# Patient Record
Sex: FEMALE | Race: BLACK OR AFRICAN AMERICAN | NOT HISPANIC OR LATINO | Employment: PART TIME | URBAN - METROPOLITAN AREA
[De-identification: names, ages, dates, MRNs, and addresses within clinical notes are randomized per-mention and may not be internally consistent; named-entity substitution may affect disease eponyms.]

---

## 2020-06-15 ENCOUNTER — OFFICE VISIT - HEALTHEAST (OUTPATIENT)
Dept: FAMILY MEDICINE | Facility: CLINIC | Age: 25
End: 2020-06-15

## 2020-06-15 DIAGNOSIS — D57.00 HB-SS DISEASE WITH CRISIS (H): ICD-10-CM

## 2020-06-15 RX ORDER — CYCLOBENZAPRINE HCL 10 MG
10 TABLET ORAL 2 TIMES DAILY PRN
Status: ON HOLD | COMMUNITY
Start: 2020-02-18 | End: 2023-01-19

## 2020-06-15 RX ORDER — ALBUTEROL SULFATE 0.83 MG/ML
SOLUTION RESPIRATORY (INHALATION)
Status: SHIPPED | COMMUNITY
Start: 2020-02-18 | End: 2022-08-04

## 2020-06-15 RX ORDER — ACETAMINOPHEN 325 MG/1
TABLET ORAL
Status: SHIPPED | COMMUNITY
Start: 2020-03-01 | End: 2023-01-14

## 2021-03-09 ENCOUNTER — COMMUNICATION - HEALTHEAST (OUTPATIENT)
Dept: SCHEDULING | Facility: CLINIC | Age: 26
End: 2021-03-09

## 2021-05-12 ENCOUNTER — COMMUNICATION - HEALTHEAST (OUTPATIENT)
Dept: SCHEDULING | Facility: CLINIC | Age: 26
End: 2021-05-12

## 2021-06-04 VITALS
SYSTOLIC BLOOD PRESSURE: 130 MMHG | WEIGHT: 176.3 LBS | HEART RATE: 84 BPM | TEMPERATURE: 98.7 F | RESPIRATION RATE: 14 BRPM | DIASTOLIC BLOOD PRESSURE: 77 MMHG | OXYGEN SATURATION: 100 %

## 2021-06-08 NOTE — PROGRESS NOTES
"Chief Complaint   Patient presents with     Arm Pain     Left arm pain, shoulder to elbow, sharp pain, constant sensation, gets more intense, started today       ASSESSMENT & PLAN:   Diagnoses and all orders for this visit:    Hb-SS disease with crisis (H)        MDM:  Patient with severe right upper arm pain and tenderness consistent with previous sickle cell flareups. Patient informed we do not have adequate capability to fully assess sickle cell flareups here.  Very slight scleral icterus noted as well.      Patient normally goes to [facility], but states she does not want to go downtown and is having difficulty obtaining a ride today.  Patient reminded we are across the street from [facility] emergency room that they can see her previous information.  Patient says she will go to [facility] for further care.  Did discuss with Dr. Jamison in the emergency room.    Supportive care discussed.  See discharge instructions below for specific recommendations given.    At the end of the encounter, I discussed results, diagnosis, medications. Discussed red flags for immediate return to clinic/ER, as well as indications for follow up if no improvement. Patient and/or caregiver understood and agreed to plan. Patient was stable for discharge.    SUBJECTIVE    HPI:  HPI  Eveline Gage presents to the walk-in clinic with   Chief Complaint   Patient presents with     Arm Pain     Left arm pain, shoulder to elbow, sharp pain, constant sensation, gets more intense, started today     Severe right (not left as stated by MA) upper arm pain.  Pain 8/10.  Got much worse throughout the day last 6 hours ago.  \"Pressure\" like if someone is squeezing arm and is starting to feel a little numb as if a \"rubber band is around the arm cutting off the circulation a little bit.\"      Normally gets flare ups with seasons changing.  Says flareups will oftentimes involve 1 or more upper or lower extremities.     Tried tylenol PTA; " doesn't have opiates at home.      Difficulty moving her arm due to pain.    Denies: fevers, cough, dysuria, other urinary/infectious sx.      Known exposures: none     See ROS for additional symptoms and/or pertinent negatives.       History obtained from the patient.    No past medical history on file.    Active Ambulatory (Non-Hospital) Problems    Diagnosis     Sickle cell disease (H)     Elevated liver enzymes     Adjustment disorder with mixed anxiety and depressed mood     Postpartum mood disturbance     Asthma       No family history on file.    Social History     Tobacco Use     Smoking status: Current Every Day Smoker     Smokeless tobacco: Never Used   Substance Use Topics     Alcohol use: Not on file       Review of Systems   Constitutional: Negative for chills and fever.   HENT: Negative for congestion and rhinorrhea.    Respiratory: Negative for cough and shortness of breath.    Cardiovascular: Negative for chest pain.   Gastrointestinal: Negative for abdominal pain and vomiting.   Musculoskeletal: Positive for neck stiffness (mild lateral neck ).   Skin: Negative for rash.   Neurological: Negative for dizziness and headaches.       OBJECTIVE    Vitals:    06/15/20 1833   BP: 130/77   Pulse: 84   Resp: 14   Temp: 98.7  F (37.1  C)   TempSrc: Oral   SpO2: 100%   Weight: 176 lb 4.8 oz (80 kg)       Physical Exam  Constitutional:       General: She is not in acute distress.     Appearance: She is well-developed.   HENT:      Right Ear: External ear normal.      Left Ear: External ear normal.      Nose: No congestion or rhinorrhea.   Eyes:      General:         Right eye: No discharge.         Left eye: No discharge.      Conjunctiva/sclera: Conjunctivae normal.   Neck:      Musculoskeletal: Muscular tenderness (rt lateral ) present.   Cardiovascular:      Rate and Rhythm: Normal rate.      Pulses: Normal pulses.   Pulmonary:      Effort: Pulmonary effort is normal.   Musculoskeletal: Normal range of  motion.         General: Tenderness (rt upper extremity ) present.   Skin:     General: Skin is warm and dry.      Capillary Refill: Capillary refill takes less than 2 seconds.   Neurological:      Mental Status: She is alert and oriented to person, place, and time.   Psychiatric:         Mood and Affect: Mood normal.         Behavior: Behavior normal.         Thought Content: Thought content normal.         Judgment: Judgment normal.         Labs:  No results found for this or any previous visit (from the past 240 hour(s)).      Radiology:    No results found.    PATIENT INSTRUCTIONS:   Patient Instructions   Recommend ER for further care.

## 2021-06-16 PROBLEM — J45.909 ASTHMA: Status: ACTIVE | Noted: 2017-03-09

## 2021-06-20 NOTE — LETTER
Letter by Carrie Soriano CNP at      Author: Carrie Soriano CNP Service: -- Author Type: --    Filed:  Encounter Date: 6/15/2020 Status: (Other)         Melissa 15, 2020     Patient: Eveline Gage   YOB: 1995   Date of Visit: 6/15/2020       To Whom It May Concern:    It is my medical opinion that Eveline Gage was seen today in my clinic for an urgent clinic.    If you have any questions or concerns, please don't hesitate to call.    Sincerely,        Electronically signed by Carrie Soriano CNP

## 2021-08-22 ENCOUNTER — HEALTH MAINTENANCE LETTER (OUTPATIENT)
Age: 26
End: 2021-08-22

## 2021-08-25 ENCOUNTER — HOSPITAL ENCOUNTER (EMERGENCY)
Facility: CLINIC | Age: 26
Discharge: HOME | End: 2021-08-25
Attending: EMERGENCY MEDICINE | Admitting: EMERGENCY MEDICINE
Payer: COMMERCIAL

## 2021-08-25 ENCOUNTER — APPOINTMENT (OUTPATIENT)
Dept: RADIOLOGY | Facility: CLINIC | Age: 26
End: 2021-08-25
Attending: EMERGENCY MEDICINE
Payer: COMMERCIAL

## 2021-08-25 VITALS
HEART RATE: 88 BPM | HEIGHT: 63 IN | SYSTOLIC BLOOD PRESSURE: 129 MMHG | TEMPERATURE: 98.7 F | OXYGEN SATURATION: 96 % | DIASTOLIC BLOOD PRESSURE: 70 MMHG | WEIGHT: 186 LBS | RESPIRATION RATE: 16 BRPM | BODY MASS INDEX: 32.96 KG/M2

## 2021-08-25 DIAGNOSIS — U07.1 2019 NOVEL CORONAVIRUS DISEASE (COVID-19): ICD-10-CM

## 2021-08-25 DIAGNOSIS — D57.00 SICKLE CELL PAIN CRISIS (H): ICD-10-CM

## 2021-08-25 LAB
ALBUMIN SERPL-MCNC: 4 G/DL (ref 3.5–5)
ALP SERPL-CCNC: 118 U/L (ref 45–120)
ALT SERPL W P-5'-P-CCNC: 76 U/L (ref 0–45)
ANION GAP SERPL CALCULATED.3IONS-SCNC: 9 MMOL/L (ref 5–18)
AST SERPL W P-5'-P-CCNC: 63 U/L (ref 0–40)
BASOPHILS # BLD MANUAL: 0 10E3/UL (ref 0–0.2)
BASOPHILS NFR BLD MANUAL: 0 %
BILIRUB SERPL-MCNC: 2.5 MG/DL (ref 0–1)
BUN SERPL-MCNC: 7 MG/DL (ref 8–22)
CALCIUM SERPL-MCNC: 9 MG/DL (ref 8.5–10.5)
CHLORIDE BLD-SCNC: 108 MMOL/L (ref 98–107)
CO2 SERPL-SCNC: 20 MMOL/L (ref 22–31)
CREAT SERPL-MCNC: 0.71 MG/DL (ref 0.6–1.1)
EOSINOPHIL # BLD MANUAL: 0.2 10E3/UL (ref 0–0.7)
EOSINOPHIL NFR BLD MANUAL: 2 %
ERYTHROCYTE [DISTWIDTH] IN BLOOD BY AUTOMATED COUNT: 16 % (ref 10–15)
GFR SERPL CREATININE-BSD FRML MDRD: >90 ML/MIN/1.73M2
GLUCOSE BLD-MCNC: 112 MG/DL (ref 70–125)
HCT VFR BLD AUTO: 27 % (ref 35–47)
HGB BLD-MCNC: 9 G/DL (ref 11.7–15.7)
LYMPHOCYTES # BLD MANUAL: 4.1 10E3/UL (ref 0.8–5.3)
LYMPHOCYTES NFR BLD MANUAL: 33 %
MCH RBC QN AUTO: 28 PG (ref 26.5–33)
MCHC RBC AUTO-ENTMCNC: 33.3 G/DL (ref 31.5–36.5)
MCV RBC AUTO: 84 FL (ref 78–100)
MONOCYTES # BLD MANUAL: 0.7 10E3/UL (ref 0–1.3)
MONOCYTES NFR BLD MANUAL: 6 %
NEUTROPHILS # BLD MANUAL: 7.3 10E3/UL (ref 1.6–8.3)
NEUTROPHILS NFR BLD MANUAL: 59 %
PLAT MORPH BLD: ABNORMAL
PLATELET # BLD AUTO: 334 10E3/UL (ref 150–450)
POTASSIUM BLD-SCNC: 3.6 MMOL/L (ref 3.5–5)
PROT SERPL-MCNC: 7.9 G/DL (ref 6–8)
RBC # BLD AUTO: 3.22 10E6/UL (ref 3.8–5.2)
RBC MORPH BLD: ABNORMAL
RETICS # AUTO: 0.21 10E6/UL (ref 0.01–0.11)
RETICS/RBC NFR AUTO: 6.4 % (ref 0.8–2.7)
SARS-COV-2 RNA RESP QL NAA+PROBE: POSITIVE
SICKLE CELLS BLD QL SMEAR: SLIGHT
SODIUM SERPL-SCNC: 137 MMOL/L (ref 136–145)
TARGETS BLD QL SMEAR: SLIGHT
TROPONIN I SERPL-MCNC: 0.03 NG/ML (ref 0–0.29)
WBC # BLD AUTO: 12.3 10E3/UL (ref 4–11)

## 2021-08-25 PROCEDURE — 258N000003 HC RX IP 258 OP 636: Performed by: EMERGENCY MEDICINE

## 2021-08-25 PROCEDURE — 87635 SARS-COV-2 COVID-19 AMP PRB: CPT | Performed by: EMERGENCY MEDICINE

## 2021-08-25 PROCEDURE — C9803 HOPD COVID-19 SPEC COLLECT: HCPCS

## 2021-08-25 PROCEDURE — 71046 X-RAY EXAM CHEST 2 VIEWS: CPT

## 2021-08-25 PROCEDURE — 250N000013 HC RX MED GY IP 250 OP 250 PS 637: Performed by: EMERGENCY MEDICINE

## 2021-08-25 PROCEDURE — 96361 HYDRATE IV INFUSION ADD-ON: CPT

## 2021-08-25 PROCEDURE — 85027 COMPLETE CBC AUTOMATED: CPT | Performed by: EMERGENCY MEDICINE

## 2021-08-25 PROCEDURE — 82040 ASSAY OF SERUM ALBUMIN: CPT | Performed by: EMERGENCY MEDICINE

## 2021-08-25 PROCEDURE — 93005 ELECTROCARDIOGRAM TRACING: CPT | Performed by: EMERGENCY MEDICINE

## 2021-08-25 PROCEDURE — 36415 COLL VENOUS BLD VENIPUNCTURE: CPT | Performed by: EMERGENCY MEDICINE

## 2021-08-25 PROCEDURE — 84484 ASSAY OF TROPONIN QUANT: CPT | Performed by: EMERGENCY MEDICINE

## 2021-08-25 PROCEDURE — 96374 THER/PROPH/DIAG INJ IV PUSH: CPT

## 2021-08-25 PROCEDURE — 99285 EMERGENCY DEPT VISIT HI MDM: CPT | Mod: 25

## 2021-08-25 PROCEDURE — 85045 AUTOMATED RETICULOCYTE COUNT: CPT | Performed by: EMERGENCY MEDICINE

## 2021-08-25 PROCEDURE — 250N000011 HC RX IP 250 OP 636: Performed by: EMERGENCY MEDICINE

## 2021-08-25 PROCEDURE — 82565 ASSAY OF CREATININE: CPT | Performed by: EMERGENCY MEDICINE

## 2021-08-25 PROCEDURE — 96375 TX/PRO/DX INJ NEW DRUG ADDON: CPT

## 2021-08-25 RX ORDER — HYDROMORPHONE HYDROCHLORIDE 1 MG/ML
0.5 INJECTION, SOLUTION INTRAMUSCULAR; INTRAVENOUS; SUBCUTANEOUS ONCE
Status: COMPLETED | OUTPATIENT
Start: 2021-08-25 | End: 2021-08-25

## 2021-08-25 RX ORDER — OXYCODONE HYDROCHLORIDE 5 MG/1
5 TABLET ORAL ONCE
Status: COMPLETED | OUTPATIENT
Start: 2021-08-25 | End: 2021-08-25

## 2021-08-25 RX ORDER — OXYCODONE HYDROCHLORIDE 5 MG/1
TABLET ORAL
Qty: 12 TABLET | Refills: 0 | Status: SHIPPED | OUTPATIENT
Start: 2021-08-25 | End: 2023-01-14

## 2021-08-25 RX ORDER — ACETAMINOPHEN 325 MG/1
975 TABLET ORAL ONCE
Status: COMPLETED | OUTPATIENT
Start: 2021-08-25 | End: 2021-08-25

## 2021-08-25 RX ORDER — SODIUM CHLORIDE 9 MG/ML
INJECTION, SOLUTION INTRAVENOUS CONTINUOUS
Status: DISCONTINUED | OUTPATIENT
Start: 2021-08-25 | End: 2021-08-25 | Stop reason: HOSPADM

## 2021-08-25 RX ORDER — KETOROLAC TROMETHAMINE 15 MG/ML
15 INJECTION, SOLUTION INTRAMUSCULAR; INTRAVENOUS ONCE
Status: COMPLETED | OUTPATIENT
Start: 2021-08-25 | End: 2021-08-25

## 2021-08-25 RX ADMIN — SODIUM CHLORIDE 1000 ML: 9 INJECTION, SOLUTION INTRAVENOUS at 16:39

## 2021-08-25 RX ADMIN — HYDROMORPHONE HYDROCHLORIDE 0.5 MG: 1 INJECTION, SOLUTION INTRAMUSCULAR; INTRAVENOUS; SUBCUTANEOUS at 17:33

## 2021-08-25 RX ADMIN — ACETAMINOPHEN 975 MG: 325 TABLET ORAL at 16:17

## 2021-08-25 RX ADMIN — OXYCODONE HYDROCHLORIDE 5 MG: 5 TABLET ORAL at 16:17

## 2021-08-25 RX ADMIN — KETOROLAC TROMETHAMINE 15 MG: 15 INJECTION, SOLUTION INTRAMUSCULAR; INTRAVENOUS at 16:16

## 2021-08-25 ASSESSMENT — MIFFLIN-ST. JEOR: SCORE: 1547.82

## 2021-08-25 NOTE — Clinical Note
Eveline Gage was seen and treated in our emergency department on 8/25/2021.  She may return to work on 09/01/2021.       If you have any questions or concerns, please don't hesitate to call.      Giovanna Arango MD

## 2021-08-25 NOTE — ED PROVIDER NOTES
EMERGENCY DEPARTMENT ENCOUNTER       ED Course & Medical Decision Making       Final Impression  25-year-old female with history of sickle cell disease and asthma presents for evaluation of multiple symptoms suggestive of viral syndrome, particularly concerning for COVID-19 given ongoing surge in pandemic, partial vaccination status, and specific symptom of loss of taste and smell.  Additionally with some myalgias worse in lower extremities that feels similar to prior sickle cell pain crises.  Taking ibuprofen alone at home with limited improvement.  Will add Tylenol, dose of toradol and oxycodone here in the ED. Suspect elevated heart rate is due to a combination of pain and possible dehydration with some loose stools as opposed to sepsis.  She does endorse a productive cough and some mild dyspnea though these could well be attributed to COVID-19.  Some chest pain that is largely posttussive though does have a pleuritic component.  Chest x-ray appears normal with no signs of viral pneumonia nor focal infiltrates to suggest a superimposed bacterial pneumonia or acute chest syndrome.  Some subjective fevers though no documented fevers including on checks at home.  No hemoptysis with overall low concern for PE at this time.  Oxygen saturation 100% and remains in the high 90s including with ambulation.  Will gently hydrate and treat pain.  Low concern for SBI. Discussed supportive care and need for self isolation.  Anticipate likely discharge home with strict return precautions.     4:16 PM I met with the patient for the initial interview and physical examination. Discussed plan for treatment and workup in the ED.    5:27 PM I rechecked and updated the patient. Pain improved from 8/10 to 6/10 will order more pain meds.     PPE: Provider wore gloves, N95 mask, eye protection, surgical cap, and paper mask.   Prior to making a final disposition on this patient the results of patient's tests and other diagnostic studies  were discussed with the patient. All questions were answered. Patient expressed understanding of the plan and was amenable to it.      ED Course as of Aug 25 2046   Wed Aug 25, 2021   1644 Labs w/ +Covid swab. Chronic stable anemia, mildly elevated tbili from prior, mild transaminitis which could be 2/2 covid. Retic count appropriately elevated. Mild leukocytosis. CXR unremarkable.      1714 EKG shows normal sinus rhythm with sinus arrhythmia rate of 70.  Normal EKG with no acute ST-T wave abnormalities no STEMI.  No right heart strain.  No old EKG available for comparison.      1727 Pain 6/10 from 8/10 on arrival. Goal is 4-5/10. Lungs CTAB. HR down to 65 s/p IVF and pain meds. Reviewed return precautions.       1752 Documented respiratory rates noted to be elevated - these appear falsely elevated in comparison to patient exam, with pt having normal WOB.            Medications   0.9% sodium chloride BOLUS (0 mLs Intravenous Stopped 8/25/21 1827)     Followed by   sodium chloride 0.9% infusion (has no administration in time range)   ketorolac (TORADOL) injection 15 mg (15 mg Intravenous Given 8/25/21 1616)   oxyCODONE (ROXICODONE) tablet 5 mg (5 mg Oral Given 8/25/21 1617)   acetaminophen (TYLENOL) tablet 975 mg (975 mg Oral Given 8/25/21 1617)   HYDROmorphone (PF) (DILAUDID) injection 0.5 mg (0.5 mg Intravenous Given 8/25/21 1733)       Final Impression     1. 2019 novel coronavirus disease (COVID-19)    2. Sickle cell pain crisis (H)          Chief Complaint     Chief Complaint   Patient presents with     Covid 19 Testing     Sickle Cell Pain Crisis       Covid 19 Testing and Sickle Cell Pain Crisis      HPI     Eveline Gage is a 25 year old female with a pertinent medical history of sickle cell disease and asthma who presents for evaluation of fever, cough, rhinorrhea, myalgias, and dyspnea.     Patient reports a 3 day history of subjective fever (Tmax 99.2F), cough productive of clear/green mucous,  rhinorrhea, myalgias, loss of taste and smell, mild HA, and diarrhea. She also notes some right-sided chest pain that worsens with coughing and deep breathing. Patient also endorses some bilateral leg pain (improved some with ibuprofen) that feels similar to her usual pain caused by her sickle cell anemia. No focal joint pain/swelling/redness. Patient denies nausea, emesis, hemoptysis, and any other symptoms or complaints at this time.    Patient is partially vaccinated with Pfizer 2-dose regimen. Second dose just given Friday (8/20) and first dose given 1 month prior. Patient's fiancee was recently ill with similar symptoms. Patient has 4 year old daughter who is in . No recent travel/immob/inj/surg. No hx VTE or cancer. Has nexplanon for contraception.     I, Maria Fernanda Koch am serving as a scribe to document services personally performed by Giovanna Arango MD, based on my observation and the provider's statements to me. I, Giovanna Arango MD attest that Maria Fernanda Koch is acting in a scribe capacity, has observed my performance of the services and has documented them in accordance with my direction.    Past Medical History     Patient Active Problem List    Diagnosis Date Noted     Sickle cell disease (H) 06/15/2020     Priority: Medium     Elevated liver enzymes 03/12/2018     Priority: Medium     Adjustment disorder with mixed anxiety and depressed mood 10/05/2017     Priority: Medium     Postpartum mood disturbance 10/05/2017     Priority: Medium     Asthma 03/09/2017     Priority: Medium       No past medical history on file.  No past surgical history on file.  No family history on file.   Social History     Tobacco Use     Smoking status: Current Every Day Smoker     Smokeless tobacco: Never Used   Substance Use Topics     Alcohol use: Not on file     Drug use: Not on file       Relevant past medical, surgical, family and social history as documented above, has been reviewed and discussed with patient. No changes  or additions, unless otherwise noted in the HPI.    Current Medications     Discharge Medication List as of 8/25/2021  6:31 PM      CONTINUE these medications which have CHANGED    Details   oxyCODONE (ROXICODONE) 5 MG tablet 1-2 tabs q6hr PRN breakthrough pain, Disp-12 tablet, R-0, Local Print         CONTINUE these medications which have NOT CHANGED    Details   acetaminophen (TYLENOL) 325 MG tablet [ACETAMINOPHEN (TYLENOL) 325 MG TABLET] , Historical      albuterol (PROVENTIL) 2.5 mg /3 mL (0.083 %) nebulizer solution [ALBUTEROL (PROVENTIL) 2.5 MG /3 ML (0.083 %) NEBULIZER SOLUTION] , Historical      cyclobenzaprine (FLEXERIL) 10 MG tablet [CYCLOBENZAPRINE (FLEXERIL) 10 MG TABLET] , Historical      famotidine (PEPCID) 20 MG tablet [FAMOTIDINE (PEPCID) 20 MG TABLET] Take 1 tablet (20 mg total) by mouth 2 (two) times a day., Disp-30 tablet, R-0, Normal      lidocaine (LIDODERM) 5 % [LIDOCAINE (LIDODERM) 5 %] Remove & Discard patch within 12 hours or as directed by MDDisp-15 patch, R-0Print      omeprazole (PRILOSEC) 20 MG capsule [OMEPRAZOLE (PRILOSEC) 20 MG CAPSULE] Take 1 capsule (20 mg total) by mouth daily before breakfast., Disp-30 capsule, R-0, Normal             Allergies     Allergies   Allergen Reactions     Blood-Group Specific Substance Other (See Comments)     Patient has anti-C, anti-S, anti-Jkb, anti-M, and a nonspecific antibody. Blood product orders may be delayed. Draw THREE purple top tubes for all Type and Screen/Type and crossmatch orders.     Penicillins Rash       Review of Systems     Constitutional: Positive for fever (subjective), loss of taste, loss of smell  Eyes: Denies discharge  HENT: Denies sore throat  Positive for rhinorrhea  Respiratory: Positive for cough, hemoptysis  Cardiovascular: Positive for chest pain (right sided)  GI: Denies vomiting, nausea  Positive for diarrhea  : Denies dysuria  Musculoskeletal: Positive for myalgias, bilateral leg pain  Skin: Denies  "rashes  Neurologic: Positive for headache (mild)    Remainder of systems reviewed, unless noted in HPI all others negative.    Physical Exam     /70   Pulse 88   Temp 98.7  F (37.1  C) (Temporal)   Resp 16   Ht 1.6 m (5' 3\")   Wt 84.4 kg (186 lb)   SpO2 96%   BMI 32.95 kg/m    Constitutional: Awake, alert, in no acute distress  Head: Normocephalic, atraumatic.  ENT: Mucous membranes moist.   Eyes: Conjunctiva normal  Respiratory: Respirations even, unlabored. Lungs clear to ascultation bilaterally.  Cardiovascular: Regular rate and rhythm. No pitting edema.   GI: Abdomen soft, non-tender. No guarding or rebound.   Musculoskeletal: No deformity. No calf asymmetry or tenderness. No palpable cords. No focal joint tenderness or swelling.   Integument: Warm, dry. No rash.   Neurologic: Alert & oriented x 3. Normal speech. Grossly normal motor and sensory function. No focal deficits noted.  Psychiatric: Normal mood and affect. Normal judgment.       Labs     Labs Ordered and Resulted from Time of ED Arrival Up to the Time of Departure from the ED   COMPREHENSIVE METABOLIC PANEL - Abnormal; Notable for the following components:       Result Value    Chloride 108 (*)     Carbon Dioxide (CO2) 20 (*)     Urea Nitrogen 7 (*)     AST 63 (*)     ALT 76 (*)     Bilirubin Total 2.5 (*)     All other components within normal limits   RETICULOCYTE COUNT - Abnormal; Notable for the following components:    % Reticulocyte 6.4 (*)     Absolute Reticulocyte 0.206 (*)     All other components within normal limits   COVID-19 VIRUS (CORONAVIRUS) BY PCR - Abnormal; Notable for the following components:    SARS CoV2 PCR Positive (*)     All other components within normal limits    Narrative:     Testing was performed using the khoi  SARS-CoV-2 & Influenza A/B Assay on the khoi  [employer]gn[employer]  System.  This test should be ordered for the detection of SARS-COV-2 in individuals who meet SARS-CoV-2 clinical and/or epidemiological criteria. " Test performance is unknown in asymptomatic patients.  This test is for in vitro diagnostic use under the FDA EUA for laboratories certified under CLIA to perform moderate and/or high complexity testing. This test has not been FDA cleared or approved.  A negative test does not rule out the presence of PCR inhibitors in the specimen or target RNA in concentration below the limit of detection for the assay. The possibility of a false negative should be considered if the patient's recent exposure or clinical presentation suggests COVID-19.  [facility] are certified under the Clinical Laboratory Improvement Amendments of 1988 (CLIA-88) as qualified to perform moderate and/or high complexity laboratory testing.   CBC WITH PLATELETS AND DIFFERENTIAL - Abnormal; Notable for the following components:    WBC Count 12.3 (*)     RBC Count 3.22 (*)     Hemoglobin 9.0 (*)     Hematocrit 27.0 (*)     RDW 16.0 (*)     All other components within normal limits   DIFFERENTIAL - Abnormal; Notable for the following components:    Sickle Cells Slight (*)     Target Cells Slight (*)     All other components within normal limits   TROPONIN I - Normal   CBC WITH PLATELETS & DIFFERENTIAL    Narrative:     The following orders were created for panel order CBC with platelets differential.  Procedure                               Abnormality         Status                     ---------                               -----------         ------                     CBC with platelets and d...[[id number]]  Abnormal            Final result               Manual Differential[[id number]]          Abnormal            Final result                 Please view results for these tests on the individual orders.   PERIPHERAL IV CATHETER   PATIENT CARE ORDER   PERIPHERAL IV CATHETER   HCG QUALITATIVE URINE POCT         EKG          EKG Interpretation:      Impression: Normal sinus rhythm with sinus arrhythmia rate of 70.  Normal EKG with no  acute ST-T wave abnormalities, no STEMI. No right heart strain. No old EKG available for comparison.    Rate: 70 bpm  Rhythm: Sinus rhythm with sinus arrhythmia  Axis: 50  NV: 150 ms   QRS: 72 ms  QTc: 399 ms  ST changes: as above  Comparison to prior: as above      Radiology     I have ordered and reviewed the following imaging exams. Upon independent review of the images and radiology reads, I agree with the reads documented below.    XR Chest 2 Views   Final Result   IMPRESSION: Negative chest.  The lungs are clear and there are no pleural effusions. Normal heart size.              Giovanan Arango MD  08/25/21 2040

## 2021-08-25 NOTE — DISCHARGE INSTRUCTIONS
For pain take acetaminophen (Tylenol) 1000 mg every 4-6 hours (maximum 4000 mg per day). If needed you can also take ibuprofen 600 mg every 6-8 hours. Take the ibuprofen with food to prevent stomach irritation.  We have prescribed a stronger opiate pain medication called oxycodone. This medication can cause drowsiness, so do not operate heavy machinery or perform certain tasks while taking it.  The medication also commonly causes constipation, so we recommend that you obtain a stool softener over-the-counter to take with it.  This medication can also be habit-forming (addicting), so please take the minimum amount possible for minimum possible duration.    Come back to the ER if you have worsening symptoms including worsening shortness of breath, coughing up blood, severe dizziness, persistent fevers, or other symptoms that worry you.  Thanks and we hope you feel better soon.

## 2021-08-26 LAB
ATRIAL RATE - MUSE: 70 BPM
DIASTOLIC BLOOD PRESSURE - MUSE: NORMAL MMHG
INTERPRETATION ECG - MUSE: NORMAL
P AXIS - MUSE: 7 DEGREES
PR INTERVAL - MUSE: 150 MS
QRS DURATION - MUSE: 72 MS
QT - MUSE: 370 MS
QTC - MUSE: 399 MS
R AXIS - MUSE: 50 DEGREES
SYSTOLIC BLOOD PRESSURE - MUSE: NORMAL MMHG
T AXIS - MUSE: 34 DEGREES
VENTRICULAR RATE- MUSE: 70 BPM

## 2021-10-17 ENCOUNTER — HEALTH MAINTENANCE LETTER (OUTPATIENT)
Age: 26
End: 2021-10-17

## 2021-12-20 ENCOUNTER — HOSPITAL ENCOUNTER (EMERGENCY)
Facility: CLINIC | Age: 26
Discharge: HOME | End: 2021-12-20
Attending: EMERGENCY MEDICINE | Admitting: EMERGENCY MEDICINE
Payer: COMMERCIAL

## 2021-12-20 VITALS
RESPIRATION RATE: 18 BRPM | WEIGHT: 182 LBS | DIASTOLIC BLOOD PRESSURE: 71 MMHG | BODY MASS INDEX: 32.24 KG/M2 | TEMPERATURE: 99.1 F | HEART RATE: 87 BPM | OXYGEN SATURATION: 99 % | SYSTOLIC BLOOD PRESSURE: 153 MMHG

## 2021-12-20 DIAGNOSIS — Z20.822 SUSPECTED 2019 NOVEL CORONAVIRUS INFECTION: ICD-10-CM

## 2021-12-20 LAB
FLUAV RNA SPEC QL NAA+PROBE: NEGATIVE
FLUBV RNA RESP QL NAA+PROBE: NEGATIVE
SARS-COV-2 RNA RESP QL NAA+PROBE: NEGATIVE

## 2021-12-20 PROCEDURE — 87636 SARSCOV2 & INF A&B AMP PRB: CPT | Performed by: EMERGENCY MEDICINE

## 2021-12-20 PROCEDURE — C9803 HOPD COVID-19 SPEC COLLECT: HCPCS

## 2021-12-20 PROCEDURE — 99283 EMERGENCY DEPT VISIT LOW MDM: CPT

## 2021-12-20 ASSESSMENT — ENCOUNTER SYMPTOMS
DIAPHORESIS: 1
HEADACHES: 0
TROUBLE SWALLOWING: 0
ADENOPATHY: 0
COUGH: 1
SHORTNESS OF BREATH: 1
MYALGIAS: 0
CONFUSION: 0

## 2021-12-20 NOTE — Clinical Note
Eveline Gage was seen and treated in our emergency department on 12/20/2021.  She may return to work on 12/30/2021.  Covid exposure and has symptoms     If you have any questions or concerns, please don't hesitate to call.      Sg Erickson MD

## 2021-12-20 NOTE — ED TRIAGE NOTES
Patient is here with a cough and shortness of breath.She does have asthma as well. Her  is covid positive.

## 2021-12-21 NOTE — ED PROVIDER NOTES
EMERGENCY DEPARTMENT ENCOUNTER      NAME: Eveline Gage  AGE: 26 year old female  YOB: 1995  MRN: [id number]  EVALUATION DATE & TIME: 12/20/2021  5:47 PM    [facility]        Chief Complaint   Patient presents with     Cough     Shortness of Breath         FINAL IMPRESSION:  1. Suspected 2019 novel coronavirus infection          ED COURSE & MEDICAL DECISION MAKING:    Pertinent Labs & Imaging studies reviewed. (See chart for details)  26 year old female presents to the Emergency Department for evaluation of cough and SOB    ED Course as of 12/20/21 2014   Mon Dec 20, 2021   1803 Patient declined x-ray   1925 Cough, myalgias after being exposed to Covid.  Is vaccinated.  Has history of asthma.  Is declining x-ray.  On exam no wheezing or crackles or increased work of breathing.   1925 Pulmonary bullae, ACS, Bactrim pneumonia unlikely   1925 COVID-19 test ordered   2011 SARS CoV2 PCR: Negative   2013 Covid test is negative however just her developing symptoms yesterday.  Lives with somebody was COVID-19 therefore needs to isolate since she has symptoms despite the negative Covid test.  Recommend repeat Covid test in 72 hours if positive recommend monoclonal antibody treatment.     Exam and history is not suggestive of an asthma exacerbation    6:47 PM I met with the patient to gather history and to perform my initial exam. I discussed the plan for care while in the Emergency Department. PPE (gown, gloves, face shield, N95 mask) was worn by me during patient encounters while patient wore mask.       At the conclusion of the encounter I discussed the results of all of the tests and the disposition. The questions were answered. The patient or family acknowledged understanding and was agreeable with the care plan.           MEDICATIONS GIVEN IN THE EMERGENCY:  Medications - No data to display    NEW PRESCRIPTIONS STARTED AT TODAY'S ER VISIT  New Prescriptions    No  medications on file            =================================================================    HPI    Patient information was obtained from: Patient    Use of Intrepreter: N/A         Eveline Gage is a 26 year old female with a pertinent history of asthma and sickle cell disease who presents to this ED by walk in accompanied by daughter and  for evaluation of cough and shortness of breath.    Patient reports feeling unwell since yesterday with diaphoresis, cough, and mild shortness of breath. Denies chest pain. Patient does have a history of asthma and has albuterol at home but has not had to use it yet. She does smoke. Patient has been vaccinated x2 for COVID but has not received the booster. Patient denies additional medical concerns or complaints at this time.      REVIEW OF SYSTEMS   Review of Systems   Constitutional: Positive for diaphoresis.   HENT: Negative for trouble swallowing.    Respiratory: Positive for cough and shortness of breath (mild).    Cardiovascular: Negative for chest pain.   Musculoskeletal: Negative for myalgias.   Skin: Negative for rash.   Allergic/Immunologic: Negative for immunocompromised state.   Neurological: Negative for headaches.   Hematological: Negative for adenopathy.   Psychiatric/Behavioral: Negative for confusion.     PAST MEDICAL HISTORY:  History reviewed. No pertinent past medical history.    PAST SURGICAL HISTORY:  History reviewed. No pertinent surgical history.        CURRENT MEDICATIONS:    Patient's Medications   New Prescriptions    No medications on file   Previous Medications    ACETAMINOPHEN (TYLENOL) 325 MG TABLET    [ACETAMINOPHEN (TYLENOL) 325 MG TABLET]     ALBUTEROL (PROVENTIL) 2.5 MG /3 ML (0.083 %) NEBULIZER SOLUTION    [ALBUTEROL (PROVENTIL) 2.5 MG /3 ML (0.083 %) NEBULIZER SOLUTION]     CYCLOBENZAPRINE (FLEXERIL) 10 MG TABLET    [CYCLOBENZAPRINE (FLEXERIL) 10 MG TABLET]     FAMOTIDINE (PEPCID) 20 MG TABLET    [FAMOTIDINE (PEPCID) 20 MG  TABLET] Take 1 tablet (20 mg total) by mouth 2 (two) times a day.    LIDOCAINE (LIDODERM) 5 %    [LIDOCAINE (LIDODERM) 5 %] Remove & Discard patch within 12 hours or as directed by MD    OMEPRAZOLE (PRILOSEC) 20 MG CAPSULE    [OMEPRAZOLE (PRILOSEC) 20 MG CAPSULE] Take 1 capsule (20 mg total) by mouth daily before breakfast.    OXYCODONE (ROXICODONE) 5 MG TABLET    1-2 tabs q6hr PRN breakthrough pain   Modified Medications    No medications on file   Discontinued Medications    No medications on file       ALLERGIES:  Allergies   Allergen Reactions     Blood-Group Specific Substance Other (See Comments)     Patient has anti-C, anti-S, anti-Jkb, anti-M, and a nonspecific antibody. Blood product orders may be delayed. Draw THREE purple top tubes for all Type and Screen/Type and crossmatch orders.  Patient has anti-C, anti-S, anti-Jkb, anti-M, and a nonspecific antibody. Blood product orders may be delayed. Draw THREE purple top tubes for all Type and Screen/Type and crossmatch orders.  Patient has anti-C, anti-S, anti-Jkb, anti-M, and a nonspecific antibody. Blood product orders may be delayed. Draw THREE purple top tubes for all Type and Screen/Type and crossmatch orders.  Patient has anti-C, anti-S, anti-Jkb, anti-M, and a nonspecific antibody. Blood product orders may be delayed. Draw THREE purple top tubes for all Type and Screen/Type and crossmatch orders.       Penicillins Rash       FAMILY HISTORY:  History reviewed. No pertinent family history.    SOCIAL HISTORY:   Social History     Socioeconomic History     Marital status: Single     Spouse name: Not on file     Number of children: Not on file     Years of education: Not on file     Highest education level: Not on file   Occupational History     Not on file   Tobacco Use     Smoking status: Current Every Day Smoker     Smokeless tobacco: Never Used   Substance and Sexual Activity     Alcohol use: Not on file     Drug use: Not on file     Sexual activity: Yes      Birth control/protection: Implant   Other Topics Concern     Not on file   Social History Narrative     Not on file     Social Determinants of Health     Financial Resource Strain: Not on file   Food Insecurity: Not on file   Transportation Needs: Not on file   Physical Activity: Not on file   Stress: Not on file   Social Connections: Not on file   Intimate Partner Violence: Not on file   Housing Stability: Not on file       VITALS:  Patient Vitals for the past 24 hrs:   BP Temp Temp src Pulse Resp SpO2 Weight   12/20/21 1829 (!) 153/71 -- -- 87 18 99 % --   12/20/21 1501 112/60 99.1  F (37.3  C) Oral 99 16 99 % 82.6 kg (182 lb)       PHYSICAL EXAM      Vitals: BP (!) 153/71   Pulse 87   Temp 99.1  F (37.3  C) (Oral)   Resp 18   Wt 82.6 kg (182 lb)   SpO2 99%   BMI 32.24 kg/m    General: Appears in no acute distress, awake, alert, interactive.  Eyes: Conjunctivae non-injected. Sclera anicteric.  HENT: Atraumatic.  Neck: Supple.  Respiratory/Chest: Respiration unlabored. No wheezing or increased work of breathing.  Heart: RRR  Abdomen: non distended  Musculoskeletal: Normal extremities. No edema or erythema.  Skin: Normal color. No rash or diaphoresis.  Neurologic: Face symmetric, moves all extremities spontaneously. Speech clear.  Psychiatric: Oriented to person, place, and time. Affect appropriate.    LAB:  All pertinent labs reviewed and interpreted.  Results for orders placed or performed during the hospital encounter of 12/20/21   Symptomatic; Unknown Influenza A/B & SARS-CoV2 (COVID-19) Virus PCR Multiplex Nasopharyngeal    Specimen: Nasopharyngeal; Swab   Result Value Ref Range    Influenza A PCR Negative Negative    Influenza B PCR Negative Negative    SARS CoV2 PCR Negative Negative       RADIOLOGY:  Reviewed all pertinent imaging. Please see official radiology report.  No orders to display         Sandy LUNA, cristal serving as a scribe to document services personally performed by Dr. Georgina thompson  on my observation and the provider's statements to me. I, José Miguel Erickson MD attest that Sandy Cortesorn is acting in a scribe capacity, has observed my performance of the services and has documented them in accordance with my direction.    José Miguel Erickson M.D.  Emergency Medicine  [facility] EMERGENCY ROOM  [address] [id number]  [phone]  Dept: [phone]     Sg Erickson MD  12/20/21 2014

## 2021-12-21 NOTE — DISCHARGE INSTRUCTIONS
Your Covid test was negative however this could be a false negative test.  Since you were exposed to COVID-19 and have symptoms you need to isolate for a minimum of 10 days from when your symptoms started.  Recommend scheduling another Covid test and 72 hours at a Covid testing site.  If the repeat Covid test comes back positive recommend a monoclonal antibody treatment that is available for Department of Health

## 2022-01-18 ENCOUNTER — TELEPHONE (OUTPATIENT)
Dept: EMERGENCY MEDICINE | Facility: CLINIC | Age: 27
End: 2022-01-18
Payer: COMMERCIAL

## 2022-01-19 NOTE — TELEPHONE ENCOUNTER
"Coronavirus (COVID-19) Notification    Caller Name (Patient, parent, daughter/son, grandparent, etc)  The patient states she never received the covid results via my chart.Writer informed the patient the results were released to my chart. The patient is requesting the results in an email. Advised to contact the clinic to obtain the results.    Reason for call  Notify of Positive Coronavirus (COVID-19) lab results, assess symptoms,  review  [employer] recommendations    Lab Result    Lab test:  2019-nCoV rRt-PCR or SARS-CoV-2 PCR    Oropharyngeal AND/OR nasopharyngeal swabs is POSITIVE for 2019-nCoV RNA/SARS-COV-2 PCR (COVID-19 virus)    RN Recommendations/Instructions per [facility] Coronavirus COVID-19 recommendations    Brief introduction script  Introduce self then review script:  \"I am calling on behalf of [employer].  We were notified that your Coronavirus test (COVID-19) for was POSITIVE for the virus.  I have some information to relay to you but first I wanted to mention that the [place] Dept of Health will be contacting you shortly [it's possible MD already called Patient] to talk to you more about how you are feeling and other people you have had contact with who might now also have the virus.  Also,  [employer] is Partnering with the [facility] for Covid-19 research, you may be contacted directly by research staff.\"    Assessment (Inquire about Patient's current symptoms)   Assessment   Current Symptoms at time of phone call: (if no symptoms, document No symptoms] None   Symptoms onset (if applicable) NA     If at time of call, Patients symptoms hare worsened, the Patient should contact 911 or have someone drive them to Emergency Dept promptly:      If Patient calling 911, inform 911 personal that you have tested positive for the Coronavirus (COVID-19).  Place mask on and await 911 to arrive.    If Emergency Dept, If possible, please have another adult drive you to the Emergency " Dept but you need to wear mask when in contact with other people.      Monoclonal Antibody Administration    Is the patient symptomatic at the time of result notification? No.  Does the patient fit the criteria: No    Review information with Patient    Your result was positive. This means you have COVID-19 (coronavirus).  We have sent you a letter that reviews the information that I'll be reviewing with you now.    How can I protect others?    If you have symptoms: stay home and away from others (self-isolate) until:    You've had no fever--and no medicine that reduces fever--for 1 full day (24 hours). And       Your other symptoms have gotten better. For example, your cough or breathing has improved. And     At least 10 days have passed since your symptoms started. (If you've been told by a doctor that you have a weak immune system, wait 20 days.)     If you don't have symptoms: Stay home and away from others (self-isolate) until at least 10 days have passed since your first positive COVID-19 test. (Date test collected)    During this time:    Stay in your own room, including for meals. Use your own bathroom if you can.    Stay away from others in your home. No hugging, kissing or shaking hands. No visitors.     Don't go to work, school or anywhere else.     Clean  high touch  surfaces often (doorknobs, counters, handles, etc.). Use a household cleaning spray or wipes. You'll find a full list on the EPA website at www.epa.gov/pesticide-registration/list-n-disinfectants-use-against-sars-cov-2.     Cover your mouth and nose with a mask, tissue or other face covering to avoid spreading germs.    Wash your hands and face often with soap and water.    Make a list of people you have been in close contact with recently, even if either of you wore a face covering.   - Start your list from 2 days before you became ill or had a positive test.  - Include anyone that was within 6 feet of you for a cumulative total of 15  minutes or more in 24 hours. (Example: if you sat next to Vikram for 5 minutes in the morning and 10 minutes in the afternoon, then you were in close contact for 15 minutes total that day. Vikram would be added to your list.)    A public health worker will call or text you. It is important that you answer. They will ask you questions about possible exposures to COVID-19, such as people you have been in direct contact with and places you have visited.    Tell the people on your list that you have COVID-19; they should stay away from others for 14 days starting from the last time they were in contact with you (unless you are told something different from a public health worker).     Caregivers in these groups are at risk for severe illness due to COVID-19:  o People 65 years and older  o People who live in a nursing home or long-term care facility  o People with chronic disease (lung, heart, cancer, diabetes, kidney, liver, immunologic)  o People who have a weakened immune system, including those who:  - Are in cancer treatment  - Take medicine that weakens the immune system, such as corticosteroids  - Had a bone marrow or organ transplant  - Have an immune deficiency  - Have poorly controlled HIV or AIDS  - Are obese (body mass index of 40 or higher)  - Smoke regularly    Caregivers should wear gloves while washing dishes, handling laundry and cleaning bedrooms and bathrooms.    Wash and dry laundry with special caution. Don't shake dirty laundry, and use the warmest water setting you can.    If you have a weakened immune system, ask your doctor about other actions you should take.    For more tips, go to www.cdc.gov/coronavirus/2019-ncov/downloads/10Things.pdf.    You should not go back to work until you meet the guidelines above for ending your home isolation. You don't need to be retested for COVID-19 before going back to work--studies show that you won't spread the virus if it's been at least 10 days since your  symptoms started (or 20 days, if you have a weak immune system).    Employers: This document serves as formal notice of your employee's medical guidelines for going back to work. They must meet the above guidelines before going back to work in person.    How can I take care of myself?    1. Get lots of rest. Drink extra fluids (unless a doctor has told you not to).    2. Take Tylenol (acetaminophen) for fever or pain. If you have liver or kidney problems, ask your family doctor if it's okay to take Tylenol.     Take either:     650 mg (two 325 mg pills) every 4 to 6 hours, or     1,000 mg (two 500 mg pills) every 8 hours as needed.     Note: Don't take more than 3,000 mg in one day. Acetaminophen is found in many medicines (both prescribed and over-the-counter medicines). Read all labels to be sure you don't take too much.    For children, check the Tylenol bottle for the right dose (based on their age or weight).    3. If you have other health problems (like cancer, heart failure, an organ transplant or severe kidney disease): Call your specialty clinic if you don't feel better in the next 2 days.    4. Know when to call 911: Emergency warning signs include:    Trouble breathing or shortness of breath    Pain or pressure in the chest that doesn't go away    Feeling confused like you haven't felt before, or not being able to wake up    Bluish-colored lips or face    5. Sign up for [employer]. We know it's scary to hear that you have COVID-19. We want to track your symptoms to make sure you're okay over the next 2 weeks. Please look for an email from [employer]--this is a free, online program that we'll use to keep in touch. To sign up, follow the link in the email. Learn more at [email or web address].    Where can I get more information?    [facility] [employer]: [email or web address]    Coronavirus Basics: www.health.[facility].[place]/diseases/coronavirus/basics.html    What to Do If You're Sick:  www.cdc.gov/coronavirus/2019-ncov/about/steps-when-sick.html    Ending Home Isolation: www.cdc.gov/coronavirus/2019-ncov/hcp/disposition-in-home-patients.html     Caring for Someone with COVID-19: www.cdc.gov/coronavirus/2019-ncov/if-you-are-sick/care-for-someone.html     [facility] clinical trials (COVID-19 research studies): clinicalaffairs.[facility]-clinical-trials     A Positive COVID-19 letter will be sent via [employer] or the mail. (Exception, no letters sent to Presurgerical/Preprocedure Patients)    Lillie Torres LPN

## 2022-08-04 ENCOUNTER — HOSPITAL ENCOUNTER (EMERGENCY)
Facility: CLINIC | Age: 27
Discharge: HOME | End: 2022-08-04
Admitting: PHYSICIAN ASSISTANT
Payer: COMMERCIAL

## 2022-08-04 VITALS
HEIGHT: 64 IN | WEIGHT: 182 LBS | HEART RATE: 93 BPM | TEMPERATURE: 97.7 F | RESPIRATION RATE: 18 BRPM | SYSTOLIC BLOOD PRESSURE: 128 MMHG | OXYGEN SATURATION: 97 % | BODY MASS INDEX: 31.07 KG/M2 | DIASTOLIC BLOOD PRESSURE: 66 MMHG

## 2022-08-04 DIAGNOSIS — J06.9 UPPER RESPIRATORY TRACT INFECTION, UNSPECIFIED TYPE: ICD-10-CM

## 2022-08-04 LAB
FLUAV RNA SPEC QL NAA+PROBE: NEGATIVE
FLUBV RNA RESP QL NAA+PROBE: NEGATIVE
RSV RNA SPEC NAA+PROBE: NEGATIVE
SARS-COV-2 RNA RESP QL NAA+PROBE: NEGATIVE

## 2022-08-04 PROCEDURE — C9803 HOPD COVID-19 SPEC COLLECT: HCPCS

## 2022-08-04 PROCEDURE — 87637 SARSCOV2&INF A&B&RSV AMP PRB: CPT | Performed by: EMERGENCY MEDICINE

## 2022-08-04 PROCEDURE — 99284 EMERGENCY DEPT VISIT MOD MDM: CPT

## 2022-08-04 RX ORDER — ALBUTEROL SULFATE 90 UG/1
2 AEROSOL, METERED RESPIRATORY (INHALATION) EVERY 6 HOURS PRN
Qty: 18 G | Refills: 1 | Status: SHIPPED | OUTPATIENT
Start: 2022-08-04 | End: 2023-05-22

## 2022-08-04 RX ORDER — IPRATROPIUM BROMIDE AND ALBUTEROL SULFATE 2.5; .5 MG/3ML; MG/3ML
1 SOLUTION RESPIRATORY (INHALATION) EVERY 6 HOURS PRN
Qty: 90 ML | Refills: 1 | Status: SHIPPED | OUTPATIENT
Start: 2022-08-04 | End: 2024-06-28

## 2022-08-04 ASSESSMENT — ENCOUNTER SYMPTOMS
FATIGUE: 1
SHORTNESS OF BREATH: 1
SORE THROAT: 1
CHILLS: 0
COUGH: 1
FEVER: 0
RHINORRHEA: 1
CARDIOVASCULAR NEGATIVE: 1
NEUROLOGICAL NEGATIVE: 1
MUSCULOSKELETAL NEGATIVE: 1
GASTROINTESTINAL NEGATIVE: 1

## 2022-08-04 NOTE — ED TRIAGE NOTES
Pt reports she started having nasal congestion and increased cough since last night.      Triage Assessment     Row Name 08/04/22 4950       Triage Assessment (Adult)    Airway WDL WDL       Respiratory WDL    Respiratory WDL X;cough    Cough Frequency infrequent    Cough Type congested;nonproductive       Skin Circulation/Temperature WDL    Skin Circulation/Temperature WDL WDL       Cardiac WDL    Cardiac WDL WDL       Peripheral/Neurovascular WDL    Peripheral Neurovascular WDL WDL       Cognitive/Neuro/Behavioral WDL    Cognitive/Neuro/Behavioral WDL WDL

## 2022-08-04 NOTE — ED PROVIDER NOTES
EMERGENCY DEPARTMENT ENCOUNTER      NAME: Eveline Gage  AGE: 26 year old female  YOB: 1995  MRN: [id number]  EVALUATION DATE & TIME: 8/4/2022  5:14 PM    PCP: [facility]    ED PROVIDER: Feliberto Guaman PA-C      Chief Complaint   Patient presents with     Nasal Congestion     Cough         FINAL IMPRESSION:  1. Upper respiratory tract infection, unspecified type          MEDICAL DECISION MAKING:    Pertinent Labs & Imaging studies reviewed. (See chart for details)  26 year old female presents to the Emergency Department for evaluation of 2 to 3 days of upper respiratory infection type symptoms.    After obtaining history present illness, reviewing vitals and exam the patient I did not feel that there is indication for further emergent work-up at this time.  Vital signs are benign, exam is benign.  I suspect viral cause of symptoms.  Because the patient does have intermittent asthma and she is out of her inhalers I did fill prescriptions of albuterol and also DuoNeb therapy.  I discussed over-the-counter medications to treat symptoms and encouraged rest.  Patient can follow-up with her primary care if there is persistent symptoms.  No indication for further emergent work-up or antibiotic therapy at this time.        ED COURSE    I met with the patient, obtained history, performed an initial exam, and discussed options and plan for diagnostics and treatment here in the ED.    At the conclusion of the encounter I discussed the results of all of the tests and the disposition. The questions were answered. The patient or family acknowledged understanding and was agreeable with the care plan.     MEDICATIONS GIVEN IN THE EMERGENCY:  Medications - No data to display    NEW PRESCRIPTIONS STARTED AT TODAY'S ER VISIT  New Prescriptions    ALBUTEROL (PROAIR HFA/PROVENTIL HFA/VENTOLIN HFA) 108 (90 BASE) MCG/ACT INHALER    Inhale 2 puffs into the lungs every 6 hours as needed for  shortness of breath / dyspnea or wheezing    IPRATROPIUM - ALBUTEROL 0.5 MG/2.5 MG/3 ML (DUONEB) 0.5-2.5 (3) MG/3ML NEB SOLUTION    Take 1 vial (3 mLs) by nebulization every 6 hours as needed for shortness of breath / dyspnea or wheezing            =================================================================    HPI    Patient information was obtained from: Patient  Eveline Gage is a 26 year old female with a pertinent history of asthma who presents to this ED for evaluation of 2 to 3 days of nasal congestion, sinus pressure, cough, sore throat, fatigue, mild shortness of breath.  Patient does admit that she has asthma.  She denies smoking.  No reports of nausea, vomiting, diarrhea.  She denies abdominal pain.  She is here with daughter who is sick with similar symptoms.  Patient reports that they have had COVID in the past.  They have been trying some antihistamines for symptoms but no Tylenol or Motrin at this time.      REVIEW OF SYSTEMS   Review of Systems   Constitutional: Positive for fatigue. Negative for chills and fever.   HENT: Positive for congestion, rhinorrhea and sore throat.    Respiratory: Positive for cough and shortness of breath.    Cardiovascular: Negative.    Gastrointestinal: Negative.    Genitourinary: Negative.    Musculoskeletal: Negative.    Skin: Negative.    Neurological: Negative.    All other systems reviewed and are negative.         PAST MEDICAL HISTORY:  No past medical history on file.    PAST SURGICAL HISTORY:  No past surgical history on file.      CURRENT MEDICATIONS:    No current facility-administered medications for this encounter.    Current Outpatient Medications:      albuterol (PROAIR HFA/PROVENTIL HFA/VENTOLIN HFA) 108 (90 Base) MCG/ACT inhaler, Inhale 2 puffs into the lungs every 6 hours as needed for shortness of breath / dyspnea or wheezing, Disp: 18 g, Rfl: 1     ipratropium - albuterol 0.5 mg/2.5 mg/3 mL (DUONEB) 0.5-2.5 (3) MG/3ML neb solution, Take 1 vial  (3 mLs) by nebulization every 6 hours as needed for shortness of breath / dyspnea or wheezing, Disp: 90 mL, Rfl: 1     acetaminophen (TYLENOL) 325 MG tablet, [ACETAMINOPHEN (TYLENOL) 325 MG TABLET] , Disp: , Rfl:      cyclobenzaprine (FLEXERIL) 10 MG tablet, [CYCLOBENZAPRINE (FLEXERIL) 10 MG TABLET] , Disp: , Rfl:      famotidine (PEPCID) 20 MG tablet, [FAMOTIDINE (PEPCID) 20 MG TABLET] Take 1 tablet (20 mg total) by mouth 2 (two) times a day., Disp: 30 tablet, Rfl: 0     lidocaine (LIDODERM) 5 %, [LIDOCAINE (LIDODERM) 5 %] Remove & Discard patch within 12 hours or as directed by MD, Disp: 15 patch, Rfl: 0     omeprazole (PRILOSEC) 20 MG capsule, [OMEPRAZOLE (PRILOSEC) 20 MG CAPSULE] Take 1 capsule (20 mg total) by mouth daily before breakfast., Disp: 30 capsule, Rfl: 0     oxyCODONE (ROXICODONE) 5 MG tablet, 1-2 tabs q6hr PRN breakthrough pain, Disp: 12 tablet, Rfl: 0      ALLERGIES:  Allergies   Allergen Reactions     Blood-Group Specific Substance Other (See Comments)     Patient has anti-C, anti-S, anti-Jkb, anti-M, and a nonspecific antibody. Blood product orders may be delayed. Draw THREE purple top tubes for all Type and Screen/Type and crossmatch orders.  Patient has anti-C, anti-S, anti-Jkb, anti-M, and a nonspecific antibody. Blood product orders may be delayed. Draw THREE purple top tubes for all Type and Screen/Type and crossmatch orders.  Patient has anti-C, anti-S, anti-Jkb, anti-M, and a nonspecific antibody. Blood product orders may be delayed. Draw THREE purple top tubes for all Type and Screen/Type and crossmatch orders.  Patient has anti-C, anti-S, anti-Jkb, anti-M, and a nonspecific antibody. Blood product orders may be delayed. Draw THREE purple top tubes for all Type and Screen/Type and crossmatch orders.       Penicillins Rash       FAMILY HISTORY:  No family history on file.    SOCIAL HISTORY:   Social History     Socioeconomic History     Marital status: Single   Tobacco Use     Smoking  "status: Current Every Day Smoker     Smokeless tobacco: Never Used   Substance and Sexual Activity     Sexual activity: Yes     Birth control/protection: Implant       VITALS:  Patient Vitals for the past 24 hrs:   BP Temp Temp src Pulse Resp SpO2 Height Weight   08/04/22 1528 128/66 97.7  F (36.5  C) Oral 93 18 97 % 1.626 m (5' 4\") 82.6 kg (182 lb)       PHYSICAL EXAM    Physical Exam  Vitals and nursing note reviewed.   Constitutional:       General: She is not in acute distress.     Appearance: She is normal weight. She is not ill-appearing, toxic-appearing or diaphoretic.   HENT:      Head: Normocephalic.      Right Ear: External ear normal.      Left Ear: External ear normal.      Nose: Nose normal.      Mouth/Throat:      Mouth: Mucous membranes are moist.      Pharynx: No oropharyngeal exudate or posterior oropharyngeal erythema.   Eyes:      Conjunctiva/sclera: Conjunctivae normal.   Cardiovascular:      Rate and Rhythm: Normal rate.      Pulses: Normal pulses.   Pulmonary:      Effort: Pulmonary effort is normal. No respiratory distress.      Breath sounds: No stridor. No wheezing, rhonchi or rales.   Musculoskeletal:         General: No deformity or signs of injury. Normal range of motion.      Cervical back: Normal range of motion. No tenderness.   Lymphadenopathy:      Cervical: No cervical adenopathy.   Skin:     General: Skin is warm and dry.      Findings: No erythema or rash.   Neurological:      General: No focal deficit present.      Mental Status: She is alert. Mental status is at baseline.      Sensory: No sensory deficit.      Motor: No weakness.   Psychiatric:         Mood and Affect: Mood normal.          LAB:  All pertinent labs reviewed and interpreted.       RADIOLOGY:  Reviewed all pertinent imaging. Please see official radiology report.  No orders to display           Feliberto Guaman PA-C  Emergency Medicine  [facility]     Feliberto Guaman PA-C  08/04/22 1808    "

## 2022-08-04 NOTE — DISCHARGE INSTRUCTIONS
Please focus on over-the-counter medication such as Tylenol and ibuprofen as needed for fever, chills, pain.  You may also use over-the-counter decongestions and consider Mentholatum ointment, or eucalyptus oils.    You requested refills of your albuterol and a DuoNeb solution which I obliged.  If there are new or worsening symptoms consider returning to the ED otherwise outpatient follow-up with the PCP as recommended.

## 2022-10-01 ENCOUNTER — HEALTH MAINTENANCE LETTER (OUTPATIENT)
Age: 27
End: 2022-10-01

## 2022-11-20 ENCOUNTER — E-VISIT (OUTPATIENT)
Dept: URGENT CARE | Facility: CLINIC | Age: 27
End: 2022-11-20
Payer: COMMERCIAL

## 2022-11-20 DIAGNOSIS — R05.1 ACUTE COUGH: Primary | ICD-10-CM

## 2022-11-20 PROCEDURE — 99207 PR NON-BILLABLE SERV PER CHARTING: CPT | Performed by: NURSE PRACTITIONER

## 2022-11-20 NOTE — PATIENT INSTRUCTIONS
Dear Eveline Gage,    We are sorry you are not feeling well. Based on the responses you provided, you may be experiencing a serious health condition that needs immediate in-person attention. It is recommended that you be seen in the emergency room in order to better evaluate your symptoms. Please click here to find the nearest Emergency Room.     Felisa Cristina NP

## 2022-11-21 ENCOUNTER — VIRTUAL VISIT (OUTPATIENT)
Dept: INTERNAL MEDICINE | Facility: CLINIC | Age: 27
End: 2022-11-21
Payer: COMMERCIAL

## 2022-11-21 DIAGNOSIS — Z12.4 SCREENING FOR MALIGNANT NEOPLASM OF CERVIX: ICD-10-CM

## 2022-11-21 DIAGNOSIS — Z53.9 ERRONEOUS ENCOUNTER--DISREGARD: Primary | ICD-10-CM

## 2022-11-21 PROBLEM — D50.9 IRON DEFICIENCY ANEMIA: Status: ACTIVE | Noted: 2018-03-12

## 2022-11-21 PROBLEM — O09.90 SUPERVISION OF HIGH RISK PREGNANCY, UNSPECIFIED, UNSPECIFIED TRIMESTER: Status: ACTIVE | Noted: 2017-03-02

## 2022-11-21 PROBLEM — R19.7 DIARRHEA: Status: ACTIVE | Noted: 2018-03-12

## 2022-11-21 PROBLEM — R76.0 ABNORMAL ANTIBODY TITER: Status: ACTIVE | Noted: 2017-05-30

## 2022-11-21 PROBLEM — Z01.83 BLOOD TYPING ENCOUNTER: Status: ACTIVE | Noted: 2017-04-21

## 2022-11-21 ASSESSMENT — ASTHMA QUESTIONNAIRES
QUESTION_2 LAST FOUR WEEKS HOW OFTEN HAVE YOU HAD SHORTNESS OF BREATH: ONCE OR TWICE A WEEK
ACT_TOTALSCORE: 16
QUESTION_1 LAST FOUR WEEKS HOW MUCH OF THE TIME DID YOUR ASTHMA KEEP YOU FROM GETTING AS MUCH DONE AT WORK, SCHOOL OR AT HOME: SOME OF THE TIME
QUESTION_4 LAST FOUR WEEKS HOW OFTEN HAVE YOU USED YOUR RESCUE INHALER OR NEBULIZER MEDICATION (SUCH AS ALBUTEROL): ONCE A WEEK OR LESS
ACT_TOTALSCORE: 16
QUESTION_3 LAST FOUR WEEKS HOW OFTEN DID YOUR ASTHMA SYMPTOMS (WHEEZING, COUGHING, SHORTNESS OF BREATH, CHEST TIGHTNESS OR PAIN) WAKE YOU UP AT NIGHT OR EARLIER THAN USUAL IN THE MORNING: TWO OR THREE NIGHTS A WEEK
QUESTION_5 LAST FOUR WEEKS HOW WOULD YOU RATE YOUR ASTHMA CONTROL: SOMEWHAT CONTROLLED

## 2022-11-21 NOTE — PROGRESS NOTES
Eveline is a 26 year old who is being evaluated via a billable video visit.      How would you like to obtain your AVS? Mail a copy  If the video visit is dropped, the invitation should be resent by: Text to cell phone: [phone]  Will anyone else be joining your video visit? No        This is a VIDEO ( using [employer])  encounter with the patient.       Location of the provider : office   Location of the patient : home      10:14 --- 11:25  11:07 -- 11:19     patient hasn't responded to [employer] nor [employer] messages

## 2023-01-03 ENCOUNTER — E-VISIT (OUTPATIENT)
Dept: INTERNAL MEDICINE | Facility: CLINIC | Age: 28
End: 2023-01-03
Payer: COMMERCIAL

## 2023-01-03 DIAGNOSIS — R10.13 ABDOMINAL PAIN, EPIGASTRIC: Primary | ICD-10-CM

## 2023-01-03 PROCEDURE — 99207 PR NO CHARGE LOS: CPT | Performed by: INTERNAL MEDICINE

## 2023-01-03 RX ORDER — SUCRALFATE 1 G/1
1 TABLET ORAL 4 TIMES DAILY PRN
Qty: 40 TABLET | Refills: 0 | Status: SHIPPED | OUTPATIENT
Start: 2023-01-03 | End: 2024-03-05

## 2023-01-14 ENCOUNTER — APPOINTMENT (OUTPATIENT)
Dept: ULTRASOUND IMAGING | Facility: CLINIC | Age: 28
End: 2023-01-14
Attending: PHYSICIAN ASSISTANT
Payer: COMMERCIAL

## 2023-01-14 ENCOUNTER — HOSPITAL ENCOUNTER (INPATIENT)
Facility: CLINIC | Age: 28
LOS: 1 days | Discharge: OTHER INSTITUTION | End: 2023-01-15
Attending: EMERGENCY MEDICINE | Admitting: INTERNAL MEDICINE
Payer: COMMERCIAL

## 2023-01-14 ENCOUNTER — APPOINTMENT (OUTPATIENT)
Dept: CT IMAGING | Facility: CLINIC | Age: 28
End: 2023-01-14
Attending: PHYSICIAN ASSISTANT
Payer: COMMERCIAL

## 2023-01-14 DIAGNOSIS — R10.13 ABDOMINAL PAIN, EPIGASTRIC: ICD-10-CM

## 2023-01-14 DIAGNOSIS — D64.9 ANEMIA, UNSPECIFIED TYPE: ICD-10-CM

## 2023-01-14 DIAGNOSIS — R74.8 ELEVATED LIVER ENZYMES: ICD-10-CM

## 2023-01-14 LAB
ALBUMIN SERPL-MCNC: 3.3 G/DL (ref 3.5–5)
ALBUMIN UR-MCNC: NEGATIVE MG/DL
ALP SERPL-CCNC: 216 U/L (ref 45–120)
ALT SERPL W P-5'-P-CCNC: 1000 U/L (ref 0–45)
ANION GAP SERPL CALCULATED.3IONS-SCNC: 8 MMOL/L (ref 5–18)
APAP SERPL-MCNC: <3 UG/ML (ref 10–20)
APPEARANCE UR: CLEAR
AST SERPL W P-5'-P-CCNC: 1069 U/L (ref 0–40)
BASOPHILS # BLD MANUAL: 0.1 10E3/UL (ref 0–0.2)
BASOPHILS NFR BLD MANUAL: 1 %
BILIRUB SERPL-MCNC: 21 MG/DL (ref 0–1)
BILIRUB UR QL STRIP: ABNORMAL
BUN SERPL-MCNC: 8 MG/DL (ref 8–22)
C REACTIVE PROTEIN LHE: 1.4 MG/DL (ref 0–?)
CALCIUM SERPL-MCNC: 9.1 MG/DL (ref 8.5–10.5)
CHLORIDE BLD-SCNC: 109 MMOL/L (ref 98–107)
CO2 SERPL-SCNC: 20 MMOL/L (ref 22–31)
COLOR UR AUTO: YELLOW
CREAT SERPL-MCNC: 0.65 MG/DL (ref 0.6–1.1)
ELLIPTOCYTES BLD QL SMEAR: SLIGHT
EOSINOPHIL # BLD MANUAL: 0.9 10E3/UL (ref 0–0.7)
EOSINOPHIL NFR BLD MANUAL: 6 %
ERYTHROCYTE [DISTWIDTH] IN BLOOD BY AUTOMATED COUNT: 17.1 % (ref 10–15)
FLUAV RNA SPEC QL NAA+PROBE: NEGATIVE
FLUBV RNA RESP QL NAA+PROBE: NEGATIVE
GFR SERPL CREATININE-BSD FRML MDRD: >90 ML/MIN/1.73M2
GLUCOSE BLD-MCNC: 106 MG/DL (ref 70–125)
GLUCOSE UR STRIP-MCNC: NEGATIVE MG/DL
HCG SERPL QL: NEGATIVE
HCT VFR BLD AUTO: 28.6 % (ref 35–47)
HGB BLD-MCNC: 10.2 G/DL (ref 11.7–15.7)
HGB UR QL STRIP: NEGATIVE
KETONES UR STRIP-MCNC: NEGATIVE MG/DL
LEUKOCYTE ESTERASE UR QL STRIP: NEGATIVE
LIPASE SERPL-CCNC: 42 U/L (ref 0–52)
LYMPHOCYTES # BLD MANUAL: 2.8 10E3/UL (ref 0.8–5.3)
LYMPHOCYTES NFR BLD MANUAL: 19 %
MCH RBC QN AUTO: 29.4 PG (ref 26.5–33)
MCHC RBC AUTO-ENTMCNC: 35.7 G/DL (ref 31.5–36.5)
MCV RBC AUTO: 82 FL (ref 78–100)
MONOCYTES # BLD MANUAL: 1.5 10E3/UL (ref 0–1.3)
MONOCYTES NFR BLD MANUAL: 10 %
NEUTROPHILS # BLD MANUAL: 9.4 10E3/UL (ref 1.6–8.3)
NEUTROPHILS NFR BLD MANUAL: 64 %
NITRATE UR QL: NEGATIVE
PH UR STRIP: 6.5 [PH] (ref 5–7)
PLAT MORPH BLD: ABNORMAL
PLATELET # BLD AUTO: 301 10E3/UL (ref 150–450)
POLYCHROMASIA BLD QL SMEAR: SLIGHT
POTASSIUM BLD-SCNC: 3.8 MMOL/L (ref 3.5–5)
PROT SERPL-MCNC: 7.6 G/DL (ref 6–8)
RBC # BLD AUTO: 3.47 10E6/UL (ref 3.8–5.2)
RBC MORPH BLD: ABNORMAL
RBC URINE: 1 /HPF
RSV RNA SPEC NAA+PROBE: NEGATIVE
SALICYLATES SERPL-MCNC: <8 MG/DL (ref 2–25)
SARS-COV-2 RNA RESP QL NAA+PROBE: NEGATIVE
SODIUM SERPL-SCNC: 137 MMOL/L (ref 136–145)
SP GR UR STRIP: 1.03 (ref 1–1.03)
SQUAMOUS EPITHELIAL: 2 /HPF
TARGETS BLD QL SMEAR: SLIGHT
UROBILINOGEN UR STRIP-MCNC: <2 MG/DL
WBC # BLD AUTO: 14.7 10E3/UL (ref 4–11)
WBC URINE: 2 /HPF

## 2023-01-14 PROCEDURE — 80053 COMPREHEN METABOLIC PANEL: CPT | Performed by: PHYSICIAN ASSISTANT

## 2023-01-14 PROCEDURE — 86256 FLUORESCENT ANTIBODY TITER: CPT | Performed by: INTERNAL MEDICINE

## 2023-01-14 PROCEDURE — 82784 ASSAY IGA/IGD/IGG/IGM EACH: CPT | Performed by: INTERNAL MEDICINE

## 2023-01-14 PROCEDURE — 120N000001 HC R&B MED SURG/OB

## 2023-01-14 PROCEDURE — 258N000003 HC RX IP 258 OP 636: Performed by: PHYSICIAN ASSISTANT

## 2023-01-14 PROCEDURE — 86376 MICROSOMAL ANTIBODY EACH: CPT | Performed by: INTERNAL MEDICINE

## 2023-01-14 PROCEDURE — 86038 ANTINUCLEAR ANTIBODIES: CPT | Performed by: INTERNAL MEDICINE

## 2023-01-14 PROCEDURE — 96374 THER/PROPH/DIAG INJ IV PUSH: CPT | Mod: 59

## 2023-01-14 PROCEDURE — 83690 ASSAY OF LIPASE: CPT | Performed by: PHYSICIAN ASSISTANT

## 2023-01-14 PROCEDURE — 74177 CT ABD & PELVIS W/CONTRAST: CPT

## 2023-01-14 PROCEDURE — 81001 URINALYSIS AUTO W/SCOPE: CPT | Performed by: EMERGENCY MEDICINE

## 2023-01-14 PROCEDURE — C9803 HOPD COVID-19 SPEC COLLECT: HCPCS

## 2023-01-14 PROCEDURE — 258N000003 HC RX IP 258 OP 636: Performed by: INTERNAL MEDICINE

## 2023-01-14 PROCEDURE — 82390 ASSAY OF CERULOPLASMIN: CPT | Performed by: INTERNAL MEDICINE

## 2023-01-14 PROCEDURE — 250N000011 HC RX IP 250 OP 636: Performed by: INTERNAL MEDICINE

## 2023-01-14 PROCEDURE — 80179 DRUG ASSAY SALICYLATE: CPT | Performed by: INTERNAL MEDICINE

## 2023-01-14 PROCEDURE — 86140 C-REACTIVE PROTEIN: CPT | Performed by: EMERGENCY MEDICINE

## 2023-01-14 PROCEDURE — 86709 HEPATITIS A IGM ANTIBODY: CPT | Performed by: PHYSICIAN ASSISTANT

## 2023-01-14 PROCEDURE — 250N000011 HC RX IP 250 OP 636: Performed by: PHYSICIAN ASSISTANT

## 2023-01-14 PROCEDURE — 99285 EMERGENCY DEPT VISIT HI MDM: CPT | Mod: 25

## 2023-01-14 PROCEDURE — 96376 TX/PRO/DX INJ SAME DRUG ADON: CPT

## 2023-01-14 PROCEDURE — 86015 ACTIN ANTIBODY EACH: CPT | Performed by: INTERNAL MEDICINE

## 2023-01-14 PROCEDURE — 36415 COLL VENOUS BLD VENIPUNCTURE: CPT | Performed by: INTERNAL MEDICINE

## 2023-01-14 PROCEDURE — 36415 COLL VENOUS BLD VENIPUNCTURE: CPT | Performed by: PHYSICIAN ASSISTANT

## 2023-01-14 PROCEDURE — 85007 BL SMEAR W/DIFF WBC COUNT: CPT | Performed by: PHYSICIAN ASSISTANT

## 2023-01-14 PROCEDURE — 250N000011 HC RX IP 250 OP 636: Performed by: EMERGENCY MEDICINE

## 2023-01-14 PROCEDURE — 76705 ECHO EXAM OF ABDOMEN: CPT

## 2023-01-14 PROCEDURE — 84703 CHORIONIC GONADOTROPIN ASSAY: CPT | Performed by: PHYSICIAN ASSISTANT

## 2023-01-14 PROCEDURE — 96361 HYDRATE IV INFUSION ADD-ON: CPT

## 2023-01-14 PROCEDURE — 87637 SARSCOV2&INF A&B&RSV AMP PRB: CPT | Performed by: EMERGENCY MEDICINE

## 2023-01-14 PROCEDURE — 99223 1ST HOSP IP/OBS HIGH 75: CPT | Performed by: INTERNAL MEDICINE

## 2023-01-14 PROCEDURE — 250N000013 HC RX MED GY IP 250 OP 250 PS 637: Performed by: INTERNAL MEDICINE

## 2023-01-14 PROCEDURE — 96375 TX/PRO/DX INJ NEW DRUG ADDON: CPT

## 2023-01-14 PROCEDURE — 80143 DRUG ASSAY ACETAMINOPHEN: CPT | Performed by: INTERNAL MEDICINE

## 2023-01-14 PROCEDURE — 85027 COMPLETE CBC AUTOMATED: CPT | Performed by: PHYSICIAN ASSISTANT

## 2023-01-14 RX ORDER — POLYETHYLENE GLYCOL 3350 17 G/17G
17 POWDER, FOR SOLUTION ORAL DAILY PRN
Status: CANCELLED | OUTPATIENT
Start: 2023-01-14

## 2023-01-14 RX ORDER — CALCIUM CARBONATE 500 MG/1
500-1000 TABLET, CHEWABLE ORAL EVERY 4 HOURS PRN
Status: DISCONTINUED | OUTPATIENT
Start: 2023-01-14 | End: 2023-01-15 | Stop reason: HOSPADM

## 2023-01-14 RX ORDER — AMOXICILLIN 250 MG
1 CAPSULE ORAL 2 TIMES DAILY PRN
Status: DISCONTINUED | OUTPATIENT
Start: 2023-01-14 | End: 2023-01-15 | Stop reason: HOSPADM

## 2023-01-14 RX ORDER — LIDOCAINE 40 MG/G
CREAM TOPICAL
Status: DISCONTINUED | OUTPATIENT
Start: 2023-01-14 | End: 2023-01-15 | Stop reason: HOSPADM

## 2023-01-14 RX ORDER — CYCLOBENZAPRINE HCL 10 MG
10 TABLET ORAL 2 TIMES DAILY PRN
Status: DISCONTINUED | OUTPATIENT
Start: 2023-01-14 | End: 2023-01-15 | Stop reason: HOSPADM

## 2023-01-14 RX ORDER — AMOXICILLIN 250 MG
1 CAPSULE ORAL 2 TIMES DAILY PRN
Status: CANCELLED | OUTPATIENT
Start: 2023-01-14

## 2023-01-14 RX ORDER — AMOXICILLIN 250 MG
2 CAPSULE ORAL 2 TIMES DAILY PRN
Status: CANCELLED | OUTPATIENT
Start: 2023-01-14

## 2023-01-14 RX ORDER — BISACODYL 10 MG
10 SUPPOSITORY, RECTAL RECTAL DAILY PRN
Status: CANCELLED | OUTPATIENT
Start: 2023-01-14

## 2023-01-14 RX ORDER — PROCHLORPERAZINE MALEATE 10 MG
10 TABLET ORAL EVERY 6 HOURS PRN
Status: DISCONTINUED | OUTPATIENT
Start: 2023-01-14 | End: 2023-01-15 | Stop reason: HOSPADM

## 2023-01-14 RX ORDER — IOPAMIDOL 755 MG/ML
100 INJECTION, SOLUTION INTRAVASCULAR ONCE
Status: COMPLETED | OUTPATIENT
Start: 2023-01-14 | End: 2023-01-14

## 2023-01-14 RX ORDER — NALOXONE HYDROCHLORIDE 0.4 MG/ML
0.2 INJECTION, SOLUTION INTRAMUSCULAR; INTRAVENOUS; SUBCUTANEOUS
Status: CANCELLED | OUTPATIENT
Start: 2023-01-14

## 2023-01-14 RX ORDER — AMOXICILLIN 250 MG
2 CAPSULE ORAL 2 TIMES DAILY PRN
Status: DISCONTINUED | OUTPATIENT
Start: 2023-01-14 | End: 2023-01-15 | Stop reason: HOSPADM

## 2023-01-14 RX ORDER — LIDOCAINE 40 MG/G
CREAM TOPICAL
Status: CANCELLED | OUTPATIENT
Start: 2023-01-14

## 2023-01-14 RX ORDER — NALOXONE HYDROCHLORIDE 0.4 MG/ML
0.2 INJECTION, SOLUTION INTRAMUSCULAR; INTRAVENOUS; SUBCUTANEOUS
Status: DISCONTINUED | OUTPATIENT
Start: 2023-01-14 | End: 2023-01-15 | Stop reason: HOSPADM

## 2023-01-14 RX ORDER — IPRATROPIUM BROMIDE AND ALBUTEROL SULFATE 2.5; .5 MG/3ML; MG/3ML
1 SOLUTION RESPIRATORY (INHALATION) EVERY 6 HOURS PRN
Status: DISCONTINUED | OUTPATIENT
Start: 2023-01-14 | End: 2023-01-15 | Stop reason: HOSPADM

## 2023-01-14 RX ORDER — HYDROMORPHONE HYDROCHLORIDE 1 MG/ML
0.5 INJECTION, SOLUTION INTRAMUSCULAR; INTRAVENOUS; SUBCUTANEOUS
Status: DISCONTINUED | OUTPATIENT
Start: 2023-01-14 | End: 2023-01-15 | Stop reason: HOSPADM

## 2023-01-14 RX ORDER — PANTOPRAZOLE SODIUM 20 MG/1
40 TABLET, DELAYED RELEASE ORAL
Status: CANCELLED | OUTPATIENT
Start: 2023-01-15

## 2023-01-14 RX ORDER — SODIUM CHLORIDE 9 MG/ML
INJECTION, SOLUTION INTRAVENOUS CONTINUOUS
Status: CANCELLED | OUTPATIENT
Start: 2023-01-14

## 2023-01-14 RX ORDER — NALOXONE HYDROCHLORIDE 0.4 MG/ML
0.4 INJECTION, SOLUTION INTRAMUSCULAR; INTRAVENOUS; SUBCUTANEOUS
Status: CANCELLED | OUTPATIENT
Start: 2023-01-14

## 2023-01-14 RX ORDER — POLYETHYLENE GLYCOL 3350 17 G/17G
17 POWDER, FOR SOLUTION ORAL DAILY PRN
Status: DISCONTINUED | OUTPATIENT
Start: 2023-01-14 | End: 2023-01-15 | Stop reason: HOSPADM

## 2023-01-14 RX ORDER — NALOXONE HYDROCHLORIDE 0.4 MG/ML
0.4 INJECTION, SOLUTION INTRAMUSCULAR; INTRAVENOUS; SUBCUTANEOUS
Status: DISCONTINUED | OUTPATIENT
Start: 2023-01-14 | End: 2023-01-15 | Stop reason: HOSPADM

## 2023-01-14 RX ORDER — ALBUTEROL SULFATE 90 UG/1
2 AEROSOL, METERED RESPIRATORY (INHALATION) EVERY 6 HOURS PRN
Status: CANCELLED | OUTPATIENT
Start: 2023-01-14

## 2023-01-14 RX ORDER — SODIUM CHLORIDE 9 MG/ML
INJECTION, SOLUTION INTRAVENOUS CONTINUOUS
Status: DISCONTINUED | OUTPATIENT
Start: 2023-01-14 | End: 2023-01-15 | Stop reason: HOSPADM

## 2023-01-14 RX ORDER — BISACODYL 10 MG
10 SUPPOSITORY, RECTAL RECTAL DAILY PRN
Status: DISCONTINUED | OUTPATIENT
Start: 2023-01-14 | End: 2023-01-15 | Stop reason: HOSPADM

## 2023-01-14 RX ORDER — ALBUTEROL SULFATE 90 UG/1
2 AEROSOL, METERED RESPIRATORY (INHALATION) EVERY 6 HOURS PRN
Status: DISCONTINUED | OUTPATIENT
Start: 2023-01-14 | End: 2023-01-15 | Stop reason: HOSPADM

## 2023-01-14 RX ORDER — ONDANSETRON 2 MG/ML
4 INJECTION INTRAMUSCULAR; INTRAVENOUS ONCE
Status: COMPLETED | OUTPATIENT
Start: 2023-01-14 | End: 2023-01-14

## 2023-01-14 RX ORDER — HYDROMORPHONE HYDROCHLORIDE 1 MG/ML
0.5 INJECTION, SOLUTION INTRAMUSCULAR; INTRAVENOUS; SUBCUTANEOUS ONCE
Status: COMPLETED | OUTPATIENT
Start: 2023-01-14 | End: 2023-01-14

## 2023-01-14 RX ORDER — PANTOPRAZOLE SODIUM 20 MG/1
40 TABLET, DELAYED RELEASE ORAL
Status: DISCONTINUED | OUTPATIENT
Start: 2023-01-14 | End: 2023-01-15 | Stop reason: HOSPADM

## 2023-01-14 RX ORDER — PROCHLORPERAZINE 25 MG
25 SUPPOSITORY, RECTAL RECTAL EVERY 12 HOURS PRN
Status: CANCELLED | OUTPATIENT
Start: 2023-01-14

## 2023-01-14 RX ORDER — CALCIUM CARBONATE 500 MG/1
500-1000 TABLET, CHEWABLE ORAL EVERY 4 HOURS PRN
Status: CANCELLED | OUTPATIENT
Start: 2023-01-14

## 2023-01-14 RX ORDER — ONDANSETRON 2 MG/ML
4 INJECTION INTRAMUSCULAR; INTRAVENOUS EVERY 6 HOURS PRN
Status: CANCELLED | OUTPATIENT
Start: 2023-01-14

## 2023-01-14 RX ORDER — MULTIVITAMIN,THERAPEUTIC
1 TABLET ORAL DAILY
COMMUNITY

## 2023-01-14 RX ORDER — CALCIUM CARBONATE 500 MG/1
1-2 TABLET, CHEWABLE ORAL EVERY 4 HOURS PRN
COMMUNITY
End: 2023-02-28

## 2023-01-14 RX ORDER — HYDROMORPHONE HYDROCHLORIDE 1 MG/ML
0.5 INJECTION, SOLUTION INTRAMUSCULAR; INTRAVENOUS; SUBCUTANEOUS
Status: CANCELLED | OUTPATIENT
Start: 2023-01-14

## 2023-01-14 RX ORDER — PROCHLORPERAZINE MALEATE 10 MG
10 TABLET ORAL EVERY 6 HOURS PRN
Status: CANCELLED | OUTPATIENT
Start: 2023-01-14

## 2023-01-14 RX ORDER — ONDANSETRON 2 MG/ML
4 INJECTION INTRAMUSCULAR; INTRAVENOUS EVERY 6 HOURS PRN
Status: DISCONTINUED | OUTPATIENT
Start: 2023-01-14 | End: 2023-01-15 | Stop reason: HOSPADM

## 2023-01-14 RX ORDER — ONDANSETRON 4 MG/1
4 TABLET, ORALLY DISINTEGRATING ORAL EVERY 6 HOURS PRN
Status: DISCONTINUED | OUTPATIENT
Start: 2023-01-14 | End: 2023-01-15 | Stop reason: HOSPADM

## 2023-01-14 RX ORDER — CYCLOBENZAPRINE HCL 10 MG
10 TABLET ORAL 2 TIMES DAILY PRN
Status: CANCELLED | OUTPATIENT
Start: 2023-01-14

## 2023-01-14 RX ORDER — PROCHLORPERAZINE 25 MG
25 SUPPOSITORY, RECTAL RECTAL EVERY 12 HOURS PRN
Status: DISCONTINUED | OUTPATIENT
Start: 2023-01-14 | End: 2023-01-15 | Stop reason: HOSPADM

## 2023-01-14 RX ORDER — ONDANSETRON 4 MG/1
4 TABLET, ORALLY DISINTEGRATING ORAL EVERY 6 HOURS PRN
Status: CANCELLED | OUTPATIENT
Start: 2023-01-14

## 2023-01-14 RX ORDER — NAPROXEN SODIUM 220 MG
220-440 TABLET ORAL EVERY 12 HOURS PRN
COMMUNITY
End: 2024-03-05

## 2023-01-14 RX ORDER — IPRATROPIUM BROMIDE AND ALBUTEROL SULFATE 2.5; .5 MG/3ML; MG/3ML
1 SOLUTION RESPIRATORY (INHALATION) EVERY 6 HOURS PRN
Status: CANCELLED | OUTPATIENT
Start: 2023-01-14

## 2023-01-14 RX ADMIN — PANTOPRAZOLE SODIUM 40 MG: 20 TABLET, DELAYED RELEASE ORAL at 21:28

## 2023-01-14 RX ADMIN — IOPAMIDOL 100 ML: 755 INJECTION, SOLUTION INTRAVENOUS at 16:43

## 2023-01-14 RX ADMIN — ONDANSETRON 4 MG: 2 INJECTION INTRAMUSCULAR; INTRAVENOUS at 15:58

## 2023-01-14 RX ADMIN — HYDROMORPHONE HYDROCHLORIDE 1 MG: 1 INJECTION, SOLUTION INTRAMUSCULAR; INTRAVENOUS; SUBCUTANEOUS at 23:03

## 2023-01-14 RX ADMIN — SODIUM CHLORIDE: 9 INJECTION, SOLUTION INTRAVENOUS at 21:28

## 2023-01-14 RX ADMIN — HYDROMORPHONE HYDROCHLORIDE 0.5 MG: 1 INJECTION, SOLUTION INTRAMUSCULAR; INTRAVENOUS; SUBCUTANEOUS at 18:46

## 2023-01-14 RX ADMIN — HYDROMORPHONE HYDROCHLORIDE 1 MG: 1 INJECTION, SOLUTION INTRAMUSCULAR; INTRAVENOUS; SUBCUTANEOUS at 16:01

## 2023-01-14 RX ADMIN — FAMOTIDINE 20 MG: 10 INJECTION, SOLUTION INTRAVENOUS at 16:00

## 2023-01-14 RX ADMIN — SODIUM CHLORIDE 1000 ML: 9 INJECTION, SOLUTION INTRAVENOUS at 15:58

## 2023-01-14 ASSESSMENT — ENCOUNTER SYMPTOMS
FATIGUE: 0
VOMITING: 1
FEVER: 0
ABDOMINAL PAIN: 1
CHILLS: 0
MUSCULOSKELETAL NEGATIVE: 1
RESPIRATORY NEGATIVE: 1
NAUSEA: 1
CARDIOVASCULAR NEGATIVE: 1

## 2023-01-14 ASSESSMENT — ACTIVITIES OF DAILY LIVING (ADL)
ADLS_ACUITY_SCORE: 35

## 2023-01-14 NOTE — ED TRIAGE NOTES
"Patient presents to the ED via EMS with upper abdominal pain that travels to her lower abdomen. She's been dealing with this pain for the last three months. Patient's sclera noted to be yellow. Patient states they have been like this for a while. Patient also having darker colored urine. Last BM today prior to arrival. Was \"normal\" per patient report. History of sickle cell anemia.      Triage Assessment     Row Name 01/14/23 1526       Triage Assessment (Adult)    Airway WDL WDL       Respiratory WDL    Respiratory WDL WDL       Skin Circulation/Temperature WDL    Skin Circulation/Temperature WDL WDL       Cardiac WDL    Cardiac WDL WDL       Peripheral/Neurovascular WDL    Peripheral Neurovascular WDL WDL       Cognitive/Neuro/Behavioral WDL    Cognitive/Neuro/Behavioral WDL WDL              "

## 2023-01-14 NOTE — PHARMACY-ADMISSION MEDICATION HISTORY
Pharmacy Note - Admission Medication History    Pertinent Provider Information: n/a     ______________________________________________________________________    Prior To Admission (PTA) med list completed and updated in EMR.       PTA Med List   Medication Sig Last Dose     albuterol (PROAIR HFA/PROVENTIL HFA/VENTOLIN HFA) 108 (90 Base) MCG/ACT inhaler Inhale 2 puffs into the lungs every 6 hours as needed for shortness of breath / dyspnea or wheezing PRN     calcium carbonate (TUMS) 500 MG chewable tablet Take 1-2 chew tab by mouth every 4 hours as needed for heartburn Past Week     cyclobenzaprine (FLEXERIL) 10 MG tablet Take 10 mg by mouth 2 times daily as needed PRN     etonogestrel (NEXPLANON) 68 MG IMPL 1 each by Subdermal route in place     ipratropium - albuterol 0.5 mg/2.5 mg/3 mL (DUONEB) 0.5-2.5 (3) MG/3ML neb solution Take 1 vial (3 mLs) by nebulization every 6 hours as needed for shortness of breath / dyspnea or wheezing PRN     multivitamin, therapeutic (THERA-VIT) TABS tablet Take 1 tablet by mouth daily Past Month     naproxen sodium (ANAPROX) 220 MG tablet Take 220-440 mg by mouth every 12 hours as needed for moderate pain (4-6) 1/12/2023     omeprazole (PRILOSEC) 20 MG DR capsule Take 1 capsule (20 mg) by mouth 2 times daily 1/14/2023 at am     sucralfate (CARAFATE) 1 GM tablet Take 1 tablet (1 g) by mouth 4 times daily as needed for nausea (stomach pain) 1/14/2023       Information source(s): Patient and CareEverywhere/SureScripts  Method of interview communication: in-person    Summary of Changes to PTA Med List  New: naproxen, tums, nexplanon, multivit  Discontinued: lidocaine patch, oxycodone, tylenol, pepcid  Changed: none    Patient was asked about OTC/herbal products specifically.  PTA med list reflects this.    In the past week, patient estimated taking medication this percent of the time:  greater than 90%.    Allergies were reviewed, assessed, and updated with the patient.      Patient  does not anticipate needing any multi-use medications during admission.    The information provided in this note is only as accurate as the sources available at the time of the update(s).    Thank you for the opportunity to participate in the care of this patient.    Holly Carter [facility]  1/14/2023 5:20 PM

## 2023-01-14 NOTE — ED PROVIDER NOTES
EMERGENCY DEPARTMENT ENCOUNTER      NAME: Eveline Gage  AGE: 27 year old female  YOB: 1995  MRN: [id number]  EVALUATION DATE & TIME: 1/14/2023  3:29 PM    PCP: [facility]    ED PROVIDER: Feliberto Guaman PA-C      Chief Complaint   Patient presents with     Abdominal Pain         FINAL IMPRESSION:  1. Elevated liver enzymes    2. Abdominal pain, epigastric    3. Anemia, unspecified type          MEDICAL DECISION MAKING:    Pertinent Labs & Imaging studies reviewed. (See chart for details)  27 year old female presents to the Emergency Department for evaluation of generalized upper abdominal pain associated with nausea and vomiting.  Based on patient's previous surgical history plan to assess with screening labs and CT scan to rule out bowel obstruction.  Plan to medically manage symptoms with pain medication, nausea medicine as well as IV fluids.    Medical Decision Making    History:    Supplemental history from: Documented in HPI, if applicable    External Record(s) reviewed: Inpatient Record: Previous ED visits over the last 12 months.    Work Up:    Chart documentation includes differential considered and any EKGs or imaging independently interpreted by provider.    In additional to work up documented, I considered the following work up: See chart documentation, if applicable.    External consultation:    Discussion of management with another provider: See chart documentation, if applicable and Gastroenterology    Complicating factors:    Care impacted by chronic illness: N/A    Care affected by social determinants of health: N/A    Disposition considerations: Admission considered. Patient was signed out to the oncoming physician, disposition pending.        Patient will be signed out to Dr. Ruano with CT & U/S results pending.     ED COURSE    I met with the patient, obtained history, performed an initial exam, and discussed options and plan for diagnostics and treatment  here in the ED.    At the conclusion of the encounter I discussed the results of all of the tests and the disposition. The questions were answered. The patient or family acknowledged understanding and was agreeable with the care plan.     MEDICATIONS GIVEN IN THE EMERGENCY:  Medications   0.9% sodium chloride BOLUS (1,000 mLs Intravenous New Bag 1/14/23 1558)   HYDROmorphone (DILAUDID) injection 1 mg (1 mg Intravenous Given 1/14/23 1601)   ondansetron (ZOFRAN) injection 4 mg (4 mg Intravenous Given 1/14/23 1558)   famotidine (PEPCID) injection 20 mg (20 mg Intravenous Given 1/14/23 1600)   iopamidol (ISOVUE-370) solution 100 mL (100 mLs Intravenous Given 1/14/23 1643)       NEW PRESCRIPTIONS STARTED AT TODAY'S ER VISIT  New Prescriptions    No medications on file            =================================================================    HPI    Patient information was obtained from: Patient and review of previous records  Eveline Gage is a 27 year old female with a pertinent history of sickle cell disease, asthma, previous cholecystectomy who presents to this ED via ambulance for evaluation of assessment of acute abdominal pain.  Patient reports that she developed the upper abdominal pain that started earlier in the morning.  She states that the pain is been coming in waves of severity.  No radiation specifically to one specific side or to the back.  It has occurred in nausea and vomiting.  She states that she is passing small amounts of gas but no significant bowel movement today.  She denies any ill contacts.  No complaints of chest pain or shortness of breath.  She denies any fever or chills.  No reports of specific urinary symptoms such as pain with urination, blood in urine or urinary urgency or frequency.      REVIEW OF SYSTEMS   Review of Systems   Constitutional: Negative for chills, fatigue and fever.   Respiratory: Negative.    Cardiovascular: Negative.    Gastrointestinal: Positive for abdominal  pain, nausea and vomiting.   Genitourinary: Negative.    Musculoskeletal: Negative.    All other systems reviewed and are negative.         PAST MEDICAL HISTORY:  No past medical history on file.    PAST SURGICAL HISTORY:  No past surgical history on file.      CURRENT MEDICATIONS:      Current Facility-Administered Medications:      0.9% sodium chloride BOLUS, 1,000 mL, Intravenous, Once, Feliberto Guaman PA-C, Last Rate: 1,000 mL/hr at 01/14/23 1558, 1,000 mL at 01/14/23 1558    Current Outpatient Medications:      acetaminophen (TYLENOL) 325 MG tablet, [ACETAMINOPHEN (TYLENOL) 325 MG TABLET] , Disp: , Rfl:      albuterol (PROAIR HFA/PROVENTIL HFA/VENTOLIN HFA) 108 (90 Base) MCG/ACT inhaler, Inhale 2 puffs into the lungs every 6 hours as needed for shortness of breath / dyspnea or wheezing, Disp: 18 g, Rfl: 1     cyclobenzaprine (FLEXERIL) 10 MG tablet, [CYCLOBENZAPRINE (FLEXERIL) 10 MG TABLET] , Disp: , Rfl:      famotidine (PEPCID) 20 MG tablet, [FAMOTIDINE (PEPCID) 20 MG TABLET] Take 1 tablet (20 mg total) by mouth 2 (two) times a day., Disp: 30 tablet, Rfl: 0     ipratropium - albuterol 0.5 mg/2.5 mg/3 mL (DUONEB) 0.5-2.5 (3) MG/3ML neb solution, Take 1 vial (3 mLs) by nebulization every 6 hours as needed for shortness of breath / dyspnea or wheezing, Disp: 90 mL, Rfl: 1     lidocaine (LIDODERM) 5 %, [LIDOCAINE (LIDODERM) 5 %] Remove & Discard patch within 12 hours or as directed by MD, Disp: 15 patch, Rfl: 0     omeprazole (PRILOSEC) 20 MG capsule, [OMEPRAZOLE (PRILOSEC) 20 MG CAPSULE] Take 1 capsule (20 mg total) by mouth daily before breakfast., Disp: 30 capsule, Rfl: 0     omeprazole (PRILOSEC) 20 MG DR capsule, Take 1 capsule (20 mg) by mouth 2 times daily, Disp: 40 capsule, Rfl: 0     oxyCODONE (ROXICODONE) 5 MG tablet, 1-2 tabs q6hr PRN breakthrough pain (Patient not taking: Reported on 11/21/2022), Disp: 12 tablet, Rfl: 0     sucralfate (CARAFATE) 1 GM tablet, Take 1 tablet (1 g) by mouth 4 times  "daily as needed for nausea (stomach pain), Disp: 40 tablet, Rfl: 0      ALLERGIES:  Allergies   Allergen Reactions     Blood-Group Specific Substance Other (See Comments)     Patient has anti-C, anti-S, anti-Jkb, anti-M, and a nonspecific antibody. Blood product orders may be delayed. Draw THREE purple top tubes for all Type and Screen/Type and crossmatch orders.  Patient has anti-C, anti-S, anti-Jkb, anti-M, and a nonspecific antibody. Blood product orders may be delayed. Draw THREE purple top tubes for all Type and Screen/Type and crossmatch orders.  Patient has anti-C, anti-S, anti-Jkb, anti-M, and a nonspecific antibody. Blood product orders may be delayed. Draw THREE purple top tubes for all Type and Screen/Type and crossmatch orders.  Patient has anti-C, anti-S, anti-Jkb, anti-M, and a nonspecific antibody. Blood product orders may be delayed. Draw THREE purple top tubes for all Type and Screen/Type and crossmatch orders.       Penicillins Rash       FAMILY HISTORY:  No family history on file.    SOCIAL HISTORY:   Social History     Socioeconomic History     Marital status: Single   Tobacco Use     Smoking status: Every Day     Passive exposure: Never     Smokeless tobacco: Never   Vaping Use     Vaping Use: Never used   Substance and Sexual Activity     Sexual activity: Yes     Birth control/protection: Implant       VITALS:  Patient Vitals for the past 24 hrs:   BP Temp Temp src Pulse Resp SpO2 Height Weight   01/14/23 1525 119/63 98.1  F (36.7  C) Oral 62 17 100 % 1.6 m (5' 3\") 73.5 kg (162 lb)       PHYSICAL EXAM    Physical Exam  Vitals and nursing note reviewed.   Constitutional:       General: She is not in acute distress.     Appearance: She is obese. She is not ill-appearing or toxic-appearing.   HENT:      Head: Normocephalic.      Right Ear: External ear normal.      Left Ear: External ear normal.   Eyes:      Conjunctiva/sclera: Conjunctivae normal.   Cardiovascular:      Rate and Rhythm: Normal " rate.      Pulses: Normal pulses.   Pulmonary:      Effort: Pulmonary effort is normal. No respiratory distress.   Abdominal:      General: Bowel sounds are normal. There is no distension.      Tenderness: There is abdominal tenderness. There is no guarding or rebound.      Comments: Large vertical scar over the abdomen from sternum to umbilicus   Musculoskeletal:         General: No tenderness or deformity. Normal range of motion.      Cervical back: Normal range of motion.   Skin:     General: Skin is warm and dry.      Findings: No rash.   Neurological:      General: No focal deficit present.      Mental Status: She is alert. Mental status is at baseline.      Sensory: No sensory deficit.      Motor: No weakness.          LAB:  All pertinent labs reviewed and interpreted.  Results for orders placed or performed during the hospital encounter of 01/14/23   Comprehensive metabolic panel   Result Value Ref Range    Sodium 137 136 - 145 mmol/L    Potassium 3.8 3.5 - 5.0 mmol/L    Chloride 109 (H) 98 - 107 mmol/L    Carbon Dioxide (CO2) 20 (L) 22 - 31 mmol/L    Anion Gap 8 5 - 18 mmol/L    Urea Nitrogen 8 8 - 22 mg/dL    Creatinine 0.65 0.60 - 1.10 mg/dL    Calcium 9.1 8.5 - 10.5 mg/dL    Glucose 106 70 - 125 mg/dL    Alkaline Phosphatase 216 (H) 45 - 120 U/L    AST 1,069 (H) 0 - 40 U/L    ALT 1,000 (H) 0 - 45 U/L    Protein Total 7.6 6.0 - 8.0 g/dL    Albumin 3.3 (L) 3.5 - 5.0 g/dL    Bilirubin Total 21.0 (HH) 0.0 - 1.0 mg/dL    GFR Estimate >90 >60 mL/min/1.73m2   Result Value Ref Range    Lipase 42 0 - 52 U/L   HCG qualitative Blood   Result Value Ref Range    hCG Serum Qualitative Negative Negative   CBC with platelets and differential   Result Value Ref Range    WBC Count      RBC Count 3.47 (L) 3.80 - 5.20 10e6/uL    Hemoglobin 10.2 (L) 11.7 - 15.7 g/dL    Hematocrit 28.6 (L) 35.0 - 47.0 %    MCV 82 78 - 100 fL    MCH 29.4 26.5 - 33.0 pg    MCHC 35.7 31.5 - 36.5 g/dL    RDW 17.1 (H) 10.0 - 15.0 %    Platelet  Count 301 150 - 450 10e3/uL       RADIOLOGY:  Reviewed all pertinent imaging. Please see official radiology report.  CT Abdomen Pelvis w Contrast    (Results Pending)   Abdomen US, limited (RUQ only)    (Results Pending)         Feliberto Guaman PA-C  Emergency Medicine  [facility]     Feliberto Guaman PA-C  01/14/23 5321

## 2023-01-14 NOTE — ED PROVIDER NOTES
Emergency Department Midlevel Supervisory Note     I personally saw the patient and performed a substantive portion of the visit including all aspects of the medical decision making.    Impression:  1. Elevated liver enzymes    2. Abdominal pain, epigastric    3. Anemia, unspecified type            MDM / ED Course:  27-year-old female with history of sickle cell disease who is 11 years status postcholecystectomy who presented to the ED for evaluation of epigastric abdominal pain and scleral icterus that she states started today.  The patient reported a similar episode of pain in scleral icterus over 1 month ago that resolved spontaneously.  The patient also noted that her urine has been dark-colored.  Here in the ED the patient was hemodynamically stable upon arrival.  She did not appear to be in any obvious distress at the time of my evaluation.  On exam the patient was noted to have scleral icterus and mild/moderate tenderness to palpation located in the epigastric region of her abdomen.  She did not have evidence of an acute abdomen, however.    The patient's AST was 1069, ALT was 1000, and total bilirubin was 21.  The alkaline phosphatase was minimally elevated at 216 and the lipase was normal.  The patient's white blood cell count was 14.7 which appears to be her baseline.  hCG testing was negative.  Hepatitis panel was also obtained and is currently pending.    The abdominal CT scan and will right upper quadrant ultrasound were both nondiagnostic.    The patient was reevaluated and informed of the lab and imaging results.  She denies any history of hepatitis and she denies daily or heavy alcohol use.  The patient was informed that she would likely need an ERCP. however, ERCPs were not performed at this hospital.      The patient's case was discussed with Dr. Tomas from [facility] who agreed that the patient will likely need an ERCP. unfortunately there were no beds available at [facility] to admit  the patient to.  The patient's case was then discussed with the admitting hospitalist Dr. Hyatt here at [facility].  The patient will likely be transferred over to [facility] in the a.m. to undergo the ERCP.  Dr. Tomas states that the patient will then need to return back to [facility] for further observation given her elevated laboratory results.          4:50 PM Feliberto Guaman PA-C staffed patient with me. I agree with their assessment and plan of management, and I will see the patient.  6:00 PM I met with the patient to introduce myself, gather additional history, perform my initial exam, and discuss the plan.   6:21 PM Call placed to [facility].  6:26 PM Spoke with STARR Helm.  6:48 PM I spoke with the hospitalist, Dr. Hyatt. Discussed the patient's case and they agree to admit the patient.  6:59 PM Updated the patient.    PPE: Provider wore gloves, N95 mask, eye protection.    Brief HPI:     Eveline Gage is a 27 year old female who presents for evaluation of generalized upper abdominal pain with associated nausea and vomiting that started this morning. The pain comes in waves, no radiation. She is pasing gas, but no significant bowel movement today.    I, Tez Aguilar, am serving as a scribe to document services personally performed by Dr. Randi Ruano, based on my observations and the provider's statements to me.   I, Dr. Randi Ruano, attest that Tez Aguilar was acting in a scribe capacity, has observed my performance of the services and has documented them in accordance with my direction.      Brief Physical Exam:  Constitutional:  Alert, in no acute distress  EYES: Conjunctivae clear  HENT:  Atraumatic, normocephalic  Respiratory:  Respirations even, unlabored, in no acute respiratory distress  Cardiovascular:  Regular rate and rhythm, good peripheral perfusion  GI: Soft, nondistended, nontender, no palpable masses, no rebound, no guarding   Musculoskeletal:  No edema. No  cyanosis. Range of motion major extremities intact.    Integument: Warm, Dry, No erythema, No rash.   Neurologic:  Alert & oriented, no focal deficits noted  Psych: Normal mood and affect         Labs and Imaging:  Results for orders placed or performed during the hospital encounter of 01/14/23   CT Abdomen Pelvis w Contrast    Impression    IMPRESSION:   1.  No obstruction, colitis, diverticulitis, or appendicitis.  2.  No obstructing renal or ureteral stones.  3.  Status post cholecystectomy. Absent spleen.  4.  Uterine fibroid.   Abdomen US, limited (RUQ only)    Impression    IMPRESSION:  1.  Status post cholecystectomy. No biliary dilatation.       Comprehensive metabolic panel   Result Value Ref Range    Sodium 137 136 - 145 mmol/L    Potassium 3.8 3.5 - 5.0 mmol/L    Chloride 109 (H) 98 - 107 mmol/L    Carbon Dioxide (CO2) 20 (L) 22 - 31 mmol/L    Anion Gap 8 5 - 18 mmol/L    Urea Nitrogen 8 8 - 22 mg/dL    Creatinine 0.65 0.60 - 1.10 mg/dL    Calcium 9.1 8.5 - 10.5 mg/dL    Glucose 106 70 - 125 mg/dL    Alkaline Phosphatase 216 (H) 45 - 120 U/L    AST 1,069 (H) 0 - 40 U/L    ALT 1,000 (H) 0 - 45 U/L    Protein Total 7.6 6.0 - 8.0 g/dL    Albumin 3.3 (L) 3.5 - 5.0 g/dL    Bilirubin Total 21.0 (HH) 0.0 - 1.0 mg/dL    GFR Estimate >90 >60 mL/min/1.73m2   Result Value Ref Range    Lipase 42 0 - 52 U/L   HCG qualitative Blood   Result Value Ref Range    hCG Serum Qualitative Negative Negative   CBC with platelets and differential   Result Value Ref Range    WBC Count 14.7 (H) 4.0 - 11.0 10e3/uL    RBC Count 3.47 (L) 3.80 - 5.20 10e6/uL    Hemoglobin 10.2 (L) 11.7 - 15.7 g/dL    Hematocrit 28.6 (L) 35.0 - 47.0 %    MCV 82 78 - 100 fL    MCH 29.4 26.5 - 33.0 pg    MCHC 35.7 31.5 - 36.5 g/dL    RDW 17.1 (H) 10.0 - 15.0 %    Platelet Count 301 150 - 450 10e3/uL   Manual Differential   Result Value Ref Range    % Neutrophils 64 %    % Lymphocytes 19 %    % Monocytes 10 %    % Eosinophils 6 %    % Basophils 1 %     Absolute Neutrophils 9.4 (H) 1.6 - 8.3 10e3/uL    Absolute Lymphocytes 2.8 0.8 - 5.3 10e3/uL    Absolute Monocytes 1.5 (H) 0.0 - 1.3 10e3/uL    Absolute Eosinophils 0.9 (H) 0.0 - 0.7 10e3/uL    Absolute Basophils 0.1 0.0 - 0.2 10e3/uL    RBC Morphology Confirmed RBC Indices     Platelet Assessment  Automated Count Confirmed. Platelet morphology is normal.     Automated Count Confirmed. Platelet morphology is normal.    Elliptocytes Slight (A) None Seen    Polychromasia Slight (A) None Seen    Target Cells Slight (A) None Seen   UA with Microscopic reflex to Culture    Specimen: Urine, Midstream   Result Value Ref Range    Color Urine Yellow Colorless, Straw, Light Yellow, Yellow    Appearance Urine Clear Clear    Glucose Urine Negative Negative mg/dL    Bilirubin Urine 1.0 mg/dL (A) Negative    Ketones Urine Negative Negative mg/dL    Specific Gravity Urine 1.033 (H) 1.001 - 1.030    Blood Urine Negative Negative    pH Urine 6.5 5.0 - 7.0    Protein Albumin Urine Negative Negative mg/dL    Urobilinogen Urine <2.0 <2.0 mg/dL    Nitrite Urine Negative Negative    Leukocyte Esterase Urine Negative Negative    RBC Urine 1 <=2 /HPF    WBC Urine 2 <=5 /HPF    Squamous Epithelials Urine 2 (H) <=1 /HPF     I have reviewed the relevant laboratory and radiology studies          Dr. Randi Ruano  [facility] EMERGENCY ROOM  [address] [address] [phone]       Randi Ruano DO  01/14/23 2009

## 2023-01-15 ENCOUNTER — HOSPITAL ENCOUNTER (OUTPATIENT)
Facility: HOSPITAL | Age: 28
Discharge: HOSPICE | End: 2023-01-15
Attending: INTERNAL MEDICINE | Admitting: INTERNAL MEDICINE
Payer: COMMERCIAL

## 2023-01-15 ENCOUNTER — HOSPITAL ENCOUNTER (INPATIENT)
Facility: CLINIC | Age: 28
LOS: 3 days | Discharge: HOME | End: 2023-01-19
Attending: INTERNAL MEDICINE | Admitting: INTERNAL MEDICINE
Payer: COMMERCIAL

## 2023-01-15 ENCOUNTER — ANESTHESIA (OUTPATIENT)
Dept: SURGERY | Facility: HOSPITAL | Age: 28
End: 2023-01-15
Payer: COMMERCIAL

## 2023-01-15 ENCOUNTER — ANESTHESIA EVENT (OUTPATIENT)
Dept: SURGERY | Facility: HOSPITAL | Age: 28
End: 2023-01-15
Payer: COMMERCIAL

## 2023-01-15 VITALS
OXYGEN SATURATION: 99 % | DIASTOLIC BLOOD PRESSURE: 87 MMHG | TEMPERATURE: 97.8 F | RESPIRATION RATE: 16 BRPM | SYSTOLIC BLOOD PRESSURE: 131 MMHG

## 2023-01-15 VITALS
DIASTOLIC BLOOD PRESSURE: 64 MMHG | RESPIRATION RATE: 16 BRPM | HEART RATE: 60 BPM | BODY MASS INDEX: 28.7 KG/M2 | HEIGHT: 63 IN | SYSTOLIC BLOOD PRESSURE: 109 MMHG | TEMPERATURE: 98.1 F | OXYGEN SATURATION: 96 % | WEIGHT: 162 LBS

## 2023-01-15 DIAGNOSIS — R74.01 ELEVATION OF LEVELS OF LIVER TRANSAMINASE LEVELS: Primary | ICD-10-CM

## 2023-01-15 DIAGNOSIS — D57.09 SICKLE CELL DISEASE WITH CRISIS AND OTHER COMPLICATION (H): ICD-10-CM

## 2023-01-15 LAB
BASOPHILS # BLD MANUAL: 0 10E3/UL (ref 0–0.2)
BASOPHILS NFR BLD MANUAL: 0 %
ELLIPTOCYTES BLD QL SMEAR: SLIGHT
EOSINOPHIL # BLD MANUAL: 0.3 10E3/UL (ref 0–0.7)
EOSINOPHIL NFR BLD MANUAL: 2 %
ERYTHROCYTE [DISTWIDTH] IN BLOOD BY AUTOMATED COUNT: 17.4 % (ref 10–15)
HCT VFR BLD AUTO: 26.2 % (ref 35–47)
HGB BLD-MCNC: 9.2 G/DL (ref 11.7–15.7)
LYMPHOCYTES # BLD MANUAL: 3.4 10E3/UL (ref 0.8–5.3)
LYMPHOCYTES NFR BLD MANUAL: 21 %
MCH RBC QN AUTO: 29.1 PG (ref 26.5–33)
MCHC RBC AUTO-ENTMCNC: 35.1 G/DL (ref 31.5–36.5)
MCV RBC AUTO: 83 FL (ref 78–100)
MONOCYTES # BLD MANUAL: 2.3 10E3/UL (ref 0–1.3)
MONOCYTES NFR BLD MANUAL: 14 %
MYELOCYTES # BLD MANUAL: 0.2 10E3/UL
MYELOCYTES NFR BLD MANUAL: 1 %
NEUTROPHILS # BLD MANUAL: 10 10E3/UL (ref 1.6–8.3)
NEUTROPHILS NFR BLD MANUAL: 62 %
PLAT MORPH BLD: ABNORMAL
PLATELET # BLD AUTO: 267 10E3/UL (ref 150–450)
RBC # BLD AUTO: 3.16 10E6/UL (ref 3.8–5.2)
RBC MORPH BLD: ABNORMAL
RETICS # AUTO: 0.22 10E6/UL (ref 0.01–0.11)
RETICS/RBC NFR AUTO: 6.8 % (ref 0.8–2.7)
TARGETS BLD QL SMEAR: SLIGHT
UPPER EUS: NORMAL
VARIANT LYMPHS BLD QL SMEAR: PRESENT
WBC # BLD AUTO: 16.1 10E3/UL (ref 4–11)

## 2023-01-15 PROCEDURE — 250N000009 HC RX 250: Performed by: NURSE ANESTHETIST, CERTIFIED REGISTERED

## 2023-01-15 PROCEDURE — 250N000011 HC RX IP 250 OP 636: Performed by: NURSE ANESTHETIST, CERTIFIED REGISTERED

## 2023-01-15 PROCEDURE — 250N000007 HC INTERCOMPANY SERVICE, DRUGS 250 OPNP

## 2023-01-15 PROCEDURE — 36415 COLL VENOUS BLD VENIPUNCTURE: CPT | Performed by: INTERNAL MEDICINE

## 2023-01-15 PROCEDURE — 999N000141 HC STATISTIC PRE-PROCEDURE NURSING ASSESSMENT: Performed by: INTERNAL MEDICINE

## 2023-01-15 PROCEDURE — 636N000001 HC INTERCOMPANY SERVICE, DRUGS DETAILED 636 OPNP

## 2023-01-15 PROCEDURE — 86664 EPSTEIN-BARR NUCLEAR ANTIGEN: CPT | Performed by: PHYSICIAN ASSISTANT

## 2023-01-15 PROCEDURE — 82784 ASSAY IGA/IGD/IGG/IGM EACH: CPT | Performed by: INTERNAL MEDICINE

## 2023-01-15 PROCEDURE — 250N000013 HC RX MED GY IP 250 OP 250 PS 637: Performed by: INTERNAL MEDICINE

## 2023-01-15 PROCEDURE — 360N000006 HC INTERCOMPANY SERVICE, OPERATIVE 360 OPNP

## 2023-01-15 PROCEDURE — 370N000017 HC ANESTHESIA TECHNICAL FEE, PER MIN: Performed by: INTERNAL MEDICINE

## 2023-01-15 PROCEDURE — 86696 HERPES SIMPLEX TYPE 2 TEST: CPT | Performed by: PHYSICIAN ASSISTANT

## 2023-01-15 PROCEDURE — 370N000020 HC INTERCOMPANY SERVICE, ANESTHESIA 370 OPNP

## 2023-01-15 PROCEDURE — 86644 CMV ANTIBODY: CPT | Performed by: PHYSICIAN ASSISTANT

## 2023-01-15 PROCEDURE — 86015 ACTIN ANTIBODY EACH: CPT | Performed by: INTERNAL MEDICINE

## 2023-01-15 PROCEDURE — 272N000006 HC INTERCOMPANY SERVICE, SUPPLY 272 OPNP

## 2023-01-15 PROCEDURE — 86645 CMV ANTIBODY IGM: CPT | Performed by: PHYSICIAN ASSISTANT

## 2023-01-15 PROCEDURE — 250N000011 HC RX IP 250 OP 636: Performed by: INTERNAL MEDICINE

## 2023-01-15 PROCEDURE — 250N000011 HC RX IP 250 OP 636: Performed by: ANESTHESIOLOGY

## 2023-01-15 PROCEDURE — 99239 HOSP IP/OBS DSCHRG MGMT >30: CPT | Performed by: INTERNAL MEDICINE

## 2023-01-15 PROCEDURE — 82390 ASSAY OF CERULOPLASMIN: CPT | Performed by: INTERNAL MEDICINE

## 2023-01-15 PROCEDURE — 86256 FLUORESCENT ANTIBODY TITER: CPT | Performed by: INTERNAL MEDICINE

## 2023-01-15 PROCEDURE — BF4CZZZ ULTRASONOGRAPHY OF HEPATOBILIARY SYSTEM, ALL: ICD-10-PCS | Performed by: INTERNAL MEDICINE

## 2023-01-15 PROCEDURE — 86376 MICROSOMAL ANTIBODY EACH: CPT | Performed by: INTERNAL MEDICINE

## 2023-01-15 PROCEDURE — 86038 ANTINUCLEAR ANTIBODIES: CPT | Performed by: INTERNAL MEDICINE

## 2023-01-15 PROCEDURE — 710N000012 HC RECOVERY PHASE 2, PER MINUTE: Performed by: INTERNAL MEDICINE

## 2023-01-15 PROCEDURE — 258N000003 HC RX IP 258 OP 636: Performed by: ANESTHESIOLOGY

## 2023-01-15 PROCEDURE — 85007 BL SMEAR W/DIFF WBC COUNT: CPT | Performed by: INTERNAL MEDICINE

## 2023-01-15 PROCEDURE — 360N000076 HC SURGERY LEVEL 3, PER MIN: Performed by: INTERNAL MEDICINE

## 2023-01-15 PROCEDURE — 85027 COMPLETE CBC AUTOMATED: CPT | Performed by: INTERNAL MEDICINE

## 2023-01-15 PROCEDURE — 258N000003 HC RX IP 258 OP 636: Performed by: INTERNAL MEDICINE

## 2023-01-15 PROCEDURE — 710N000013 HC INTERCOMPANY SERVICE, RECOVERY 710 OPNP

## 2023-01-15 PROCEDURE — 83010 ASSAY OF HAPTOGLOBIN QUANT: CPT | Performed by: INTERNAL MEDICINE

## 2023-01-15 PROCEDURE — 85045 AUTOMATED RETICULOCYTE COUNT: CPT | Performed by: INTERNAL MEDICINE

## 2023-01-15 PROCEDURE — 180N000001 HC R&B LOA DAYS

## 2023-01-15 PROCEDURE — 272N000001 HC OR GENERAL SUPPLY STERILE: Performed by: INTERNAL MEDICINE

## 2023-01-15 RX ORDER — ONDANSETRON 4 MG/1
4 TABLET, ORALLY DISINTEGRATING ORAL EVERY 30 MIN PRN
Status: DISCONTINUED | OUTPATIENT
Start: 2023-01-15 | End: 2023-01-15 | Stop reason: HOSPADM

## 2023-01-15 RX ORDER — FENTANYL CITRATE 50 UG/ML
25 INJECTION, SOLUTION INTRAMUSCULAR; INTRAVENOUS EVERY 5 MIN PRN
Status: DISCONTINUED | OUTPATIENT
Start: 2023-01-15 | End: 2023-01-15 | Stop reason: HOSPADM

## 2023-01-15 RX ORDER — CYCLOBENZAPRINE HCL 10 MG
10 TABLET ORAL 2 TIMES DAILY PRN
Status: DISCONTINUED | OUTPATIENT
Start: 2023-01-15 | End: 2023-01-17

## 2023-01-15 RX ORDER — SODIUM CHLORIDE, SODIUM LACTATE, POTASSIUM CHLORIDE, CALCIUM CHLORIDE 600; 310; 30; 20 MG/100ML; MG/100ML; MG/100ML; MG/100ML
INJECTION, SOLUTION INTRAVENOUS CONTINUOUS
Status: DISCONTINUED | OUTPATIENT
Start: 2023-01-15 | End: 2023-01-15 | Stop reason: HOSPADM

## 2023-01-15 RX ORDER — SODIUM CHLORIDE 9 MG/ML
INJECTION, SOLUTION INTRAVENOUS CONTINUOUS
Status: DISCONTINUED | OUTPATIENT
Start: 2023-01-15 | End: 2023-01-16

## 2023-01-15 RX ORDER — LIDOCAINE 40 MG/G
CREAM TOPICAL
Status: DISCONTINUED | OUTPATIENT
Start: 2023-01-15 | End: 2023-01-15 | Stop reason: HOSPADM

## 2023-01-15 RX ORDER — NALOXONE HYDROCHLORIDE 0.4 MG/ML
0.4 INJECTION, SOLUTION INTRAMUSCULAR; INTRAVENOUS; SUBCUTANEOUS
Status: DISCONTINUED | OUTPATIENT
Start: 2023-01-15 | End: 2023-01-19 | Stop reason: HOSPADM

## 2023-01-15 RX ORDER — PROPOFOL 10 MG/ML
INJECTION, EMULSION INTRAVENOUS PRN
Status: DISCONTINUED | OUTPATIENT
Start: 2023-01-15 | End: 2023-01-15

## 2023-01-15 RX ORDER — FLUMAZENIL 0.1 MG/ML
0.2 INJECTION, SOLUTION INTRAVENOUS
Status: DISCONTINUED | OUTPATIENT
Start: 2023-01-15 | End: 2023-01-15 | Stop reason: CLARIF

## 2023-01-15 RX ORDER — ALBUTEROL SULFATE 90 UG/1
2 AEROSOL, METERED RESPIRATORY (INHALATION) EVERY 6 HOURS PRN
Status: DISCONTINUED | OUTPATIENT
Start: 2023-01-15 | End: 2023-01-19 | Stop reason: HOSPADM

## 2023-01-15 RX ORDER — LIDOCAINE 40 MG/G
CREAM TOPICAL
Status: CANCELLED | OUTPATIENT
Start: 2023-01-15

## 2023-01-15 RX ORDER — PROPOFOL 10 MG/ML
INJECTION, EMULSION INTRAVENOUS CONTINUOUS PRN
Status: DISCONTINUED | OUTPATIENT
Start: 2023-01-15 | End: 2023-01-15

## 2023-01-15 RX ORDER — BISACODYL 10 MG
10 SUPPOSITORY, RECTAL RECTAL DAILY PRN
Status: DISCONTINUED | OUTPATIENT
Start: 2023-01-15 | End: 2023-01-19 | Stop reason: HOSPADM

## 2023-01-15 RX ORDER — NALOXONE HYDROCHLORIDE 0.4 MG/ML
0.2 INJECTION, SOLUTION INTRAMUSCULAR; INTRAVENOUS; SUBCUTANEOUS
Status: DISCONTINUED | OUTPATIENT
Start: 2023-01-15 | End: 2023-01-15

## 2023-01-15 RX ORDER — NALOXONE HYDROCHLORIDE 1 MG/ML
0.2 INJECTION INTRAMUSCULAR; INTRAVENOUS; SUBCUTANEOUS
Status: CANCELLED | OUTPATIENT
Start: 2023-01-15

## 2023-01-15 RX ORDER — FLUMAZENIL 0.1 MG/ML
0.2 INJECTION, SOLUTION INTRAVENOUS
Status: CANCELLED | OUTPATIENT
Start: 2023-01-15 | End: 2023-01-15

## 2023-01-15 RX ORDER — NALOXONE HYDROCHLORIDE 0.4 MG/ML
0.2 INJECTION, SOLUTION INTRAMUSCULAR; INTRAVENOUS; SUBCUTANEOUS
Status: DISCONTINUED | OUTPATIENT
Start: 2023-01-15 | End: 2023-01-19 | Stop reason: HOSPADM

## 2023-01-15 RX ORDER — ONDANSETRON 4 MG/1
4 TABLET, ORALLY DISINTEGRATING ORAL EVERY 30 MIN PRN
Status: CANCELLED | OUTPATIENT
Start: 2023-01-15

## 2023-01-15 RX ORDER — AMOXICILLIN 250 MG
2 CAPSULE ORAL 2 TIMES DAILY PRN
Status: DISCONTINUED | OUTPATIENT
Start: 2023-01-15 | End: 2023-01-19 | Stop reason: HOSPADM

## 2023-01-15 RX ORDER — PROCHLORPERAZINE MALEATE 10 MG
10 TABLET ORAL EVERY 6 HOURS PRN
Status: DISCONTINUED | OUTPATIENT
Start: 2023-01-15 | End: 2023-01-19 | Stop reason: HOSPADM

## 2023-01-15 RX ORDER — LIDOCAINE HYDROCHLORIDE 10 MG/ML
INJECTION, SOLUTION INFILTRATION; PERINEURAL PRN
Status: DISCONTINUED | OUTPATIENT
Start: 2023-01-15 | End: 2023-01-15

## 2023-01-15 RX ORDER — OXYCODONE HYDROCHLORIDE 5 MG/1
5 TABLET ORAL EVERY 4 HOURS PRN
Status: DISCONTINUED | OUTPATIENT
Start: 2023-01-15 | End: 2023-01-16

## 2023-01-15 RX ORDER — ONDANSETRON 2 MG/ML
4 INJECTION INTRAMUSCULAR; INTRAVENOUS EVERY 6 HOURS PRN
Status: DISCONTINUED | OUTPATIENT
Start: 2023-01-15 | End: 2023-01-19 | Stop reason: HOSPADM

## 2023-01-15 RX ORDER — FENTANYL CITRATE 50 UG/ML
25 INJECTION, SOLUTION INTRAMUSCULAR; INTRAVENOUS EVERY 5 MIN PRN
Status: CANCELLED | OUTPATIENT
Start: 2023-01-15

## 2023-01-15 RX ORDER — ONDANSETRON 2 MG/ML
4 INJECTION INTRAMUSCULAR; INTRAVENOUS EVERY 30 MIN PRN
Status: DISCONTINUED | OUTPATIENT
Start: 2023-01-15 | End: 2023-01-15 | Stop reason: HOSPADM

## 2023-01-15 RX ORDER — POLYETHYLENE GLYCOL 3350 17 G/17G
17 POWDER, FOR SOLUTION ORAL DAILY PRN
Status: DISCONTINUED | OUTPATIENT
Start: 2023-01-15 | End: 2023-01-19 | Stop reason: HOSPADM

## 2023-01-15 RX ORDER — ONDANSETRON 2 MG/ML
4 INJECTION INTRAMUSCULAR; INTRAVENOUS EVERY 30 MIN PRN
Status: CANCELLED | OUTPATIENT
Start: 2023-01-15

## 2023-01-15 RX ORDER — CALCIUM CARBONATE 500 MG/1
500-1000 TABLET, CHEWABLE ORAL EVERY 4 HOURS PRN
Status: DISCONTINUED | OUTPATIENT
Start: 2023-01-15 | End: 2023-01-19 | Stop reason: HOSPADM

## 2023-01-15 RX ORDER — KETAMINE HYDROCHLORIDE 10 MG/ML
INJECTION INTRAMUSCULAR; INTRAVENOUS PRN
Status: DISCONTINUED | OUTPATIENT
Start: 2023-01-15 | End: 2023-01-15

## 2023-01-15 RX ORDER — FENTANYL CITRATE 50 UG/ML
50 INJECTION, SOLUTION INTRAMUSCULAR; INTRAVENOUS EVERY 5 MIN PRN
Status: DISCONTINUED | OUTPATIENT
Start: 2023-01-15 | End: 2023-01-15 | Stop reason: HOSPADM

## 2023-01-15 RX ORDER — HYDROMORPHONE HYDROCHLORIDE 1 MG/ML
0.5 INJECTION, SOLUTION INTRAMUSCULAR; INTRAVENOUS; SUBCUTANEOUS
Status: DISCONTINUED | OUTPATIENT
Start: 2023-01-15 | End: 2023-01-15

## 2023-01-15 RX ORDER — PANTOPRAZOLE SODIUM 20 MG/1
40 TABLET, DELAYED RELEASE ORAL
Status: DISCONTINUED | OUTPATIENT
Start: 2023-01-15 | End: 2023-01-19 | Stop reason: HOSPADM

## 2023-01-15 RX ORDER — NALOXONE HYDROCHLORIDE 0.4 MG/ML
0.4 INJECTION, SOLUTION INTRAMUSCULAR; INTRAVENOUS; SUBCUTANEOUS
Status: DISCONTINUED | OUTPATIENT
Start: 2023-01-15 | End: 2023-01-15

## 2023-01-15 RX ORDER — AMOXICILLIN 250 MG
1 CAPSULE ORAL 2 TIMES DAILY PRN
Status: DISCONTINUED | OUTPATIENT
Start: 2023-01-15 | End: 2023-01-19 | Stop reason: HOSPADM

## 2023-01-15 RX ORDER — SODIUM CHLORIDE, SODIUM LACTATE, POTASSIUM CHLORIDE, CALCIUM CHLORIDE 600; 310; 30; 20 MG/100ML; MG/100ML; MG/100ML; MG/100ML
INJECTION, SOLUTION INTRAVENOUS CONTINUOUS
Status: CANCELLED | OUTPATIENT
Start: 2023-01-15

## 2023-01-15 RX ORDER — FENTANYL CITRATE 50 UG/ML
50 INJECTION, SOLUTION INTRAMUSCULAR; INTRAVENOUS EVERY 5 MIN PRN
Status: CANCELLED | OUTPATIENT
Start: 2023-01-15

## 2023-01-15 RX ORDER — ONDANSETRON 4 MG/1
4 TABLET, ORALLY DISINTEGRATING ORAL EVERY 6 HOURS PRN
Status: DISCONTINUED | OUTPATIENT
Start: 2023-01-15 | End: 2023-01-19 | Stop reason: HOSPADM

## 2023-01-15 RX ORDER — HYDROMORPHONE HYDROCHLORIDE 1 MG/ML
0.5 INJECTION, SOLUTION INTRAMUSCULAR; INTRAVENOUS; SUBCUTANEOUS
Status: DISCONTINUED | OUTPATIENT
Start: 2023-01-15 | End: 2023-01-16

## 2023-01-15 RX ORDER — PROCHLORPERAZINE 25 MG
25 SUPPOSITORY, RECTAL RECTAL EVERY 12 HOURS PRN
Status: DISCONTINUED | OUTPATIENT
Start: 2023-01-15 | End: 2023-01-19 | Stop reason: HOSPADM

## 2023-01-15 RX ORDER — NALOXONE HYDROCHLORIDE 1 MG/ML
0.4 INJECTION INTRAMUSCULAR; INTRAVENOUS; SUBCUTANEOUS
Status: CANCELLED | OUTPATIENT
Start: 2023-01-15

## 2023-01-15 RX ORDER — IPRATROPIUM BROMIDE AND ALBUTEROL SULFATE 2.5; .5 MG/3ML; MG/3ML
1 SOLUTION RESPIRATORY (INHALATION) EVERY 6 HOURS PRN
Status: DISCONTINUED | OUTPATIENT
Start: 2023-01-15 | End: 2023-01-19 | Stop reason: HOSPADM

## 2023-01-15 RX ORDER — LIDOCAINE 40 MG/G
CREAM TOPICAL
Status: DISCONTINUED | OUTPATIENT
Start: 2023-01-15 | End: 2023-01-17

## 2023-01-15 RX ADMIN — PROPOFOL 150 MCG/KG/MIN: 10 INJECTION, EMULSION INTRAVENOUS at 11:06

## 2023-01-15 RX ADMIN — SODIUM CHLORIDE: 9 INJECTION, SOLUTION INTRAVENOUS at 13:55

## 2023-01-15 RX ADMIN — PROPOFOL 50 MG: 10 INJECTION, EMULSION INTRAVENOUS at 11:06

## 2023-01-15 RX ADMIN — OXYCODONE HYDROCHLORIDE 5 MG: 5 TABLET ORAL at 17:33

## 2023-01-15 RX ADMIN — SODIUM CHLORIDE, POTASSIUM CHLORIDE, SODIUM LACTATE AND CALCIUM CHLORIDE 100 ML: 600; 310; 30; 20 INJECTION, SOLUTION INTRAVENOUS at 10:12

## 2023-01-15 RX ADMIN — PANTOPRAZOLE SODIUM 40 MG: 20 TABLET, DELAYED RELEASE ORAL at 17:34

## 2023-01-15 RX ADMIN — PROPOFOL 50 MG: 10 INJECTION, EMULSION INTRAVENOUS at 11:11

## 2023-01-15 RX ADMIN — PANTOPRAZOLE SODIUM 40 MG: 20 TABLET, DELAYED RELEASE ORAL at 06:44

## 2023-01-15 RX ADMIN — HYDROMORPHONE HYDROCHLORIDE 1 MG: 1 INJECTION, SOLUTION INTRAMUSCULAR; INTRAVENOUS; SUBCUTANEOUS at 07:42

## 2023-01-15 RX ADMIN — LIDOCAINE HYDROCHLORIDE 3 ML: 10 INJECTION, SOLUTION INFILTRATION; PERINEURAL at 11:06

## 2023-01-15 RX ADMIN — KETAMINE HYDROCHLORIDE 30 MG: 10 INJECTION, SOLUTION INTRAMUSCULAR; INTRAVENOUS at 11:10

## 2023-01-15 RX ADMIN — HYDROMORPHONE HYDROCHLORIDE 0.5 MG: 1 INJECTION, SOLUTION INTRAMUSCULAR; INTRAVENOUS; SUBCUTANEOUS at 13:54

## 2023-01-15 RX ADMIN — SODIUM CHLORIDE: 9 INJECTION, SOLUTION INTRAVENOUS at 04:51

## 2023-01-15 RX ADMIN — SODIUM CHLORIDE: 9 INJECTION, SOLUTION INTRAVENOUS at 20:16

## 2023-01-15 RX ADMIN — FENTANYL CITRATE 25 MCG: 50 INJECTION, SOLUTION INTRAMUSCULAR; INTRAVENOUS at 12:08

## 2023-01-15 RX ADMIN — HYDROMORPHONE HYDROCHLORIDE 0.5 MG: 1 INJECTION, SOLUTION INTRAMUSCULAR; INTRAVENOUS; SUBCUTANEOUS at 20:15

## 2023-01-15 ASSESSMENT — LIFESTYLE VARIABLES: TOBACCO_USE: 1

## 2023-01-15 ASSESSMENT — ACTIVITIES OF DAILY LIVING (ADL)
ADLS_ACUITY_SCORE: 35

## 2023-01-15 NOTE — LETTER
[facility] [address] [address]  Phone: [phone]  Fax: [phone]    January 19, 2023        Eveline Gage  [address]   [place] [address]          To whom it may concern:    RE: Eveline Gage    Patient was hospitalized from 1/14 to 1/19/2023.  Patient may return to work on 1/25/2023 with the following:  Light duty-no heavy lifting.    Please contact me for questions or concerns.      Sincerely,        Mario Willams MD

## 2023-01-15 NOTE — ED NOTES
As emergency dept charge had to go into chart of patient to gain info for transport to [facility] for procedure.

## 2023-01-15 NOTE — H&P
[facility]    History and Physical - Hospitalist Service       Date of Admission:  1/14/2023    Assessment & Plan      Eveline Gage is a 27 year old female with history of sickle cell disease, adjustment disorder with mixed anxiety and depression, asthma, tobacco abuse, admitted on 1/14/2023 for elevated liver transaminases, hyperbilirubinemia and epigastric and right upper quadrant abdominal pain.  Liver transaminases are in cholestatic pattern.  ED provider spoke with [facility] assist Dr. Marv lynn who recommended ERCP.  [facility] due to bed availability could not accept patient transfer.  Patient will board in the emergency room with plan for transfer to [facility] on 1/15/2023 for ERCP.    Elevated liver transaminases  Hyperbilirubinemia  Epigastric/right upper quadrant abdominal pain  History of cholecystectomy  AST 1069, ALT 1000, total bilirubin 21.  Alkaline phosphatase minimally elevated at 216  Lipase was normal.  White blood cell count elevated at 14.7 near baseline.  Beta-hCG G testing was negative  Hepatitis panel in process.  Order Tylenol level, alcohol level, salicylate level, drug tox screen.  Order CRISPIN, anti-smooth muscle antibody, antiliver kidney microsomal antibody type I, IgG.  Ceruloplasmin.  [facility] contacted by ED provider.  A.m. ERCP is planned.    Sickle cell disease/anemia  History of Splenectomy  Hemoglobin 10.2 baseline 9.0.  Order hydromorphone 0.5 to 1 mg every 2 hours as needed for pain  Start Normal saline 125 ml/hr.   NPO for ERCP in a.m.    Mild remittent asthma  Resume home inhalers.     Tobacco abuse  Advised smoking cessation.      Diet:   Clear diet, NPO midnight.  DVT Prophylaxis: Low Risk/Ambulatory with no VTE prophylaxis indicated  Echeverria Catheter: Not present  Lines: None     Cardiac Monitoring: None  Code Status:   FULL    Clinically Significant Risk Factors Present on Admission              #  "Hypoalbuminemia: Lowest albumin = 3.3 g/dL at 1/14/2023  3:55 PM, will monitor as appropriate          # Overweight: Estimated body mass index is 28.7 kg/m  as calculated from the following:    Height as of this encounter: 1.6 m (5' 3\").    Weight as of this encounter: 73.5 kg (162 lb).           Disposition Plan      Expected Discharge Date: 01/16/2023                  Lalit Hyatt DO  Hospitalist Service  [facility]  Securely message with [employer] (more info)  Text page via [facility] Paging/Directory     ______________________________________________________________________    Chief Complaint   Abdominal pain, jaundice    History is obtained from the patient    History of Present Illness   Eveline Gage is a 27 year old female with history of sickle cell disease, cholecystectomy, tobacco abuse, mild intermittent asthma, presenting to the emergency department with complaints of abdominal pain and yellowing of her eyes.  Patient described aching pain which had been coming and going for many months.  She last spoke to a provider via virtual visit back in November and was prescribed antacids and cholestyramine.  She reported that the pain went away.  She awoke this morning with severe aching pain over her epigastric and right upper quadrant.  Pain radiated to her lower abdomen and sometimes to her left shoulder and axillary region.  She denied any chest pain, shortness of breath, palpitations.  She did feel nauseated and had one episode of a small emesis after trying to eat some ginger ale and grapes.  She had a small amount of blood noted in her emesis.  She denies any fever or chills.  Denies any recent sick contacts.  No recent changes to medication.  She did take some ibuprofen at home off and on for abdominal pain.  She denies any Tylenol or aspirin use.  She is currently in a monogamous relationship with her fiancé.    In the emergency room patient's vital signs were normal.  " Laboratory findings were remarkable for carbon dioxide of 20, anion gap of 8, AST of 1069, ALT of 1000, alkaline phosphatase of 216, total bilirubin of 21 and CRP of 1.4.  hCG quantitative serum was negative.  Lipase normal at 42.  White blood cell count was elevated at 14.7.  Hemoglobin was low at 10.2 and platelets were normal at 301.  Urinalysis was negative for urinary tract infection.  Abdomen ultrasound showed no biliary dilatation.  CT abdomen pelvis with contrast showed no obstruction colitis diverticulitis or appendicitis.  There was no obstructing renal or ureteral stones.  There was uterine fibroid noted.    Past Medical History    Sickle cell disease  Mild intermittent asthma  Tobacco abuse    Past Surgical History   Cholecystectomy  Splenectomy    Prior to Admission Medications   Prior to Admission Medications   Prescriptions Last Dose Informant Patient Reported? Taking?   albuterol (PROAIR HFA/PROVENTIL HFA/VENTOLIN HFA) 108 (90 Base) MCG/ACT inhaler PRN  No Yes   Sig: Inhale 2 puffs into the lungs every 6 hours as needed for shortness of breath / dyspnea or wheezing   calcium carbonate (TUMS) 500 MG chewable tablet Past Week  Yes Yes   Sig: Take 1-2 chew tab by mouth every 4 hours as needed for heartburn   cyclobenzaprine (FLEXERIL) 10 MG tablet PRN  Yes Yes   Sig: Take 10 mg by mouth 2 times daily as needed   etonogestrel (NEXPLANON) 68 MG IMPL in place  Yes Yes   Si each by Subdermal route   ipratropium - albuterol 0.5 mg/2.5 mg/3 mL (DUONEB) 0.5-2.5 (3) MG/3ML neb solution PRN  No Yes   Sig: Take 1 vial (3 mLs) by nebulization every 6 hours as needed for shortness of breath / dyspnea or wheezing   multivitamin, therapeutic (THERA-VIT) TABS tablet Past Month  Yes Yes   Sig: Take 1 tablet by mouth daily   naproxen sodium (ANAPROX) 220 MG tablet 2023  Yes Yes   Sig: Take 220-440 mg by mouth every 12 hours as needed for moderate pain (4-6)   omeprazole (PRILOSEC) 20 MG DR capsule 2023 at  am  No Yes   Sig: Take 1 capsule (20 mg) by mouth 2 times daily   sucralfate (CARAFATE) 1 GM tablet 1/14/2023  No Yes   Sig: Take 1 tablet (1 g) by mouth 4 times daily as needed for nausea (stomach pain)      Facility-Administered Medications: None        Review of Systems    The 10 point Review of Systems is negative other than noted in the HPI.    Social History   I have reviewed this patient's social history and updated it with pertinent information if needed.  Social History     Tobacco Use     Smoking status: Every Day     Passive exposure: Never     Smokeless tobacco: Never   Vaping Use     Vaping Use: Never used       Family History         Allergies   Allergies   Allergen Reactions     Blood-Group Specific Substance Other (See Comments)     Patient has anti-C, anti-S, anti-Jkb, anti-M, and a nonspecific antibody. Blood product orders may be delayed. Draw THREE purple top tubes for all Type and Screen/Type and crossmatch orders.  Patient has anti-C, anti-S, anti-Jkb, anti-M, and a nonspecific antibody. Blood product orders may be delayed. Draw THREE purple top tubes for all Type and Screen/Type and crossmatch orders.  Patient has anti-C, anti-S, anti-Jkb, anti-M, and a nonspecific antibody. Blood product orders may be delayed. Draw THREE purple top tubes for all Type and Screen/Type and crossmatch orders.  Patient has anti-C, anti-S, anti-Jkb, anti-M, and a nonspecific antibody. Blood product orders may be delayed. Draw THREE purple top tubes for all Type and Screen/Type and crossmatch orders.       Penicillins Rash        Physical Exam   Vital Signs: Temp: 98.1  F (36.7  C) Temp src: Oral BP: 119/63 Pulse: 61   Resp: 17 SpO2: 100 % O2 Device: None (Room air)    Weight: 162 lbs 0 oz    Constitutional: awake, alert, cooperative, no apparent distress, and appears stated age  Eyes: pupils equal, round and reactive to light, extra-ocular muscles intact and icteric bilaterally  ENT: Normocephalic, without obvious  abnormality, atraumatic, sinuses nontender on palpation, external ears without lesions, oral pharynx with moist mucous membranes, tonsils without erythema or exudates, gums normal and good dentition.  Hematologic / Lymphatic: no cervical lymphadenopathy and no supraclavicular lymphadenopathy  Respiratory: No increased work of breathing, good air exchange, clear to auscultation bilaterally, no crackles or wheezing  Cardiovascular: Normal apical impulse, regular rate and rhythm, normal S1 and S2, no S3 or S4, and no murmur noted  GI: normal bowel sounds, soft, non-distended and non-tender  Skin: no bruising or bleeding and normal skin color, texture, turgor  Musculoskeletal: There is no redness, warmth, or swelling of the joints.  Full range of motion noted.  Motor strength is 5 out of 5 all extremities bilaterally.  Tone is normal.  Neurologic: Awake, alert, oriented to name, place and time.  Cranial nerves II-XII are grossly intact.  Motor is 5 out of 5 bilaterally.  Cerebellar finger to nose, heel to shin intact.  Sensory is intact.  Babinski down going, Romberg negative, and gait is normal.  Neuropsychiatric: General: normal, calm and normal eye contact    Medical Decision Making   75 MINUTES SPENT BY ME on the date of service doing chart review, history, exam, documentation & further activities per the note.      Data     I have personally reviewed the following data over the past 24 hrs:    14.7 (H)  \   10.2 (L)   / 301     137 109 (H) 8 /  106   3.8 20 (L) 0.65 \       ALT: 1,000 (H) AST: 1,069 (H) AP: 216 (H) TBILI: 21.0 (HH)   ALB: 3.3 (L) TOT PROTEIN: 7.6 LIPASE: 42       Procal: N/A CRP: 1.4 (H) Lactic Acid: N/A         Imaging results reviewed over the past 24 hrs:   Recent Results (from the past 24 hour(s))   CT Abdomen Pelvis w Contrast    Narrative    EXAM: CT ABDOMEN PELVIS W CONTRAST  LOCATION: [facility]  DATE/TIME: 1/14/2023 4:50 PM    INDICATION: upper abd  pain  COMPARISON: None.  TECHNIQUE: CT scan of the abdomen and pelvis was performed following injection of IV contrast. Multiplanar reformats were obtained. Dose reduction techniques were used.  CONTRAST: Isovue 370 100mL    FINDINGS:   LOWER CHEST: Normal.    HEPATOBILIARY: Status post cholecystectomy.    PANCREAS: Normal.    SPLEEN: Absent.    ADRENAL GLANDS: Normal.    KIDNEYS/BLADDER: Normal.    BOWEL: Previous laparotomy. No obstruction, colitis, diverticulitis, or appendicitis.    LYMPH NODES: Normal.    VASCULATURE: Unremarkable.    PELVIC ORGANS: Normal.    MUSCULOSKELETAL: Normal.      Impression    IMPRESSION:   1.  No obstruction, colitis, diverticulitis, or appendicitis.  2.  No obstructing renal or ureteral stones.  3.  Status post cholecystectomy. Absent spleen.  4.  Uterine fibroid.   Abdomen US, limited (RUQ only)    Narrative    EXAM: US ABDOMEN LIMITED  LOCATION: [facility]  DATE/TIME: 1/14/2023 5:18 PM    INDICATION: Elevated total bilirubin. Epigastric abdominal pain.  COMPARISON: CT abdomen/pelvis 01/14/2023  TECHNIQUE: Limited abdominal ultrasound.    FINDINGS:    GALLBLADDER: Status post cholecystectomy.    BILE DUCTS: No biliary dilatation. The common duct measures 5 mm.    LIVER: Normal parenchyma with smooth contour. No focal mass.    RIGHT KIDNEY: No hydronephrosis.    PANCREAS: The visualized portions are normal.    No ascites.      Impression    IMPRESSION:  1.  Status post cholecystectomy. No biliary dilatation.

## 2023-01-15 NOTE — ANESTHESIA PREPROCEDURE EVALUATION
Anesthesia Pre-Procedure Evaluation    Patient: Eveline Gage   MRN: [id number] : 1995        Procedure : Procedure(s):  ENDOSCOPIC RETROGRADE CHOLANGIOPANCREATOGRAPHY  ESOPHAGOGASTRODUODENOSCOPY, WITH ENDOSCOPIC US          No past medical history on file.   No past surgical history on file.   Allergies   Allergen Reactions     Blood-Group Specific Substance Other (See Comments)     Patient has anti-C, anti-S, anti-Jkb, anti-M, and a nonspecific antibody. Blood product orders may be delayed. Draw THREE purple top tubes for all Type and Screen/Type and crossmatch orders.  Patient has anti-C, anti-S, anti-Jkb, anti-M, and a nonspecific antibody. Blood product orders may be delayed. Draw THREE purple top tubes for all Type and Screen/Type and crossmatch orders.  Patient has anti-C, anti-S, anti-Jkb, anti-M, and a nonspecific antibody. Blood product orders may be delayed. Draw THREE purple top tubes for all Type and Screen/Type and crossmatch orders.  Patient has anti-C, anti-S, anti-Jkb, anti-M, and a nonspecific antibody. Blood product orders may be delayed. Draw THREE purple top tubes for all Type and Screen/Type and crossmatch orders.       Penicillins Rash      Social History     Tobacco Use     Smoking status: Every Day     Passive exposure: Never     Smokeless tobacco: Never   Substance Use Topics     Alcohol use: Not on file      Wt Readings from Last 1 Encounters:   23 73.5 kg (162 lb)        Anesthesia Evaluation   Pt has had prior anesthetic. Type: General.        ROS/MED HX  ENT/Pulmonary:     (+) tobacco use, Current use, asthma     Neurologic:  - neg neurologic ROS     Cardiovascular:  - neg cardiovascular ROS  (-) murmur   METS/Exercise Tolerance: >4 METS    Hematologic: Comments: Sickle cell disease    (+) anemia,     Musculoskeletal:       GI/Hepatic:     (+) cholecystitis/cholelithiasis,     Renal/Genitourinary:       Endo:       Psychiatric/Substance Use:     (+) psychiatric  history anxiety and depression     Infectious Disease:       Malignancy:       Other:            Physical Exam    Airway        Mallampati: II   TM distance: > 3 FB   Neck ROM: full   Mouth opening: > 3 cm    Respiratory Devices and Support         Dental  no notable dental history         Cardiovascular          Rhythm and rate: regular and normal (-) no murmur    Pulmonary           breath sounds clear to auscultation           OUTSIDE LABS:  CBC:   Lab Results   Component Value Date    WBC 14.7 (H) 01/14/2023    WBC 12.3 (H) 08/25/2021    HGB 10.2 (L) 01/14/2023    HGB 9.0 (L) 08/25/2021    HCT 28.6 (L) 01/14/2023    HCT 27.0 (L) 08/25/2021     01/14/2023     08/25/2021     BMP:   Lab Results   Component Value Date     01/14/2023     08/25/2021    POTASSIUM 3.8 01/14/2023    POTASSIUM 3.6 08/25/2021    CHLORIDE 109 (H) 01/14/2023    CHLORIDE 108 (H) 08/25/2021    CO2 20 (L) 01/14/2023    CO2 20 (L) 08/25/2021    BUN 8 01/14/2023    BUN 7 (L) 08/25/2021    CR 0.65 01/14/2023    CR 0.71 08/25/2021     01/14/2023     08/25/2021     COAGS: No results found for: PTT, INR, FIBR  POC:   Lab Results   Component Value Date    HCGS Negative 01/14/2023     HEPATIC:   Lab Results   Component Value Date    ALBUMIN 3.3 (L) 01/14/2023    PROTTOTAL 7.6 01/14/2023    ALT 1,000 (H) 01/14/2023    AST 1,069 (H) 01/14/2023    ALKPHOS 216 (H) 01/14/2023    BILITOTAL 21.0 (HH) 01/14/2023     OTHER:   Lab Results   Component Value Date    ALIZA 9.1 01/14/2023    LIPASE 42 01/14/2023    CRP 1.4 (H) 01/14/2023       Anesthesia Plan    ASA Status:  3   NPO Status:  NPO Appropriate    Anesthesia Type: MAC.     - Reason for MAC: chronic cardiopulmonary disease, immobility needed, straight local not clinically adequate   Induction: Intravenous.   Maintenance: Balanced.        Consents    Anesthesia Plan(s) and associated risks, benefits, and realistic alternatives discussed. Questions answered and  patient/representative(s) expressed understanding.     - Discussed: Risks, Benefits and Alternatives for BOTH SEDATION and the PROCEDURE were discussed     - Discussed with:  Patient         Postoperative Care    Pain management: IV analgesics, Oral pain medications, Multi-modal analgesia.   PONV prophylaxis: Ondansetron (or other 5HT-3), Dexamethasone or Solumedrol     Comments:    Other Comments: 27 year old female with history of sickle cell disease, adjustment disorder with mixed anxiety and depression, asthma, tobacco abuse, admitted on 1/14/2023 for elevated liver transaminases, hyperbilirubinemia and epigastric and right upper quadrant abdominal pain.  Liver transaminases are in cholestatic pattern    MAC w/ propofol gtt.  Decadron and zofran for PONV ppx.  Discussed potential need to convert GA.            Sg Kent MD

## 2023-01-15 NOTE — DISCHARGE SUMMARY
[facility]  Hospitalist Discharge Summary      Date of Admission:  1/15/2023  Date of Discharge:  No discharge date for patient encounter.  Discharging Provider: Lalit Hyatt DO  Discharge Service: Hospitalist Service    Discharge Diagnoses   Elevated liver transaminases  Hyperbilirubinemia  Abdominal pain, right upper quadrant  History of cholecystectomy  Jaundice  Sickle cell anemia  Intermittent asthma  Tobacco abuse    Unresulted Labs Ordered in the Past 30 Days of this Admission     Date and Time Order Name Status Description    1/14/2023  8:53 PM Ceruloplasmin In process     1/14/2023  8:53 PM IgG In process     1/14/2023  8:53 PM Liver kidney microsomal antibody IgG In process     1/14/2023  8:53 PM F Actin EIA with reflex In process     1/14/2023  8:53 PM Anti Nuclear Elidia IgG by IFA with Reflex In process     1/14/2023  4:36 PM Acute hepatitis panel In process       These results will be followed up by [facility], and/or hospitalist.     Discharge Disposition   Transferred to [facility] for ERCP  Condition at discharge: Stable    Hospital Course   Eveline Gage is a 27 year old female with history of sickle cell disease, adjustment disorder with mixed anxiety and depression, asthma, tobacco abuse, admitted on 1/14/2023 for elevated liver transaminases, hyperbilirubinemia and epigastric and right upper quadrant abdominal pain.  Liver transaminases are in cholestatic pattern.  ERCP was recommended and patient was boarded at [facility] until transport could be arranged to transfer to [facility].  Her pain was controlled with IV dilaudid.  Tylenol and salicylate levels were normal.  Beta-HCG was negative.  Order CRISPIN, anti-smooth muscle antibody, antiliver kidney microsomal antibody type I, IgG.  Ceruloplasmin levels were pending.     Consultations This Hospital Stay   None    Code Status   Prior    Time Spent on this Encounter   I, Lalit Hyatt DO, personally saw  the patient today and spent greater than 30 minutes discharging this patient.       Lalit Hyatt DO  [facility] [facility]  [address] [address]  Phone: [phone]  Fax: [phone]  ______________________________________________________________________    Physical Exam   Vital Signs:   Temp src: Oral BP: 105/55     Resp: 16 SpO2: 100 % O2 Device: None (Room air)    Weight: 0 lbs 0 oz       Primary Care Physician   [facility]    Discharge Orders   No discharge procedures on file.    Significant Results and Procedures   Most Recent 3 CBC's:Recent Labs   Lab Test 01/14/23  1555 08/25/21  1236 05/31/21  2049   WBC 14.7* 12.3* 21.3*   HGB 10.2* 9.0* 9.1*   MCV 82 84 86    334 402     Most Recent 3 BMP's:Recent Labs   Lab Test 01/14/23  1555 08/25/21  1236 05/31/21  2049    137 140   POTASSIUM 3.8 3.6 3.9   CHLORIDE 109* 108* 107   CO2 20* 20* 25   BUN 8 7* 8   CR 0.65 0.71 0.75   ANIONGAP 8 9 8   ALIZA 9.1 9.0 9.2    112 87     Most Recent 2 LFT's:Recent Labs   Lab Test 01/14/23  1555 08/25/21  1236   AST 1,069* 63*   ALT 1,000* 76*   ALKPHOS 216* 118   BILITOTAL 21.0* 2.5*     Most Recent Urinalysis:Recent Labs   Lab Test 01/14/23  1727   COLOR Yellow   APPEARANCE Clear   URINEGLC Negative   URINEBILI 1.0 mg/dL*   URINEKETONE Negative   SG 1.033*   UBLD Negative   URINEPH 6.5   PROTEIN Negative   NITRITE Negative   LEUKEST Negative   RBCU 1   WBCU 2     Most Recent ESR & CRP:Recent Labs   Lab Test 01/14/23  1555   CRP 1.4*     Most Recent CPK:No lab results found.,   Results for orders placed or performed during the hospital encounter of 01/14/23   CT Abdomen Pelvis w Contrast    Narrative    EXAM: CT ABDOMEN PELVIS W CONTRAST  LOCATION: [facility]  DATE/TIME: 1/14/2023 4:50 PM    INDICATION: upper abd pain  COMPARISON: None.  TECHNIQUE: CT scan of the abdomen and pelvis was performed following injection  of IV contrast. Multiplanar reformats were obtained. Dose reduction techniques were used.  CONTRAST: Isovue 370 100mL    FINDINGS:   LOWER CHEST: Normal.    HEPATOBILIARY: Status post cholecystectomy.    PANCREAS: Normal.    SPLEEN: Absent.    ADRENAL GLANDS: Normal.    KIDNEYS/BLADDER: Normal.    BOWEL: Previous laparotomy. No obstruction, colitis, diverticulitis, or appendicitis.    LYMPH NODES: Normal.    VASCULATURE: Unremarkable.    PELVIC ORGANS: Normal.    MUSCULOSKELETAL: Normal.      Impression    IMPRESSION:   1.  No obstruction, colitis, diverticulitis, or appendicitis.  2.  No obstructing renal or ureteral stones.  3.  Status post cholecystectomy. Absent spleen.  4.  Uterine fibroid.   Abdomen US, limited (RUQ only)    Narrative    EXAM: US ABDOMEN LIMITED  LOCATION: [facility]  DATE/TIME: 1/14/2023 5:18 PM    INDICATION: Elevated total bilirubin. Epigastric abdominal pain.  COMPARISON: CT abdomen/pelvis 01/14/2023  TECHNIQUE: Limited abdominal ultrasound.    FINDINGS:    GALLBLADDER: Status post cholecystectomy.    BILE DUCTS: No biliary dilatation. The common duct measures 5 mm.    LIVER: Normal parenchyma with smooth contour. No focal mass.    RIGHT KIDNEY: No hydronephrosis.    PANCREAS: The visualized portions are normal.    No ascites.      Impression    IMPRESSION:  1.  Status post cholecystectomy. No biliary dilatation.             Discharge Medications     Current Facility-Administered Medications:      lactated ringers infusion, , Intravenous, Continuous, Sg Kent MD, Last Rate: 100 mL/hr at 01/15/23 1012, 100 mL at 01/15/23 1012     lidocaine (LMX4) cream, , Topical, Q1H PRN, Marv Tomas MD     lidocaine (LMX4) cream, , Topical, Q1H PRN, Sg Kent MD     lidocaine 1 % 0.1-1 mL, 0.1-1 mL, Other, Q1H PRN, Marv Tomas MD     lidocaine 1 % 0.1-1 mL, 0.1-1 mL, Other, Q1H PRN, Sg Kent MD     sodium chloride (PF) 0.9% PF flush 3 mL, 3 mL,  Intracatheter, Q8H, Marv Tomas MD     sodium chloride (PF) 0.9% PF flush 3 mL, 3 mL, Intracatheter, q1 min prn, Marv Tomas MD     sodium chloride (PF) 0.9% PF flush 3 mL, 3 mL, Intracatheter, Q8H, Sg Kent MD     sodium chloride (PF) 0.9% PF flush 3 mL, 3 mL, Intracatheter, q1 min prn, Sg Kent MD    Allergies   Allergies   Allergen Reactions     Blood-Group Specific Substance Other (See Comments)     Patient has anti-C, anti-S, anti-Jkb, anti-M, and a nonspecific antibody. Blood product orders may be delayed. Draw THREE purple top tubes for all Type and Screen/Type and crossmatch orders.  Patient has anti-C, anti-S, anti-Jkb, anti-M, and a nonspecific antibody. Blood product orders may be delayed. Draw THREE purple top tubes for all Type and Screen/Type and crossmatch orders.  Patient has anti-C, anti-S, anti-Jkb, anti-M, and a nonspecific antibody. Blood product orders may be delayed. Draw THREE purple top tubes for all Type and Screen/Type and crossmatch orders.  Patient has anti-C, anti-S, anti-Jkb, anti-M, and a nonspecific antibody. Blood product orders may be delayed. Draw THREE purple top tubes for all Type and Screen/Type and crossmatch orders.       Penicillins Rash

## 2023-01-15 NOTE — PLAN OF CARE
Goal Outcome Evaluation:      Plan of Care Reviewed With: patient    Overall Patient Progress: improvingOverall Patient Progress: improving    Outcome Evaluation: Pt endorsing abdominal discomfort tonight. Was given IV dilaudid x 1 and has been comfortably resting since. Plan for an ERCP at 1010 today at [facility]. Transport with pick her up at 0830.      Problem: Pain Acute  Goal: Optimal Pain Control and Function  Intervention: Prevent or Manage Pain  Recent Flowsheet Documentation  Taken 1/15/2023 0000 by Cedrick White, RN  Medication Review/Management: medications reviewed

## 2023-01-15 NOTE — OR NURSING
Report given to emt transport, called ne resendez at [facility] to report pt c/o left anr pain medicated 25 mcg fentanyl. 100% on room air will be monitored by  EMS transport.

## 2023-01-15 NOTE — PROGRESS NOTES
"Patient transferred to appointment at 0800 this morning. Head to toe completed, but unable to be charted due to transfer limiting the time to chart pt.   Lines- WDL, saline locked.     Heart- WDL    Lungs- WDL    Gi/- voided prior to transfer, NPO since midnight    Neuro-  Pain was identified with assessment. Pain rated 7/10, located in the head (\"feels like hair is being pulled and head is sore from that\" and pain RUQ with palpitation).   Gave IV pain medication for the pain rating for 7/10.     Activity-   Pt states that she is weaker today, but able to feel stable enough to ambulate by herself. Pt just likes a hand to get out of bed, otherwise she is okay on her own.     Skin- WDL    Fam-   Pt keeping fiance informed.     Plan-   Apt 0800.     Labs-  Lab will be drawn when pt comes back to recheck potassium.           "

## 2023-01-15 NOTE — ANESTHESIA POSTPROCEDURE EVALUATION
Patient: Eveline Gage    Procedure: Procedure(s):  ENDOSCOPIC US       Anesthesia Type:  MAC    Note:  Disposition: Inpatient   Postop Pain Control: Uneventful            Sign Out: Well controlled pain   PONV: No   Neuro/Psych: Uneventful            Sign Out: Acceptable/Baseline neuro status   Airway/Respiratory: Uneventful            Sign Out: Acceptable/Baseline resp. status   CV/Hemodynamics: Uneventful            Sign Out: Acceptable CV status; No obvious hypovolemia; No obvious fluid overload   Other NRE: NONE   DID A NON-ROUTINE EVENT OCCUR? No           Last vitals:  Vitals Value Taken Time   /87 01/15/23 1200   Temp 36.6  C (97.8  F) 01/15/23 1146   Pulse     Resp 16 01/15/23 1200   SpO2 99 % 01/15/23 1200       Electronically Signed By: Sg Kent MD  January 15, 2023  1:12 PM

## 2023-01-15 NOTE — CONSULTS
Gastroenterology Consultation     Consulting Physician   Lalit Hyatt DO     Reason For Consultation   Markedly elevated LFTs with abdominal pain.     Chief Complaint   Eveline Gage came to the hospital for evaluation of abdominal pain and jaundice.     History of Present Illness    Eveline Gage is a pleasant 27 year old female with a past medical history of asthma and sickle cell anemia.  The patient states that she had an episode of jaundice 2 to 3 months ago with some abdominal pain and reflux type symptoms.  She never had liver function tests done at that point and was given some medications for reflux symptoms.  Her jaundice seemed to resolve spontaneously and she did not think much of this.  However, yesterday she noticed that she was jaundiced again and was having fairly severe abdominal discomfort.  Given ongoing symptoms with an addition of nausea and vomiting she presented to the hospital for further evaluation and treatment.  On admission to the hospital the patient was found to have a markedly elevated bilirubin of more than 20 as well as elevated LFTs.  She had a history of a cholecystectomy with multiple gallstones several years back.  She has never had choledocholithiasis to her knowledge in the past.  The imaging study of her bile duct did not show any evidence of a dilated bile duct or choledocholithiasis.    The patient states that she continues to have somewhat diffuse abdominal discomfort mainly in the right upper quadrant though.  She has had some nausea with vomiting but not since being in the hospital.  Overall her symptoms have improved since being in the hospital and being on IV fluids and pain medications.  She denies having a sickle cell crisis recently.  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  The GI service is asked to see the patient for help in evaluation and treatment of her markedly elevated LFTs and possible need for  ERCP to clear the bile duct.     Past History   Sickle cell anemia  Asthma   Splenectomy  Cholecystectomy     Family History Social History   Sickle cell anemia  Hypertension  CAD   Occupation: Works at [employer]    Marital Status: Recently engaged, 5-year-old daughter    Tobacco: None    Alcohol: None    Recreational Drugs: None     Medications    Medications Prior to Admission   Medication Sig Dispense Refill Last Dose     albuterol (PROAIR HFA/PROVENTIL HFA/VENTOLIN HFA) 108 (90 Base) MCG/ACT inhaler Inhale 2 puffs into the lungs every 6 hours as needed for shortness of breath / dyspnea or wheezing 18 g 1 PRN     calcium carbonate (TUMS) 500 MG chewable tablet Take 1-2 chew tab by mouth every 4 hours as needed for heartburn   Past Week     cyclobenzaprine (FLEXERIL) 10 MG tablet Take 10 mg by mouth 2 times daily as needed   PRN     etonogestrel (NEXPLANON) 68 MG IMPL 1 each by Subdermal route   in place     ipratropium - albuterol 0.5 mg/2.5 mg/3 mL (DUONEB) 0.5-2.5 (3) MG/3ML neb solution Take 1 vial (3 mLs) by nebulization every 6 hours as needed for shortness of breath / dyspnea or wheezing 90 mL 1 PRN     multivitamin, therapeutic (THERA-VIT) TABS tablet Take 1 tablet by mouth daily   Past Month     naproxen sodium (ANAPROX) 220 MG tablet Take 220-440 mg by mouth every 12 hours as needed for moderate pain (4-6)   1/12/2023     omeprazole (PRILOSEC) 20 MG DR capsule Take 1 capsule (20 mg) by mouth 2 times daily 40 capsule 0 1/14/2023 at am     sucralfate (CARAFATE) 1 GM tablet Take 1 tablet (1 g) by mouth 4 times daily as needed for nausea (stomach pain) 40 tablet 0 1/14/2023        Allergies   Allergies   Allergen Reactions     Blood-Group Specific Substance Other (See Comments)     Patient has anti-C, anti-S, anti-Jkb, anti-M, and a nonspecific antibody. Blood product orders may be delayed. Draw THREE purple top tubes for all Type and Screen/Type and crossmatch orders.  Patient has anti-C, anti-S, anti-Jkb,  "anti-M, and a nonspecific antibody. Blood product orders may be delayed. Draw THREE purple top tubes for all Type and Screen/Type and crossmatch orders.  Patient has anti-C, anti-S, anti-Jkb, anti-M, and a nonspecific antibody. Blood product orders may be delayed. Draw THREE purple top tubes for all Type and Screen/Type and crossmatch orders.  Patient has anti-C, anti-S, anti-Jkb, anti-M, and a nonspecific antibody. Blood product orders may be delayed. Draw THREE purple top tubes for all Type and Screen/Type and crossmatch orders.       Penicillins Rash         Review of Systems     no chest pain    no SOB    no fevers/sweats/chills    no weight gain or loss    Nausea with episodes of vomiting    Severe abdominal pain mainly in the right upper quadrant    no constipation or diarrhea    no black or bloody stools    no numbness or weakness    Jaundiced with dark urine  A comprehensive review of systems was performed and was otherwise noncontributory.     Objective     Vitals Blood pressure 109/64, pulse 60, temperature 98.1  F (36.7  C), temperature source Oral, resp. rate 16, height 1.6 m (5' 3\"), weight 73.5 kg (162 lb), SpO2 96 %.          Physical   Exam   GENERAL: alert and oriented, uncomfortable    SKIN: Icteric    HEENT: atraumatic, anicteric, moist mucous membranes, neck soft/supple     PULMONARY: normal resp effort, breath sounds clear to auscultation bilateral    CARDIOVASCULAR: normal rate and rhythm, mild lower extremity edema    ABDOMEN: Diffuse tenderness mainly in the right upper quadrant, no distention, bowel sounds normal    MUSCULOSKELETAL: joints and gait normal    NEUROLOGICAL: appropriate mental status, grossly intact    PSYCHIATRIC: normal mood, affect and insight        Laboratory     Electrolytes    Recent Labs   Lab 01/14/23  1555      POTASSIUM 3.8   CHLORIDE 109*   CO2 20*      CR 0.65   BUN 8      Hematology    Recent Labs   Lab 01/14/23  1555   HGB 10.2*   MCV 82   WBC 14.7* "         LFTs & Lipase    Recent Labs   Lab 01/14/23  1555   AST 1,069*   ALT 1,000*   ALKPHOS 216*   BILITOTAL 21.0*   LIPASE 42       Imaging Studies     Abdominal ultrasound from January 14, 2023  Status post cholecystectomy. No biliary dilatation.    Abdominal CT scan from January 14, 2023  1.  No obstruction, colitis, diverticulitis, or appendicitis.  2.  No obstructing renal or ureteral stones.  3.  Status post cholecystectomy. Absent spleen.  4.  Uterine fibroid.    I have reviewed the current diagnostic and laboratory tests.           Impression    Eveline Gage is a pleasant 27 year old female with sickle cell anemia and asthma who presented to the hospital with acute onset of jaundice for the second time as well as markedly elevated liver function tests and abdominal pain.     Recommendations      Elevated LFTs   Differential diagnosis: Choledocholithiasis versus viral hepatitis versus autoimmune versus other.  Laboratory evaluation: Acute viral hepatitis serologies, CRISPIN, AMA, ASMA, ceruloplasmin, iron saturation and ferritin.  The patient is scheduled for an endoscopic ultrasound this morning at [facility] to rule out choledocholithiasis.  If obstruction is found an ERCP will be performed to follow.  If no obstruction is found we will focus our efforts on looking for intrahepatic causes of the patient's elevated liver function test.       Abdominal pain   IV pain medications to manage.       Nausea with vomiting   IV fluids for hydration.  IV antiemetics.       Disposition   Further recommendations based on the findings of the endoscopic ultrasound.     50 minutes of total time was spent providing patient care including patient evaluation, reviewing documentation/test results, and .           Marv Tomas MD  Thank you for the opportunity to participate in the care of this patient.   Please feel free to call me with any questions or concerns.  Phone number [phone].

## 2023-01-15 NOTE — H&P
GENERAL PRE-PROCEDURE:   Procedure:  EUS with possible ERCP - Elevated LFTs  Date/Time:  1/15/2023 10:35 AM    Verbal consent obtained?: Yes    Written consent obtained?: Yes    Risks and benefits: Risks, benefits and alternatives were discussed    Consent given by:  Patient  Patient states understanding of procedure being performed: Yes    Patient's understanding of procedure matches consent: Yes    Procedure consent matches procedure scheduled: Yes    Expected level of sedation:  Deep  Appropriately NPO:  Yes  ASA Class:  3  Mallampati  :  Grade 2- soft palate, base of uvula, tonsillar pillars, and portion of posterior pharyngeal wall visible  Lungs:  Lungs clear with good breath sounds bilaterally  Heart:  Normal heart sounds and rate  History & Physical reviewed:  History and physical reviewed and no updates needed  Statement of review:  I have reviewed the lab findings, diagnostic data, medications, and the plan for sedation

## 2023-01-16 ENCOUNTER — HOME INFUSION (PRE-WILLOW HOME INFUSION) (OUTPATIENT)
Dept: PHARMACY | Facility: CLINIC | Age: 28
End: 2023-01-16

## 2023-01-16 PROBLEM — R74.01 ELEVATION OF LEVELS OF LIVER TRANSAMINASE LEVELS: Status: ACTIVE | Noted: 2023-01-16

## 2023-01-16 LAB
ALBUMIN SERPL-MCNC: 2.9 G/DL (ref 3.5–5)
ALBUMIN UR-MCNC: NEGATIVE MG/DL
ALP SERPL-CCNC: 184 U/L (ref 45–120)
ALT SERPL W P-5'-P-CCNC: 858 U/L (ref 0–45)
ANA PAT SER IF-IMP: ABNORMAL
ANA SER QL IF: POSITIVE
ANA SER QL IF: POSITIVE
ANA TITR SER IF: ABNORMAL {TITER}
ANION GAP SERPL CALCULATED.3IONS-SCNC: 9 MMOL/L (ref 5–18)
APPEARANCE UR: ABNORMAL
AST SERPL W P-5'-P-CCNC: 905 U/L (ref 0–40)
BASOPHILS # BLD MANUAL: 0.2 10E3/UL (ref 0–0.2)
BASOPHILS NFR BLD MANUAL: 1 %
BILIRUB SERPL-MCNC: 18.2 MG/DL (ref 0–1)
BILIRUB UR QL STRIP: ABNORMAL
BUN SERPL-MCNC: 6 MG/DL (ref 8–22)
CALCIUM SERPL-MCNC: 8.6 MG/DL (ref 8.5–10.5)
CERULOPLASMIN SERPL-MCNC: 29 MG/DL (ref 20–60)
CERULOPLASMIN SERPL-MCNC: 31 MG/DL (ref 20–60)
CHLORIDE BLD-SCNC: 110 MMOL/L (ref 98–107)
CO2 SERPL-SCNC: 20 MMOL/L (ref 22–31)
COLOR UR AUTO: ABNORMAL
CREAT SERPL-MCNC: 0.61 MG/DL (ref 0.6–1.1)
ELLIPTOCYTES BLD QL SMEAR: SLIGHT
EOSINOPHIL # BLD MANUAL: 1.4 10E3/UL (ref 0–0.7)
EOSINOPHIL NFR BLD MANUAL: 7 %
ERYTHROCYTE [DISTWIDTH] IN BLOOD BY AUTOMATED COUNT: 18.4 % (ref 10–15)
GFR SERPL CREATININE-BSD FRML MDRD: >90 ML/MIN/1.73M2
GLUCOSE BLD-MCNC: 91 MG/DL (ref 70–125)
GLUCOSE UR STRIP-MCNC: NEGATIVE MG/DL
HAPTOGLOB SERPL-MCNC: <3 MG/DL (ref 32–197)
HAV IGM SERPL QL IA: NONREACTIVE
HBV CORE IGM SERPL QL IA: NONREACTIVE
HBV SURFACE AG SERPL QL IA: NONREACTIVE
HCT VFR BLD AUTO: 26.6 % (ref 35–47)
HCV AB SERPL QL IA: NONREACTIVE
HGB BLD-MCNC: 9.3 G/DL (ref 11.7–15.7)
HGB UR QL STRIP: NEGATIVE
IGG SERPL-MCNC: 1750 MG/DL (ref 610–1616)
IGG SERPL-MCNC: 1809 MG/DL (ref 610–1616)
IRON BINDING CAPACITY (ROCHE): ABNORMAL
IRON SATN MFR SERPL: ABNORMAL %
IRON SERPL-MCNC: 255 UG/DL (ref 37–145)
KETONES UR STRIP-MCNC: NEGATIVE MG/DL
LEUKOCYTE ESTERASE UR QL STRIP: ABNORMAL
LYMPHOCYTES # BLD MANUAL: 5.3 10E3/UL (ref 0.8–5.3)
LYMPHOCYTES NFR BLD MANUAL: 26 %
MCH RBC QN AUTO: 29.5 PG (ref 26.5–33)
MCHC RBC AUTO-ENTMCNC: 35 G/DL (ref 31.5–36.5)
MCV RBC AUTO: 84 FL (ref 78–100)
MONOCYTES # BLD MANUAL: 2 10E3/UL (ref 0–1.3)
MONOCYTES NFR BLD MANUAL: 10 %
MUCOUS THREADS #/AREA URNS LPF: PRESENT /LPF
NEUTROPHILS # BLD MANUAL: 11.3 10E3/UL (ref 1.6–8.3)
NEUTROPHILS NFR BLD MANUAL: 56 %
NITRATE UR QL: NEGATIVE
PATH REPORT.COMMENTS IMP SPEC: NORMAL
PATH REPORT.COMMENTS IMP SPEC: NORMAL
PATH REPORT.FINAL DX SPEC: NORMAL
PATH REPORT.RELEVANT HX SPEC: NORMAL
PH UR STRIP: 6 [PH] (ref 5–7)
PLAT MORPH BLD: ABNORMAL
PLATELET # BLD AUTO: 263 10E3/UL (ref 150–450)
POTASSIUM BLD-SCNC: 4.1 MMOL/L (ref 3.5–5)
PROT SERPL-MCNC: 6.5 G/DL (ref 6–8)
RBC # BLD AUTO: 3.15 10E6/UL (ref 3.8–5.2)
RBC MORPH BLD: ABNORMAL
RBC URINE: 2 /HPF
SICKLE CELLS BLD QL SMEAR: SLIGHT
SODIUM SERPL-SCNC: 139 MMOL/L (ref 136–145)
SP GR UR STRIP: 1.01 (ref 1–1.03)
SQUAMOUS EPITHELIAL: 6 /HPF
TARGETS BLD QL SMEAR: ABNORMAL
UROBILINOGEN UR STRIP-MCNC: 8 MG/DL
WBC # BLD AUTO: 20.2 10E3/UL (ref 4–11)
WBC URINE: 15 /HPF

## 2023-01-16 PROCEDURE — 99232 SBSQ HOSP IP/OBS MODERATE 35: CPT | Performed by: INTERNAL MEDICINE

## 2023-01-16 PROCEDURE — 80074 ACUTE HEPATITIS PANEL: CPT | Performed by: INTERNAL MEDICINE

## 2023-01-16 PROCEDURE — 99207 BLOOD MORPHOLOGY PATHOLOGIST REVIEW: CPT | Performed by: PATHOLOGY

## 2023-01-16 PROCEDURE — 99254 IP/OBS CNSLTJ NEW/EST MOD 60: CPT | Performed by: INTERNAL MEDICINE

## 2023-01-16 PROCEDURE — 120N000001 HC R&B MED SURG/OB

## 2023-01-16 PROCEDURE — 81001 URINALYSIS AUTO W/SCOPE: CPT | Performed by: INTERNAL MEDICINE

## 2023-01-16 PROCEDURE — 87086 URINE CULTURE/COLONY COUNT: CPT | Performed by: INTERNAL MEDICINE

## 2023-01-16 PROCEDURE — 80053 COMPREHEN METABOLIC PANEL: CPT | Performed by: INTERNAL MEDICINE

## 2023-01-16 PROCEDURE — 258N000002 HC RX IP 258 OP 250: Performed by: INTERNAL MEDICINE

## 2023-01-16 PROCEDURE — 36415 COLL VENOUS BLD VENIPUNCTURE: CPT | Performed by: INTERNAL MEDICINE

## 2023-01-16 PROCEDURE — 85027 COMPLETE CBC AUTOMATED: CPT | Performed by: INTERNAL MEDICINE

## 2023-01-16 PROCEDURE — 83550 IRON BINDING TEST: CPT | Performed by: PHYSICIAN ASSISTANT

## 2023-01-16 PROCEDURE — 82728 ASSAY OF FERRITIN: CPT | Performed by: PHYSICIAN ASSISTANT

## 2023-01-16 PROCEDURE — 258N000003 HC RX IP 258 OP 636: Performed by: INTERNAL MEDICINE

## 2023-01-16 PROCEDURE — 250N000011 HC RX IP 250 OP 636: Performed by: INTERNAL MEDICINE

## 2023-01-16 PROCEDURE — 85007 BL SMEAR W/DIFF WBC COUNT: CPT | Performed by: INTERNAL MEDICINE

## 2023-01-16 PROCEDURE — 250N000013 HC RX MED GY IP 250 OP 250 PS 637: Performed by: INTERNAL MEDICINE

## 2023-01-16 RX ORDER — ONDANSETRON 4 MG/1
4 TABLET, ORALLY DISINTEGRATING ORAL EVERY 30 MIN PRN
Status: DISCONTINUED | OUTPATIENT
Start: 2023-01-16 | End: 2023-01-17

## 2023-01-16 RX ORDER — HYDROCODONE BITARTRATE AND ACETAMINOPHEN 5; 325 MG/1; MG/1
2 TABLET ORAL ONCE
Status: DISCONTINUED | OUTPATIENT
Start: 2023-01-16 | End: 2023-01-16 | Stop reason: CLARIF

## 2023-01-16 RX ORDER — HYDROMORPHONE HYDROCHLORIDE 1 MG/ML
0.5 INJECTION, SOLUTION INTRAMUSCULAR; INTRAVENOUS; SUBCUTANEOUS EVERY 4 HOURS PRN
Status: DISCONTINUED | OUTPATIENT
Start: 2023-01-16 | End: 2023-01-16

## 2023-01-16 RX ORDER — SUCRALFATE 1 G/1
1 TABLET ORAL
Status: DISCONTINUED | OUTPATIENT
Start: 2023-01-16 | End: 2023-01-19 | Stop reason: HOSPADM

## 2023-01-16 RX ORDER — HYDROMORPHONE HYDROCHLORIDE 2 MG/1
2 TABLET ORAL
Status: DISCONTINUED | OUTPATIENT
Start: 2023-01-16 | End: 2023-01-17

## 2023-01-16 RX ORDER — SODIUM CHLORIDE, SODIUM LACTATE, POTASSIUM CHLORIDE, CALCIUM CHLORIDE 600; 310; 30; 20 MG/100ML; MG/100ML; MG/100ML; MG/100ML
INJECTION, SOLUTION INTRAVENOUS CONTINUOUS
Status: DISCONTINUED | OUTPATIENT
Start: 2023-01-17 | End: 2023-01-17

## 2023-01-16 RX ORDER — SODIUM CHLORIDE 450 MG/100ML
INJECTION, SOLUTION INTRAVENOUS CONTINUOUS
Status: DISCONTINUED | OUTPATIENT
Start: 2023-01-16 | End: 2023-01-19 | Stop reason: HOSPADM

## 2023-01-16 RX ORDER — ONDANSETRON 2 MG/ML
4 INJECTION INTRAMUSCULAR; INTRAVENOUS EVERY 30 MIN PRN
Status: DISCONTINUED | OUTPATIENT
Start: 2023-01-16 | End: 2023-01-17

## 2023-01-16 RX ORDER — SUCRALFATE 1 G/1
1 TABLET ORAL 4 TIMES DAILY PRN
Status: DISCONTINUED | OUTPATIENT
Start: 2023-01-16 | End: 2023-01-16

## 2023-01-16 RX ORDER — SODIUM CHLORIDE 9 MG/ML
INJECTION, SOLUTION INTRAVENOUS CONTINUOUS
Status: DISCONTINUED | OUTPATIENT
Start: 2023-01-16 | End: 2023-01-16

## 2023-01-16 RX ORDER — HYDROMORPHONE HYDROCHLORIDE 1 MG/ML
0.5 INJECTION, SOLUTION INTRAMUSCULAR; INTRAVENOUS; SUBCUTANEOUS ONCE
Status: COMPLETED | OUTPATIENT
Start: 2023-01-16 | End: 2023-01-16

## 2023-01-16 RX ORDER — LIDOCAINE 40 MG/G
CREAM TOPICAL
Status: DISCONTINUED | OUTPATIENT
Start: 2023-01-16 | End: 2023-01-17

## 2023-01-16 RX ORDER — HYDROMORPHONE HCL IN WATER/PF 6 MG/30 ML
0.2 PATIENT CONTROLLED ANALGESIA SYRINGE INTRAVENOUS EVERY 5 MIN PRN
Status: DISCONTINUED | OUTPATIENT
Start: 2023-01-16 | End: 2023-01-17

## 2023-01-16 RX ORDER — FENTANYL CITRATE 50 UG/ML
50 INJECTION, SOLUTION INTRAMUSCULAR; INTRAVENOUS EVERY 5 MIN PRN
Status: DISCONTINUED | OUTPATIENT
Start: 2023-01-16 | End: 2023-01-17

## 2023-01-16 RX ORDER — LIDOCAINE 40 MG/G
CREAM TOPICAL
Status: DISCONTINUED | OUTPATIENT
Start: 2023-01-16 | End: 2023-01-19 | Stop reason: HOSPADM

## 2023-01-16 RX ORDER — HYDROMORPHONE HYDROCHLORIDE 1 MG/ML
0.5 INJECTION, SOLUTION INTRAMUSCULAR; INTRAVENOUS; SUBCUTANEOUS
Status: DISCONTINUED | OUTPATIENT
Start: 2023-01-16 | End: 2023-01-17

## 2023-01-16 RX ORDER — HYDROMORPHONE HCL IN WATER/PF 6 MG/30 ML
0.4 PATIENT CONTROLLED ANALGESIA SYRINGE INTRAVENOUS EVERY 5 MIN PRN
Status: DISCONTINUED | OUTPATIENT
Start: 2023-01-16 | End: 2023-01-17

## 2023-01-16 RX ORDER — FENTANYL CITRATE 50 UG/ML
25 INJECTION, SOLUTION INTRAMUSCULAR; INTRAVENOUS EVERY 5 MIN PRN
Status: DISCONTINUED | OUTPATIENT
Start: 2023-01-16 | End: 2023-01-17

## 2023-01-16 RX ORDER — HYDROCODONE BITARTRATE AND ACETAMINOPHEN 5; 325 MG/1; MG/1
1 TABLET ORAL EVERY 4 HOURS PRN
Status: DISCONTINUED | OUTPATIENT
Start: 2023-01-17 | End: 2023-01-16 | Stop reason: CLARIF

## 2023-01-16 RX ADMIN — HYDROMORPHONE HYDROCHLORIDE 2 MG: 2 TABLET ORAL at 16:23

## 2023-01-16 RX ADMIN — OXYCODONE HYDROCHLORIDE 5 MG: 5 TABLET ORAL at 14:51

## 2023-01-16 RX ADMIN — HYDROMORPHONE HYDROCHLORIDE 0.5 MG: 1 INJECTION, SOLUTION INTRAMUSCULAR; INTRAVENOUS; SUBCUTANEOUS at 08:47

## 2023-01-16 RX ADMIN — OXYCODONE HYDROCHLORIDE 5 MG: 5 TABLET ORAL at 09:51

## 2023-01-16 RX ADMIN — HYDROMORPHONE HYDROCHLORIDE 2 MG: 2 TABLET ORAL at 21:05

## 2023-01-16 RX ADMIN — HYDROMORPHONE HYDROCHLORIDE 0.5 MG: 1 INJECTION, SOLUTION INTRAMUSCULAR; INTRAVENOUS; SUBCUTANEOUS at 14:06

## 2023-01-16 RX ADMIN — CYCLOBENZAPRINE 10 MG: 10 TABLET, FILM COATED ORAL at 17:10

## 2023-01-16 RX ADMIN — ONDANSETRON 4 MG: 4 TABLET, ORALLY DISINTEGRATING ORAL at 18:07

## 2023-01-16 RX ADMIN — PANTOPRAZOLE SODIUM 40 MG: 20 TABLET, DELAYED RELEASE ORAL at 16:23

## 2023-01-16 RX ADMIN — HYDROMORPHONE HYDROCHLORIDE 0.5 MG: 1 INJECTION, SOLUTION INTRAMUSCULAR; INTRAVENOUS; SUBCUTANEOUS at 22:52

## 2023-01-16 RX ADMIN — HYDROMORPHONE HYDROCHLORIDE 0.5 MG: 1 INJECTION, SOLUTION INTRAMUSCULAR; INTRAVENOUS; SUBCUTANEOUS at 19:03

## 2023-01-16 RX ADMIN — SUCRALFATE 1 G: 1 TABLET ORAL at 21:06

## 2023-01-16 RX ADMIN — HYDROMORPHONE HYDROCHLORIDE 0.5 MG: 1 INJECTION, SOLUTION INTRAMUSCULAR; INTRAVENOUS; SUBCUTANEOUS at 17:23

## 2023-01-16 RX ADMIN — HYDROMORPHONE HYDROCHLORIDE 0.5 MG: 1 INJECTION, SOLUTION INTRAMUSCULAR; INTRAVENOUS; SUBCUTANEOUS at 11:59

## 2023-01-16 RX ADMIN — SODIUM CHLORIDE: 9 INJECTION, SOLUTION INTRAVENOUS at 12:02

## 2023-01-16 RX ADMIN — OXYCODONE HYDROCHLORIDE 5 MG: 5 TABLET ORAL at 03:13

## 2023-01-16 RX ADMIN — SODIUM CHLORIDE: 4.5 INJECTION, SOLUTION INTRAVENOUS at 17:21

## 2023-01-16 RX ADMIN — SODIUM CHLORIDE: 9 INJECTION, SOLUTION INTRAVENOUS at 04:35

## 2023-01-16 RX ADMIN — PANTOPRAZOLE SODIUM 40 MG: 20 TABLET, DELAYED RELEASE ORAL at 08:47

## 2023-01-16 RX ADMIN — ONDANSETRON 4 MG: 2 INJECTION INTRAMUSCULAR; INTRAVENOUS at 14:48

## 2023-01-16 RX ADMIN — SUCRALFATE 1 G: 1 TABLET ORAL at 16:23

## 2023-01-16 RX ADMIN — HYDROMORPHONE HYDROCHLORIDE 0.5 MG: 1 INJECTION, SOLUTION INTRAMUSCULAR; INTRAVENOUS; SUBCUTANEOUS at 05:34

## 2023-01-16 ASSESSMENT — ACTIVITIES OF DAILY LIVING (ADL)
ADLS_ACUITY_SCORE: 35

## 2023-01-16 NOTE — CONSULTS
Consultation - Hematology/Oncology  Eveline Gage,  1995, MRN [id number]    Admitting Dx: Elevation of levels of liver transaminase levels [R74.01]    PCP: Stalin Casey Bimble, [phone]   Code status:  Full Code       Extended Emergency Contact Information  Primary Emergency Contact: Joel Jaimes  Mobile Phone: [phone]  Relation: Significant other       Assessment and Plan     1.  Sickle cell disease: Confirmed by hemoglobin electrophoresis 2017.  As fosfomycin in the past.  Rare admissions for sickle cell pain crisis.  From what I can tell, she was last seen in the emergency room in May 2021.  She did follow-up with Dr. Chris in the past.  Never been on Hydrea.  Now admitted with elevated liver function tests.  Liver imaging is normal.  Endoscopic ultrasound was done yesterday showing no evidence of endoscopic abnormality in the entire main bile duct.  Left lobe of the liver was without any abnormalities.  Acute hepatitis panel was negative.  Could be sickle cell hepatology.  Treatment of this is supportive.  Supplemental oxygen at 2 L/min.  IV hydration.  Will change to half-normal saline.  She will need to follow-up with us at discharge for ongoing management of her sickle cell disease and ongoing discussions regarding initiating Hydrea.    2.  Pain: Likely from acute sickling.  She is being followed by the pain service.  Supplemental oxygen.  Hydration.  She also has Flexeril written.  It is hard to determine if this is her typical pain and she states she usually does not have chest and back pain.  CT scan performed in  shows no evidence of pulmonary infiltrates.  She has no hypoxemia.    3.  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  We will check a reticulocyte count.  Hemoglobin seems to be at her baseline currently.       Chief Complaint Elevation of levels of liver transaminase levels       HPI      We have been requested  by Dr. Hyatt to evaluate Eveline Gage who is a 27 year old year old female for sickle cell pain crisis.  The patient is admitted to the hospital on January 14 with abdominal discomfort and elevated liver function tests.  She went on to have an endoscopic ultrasound which did not suggest any evidence of common bile duct obstruction.  She has had a CT scan and an ultrasound of the abdomen/liver without any obvious abnormalities.  The pain team is seeing the patient for pain management.  She is on hydration.  Her LFTs are lower today than they were on the 14th.  In regards of her sickle cell disease.  She has had a splenectomy in the past.  She is not following with a hematologist.  It appears her last clinic evaluation was in 2016.  She has infrequent pain crises.  Pain medications at home include Tylenol and ibuprofen.  She does not regularly take opioids.  She rarely is admitted for pain crises.       Medical History  No past medical history on file.   Surgical History  She  has a past surgical history that includes Esophagoscopy, gastroscopy, duodenoscopy (EGD), combined (N/A, 1/15/2023).   Social History  Reviewed, and she  reports that she has been smoking. She has never been exposed to tobacco smoke. She has never used smokeless tobacco. She reports that she does not currently use alcohol. She reports that she does not currently use drugs.   Allergies  Allergies   Allergen Reactions     Blood-Group Specific Substance Other (See Comments)     Patient has anti-C, anti-S, anti-Jkb, anti-M, and a nonspecific antibody. Blood product orders may be delayed. Draw THREE purple top tubes for all Type and Screen/Type and crossmatch orders.  Patient has anti-C, anti-S, anti-Jkb, anti-M, and a nonspecific antibody. Blood product orders may be delayed. Draw THREE purple top tubes for all Type and Screen/Type and crossmatch orders.  Patient has anti-C, anti-S, anti-Jkb, anti-M, and a nonspecific antibody. Blood  product orders may be delayed. Draw THREE purple top tubes for all Type and Screen/Type and crossmatch orders.  Patient has anti-C, anti-S, anti-Jkb, anti-M, and a nonspecific antibody. Blood product orders may be delayed. Draw THREE purple top tubes for all Type and Screen/Type and crossmatch orders.       Penicillins Rash    Family History  Reviewed, and family history is not on file.  Sickle cell Psychosocial Needs  Social History     Social History Narrative     Not on file     Additional psychosocial needs reviewed per nursing assessment.     Prior to Admission Medications   Medications Prior to Admission   Medication Sig Dispense Refill Last Dose     albuterol (PROAIR HFA/PROVENTIL HFA/VENTOLIN HFA) 108 (90 Base) MCG/ACT inhaler Inhale 2 puffs into the lungs every 6 hours as needed for shortness of breath / dyspnea or wheezing 18 g 1 Unknown     calcium carbonate (TUMS) 500 MG chewable tablet Take 1-2 chew tab by mouth every 4 hours as needed for heartburn   Past Week     cyclobenzaprine (FLEXERIL) 10 MG tablet Take 10 mg by mouth 2 times daily as needed   Unknown     ipratropium - albuterol 0.5 mg/2.5 mg/3 mL (DUONEB) 0.5-2.5 (3) MG/3ML neb solution Take 1 vial (3 mLs) by nebulization every 6 hours as needed for shortness of breath / dyspnea or wheezing 90 mL 1 Unknown     multivitamin, therapeutic (THERA-VIT) TABS tablet Take 1 tablet by mouth daily   Past Month     omeprazole (PRILOSEC) 20 MG DR capsule Take 1 capsule (20 mg) by mouth 2 times daily 40 capsule 0 1/14/2023 at am     sucralfate (CARAFATE) 1 GM tablet Take 1 tablet (1 g) by mouth 4 times daily as needed for nausea (stomach pain) 40 tablet 0 1/14/2023     etonogestrel (NEXPLANON) 68 MG IMPL 1 each by Subdermal route   in place     naproxen sodium (ANAPROX) 220 MG tablet Take 220-440 mg by mouth every 12 hours as needed for moderate pain (4-6)   1/12/2023          Review of Systems:    As above in the history.     Review of Systems otherwise  Negative for:  General: chills, fever or night sweats  Psychological: anxiety or depression  Ophthalmic: blurry vision, double vision or loss of vision, vision change  ENT: epistaxis, oral lesions, hearing changes  Hematological and Lymphatic: bleeding, bruising, jaundice, swollen lymph nodes  Endocrine: hot flashes, unexpected weight changes  Respiratory: cough, hemoptysis, orthopnea  Cardiovascular: edema, palpitations or PND  Gastrointestinal: blood in stools, change in bowel habits, constipation, diarrhea or nausea/vomiting  Genito-Urinary: change in urinary stream, incontinence, frequency/urgency  Musculoskeletal: joint pain, stiffness, swelling, weakness  Neurological: dizziness, headaches, numbness/tingling  Dermatological: lumps and rash    Physical Exam:    Vitals:    01/16/23 0052 01/16/23 0421 01/16/23 1159 01/16/23 1410   BP: 98/51 95/51 111/65 (!) 140/80   Pulse: 68 62 66 71   Resp: 16 17 18 18   Temp: 98.1  F (36.7  C) 99  F (37.2  C) 98  F (36.7  C)    TempSrc: Oral Oral Oral    SpO2: 99% 96% 96% 96%       General: patient appears stated age of 27 year old. Nontoxic.  Some distress secondary to pain during our visit.  HEENT: Head: atraumatic, normocephalic. Sclerae anicteric.  Chest:  Normal respiratory effort  Cardiac:  No edema.   Abdomen: abdomen is non-distended  Extremities: normal tone and muscle bulk.  Skin: no lesions or rash. Warm and dry.   CNS: alert and oriented. Grossly non-focal.   Psychiatric: normal mood and affect.        Pertinent Labs:    Lab Results: personally reviewed.   Lab Results   Component Value Date     01/16/2023    CO2 20 01/16/2023    BUN 6 01/16/2023     Lab Results   Component Value Date    WBC 20.2 01/16/2023    HGB 9.3 01/16/2023    HCT 26.6 01/16/2023    MCV 84 01/16/2023     01/16/2023        Pertinent Radiology:    Radiology Results: Personally reviewed image/s and Personally reviewed impression/s    No results found.

## 2023-01-16 NOTE — PROGRESS NOTES
[facility]    Medicine Progress Note - Hospitalist Service    Date of Admission:  1/15/2023    Assessment & Plan    Eveline Gage is a 27 year old female with history of sickle cell disease, adjustment disorder with mixed anxiety and depression, asthma, tobacco abuse, admitted on 1/14/2023 for elevated liver transaminases, hyperbilirubinemia and epigastric and right upper quadrant abdominal pain.  Liver transaminases are in cholestatic pattern.  Transferred to [facility] for ERCP on 1/15/23 without obstruction visualized.  Evaluating for viral hepatitis versus autoimmune hepatitis.       Elevated liver transaminases  Hyperbilirubinemia  Epigastric/right upper quadrant abdominal pain  History of cholecystectomy  AST 1069->858, ALT 1000->905, total bilirubin 21-18.2  Alkaline phosphatase minimally elevated at 216-184  Lipase was normal.  White blood cell count elevated at 14.7->20.2.  Beta-hCG G testing was negative  Hepatitis panel in process.  Tylenol level, salicylate level negative.  ERCP on 1/15/23 without obstruction.  CRISPIN, anti-smooth muscle antibody, antiliver kidney microsomal antibody type I, IgG.  Ceruloplasmin, ferritin levels pending.  Regular diet.     Sickle cell disease/anemia  History of Splenectomy  ? Pain crisis  Hemoglobin 10.2->9.3 baseline 9.0.  Repeat CBC with differential pending.   Hemolysis labs in process.   Dilaudid 2 mg q3h prn  hydromorphone 0.5mg every 2 hours as needed for breakthrough pain  Hematology consultation     Mild remittent asthma  Resume home inhalers.      Tobacco abuse  Advised smoking cessation.      Diet: Advance Diet as Tolerated: Regular Diet Adult  Room Service    DVT Prophylaxis: Low Risk/Ambulatory with no VTE prophylaxis indicated  Echeverria Catheter: Not present  Lines: None     Cardiac Monitoring: None  Code Status: Full Code      Clinically Significant Risk Factors Present on Admission              # Hypoalbuminemia: Lowest albumin =  "3.3 g/dL at 1/14/2023  3:55 PM, will monitor as appropriate          # Overweight: Estimated body mass index is 28.7 kg/m  as calculated from the following:    Height as of 1/14/23: 1.6 m (5' 3\").    Weight as of 1/14/23: 73.5 kg (162 lb).           Disposition Plan      Expected Discharge Date: 01/18/2023                  Lalit Hyatt DO  Hospitalist Service  [facility]  Securely message with [employer] (more info)  Text page via [facility] Paging/Directory   ______________________________________________________________________    Interval History   Patient reports 7/10 pain, and has pain that comes and goes around legs, hips, and shoulder.  Right upper quadrant pain is improved.  Denies fever or chills.     Physical Exam   Vital Signs: Temp: 99  F (37.2  C) Temp src: Oral BP: 95/51 Pulse: 62   Resp: 17 SpO2: 96 % O2 Device: None (Room air)    Weight: 0 lbs 0 oz  Constitutional: awake, alert, cooperative, no apparent distress, and appears stated age  Eyes: pupils equal, round and reactive to light, extra-ocular muscles intact and icteric bilaterally  ENT: Normocephalic, without obvious abnormality.  Respiratory: Clear to auscultation bilaterally, no crackles or wheezing  Cardiovascular: Normal apical impulse, RRR, normal S1 and S2, no S3 or S4.  GI: normal bowel sounds, soft, non-distended and right upper quadrant tenderness.  Skin: no bruising or bleeding and normal skin color, texture, turgor  Musculoskeletal: There is no redness, warmth, or swelling of the joints.  Neurologic: Awake, alert, oriented to name, place and time.    Neuropsychiatric: General: normal, calm and normal eye contact    Medical Decision Making   36 MINUTES SPENT BY ME on the date of service doing chart review, history, exam, documentation & further activities per the note.      Data     I have personally reviewed the following data over the past 24 hrs:    16.1 (H)  \   9.2 (L)   / 267     N/A N/A N/A /  N/A   N/A " N/A N/A \       Ferritin:  N/A % Retic:  6.8 (H) LDH:  N/A       Imaging results reviewed over the past 24 hrs:   No results found for this or any previous visit (from the past 24 hour(s)).

## 2023-01-16 NOTE — PROGRESS NOTES
GI PROGRESS NOTE  1/16/2023  Eveline Gage  1995  [id number]    Subjective:  Her abdominal pain is slightly improved today but still present.  She tolerated breakfast.  She describes having reflux like symptoms several months ago which later improved and then worsened before this admission.  She noticed dark urine and jaundice.    She has a tattoo placed by family friend approximately 9 years ago.  Father has a history of alcoholic liver disease, otherwise negative family history.  No drug use reportedly.  She does smoke cigarettes.  Last sickle cell crisis like pain was in August when she had COVID.  Today she has some back pain and feels like some sickle cell pain.     No unintentional weight loss.  She has been trying to drink more water, drink less soda, and eat less to lose abdominal fat.  Some alcohol on occasion, but only small amounts rarely per pt.     Objective:    Patient Vitals for the past 24 hrs:   BP Temp Temp src Pulse Resp SpO2   01/16/23 0421 95/51 99  F (37.2  C) Oral 62 17 96 %   01/16/23 0052 98/51 98.1  F (36.7  C) Oral 68 16 99 %   01/15/23 2000 112/60 98.2  F (36.8  C) Oral 65 16 99 %   01/15/23 1450 115/81 98.1  F (36.7  C) Oral 61 16 98 %   01/15/23 1350 118/61 98  F (36.7  C) Oral 69 14 98 %   01/15/23 1320 126/68 98.4  F (36.9  C) Oral 62 15 98 %   01/15/23 1250 133/65 98.2  F (36.8  C) Oral 64 16 97 %     There is no height or weight on file to calculate BMI.  Gen: NAD  GI: Non-distended, BS positive, soft, tender across upper abdomen    Laboratory  Recent Labs   Lab Test 01/16/23  0846 01/15/23  1550 01/14/23  1555   WBC 20.2* 16.1* 14.7*   HGB 9.3* 9.2* 10.2*   MCV 84 83 82    267 301     Recent Labs   Lab Test 01/16/23  0846 01/14/23  1555 08/25/21  1236    137 137   POTASSIUM 4.1 3.8 3.6   CHLORIDE 110* 109* 108*   CO2 20* 20* 20*   BUN 6* 8 7*   CR 0.61 0.65 0.71   ANIONGAP 9 8 9   ALIZA 8.6 9.1 9.0   GLC 91 106 112     Recent Labs   Lab Test 01/16/23  0846  01/16/23  0010 01/14/23  1727 01/14/23  1555 08/25/21  1236   ALBUMIN 2.9*  --   --  3.3* 4.0   BILITOTAL 18.2*  --   --  21.0* 2.5*   *  --   --  1,000* 76*   *  --   --  1,069* 63*   ALKPHOS 184*  --   --  216* 118   PROTEIN  --  Negative Negative  --   --    LIPASE  --   --   --  42  --      Influenza, Covid- negative  CRP 1.4 (high)  Acetaminophen <3  Pending: acute hepatitis, CRISPIN, f actin, liver/kidney Ab, ceruloplasmin, IgG, haptoglobin, blood morphology    Abdomen US, limited (RUQ only)    Result Date: 1/14/2023  EXAM: US ABDOMEN LIMITED LOCATION: [facility] DATE/TIME: 1/14/2023 5:18 PM INDICATION: Elevated total bilirubin. Epigastric abdominal pain. COMPARISON: CT abdomen/pelvis 01/14/2023 TECHNIQUE: Limited abdominal ultrasound. FINDINGS: GALLBLADDER: Status post cholecystectomy. BILE DUCTS: No biliary dilatation. The common duct measures 5 mm. LIVER: Normal parenchyma with smooth contour. No focal mass. RIGHT KIDNEY: No hydronephrosis. PANCREAS: The visualized portions are normal. No ascites.     IMPRESSION: 1.  Status post cholecystectomy. No biliary dilatation.     CT Abdomen Pelvis w Contrast    Result Date: 1/14/2023  EXAM: CT ABDOMEN PELVIS W CONTRAST LOCATION: [facility] DATE/TIME: 1/14/2023 4:50 PM INDICATION: upper abd pain COMPARISON: None. TECHNIQUE: CT scan of the abdomen and pelvis was performed following injection of IV contrast. Multiplanar reformats were obtained. Dose reduction techniques were used. CONTRAST: Isovue 370 100mL FINDINGS: LOWER CHEST: Normal. HEPATOBILIARY: Status post cholecystectomy. PANCREAS: Normal. SPLEEN: Absent. ADRENAL GLANDS: Normal. KIDNEYS/BLADDER: Normal. BOWEL: Previous laparotomy. No obstruction, colitis, diverticulitis, or appendicitis. LYMPH NODES: Normal. VASCULATURE: Unremarkable. PELVIC ORGANS: Normal. MUSCULOSKELETAL: Normal.     IMPRESSION: 1.  No obstruction, colitis, diverticulitis, or  appendicitis. 2.  No obstructing renal or ureteral stones. 3.  Status post cholecystectomy. Absent spleen. 4.  Uterine fibroid.    1/15/2023 EUS  Findings:        ENDOSONOGRAPHIC FINDING: :        There was no sign of significant endosonographic abnormality in the        entire main bile duct. The maximum diameter of the duct was 3.2 mm. No        masses, no stones, no biliary sludge, ducts of normal caliber and ducts        with regular contour were identified.        The celiac axis including lymph nodes was unremarkable.        The left adrenal was examined and normal.        The examined portion of the left lobe of the liver and the gastrohepatic        ligament were evaluated and found to be without abnormalities.        The ampulla was normal endoscopically and endosonographically. The bile        duct ranged in size from 1.8 mm to 3.2 mm without stones or sludge. The        gallbladder was surgically absent.                                                                                     Moderate Sedation:        MAC   Impression:            - There was no sign of significant pathology in the                          entire main bile duct.                          - No need for ERCP at this time.                          - No specimens collected.     Assessment:  Elevated LFTs, cause unclear but many labs are pending to rule out intrinsic liver disease.  Consider viral hepatitis, drug induced hepatitis and she denies use of illicit drugs, possible ischemia, sickle cell disease, hemolysis.  EUS on 1/15/2023 was negative for biliary obstruction and ERCP was not needed.  She continues to have abdominal pain though this is somewhat improved.    Patient Active Problem List   Diagnosis     Adjustment disorder with mixed anxiety and depressed mood     Asthma     Elevated liver enzymes     Postpartum mood disturbance     Sickle cell disease (H)     Abnormal antibody titer     Anemia complicating pregnancy      Asplenia     Blood typing encounter     Diarrhea     Drug use during pregnancy     Immune to rubella     Iron deficiency anemia     Need for hepatitis B vaccination     Need for vaccination     Screening for malignant neoplasm of cervix     SGA (small for gestational age)     Supervision of high risk pregnancy, unspecified, unspecified trimester     Abdominal pain, epigastric     Anemia, unspecified type     Elevation of levels of liver transaminase levels        Plan:    1. Continue to trend LFTs.  2.  Await pending liver serologies.  3.  Add CMV, EBV, HSV, iron studies.  4.  Agree with primary to check an ultrasound of the liver with doppler studies.  5.  GI following.    45 minutes of total time was spent providing patient care, including patient evaluation, reviewing documentation/test results and .                                                Janelle Bernstein PA-C  Thank you for the opportunity to participate in the care of this patient.   Please feel free to call me with any questions or concerns.  Phone number [phone].

## 2023-01-16 NOTE — UTILIZATION REVIEW
Admission Status; Secondary Review Determination   Under the authority of the Utilization Management Committee, the utilization review process indicated a secondary review on Eveline Gage. The review outcome is based on review of the medical records, discussions with staff, and applying clinical experience noted on the date of the review.   (x) Inpatient Status Appropriate - This patient's medical care is consistent with medical management for inpatient care and reasonable inpatient medical practice.     RATIONALE FOR DETERMINATION   Eveline Gage is a 27 yr old female with sickle cell disease, depression/anxiety, asthma, tobacco abuse who presented 1/14/23 with elevated LFTs, hyperbilirubinemia and epigastric with RUQ pain.  EUS performed (day transfer to [facility] for procedure then back again to [facility]). Today WBC up from 14.7 on presentation to now 20.2.  HGB down about 1 point.  Ongoing evaluation for elevated LFTs, autoimmune vs viral with doppler and labs pending.  Still requiring IV dilaudid frequently despite PO pain meds in addition.  Has crossed 2 midnights with ongoing medical cares needed.      At the time of admission with the information available to the attending physician more than 2 nights Hospital complex care was anticipated, based on patient risk of adverse outcome if treated as outpatient and complex care required. Inpatient admission is appropriate based on the Medicare guidelines.   The information on this document is developed by the utilization review team in order for the business office to ensure compliance. This only denotes the appropriateness of proper admission status and does not reflect the quality of care rendered.   The definitions of Inpatient Status and Observation Status used in making the determination above are those provided in the CMS Coverage Manual, Chapter 1 and Chapter 6, section 70.4.   Sincerely,   Mary Jones MD  Utilization Review  Physician  Advisor  [facility]

## 2023-01-16 NOTE — PLAN OF CARE
Problem: Sickle Cell Disease  Goal: Optimal Cerebral Tissue Perfusion  Outcome: Progressing  Intervention: Protect and Optimize Cerebral Perfusion  Recent Flowsheet Documentation  Taken 1/16/2023 0831 by Rocio Khalil RN  Head of Bed (HOB) Positioning: HOB at 20-30 degrees  Goal: Optimal Coping with Sickle Cell Disease  Outcome: Progressing  Goal: Effective Tissue Perfusion  Outcome: Progressing  Goal: Absence of Infection Signs and Symptoms  Outcome: Progressing  Goal: Optimal Pain Control and Function  Outcome: Progressing  Intervention: Acknowledge Underlying Chronic Pain  Recent Flowsheet Documentation  Taken 1/16/2023 0831 by Rocio Khalil RN  Medication Review/Management: medications reviewed  Goal: Optimal Oxygenation  Outcome: Progressing  Intervention: Optimize Oxygenation  Recent Flowsheet Documentation  Taken 1/16/2023 0831 by Rocio Khalil RN  Cough And Deep Breathing: done independently per patient         Goal Outcome Evaluation:    Pt A/Ox4. VSS on RA. Pt c/o back and chest pain. Dilaudid, oxycodone given with little relief.  Pain team to increase pain management. US ordered for tomorrow, pt to be NPO at midnight. IVF infusing. Cont to monitor.

## 2023-01-16 NOTE — PROGRESS NOTES
"PRIMARY DIAGNOSIS: \"GENERIC\" NURSING  OUTPATIENT/OBSERVATION GOALS TO BE MET BEFORE DISCHARGE:  1. ADLs back to baseline: No    2. Activity and level of assistance: Up with standby assistance.    3. Pain status: Improved-controlled with oral pain medications.    4. Return to near baseline physical activity: No     Discharge Planner Nurse   Safe discharge environment identified: No  Barriers to discharge: Yes       Entered by: Thais Nam RN 01/16/2023 2:59 AM     Please review provider order for any additional goals.   Nurse to notify provider when observation goals have been met and patient is ready for discharge.  "

## 2023-01-16 NOTE — PROGRESS NOTES
[facility] ACUTE PAIN SERVICE CONSULTATION ([facility], [facility], [facility])     Date of Admission:  1/15/2023  Date of Consult (When I saw the patient): 01/16/23  Physician requesting consult: Lalit Hyatt DO   Reason for consult: sickle cell pain      Assessment/Plan:     Eveline Gage is a 27 year old female who was admitted on 1/15/2023. I was asked to see the patient for sickle cell pain. Admitted for elevated liver transaminases, hyperbilirubinemia and epigastric and right upper quadrant abdominal pain on 1/14 and transferred to [facility] for ERCP on 1/15 w/o obstruction visualized. Returned to  for evaluation for viral hepatitis vs.autoimmune hepatitis. History of sickle cell anemia, adjustment disorder with mixed anxiety and depression, asthma, tobacco abuse, asplenia. The patient does smoke and denies chemical dependency history. Current diet is regular.     Saw patient at around 2:40PM today. Patient reports her pain is different from yesterday and compared to her normal sickle cell pain. She pointed her sternum when asked where her pain is from. She reports her pain gets worsened since this morning after seeing GI. She received both oral oxycodone and iv Dilaudid which she reports not last long. Unclear if she is describing reflux symptoms as sternum pain. Seems more reflux, will restart home carafate qid before meals .  Will trial opioid rotation to Dilaudid PO, q3hprn based on current end of dose failure with oxycodone q4hprn, and decreasing IV Dilaudid frequency. discussed with  Dr. Hyatt.     PLAN:   1) Having sternum pain different from normal sickle cell pain per patient   2)Multimodal Medication Therapy  Topical:none, likely not beneficial  Adjuvants:no APAP with current liver enzymes   Added Carafate for reflux pain  Muscle relaxants: Flexeril 10mg bid prn   Antidepressants/anxiolytics:none  Opioids: oxycodone 5mg po q4hprn , opioid rotate to Dilaudid 2mg  q3hprn  IV Pain medication: Dilaudid 0.5mg iv q2hprn : decrease to q4hprn , plan to stop tomorrow, discussed this with patient, she is 'unsure'    3)Non-medication interventions- Heat, physical therapy, avoid ice   4)Constipation Prophylaxis- Miralax daily prn, Senokot-s bid prn   5) Follow up   -Opioid prescriber has been none. PCP is Stalin Casey  -Discharge Recommendations - We recommend prescribing the following at the time of discharge: none vs very low dose PRN opioid x 2-3 days          History of Present Illness (HPI):       Eveline Gage is a 27 year old old female.  The patient reports that the acute pain is 'moving' and was more in upper quadrants of abdomen but now in sternum. Became worse when 'people wake me up'. The patient is with moderate chronic pain, as well. Per [place]  review, patient is opioid naive.       Review of medical record/Summary of labs and care everywhere.      [place] -pulled from system on 01/16/23. Last refill on none. This indicated no opioid use.       Medical History   has no past medical history of Complication of anesthesia, Diabetes (H), or PONV (postoperative nausea and vomiting).       Surgical History   has a past surgical history that includes Esophagoscopy, gastroscopy, duodenoscopy (EGD), combined (N/A, 1/15/2023).     Allergies     Allergies   Allergen Reactions     Blood-Group Specific Substance Other (See Comments)     Patient has anti-C, anti-S, anti-Jkb, anti-M, and a nonspecific antibody. Blood product orders may be delayed. Draw THREE purple top tubes for all Type and Screen/Type and crossmatch orders.  Patient has anti-C, anti-S, anti-Jkb, anti-M, and a nonspecific antibody. Blood product orders may be delayed. Draw THREE purple top tubes for all Type and Screen/Type and crossmatch orders.  Patient has anti-C, anti-S, anti-Jkb, anti-M, and a nonspecific antibody. Blood product orders may be delayed. Draw THREE purple top tubes for all Type  and Screen/Type and crossmatch orders.  Patient has anti-C, anti-S, anti-Jkb, anti-M, and a nonspecific antibody. Blood product orders may be delayed. Draw THREE purple top tubes for all Type and Screen/Type and crossmatch orders.       Penicillins Rash        Current Home Medications   Prior to Admission medications    Medication Sig Start Date End Date Taking? Authorizing Provider   albuterol (PROAIR HFA/PROVENTIL HFA/VENTOLIN HFA) 108 (90 Base) MCG/ACT inhaler Inhale 2 puffs into the lungs every 6 hours as needed for shortness of breath / dyspnea or wheezing 8/4/22  Yes Feliberto Guaman PA-C   calcium carbonate (TUMS) 500 MG chewable tablet Take 1-2 chew tab by mouth every 4 hours as needed for heartburn   Yes Unknown, Entered By History   cyclobenzaprine (FLEXERIL) 10 MG tablet Take 10 mg by mouth 2 times daily as needed 2/18/20  Yes Provider, Historical   ipratropium - albuterol 0.5 mg/2.5 mg/3 mL (DUONEB) 0.5-2.5 (3) MG/3ML neb solution Take 1 vial (3 mLs) by nebulization every 6 hours as needed for shortness of breath / dyspnea or wheezing 8/4/22  Yes Feliberto Guaman PA-C   multivitamin, therapeutic (THERA-VIT) TABS tablet Take 1 tablet by mouth daily   Yes Unknown, Entered By History   omeprazole (PRILOSEC) 20 MG DR capsule Take 1 capsule (20 mg) by mouth 2 times daily 1/3/23  Yes Beth Mendoza MD   sucralfate (CARAFATE) 1 GM tablet Take 1 tablet (1 g) by mouth 4 times daily as needed for nausea (stomach pain) 1/3/23  Yes Beth Mendoza MD   etonogestrel (NEXPLANON) 68 MG IMPL 1 each by Subdermal route    Unknown, Entered By History   naproxen sodium (ANAPROX) 220 MG tablet Take 220-440 mg by mouth every 12 hours as needed for moderate pain (4-6)    Unknown, Entered By History          Social History  Reviewed, and she  reports that she has been smoking. She has never been exposed to tobacco smoke. She has never used smokeless tobacco. She reports that she does not  currently use alcohol. She reports that she does not currently use drugs.       Objective:     Vitals:  B/P: 95/51, T: 99, P: 62, R: 17     Weight:   0 lbs 0 oz  There is no height or weight on file to calculate BMI.    Labs Reviewed Personally By Myself    Sodium   Date Value Ref Range Status   01/16/2023 139 136 - 145 mmol/L Final     Potassium   Date Value Ref Range Status   01/16/2023 4.1 3.5 - 5.0 mmol/L Final     Chloride   Date Value Ref Range Status   01/16/2023 110 (H) 98 - 107 mmol/L Final     Carbon Dioxide (CO2)   Date Value Ref Range Status   01/16/2023 20 (L) 22 - 31 mmol/L Final     Anion Gap   Date Value Ref Range Status   01/16/2023 9 5 - 18 mmol/L Final     Glucose   Date Value Ref Range Status   01/16/2023 91 70 - 125 mg/dL Final     Urea Nitrogen   Date Value Ref Range Status   01/16/2023 6 (L) 8 - 22 mg/dL Final     Creatinine   Date Value Ref Range Status   01/16/2023 0.61 0.60 - 1.10 mg/dL Final     GFR Estimate   Date Value Ref Range Status   01/16/2023 >90 >60 mL/min/1.73m2 Final     Comment:     Effective December 21, 2021 eGFRcr in adults is calculated using the 2021 CKD-EPI creatinine equation which includes age and gender (Fredis et al., NEJ, DOI: 10.1056/ZJPShx7374175)   05/31/2021 >60 >60 mL/min/1.73m2 Final     Calcium   Date Value Ref Range Status   01/16/2023 8.6 8.5 - 10.5 mg/dL Final       Chrissie Peña, LorriD  Jazmin Sampson PharmD     Acute Care Pain Management Program   Hours of pain coverage 7a-1700- after 1700 please call the house officer   [facility] ([place], [place], [place])   Page via [employer]- Click HERE to page Vanessa or call [phone]

## 2023-01-16 NOTE — PROGRESS NOTES
"Notified Dr Macario. \"[id number] S.R: FYI pt UA results back, abnormal, any new orders[place] [id number]\"    Waiting for call back .      Addendum:   Nsg communication order \"If patient is symptomatic of UTI, let me know -- such as pain when peeing, frequent peeing etc\"    Patient stated she felt no pain or burning when urinating. Also, she does not urinate frequently. Will continue to monitor.   "

## 2023-01-16 NOTE — PROGRESS NOTES
Therapy: IV abx   Insurance: [facility] PMAP    100% coverage for IV abx     In reference to admission on 1/15/23 to check IV abx coverage    Please contact Intake with any questions, [phone] or In Basket pool, [facility] ([phone]).

## 2023-01-17 ENCOUNTER — APPOINTMENT (OUTPATIENT)
Dept: ULTRASOUND IMAGING | Facility: CLINIC | Age: 28
End: 2023-01-17
Attending: PHYSICIAN ASSISTANT
Payer: COMMERCIAL

## 2023-01-17 LAB
ALBUMIN SERPL-MCNC: 2.9 G/DL (ref 3.5–5)
ALP SERPL-CCNC: 183 U/L (ref 45–120)
ALT SERPL W P-5'-P-CCNC: 883 U/L (ref 0–45)
ANION GAP SERPL CALCULATED.3IONS-SCNC: 10 MMOL/L (ref 5–18)
AST SERPL W P-5'-P-CCNC: 969 U/L (ref 0–40)
BACTERIA UR CULT: NORMAL
BILIRUB DIRECT SERPL-MCNC: 8.4 MG/DL
BILIRUB SERPL-MCNC: 16.1 MG/DL (ref 0–1)
BUN SERPL-MCNC: 6 MG/DL (ref 8–22)
CALCIUM SERPL-MCNC: 8.6 MG/DL (ref 8.5–10.5)
CHLORIDE BLD-SCNC: 105 MMOL/L (ref 98–107)
CMV IGG SERPL IA-ACNC: 3.3 U/ML
CMV IGG SERPL IA-ACNC: ABNORMAL
CO2 SERPL-SCNC: 22 MMOL/L (ref 22–31)
CREAT SERPL-MCNC: 0.63 MG/DL (ref 0.6–1.1)
ERYTHROCYTE [DISTWIDTH] IN BLOOD BY AUTOMATED COUNT: 18.2 % (ref 10–15)
FERRITIN SERPL-MCNC: 8019 NG/ML (ref 6–175)
GFR SERPL CREATININE-BSD FRML MDRD: >90 ML/MIN/1.73M2
GLUCOSE BLD-MCNC: 91 MG/DL (ref 70–125)
HAV IGM SERPL QL IA: NONREACTIVE
HBV CORE IGM SERPL QL IA: NONREACTIVE
HBV SURFACE AG SERPL QL IA: NONREACTIVE
HCT VFR BLD AUTO: 26.2 % (ref 35–47)
HCV AB SERPL QL IA: NONREACTIVE
HGB BLD-MCNC: 9.1 G/DL (ref 11.7–15.7)
HSV1 IGG SERPL QL IA: 57.8 INDEX
HSV1 IGG SERPL QL IA: ABNORMAL
HSV2 IGG SERPL QL IA: 0.47 INDEX
HSV2 IGG SERPL QL IA: ABNORMAL
INR PPP: 1.4 (ref 0.85–1.15)
LAB DIRECTOR COMMENTS: NORMAL
LAB DIRECTOR DISCLAIMER: NORMAL
LAB DIRECTOR INTERPRETATION: NORMAL
LAB DIRECTOR METHODOLOGY: NORMAL
LAB DIRECTOR RESULTS: NORMAL
MCH RBC QN AUTO: 29.1 PG (ref 26.5–33)
MCHC RBC AUTO-ENTMCNC: 34.7 G/DL (ref 31.5–36.5)
MCV RBC AUTO: 84 FL (ref 78–100)
PLATELET # BLD AUTO: 208 10E3/UL (ref 150–450)
POTASSIUM BLD-SCNC: 3.6 MMOL/L (ref 3.5–5)
PROT SERPL-MCNC: 6.5 G/DL (ref 6–8)
RBC # BLD AUTO: 3.13 10E6/UL (ref 3.8–5.2)
RBC MORPH BLD: NORMAL
RETICS # AUTO: 0.28 10E6/UL (ref 0.01–0.11)
RETICS/RBC NFR AUTO: 8.8 % (ref 0.8–2.7)
SMA IGG SER-ACNC: 24 UNITS
SMA IGG SER-ACNC: 26 UNITS
SODIUM SERPL-SCNC: 137 MMOL/L (ref 136–145)
SPECIMEN DESCRIPTION: NORMAL
WBC # BLD AUTO: 15.7 10E3/UL (ref 4–11)

## 2023-01-17 PROCEDURE — G0452 MOLECULAR PATHOLOGY INTERPR: HCPCS | Mod: 26 | Performed by: PATHOLOGY

## 2023-01-17 PROCEDURE — 99232 SBSQ HOSP IP/OBS MODERATE 35: CPT | Performed by: INTERNAL MEDICINE

## 2023-01-17 PROCEDURE — 258N000002 HC RX IP 258 OP 250: Performed by: INTERNAL MEDICINE

## 2023-01-17 PROCEDURE — 36415 COLL VENOUS BLD VENIPUNCTURE: CPT | Performed by: PHYSICIAN ASSISTANT

## 2023-01-17 PROCEDURE — 250N000013 HC RX MED GY IP 250 OP 250 PS 637: Performed by: NURSE PRACTITIONER

## 2023-01-17 PROCEDURE — 93975 VASCULAR STUDY: CPT

## 2023-01-17 PROCEDURE — 99207 PR CDG-CUT & PASTE-POTENTIAL IMPACT ON LEVEL: CPT | Performed by: INTERNAL MEDICINE

## 2023-01-17 PROCEDURE — 85027 COMPLETE CBC AUTOMATED: CPT | Performed by: INTERNAL MEDICINE

## 2023-01-17 PROCEDURE — 250N000009 HC RX 250: Performed by: NURSE PRACTITIONER

## 2023-01-17 PROCEDURE — 99223 1ST HOSP IP/OBS HIGH 75: CPT | Performed by: NURSE PRACTITIONER

## 2023-01-17 PROCEDURE — 250N000013 HC RX MED GY IP 250 OP 250 PS 637: Performed by: INTERNAL MEDICINE

## 2023-01-17 PROCEDURE — 250N000011 HC RX IP 250 OP 636: Performed by: INTERNAL MEDICINE

## 2023-01-17 PROCEDURE — 80053 COMPREHEN METABOLIC PANEL: CPT | Performed by: INTERNAL MEDICINE

## 2023-01-17 PROCEDURE — 120N000001 HC R&B MED SURG/OB

## 2023-01-17 PROCEDURE — 85610 PROTHROMBIN TIME: CPT | Performed by: PHYSICIAN ASSISTANT

## 2023-01-17 PROCEDURE — 85045 AUTOMATED RETICULOCYTE COUNT: CPT | Performed by: INTERNAL MEDICINE

## 2023-01-17 PROCEDURE — 81256 HFE GENE: CPT | Performed by: PHYSICIAN ASSISTANT

## 2023-01-17 PROCEDURE — 36415 COLL VENOUS BLD VENIPUNCTURE: CPT | Performed by: INTERNAL MEDICINE

## 2023-01-17 PROCEDURE — 82248 BILIRUBIN DIRECT: CPT | Performed by: INTERNAL MEDICINE

## 2023-01-17 RX ORDER — CYCLOBENZAPRINE HCL 10 MG
10 TABLET ORAL 3 TIMES DAILY
Status: DISCONTINUED | OUTPATIENT
Start: 2023-01-17 | End: 2023-01-19 | Stop reason: HOSPADM

## 2023-01-17 RX ORDER — HYDROMORPHONE HYDROCHLORIDE 1 MG/ML
0.5 INJECTION, SOLUTION INTRAMUSCULAR; INTRAVENOUS; SUBCUTANEOUS EVERY 6 HOURS PRN
Status: DISCONTINUED | OUTPATIENT
Start: 2023-01-17 | End: 2023-01-18

## 2023-01-17 RX ORDER — LIDOCAINE 50 MG/G
OINTMENT TOPICAL 4 TIMES DAILY
Status: DISCONTINUED | OUTPATIENT
Start: 2023-01-17 | End: 2023-01-19 | Stop reason: HOSPADM

## 2023-01-17 RX ORDER — DIPHENHYDRAMINE HCL 25 MG
25 CAPSULE ORAL EVERY 6 HOURS PRN
Status: DISCONTINUED | OUTPATIENT
Start: 2023-01-17 | End: 2023-01-19 | Stop reason: HOSPADM

## 2023-01-17 RX ORDER — HYDROMORPHONE HYDROCHLORIDE 2 MG/1
2-4 TABLET ORAL
Status: DISCONTINUED | OUTPATIENT
Start: 2023-01-17 | End: 2023-01-19 | Stop reason: HOSPADM

## 2023-01-17 RX ADMIN — DIPHENHYDRAMINE HYDROCHLORIDE 25 MG: 25 CAPSULE ORAL at 23:37

## 2023-01-17 RX ADMIN — HYDROMORPHONE HYDROCHLORIDE 0.5 MG: 1 INJECTION, SOLUTION INTRAMUSCULAR; INTRAVENOUS; SUBCUTANEOUS at 04:07

## 2023-01-17 RX ADMIN — HYDROMORPHONE HYDROCHLORIDE 0.5 MG: 1 INJECTION, SOLUTION INTRAMUSCULAR; INTRAVENOUS; SUBCUTANEOUS at 08:13

## 2023-01-17 RX ADMIN — HYDROMORPHONE HYDROCHLORIDE 2 MG: 2 TABLET ORAL at 01:24

## 2023-01-17 RX ADMIN — PANTOPRAZOLE SODIUM 40 MG: 20 TABLET, DELAYED RELEASE ORAL at 17:06

## 2023-01-17 RX ADMIN — LIDOCAINE: 50 OINTMENT TOPICAL at 12:25

## 2023-01-17 RX ADMIN — PANTOPRAZOLE SODIUM 40 MG: 20 TABLET, DELAYED RELEASE ORAL at 07:57

## 2023-01-17 RX ADMIN — LIDOCAINE: 50 OINTMENT TOPICAL at 16:09

## 2023-01-17 RX ADMIN — HYDROMORPHONE HYDROCHLORIDE 4 MG: 2 TABLET ORAL at 17:39

## 2023-01-17 RX ADMIN — SUCRALFATE 1 G: 1 TABLET ORAL at 21:51

## 2023-01-17 RX ADMIN — CYCLOBENZAPRINE 10 MG: 10 TABLET, FILM COATED ORAL at 16:06

## 2023-01-17 RX ADMIN — SUCRALFATE 1 G: 1 TABLET ORAL at 17:06

## 2023-01-17 RX ADMIN — DICLOFENAC 2 G: 10 GEL TOPICAL at 12:24

## 2023-01-17 RX ADMIN — SUCRALFATE 1 G: 1 TABLET ORAL at 07:58

## 2023-01-17 RX ADMIN — SODIUM CHLORIDE: 4.5 INJECTION, SOLUTION INTRAVENOUS at 06:26

## 2023-01-17 RX ADMIN — LIDOCAINE: 50 OINTMENT TOPICAL at 21:52

## 2023-01-17 RX ADMIN — CYCLOBENZAPRINE 10 MG: 10 TABLET, FILM COATED ORAL at 21:51

## 2023-01-17 RX ADMIN — HYDROMORPHONE HYDROCHLORIDE 4 MG: 2 TABLET ORAL at 14:55

## 2023-01-17 RX ADMIN — SODIUM CHLORIDE: 4.5 INJECTION, SOLUTION INTRAVENOUS at 23:38

## 2023-01-17 RX ADMIN — HYDROMORPHONE HYDROCHLORIDE 4 MG: 2 TABLET ORAL at 11:49

## 2023-01-17 RX ADMIN — SUCRALFATE 1 G: 1 TABLET ORAL at 12:26

## 2023-01-17 RX ADMIN — DICLOFENAC 2 G: 10 GEL TOPICAL at 16:08

## 2023-01-17 ASSESSMENT — ACTIVITIES OF DAILY LIVING (ADL)
ADLS_ACUITY_SCORE: 35
ADLS_ACUITY_SCORE: 35
ADLS_ACUITY_SCORE: 37
ADLS_ACUITY_SCORE: 37
ADLS_ACUITY_SCORE: 35
ADLS_ACUITY_SCORE: 37
ADLS_ACUITY_SCORE: 35
ADLS_ACUITY_SCORE: 37

## 2023-01-17 NOTE — PROGRESS NOTES
[facility]    Medicine Progress Note - Hospitalist Service    Date of Admission:  1/15/2023    Assessment & Plan    Eveline Gage is a 27 year old female with history of sickle cell disease, adjustment disorder with mixed anxiety and depression, asthma, tobacco abuse, admitted on 1/14/2023 for elevated liver transaminases, hyperbilirubinemia and epigastric and right upper quadrant abdominal pain.  Liver transaminases are in cholestatic pattern.  Transferred to [facility] for ERCP on 1/15/23 without obstruction visualized.  Evaluating for viral hepatitis versus autoimmune hepatitis.       Elevated liver transaminases  Hyperbilirubinemia  Epigastric/right upper quadrant abdominal pain  History of cholecystectomy  AST 1069->858, ALT 1000->905, total bilirubin 21-18.2  Alkaline phosphatase minimally elevated at 216-184  Lipase was normal.  White blood cell count elevated at 14.7->20.2.  Beta-hCG G testing was negative  Hepatitis panel in process.  Tylenol level, salicylate level negative.  ERCP on 1/15/23 without obstruction.  CRISPIN, anti-smooth muscle antibody, antiliver kidney microsomal antibody type I, IgG.  Ceruloplasmin, ferritin levels pending.  Regular diet.     Sickle cell disease/anemia  History of Splenectomy  ? Pain crisis  Hemoglobin 10.2->9.3 baseline 9.0.  Repeat CBC with differential pending.   Hemolysis labs in process.   Dilaudid 2 mg q3h prn  hydromorphone 0.5mg every 2 hours as needed for breakthrough pain  Hematology consultation     Mild remittent asthma  Resume home inhalers.      Tobacco abuse  Advised smoking cessation.      Diet: Room Service  NPO per Anesthesia Guidelines for Procedure/Surgery Except for: Meds    DVT Prophylaxis: Low Risk/Ambulatory with no VTE prophylaxis indicated  Echeverria Catheter: Not present  Lines: None     Cardiac Monitoring: None  Code Status: Full Code      Clinically Significant Risk Factors Present on Admission              #  "Hypoalbuminemia: Lowest albumin = 2.9 g/dL at 1/16/2023  8:46 AM, will monitor as appropriate          # Overweight: Estimated body mass index is 28.7 kg/m  as calculated from the following:    Height as of 1/14/23: 1.6 m (5' 3\").    Weight as of 1/14/23: 73.5 kg (162 lb).           Disposition Plan      Expected Discharge Date: 01/18/2023                  Ashley Rmoan MD  Hospitalist Service  [facility]  Securely message with [employer] (more info)  Text page via [facility] Paging/Directory       Interval History   Patient in no acute distress this morning.  Denies any chest pain/sob/NVD.    Minor abd pain - improved w/ pain meds    Physical Exam   Vital Signs: Temp: 98.2  F (36.8  C) Temp src: Oral BP: 120/60 Pulse: 80   Resp: 16 SpO2: 98 % O2 Device: Nasal cannula (per report patient was started on nasal canula on the previous shift.)    Weight: 0 lbs 0 oz    General: No acute distress, breathing comfortably on room air  Neuro: EOMI, PERRLA. Facial muscles symmetric, strength/sensation grossly intact.  HEENT: Anicteric sclera. Oropharynx is clear. No lymphadenopathy.  Chest/Lungs: No accessory respiratory muscle use. Adequate air movement throughout. CTAB.  CV: Normal rate, regular rhythm. nl S1/S2. No m/r/g. Cap refill &lt;2s.  Abd: BS+. Soft, NTND.  Ext: Warm, well-perfused. Strong distal pulses. No pretibial pitting edema. No clubbing.  Skin: No new rashes      Medical Decision Making   36 MINUTES SPENT BY ME on the date of service doing chart review, history, exam, documentation & further activities per the note.      Data     I have personally reviewed the following data over the past 24 hrs:    20.2 (H)  \   9.3 (L)   / 263     139 110 (H) 6 (L) /  91   4.1 20 (L) 0.61 \       ALT: 858 (H) AST: 905 (H) AP: 184 (H) TBILI: 18.2 (HH)   ALB: 2.9 (L) TOT PROTEIN: 6.5 LIPASE: N/A       Ferritin:  8,019 (H) % Retic:  N/A LDH:  N/A       Imaging results reviewed over the past 24 hrs:   No " results found for this or any previous visit (from the past 24 hour(s)).

## 2023-01-17 NOTE — PLAN OF CARE
Goal Outcome Evaluation:      Plan of Care Reviewed With: patient    Overall Patient Progress: improving    Alert and oriented x4, able to make needs known, denies nausea, on 2 L of O2 via NC , reported provided comfort, IVF running at 50 ml/hr, c/o RUQ abd/sternum to back pain, gave IV  0.5 mg and PO 2 mg Dilaudid for good relief, applying heat was also effective, stand by assist, NPO for US

## 2023-01-17 NOTE — CONSULTS
[facility] ACUTE PAIN SERVICE CONSULTATION     Date of Admission:  1/15/2023  Date of Consult (When I saw the patient): 01/17/23     Assessment/Plan:     Eveline Gage is a 27 year old female who was admitted on 1/15/2023. Pain team was asked to see the patient for sickle cell pain. Admitted for elevated liver transaminases, hyperbilirubinemia and epigastric and right upper quadrant abdominal pain on 1/14 and transferred to [facility] for ERCP on 1/15/23. Returned to  for evaluation for viral hepatitis vs.autoimmune hepatitis. History of sickle cell anemia, adjustment disorder with mixed anxiety and depression, asthma, tobacco abuse, asplenia. The patient does smoke and denies chemical dependency history.     PLAN:   1) Pain is consistent with sternal chest pain, mid back pain. Consider the following differentials sickle cell pain, sickle cell hepatology.  Consult and progress notes, labs and imaging indicated: I have personally reviewed pertinent labs, tests, and radiologic imaging in patient's chart. Treatment plan includes multimodal pain approach, Hospital Medicine Service for medical management, GI, Heme/Onc. Patient educated regarding multimodal pain approach, medications as listed below. Patient is understanding of the plan. All questions and concerns addressed to patient's satisfaction.   2)Multimodal Medication Therapy  Topical: lidocaine alternating with Voltaren   NSAID'S: none   Steroids: none   Muscle Relaxants: schedule flexeril tid   Adjuvants: no APAP given LFTs   Antidepressants/anxiolytics: none  Opioids: dilaudid 2 mg - changed to 2-4 mg q3h prn   IV Pain medication: dilaudid q2h prn - decrease to q6h prn  3)Non-medication interventions: heat, ice  Acupuncture consult - offered and agreeable but would like more information, discussed with Acupuncture   Integrative consult - offered and agreeable   4)Constipation Prophylaxis: prn ordered   5) Care Teams: Hospital Medicine Service,  Heme/Onc, GI    -Opioid prescriber has been none  -[place]  pulled from system on 1/17/23. This indicates: none  Discharge Recommendations - We recommend prescribing the following at the time of discharge: TBD     History of Present Illness (HPI):       Eveline Gage is a 27 year old female.  History of sickle cell disease, adjustment disorder with mixed anxiety and depression, asthma, tobacco abuse, admitted on 1/14/2023 for elevated liver transaminases, hyperbilirubinemia and epigastric and right upper quadrant abdominal pain.  Liver transaminases are in cholestatic pattern.  ED provider spoke with [facility] assist Dr. Marv lynn who recommended ERCP.  She went to [facility] for ERCP 1/15/23.    The patient reports that the acute pain is in sternum ad mid back. Feels like throbbing and squeeze. Per [place]  review, patient is opioid naive.      Review of medical record/Summary of labs and care everywhere.     Medical History   PAST MEDICAL HISTORY: reviewed     PAST SURGICAL HISTORY:   Past Surgical History:   Procedure Laterality Date     ESOPHAGOSCOPY, GASTROSCOPY, DUODENOSCOPY (EGD), COMBINED N/A 1/15/2023    Procedure: ENDOSCOPIC ULTRASOUND;  Surgeon: Marv Lynn MD;  Location: [facility] OR       FAMILY HISTORY: reviewed     SOCIAL HISTORY:   Social History     Tobacco Use     Smoking status: Every Day     Passive exposure: Never     Smokeless tobacco: Never   Substance Use Topics     Alcohol use: Not Currently        HEALTH & LIFESTYLE PRACTICES  Tobacco:  reports that she has been smoking. She has never been exposed to tobacco smoke. She has never used smokeless tobacco.  Alcohol:  reports that she does not currently use alcohol.  Illicit drugs:  reports that she does not currently use drugs.    Allergies  Allergies   Allergen Reactions     Blood-Group Specific Substance Other (See Comments)     Patient has anti-C, anti-S, anti-Jkb, anti-M, and a nonspecific antibody. Blood  product orders may be delayed. Draw THREE purple top tubes for all Type and Screen/Type and crossmatch orders.  Patient has anti-C, anti-S, anti-Jkb, anti-M, and a nonspecific antibody. Blood product orders may be delayed. Draw THREE purple top tubes for all Type and Screen/Type and crossmatch orders.  Patient has anti-C, anti-S, anti-Jkb, anti-M, and a nonspecific antibody. Blood product orders may be delayed. Draw THREE purple top tubes for all Type and Screen/Type and crossmatch orders.  Patient has anti-C, anti-S, anti-Jkb, anti-M, and a nonspecific antibody. Blood product orders may be delayed. Draw THREE purple top tubes for all Type and Screen/Type and crossmatch orders.       Penicillins Rash       Problem List  Patient Active Problem List    Diagnosis Date Noted     Elevation of levels of liver transaminase levels 01/16/2023     Priority: Medium     Abdominal pain, epigastric 01/14/2023     Priority: Medium     Anemia, unspecified type 01/14/2023     Priority: Medium     SGA (small for gestational age) 11/21/2022     Priority: Medium     Asplenia 06/17/2021     Priority: Medium     Sickle cell disease (H) 06/15/2020     Priority: Medium     Elevated liver enzymes 03/12/2018     Priority: Medium     Diarrhea 03/12/2018     Priority: Medium     Iron deficiency anemia 03/12/2018     Priority: Medium     Adjustment disorder with mixed anxiety and depressed mood 10/05/2017     Priority: Medium     Postpartum mood disturbance 10/05/2017     Priority: Medium     Abnormal antibody titer 05/30/2017     Priority: Medium     Blood typing encounter 04/21/2017     Priority: Medium     Formatting of this note might be different from the original.  Blood Bank Antibody(s) Present.  Allow 12 to 36 hours for compatible RBC's. Recommend future transfusions with antigen negative. See transfusion reaction report from 4/19/17 for further details      ; ALERT - Blood Typing Antibody anti-C, -Jkb, -M       Asthma 03/09/2017      Priority: Medium     Supervision of high risk pregnancy, unspecified, unspecified trimester 03/02/2017     Priority: Medium     Formatting of this note might be different from the original.  Overview:   Bobby Jama [phone],cell [phone]  Formatting of this note might be different from the original.  Bobby Jama [phone],cell [phone]       Screening for malignant neoplasm of cervix 12/19/2016     Priority: Medium     Formatting of this note might be different from the original.   Per visit note dated Dec.  9 , 2016: History of abnormal Pap: Never had a Pap test  2016 NILM 21 y.o.  PLAN:  pap test 12/2019    ;  Pap test history       Drug use during pregnancy 12/13/2016     Priority: Medium     Formatting of this note might be different from the original.  Overview:   THC on UDS. Pt quit thc and cigarettes at dx. Followed by [facility]. Repeat drug screen with 26 week labs.(  Pt has been taking narcotics to manage pain of her sickle cell crisis as prescribed by !)  Formatting of this note might be different from the original.  THC on UDS. Pt quit thc and cigarettes at dx. Followed by [facility]. Repeat drug screen with 26 week labs.(  Pt has been taking narcotics to manage pain of her sickle cell crisis as prescribed by !)       Immune to rubella 12/13/2016     Priority: Medium     Need for hepatitis B vaccination 12/12/2016     Priority: Medium     Formatting of this note might be different from the original.  1st given 2/2/17.  2nd given 3/10/17.  3rd DUE 8/2/17.       Need for vaccination 12/12/2016     Priority: Medium     Formatting of this note might be different from the original.  Overview:   1st given 2/2/17.  2nd given 3/10/17.  3rd DUE 8/2/17.       Anemia complicating pregnancy 12/09/2016     Priority: Medium       Prior to Admission Medications   Medications Prior to Admission   Medication Sig Dispense Refill  Last Dose     albuterol (PROAIR HFA/PROVENTIL HFA/VENTOLIN HFA) 108 (90 Base) MCG/ACT inhaler Inhale 2 puffs into the lungs every 6 hours as needed for shortness of breath / dyspnea or wheezing 18 g 1 Unknown     calcium carbonate (TUMS) 500 MG chewable tablet Take 1-2 chew tab by mouth every 4 hours as needed for heartburn   Past Week     cyclobenzaprine (FLEXERIL) 10 MG tablet Take 10 mg by mouth 2 times daily as needed   Unknown     ipratropium - albuterol 0.5 mg/2.5 mg/3 mL (DUONEB) 0.5-2.5 (3) MG/3ML neb solution Take 1 vial (3 mLs) by nebulization every 6 hours as needed for shortness of breath / dyspnea or wheezing 90 mL 1 Unknown     multivitamin, therapeutic (THERA-VIT) TABS tablet Take 1 tablet by mouth daily   Past Month     omeprazole (PRILOSEC) 20 MG DR capsule Take 1 capsule (20 mg) by mouth 2 times daily 40 capsule 0 1/14/2023 at am     sucralfate (CARAFATE) 1 GM tablet Take 1 tablet (1 g) by mouth 4 times daily as needed for nausea (stomach pain) 40 tablet 0 1/14/2023     etonogestrel (NEXPLANON) 68 MG IMPL 1 each by Subdermal route   in place     naproxen sodium (ANAPROX) 220 MG tablet Take 220-440 mg by mouth every 12 hours as needed for moderate pain (4-6)   1/12/2023       Review of Systems  Complete ROS reviewed, unless noted in HPI, all other systems reviewed (with patient) and all others found to be negative.      Objective:     Physical Exam:  /58 (BP Location: Right arm)   Pulse 57   Temp 98.4  F (36.9  C) (Oral)   Resp 16   SpO2 99%   Weight:   There were no vitals filed for this visit.   There is no height or weight on file to calculate BMI.    General Appearance:  Alert, cooperative, no distress   Head:  Normocephalic, without obvious abnormality, atraumatic   Eyes:  PERRL, conjunctiva/corneas clear, EOM's intact   ENT/Throat: Lips, mucosa, and tongue normal; teeth and gums normal   Lymph/Neck: Supple, symmetrical, trachea midline   Lungs:   Clear to auscultation bilaterally,  respirations unlabored, room air    Cardiovascular/Heart:  Regular rate and rhythm, S1, S2 normal,no murmur, rub or gallop.    Abdomen:   Soft, non-tender, bowel sounds active all four quadrants,  no masses, no organomegaly   Musculoskeletal: Extremities normal, atraumatic   Skin: Skin warm, dry, scleral icterus    Neurologic: Alert and oriented X 3, Moves all 4 extremities     Psych: Affect is appropriate      Imaging: Reviewed I have personally reviewed pertinent labs, tests, and radiologic imaging in patient's chart.  Abdomen US, limited (RUQ only)    Result Date: 1/14/2023  EXAM: US ABDOMEN LIMITED LOCATION: [facility] DATE/TIME: 1/14/2023 5:18 PM INDICATION: Elevated total bilirubin. Epigastric abdominal pain. COMPARISON: CT abdomen/pelvis 01/14/2023 TECHNIQUE: Limited abdominal ultrasound. FINDINGS: GALLBLADDER: Status post cholecystectomy. BILE DUCTS: No biliary dilatation. The common duct measures 5 mm. LIVER: Normal parenchyma with smooth contour. No focal mass. RIGHT KIDNEY: No hydronephrosis. PANCREAS: The visualized portions are normal. No ascites.     IMPRESSION: 1.  Status post cholecystectomy. No biliary dilatation.     US Abdominal Doppler Complete    Result Date: 1/17/2023  EXAM: US ABDOMEN OR PELVIS DOPPLER COMPLETE LOCATION: [facility] DATE/TIME: 1/17/2023 10:16 AM INDICATION: elevated LFTs, eval for vascular causes COMPARISON: 01/14/2023. TECHNIQUE: Complete abdominal ultrasound. Color flow with spectral Doppler and waveform analysis performed.  FINDINGS: GALLBLADDER: Surgically absent. BILE DUCTS: No biliary dilatation. The common duct measures for mm. LIVER: Normal parenchyma with smooth contour. No focal mass. The kidneys spleen and pancreas were not imaged. AORTA: Normal in caliber. IVC: Normal where visualized. No ascites. ABDOMINAL DUPLEX: Hepatic artery, hepatic veins, inferior vena cava, portal veins and splenic vein are patent with flow  in the normal direction. There is mild elevation of resistive indices in the hepatic artery with the main hepatic artery resistive index 0.86, right hepatic artery 0.89 and left hepatic artery 0.9. There is diastolic flow in all of these vessels.     IMPRESSION: 1.  Normal sonographic appearance of the liver. 2.  Mildly elevated resistive indices in the hepatic arteries. The hepatic artery, hepatic veins, inferior vena cava, portal vein and splenic vein are patent with flow in the normal direction.     CT Abdomen Pelvis w Contrast    Result Date: 1/14/2023  EXAM: CT ABDOMEN PELVIS W CONTRAST LOCATION: [facility] DATE/TIME: 1/14/2023 4:50 PM INDICATION: upper abd pain COMPARISON: None. TECHNIQUE: CT scan of the abdomen and pelvis was performed following injection of IV contrast. Multiplanar reformats were obtained. Dose reduction techniques were used. CONTRAST: Isovue 370 100mL FINDINGS: LOWER CHEST: Normal. HEPATOBILIARY: Status post cholecystectomy. PANCREAS: Normal. SPLEEN: Absent. ADRENAL GLANDS: Normal. KIDNEYS/BLADDER: Normal. BOWEL: Previous laparotomy. No obstruction, colitis, diverticulitis, or appendicitis. LYMPH NODES: Normal. VASCULATURE: Unremarkable. PELVIC ORGANS: Normal. MUSCULOSKELETAL: Normal.     IMPRESSION: 1.  No obstruction, colitis, diverticulitis, or appendicitis. 2.  No obstructing renal or ureteral stones. 3.  Status post cholecystectomy. Absent spleen. 4.  Uterine fibroid.      Labs: Reviewed I have personally reviewed pertinent labs, tests, and radiologic imaging in patient's chart.  Recent Results (from the past 24 hour(s))   Hepatic function panel    Collection Time: 01/17/23  9:06 AM   Result Value Ref Range    Bilirubin Total 16.1 (HH) 0.0 - 1.0 mg/dL    Bilirubin Direct 8.4 (H) <=0.5 mg/dL    Protein Total 6.5 6.0 - 8.0 g/dL    Albumin 2.9 (L) 3.5 - 5.0 g/dL    Alkaline Phosphatase 183 (H) 45 - 120 U/L     (H) 0 - 40 U/L     (H) 0 - 45 U/L    Basic metabolic panel    Collection Time: 01/17/23  9:06 AM   Result Value Ref Range    Sodium 137 136 - 145 mmol/L    Potassium 3.6 3.5 - 5.0 mmol/L    Chloride 105 98 - 107 mmol/L    Carbon Dioxide (CO2) 22 22 - 31 mmol/L    Anion Gap 10 5 - 18 mmol/L    Urea Nitrogen 6 (L) 8 - 22 mg/dL    Creatinine 0.63 0.60 - 1.10 mg/dL    Calcium 8.6 8.5 - 10.5 mg/dL    Glucose 91 70 - 125 mg/dL    GFR Estimate >90 >60 mL/min/1.73m2   CBC with platelets    Collection Time: 01/17/23  9:06 AM   Result Value Ref Range    WBC Count 15.7 (H) 4.0 - 11.0 10e3/uL    RBC Count 3.13 (L) 3.80 - 5.20 10e6/uL    Hemoglobin 9.1 (L) 11.7 - 15.7 g/dL    Hematocrit 26.2 (L) 35.0 - 47.0 %    MCV 84 78 - 100 fL    MCH 29.1 26.5 - 33.0 pg    MCHC 34.7 31.5 - 36.5 g/dL    RDW 18.2 (H) 10.0 - 15.0 %    Platelet Count 208 150 - 450 10e3/uL   Reticulocyte count    Collection Time: 01/17/23  9:06 AM   Result Value Ref Range    % Reticulocyte 8.8 (H) 0.8 - 2.7 %    Absolute Reticulocyte 0.275 (H) 0.010 - 0.110 10e6/uL   RBC and Platelet Morphology    Collection Time: 01/17/23  9:06 AM   Result Value Ref Range    RBC Morphology Confirmed RBC Indices    INR    Collection Time: 01/17/23 12:25 PM   Result Value Ref Range    INR 1.40 (H) 0.85 - 1.15       Total time spent 75 minutes with greater than 50% in consultation, education and coordination of care.     Also discussed with RN, pharmacist.     Please see A&P for additional details of medical decision making.    Elements of Medical Decision Making as described above. High level of decision making required due to 1 or more chronic illness with severe exacerbation, progression, and side effects of treatment.  Acute or chronic illness or injury or surgery. High risk therapy including opioids, high risk drug therapy including oral and/or parenteral controlled substances    Thank you for this consultation.    Fozia SOTO, FNP-C  Acute Care Pain Management Program  [facility] ([place], [place],  [place])  Monday-Friday 8a-4p   Page via online paging system or call [phone]

## 2023-01-17 NOTE — PROVIDER NOTIFICATION
Notified provider of abnormal lab, elevated Bilirubin 16.1. Was instructed to contact [facility] with findings.

## 2023-01-17 NOTE — PLAN OF CARE
"Goal Outcome Evaluation:      Plan of Care Reviewed With: patient    Patient alert and oriented. VSS. Maintaining Oxygen saturation above 98%. On continuous 2L NC supplemental oxygen per Dr. Burroughs order. Patient verbalized better pain control with new pain regimen. Patient states, \"It does not really takes away the pain control but it takes down the intensity to a tolerable level.\" IVF running continuously. Patient up to bedside commode. Patient endorses pain is preventing her from walking longer distances. Will continue with plan of care. Much emotional support provided.     Problem: Sickle Cell Disease  Goal: Optimal Cerebral Tissue Perfusion  Outcome: Progressing  Intervention: Protect and Optimize Cerebral Perfusion  Flowsheets  Taken 1/16/2023 2211  Fever Reduction/Comfort Measures: fluid intake increased  Glycemic Management: oral hydration promoted  Cerebral Perfusion Promotion: HOB elevated (specify degrees)  Head of Bed (HOB) Positioning: HOB at 45 degrees  Taken 1/16/2023 1600  Head of Bed (HOB) Positioning: HOB at 20-30 degrees     Problem: Sickle Cell Disease  Goal: Effective Tissue Perfusion  Intervention: Maintain Adequate Tissue Perfusion  Flowsheets (Taken 1/16/2023 2211)  Fluid/Electrolyte Management: intravenous fluids adjusted     Problem: Sickle Cell Disease  Goal: Absence of Infection Signs and Symptoms  Outcome: Progressing  Intervention: Prevent or Manage Infection  Flowsheets (Taken 1/16/2023 2211)  Fever Reduction/Comfort Measures: fluid intake increased  Infection Management: aseptic technique maintained            "

## 2023-01-17 NOTE — CONSULTS
Integrative Therapy Consult    Healing PresenceYes  Essential Oils: Topical (EO/Topical Oil), Inhalation (EO/Topical oil)     Black Pepper - HC, Trish -  HC, Peppermint - HC, Tea Tree - HC, Lavender Massage Oil - HC       Healing Music:       Breathwork:       Guided Imagery:       Acupressure:       Oshibori:       Energy Therapy:       Healing Touch:       Reiki:       Qi Gong:     Massage: Foot      Targeted Massage:    Sleep Promotion:       Other Therapy:       Intervention Reason: Pain     Pre and Post Session Scores: Patient Desires Treatment: yes           Pre-session Pain: 7 Post-session Pain: 5               Delivery:         Referrals:      Richa Rocha

## 2023-01-18 LAB
ALBUMIN SERPL-MCNC: 2.7 G/DL (ref 3.5–5)
ALP SERPL-CCNC: 170 U/L (ref 45–120)
ALT SERPL W P-5'-P-CCNC: 821 U/L (ref 0–45)
ANION GAP SERPL CALCULATED.3IONS-SCNC: 7 MMOL/L (ref 5–18)
AST SERPL W P-5'-P-CCNC: 893 U/L (ref 0–40)
BILIRUB DIRECT SERPL-MCNC: 7.5 MG/DL
BILIRUB SERPL-MCNC: 15 MG/DL (ref 0–1)
BUN SERPL-MCNC: 7 MG/DL (ref 8–22)
CALCIUM SERPL-MCNC: 8.3 MG/DL (ref 8.5–10.5)
CHLORIDE BLD-SCNC: 106 MMOL/L (ref 98–107)
CMV DNA SPEC NAA+PROBE-ACNC: NOT DETECTED IU/ML
CMV IGM SERPL IA-ACNC: <8 AU/ML
CMV IGM SERPL IA-ACNC: NEGATIVE
CO2 SERPL-SCNC: 23 MMOL/L (ref 22–31)
CREAT SERPL-MCNC: 0.61 MG/DL (ref 0.6–1.1)
EBV NA IGG SER IA-ACNC: >600 U/ML
EBV NA IGG SER IA-ACNC: POSITIVE [IU]/ML
ERYTHROCYTE [DISTWIDTH] IN BLOOD BY AUTOMATED COUNT: 18.7 % (ref 10–15)
GFR SERPL CREATININE-BSD FRML MDRD: >90 ML/MIN/1.73M2
GLUCOSE BLD-MCNC: 141 MG/DL (ref 70–125)
HCT VFR BLD AUTO: 25.3 % (ref 35–47)
HGB BLD-MCNC: 8.8 G/DL (ref 11.7–15.7)
LKM AB TITR SER IF: NORMAL {TITER}
LKM AB TITR SER IF: NORMAL {TITER}
MAGNESIUM SERPL-MCNC: 1.8 MG/DL (ref 1.8–2.6)
MCH RBC QN AUTO: 29.2 PG (ref 26.5–33)
MCHC RBC AUTO-ENTMCNC: 34.8 G/DL (ref 31.5–36.5)
MCV RBC AUTO: 84 FL (ref 78–100)
PHOSPHATE SERPL-MCNC: 2.7 MG/DL (ref 2.5–4.5)
PLATELET # BLD AUTO: 239 10E3/UL (ref 150–450)
POTASSIUM BLD-SCNC: 3.5 MMOL/L (ref 3.5–5)
PROT SERPL-MCNC: 5.9 G/DL (ref 6–8)
RBC # BLD AUTO: 3.01 10E6/UL (ref 3.8–5.2)
SMOOTH MUSCLE IGG TITR SER: NORMAL {TITER}
SMOOTH MUSCLE IGG TITR SER: NORMAL {TITER}
SODIUM SERPL-SCNC: 136 MMOL/L (ref 136–145)
WBC # BLD AUTO: 14.8 10E3/UL (ref 4–11)

## 2023-01-18 PROCEDURE — 258N000002 HC RX IP 258 OP 250: Performed by: INTERNAL MEDICINE

## 2023-01-18 PROCEDURE — 83735 ASSAY OF MAGNESIUM: CPT | Performed by: HOSPITALIST

## 2023-01-18 PROCEDURE — 99232 SBSQ HOSP IP/OBS MODERATE 35: CPT | Performed by: NURSE PRACTITIONER

## 2023-01-18 PROCEDURE — 250N000013 HC RX MED GY IP 250 OP 250 PS 637: Performed by: INTERNAL MEDICINE

## 2023-01-18 PROCEDURE — 250N000011 HC RX IP 250 OP 636: Performed by: NURSE PRACTITIONER

## 2023-01-18 PROCEDURE — 36415 COLL VENOUS BLD VENIPUNCTURE: CPT | Performed by: HOSPITALIST

## 2023-01-18 PROCEDURE — 99233 SBSQ HOSP IP/OBS HIGH 50: CPT | Performed by: HOSPITALIST

## 2023-01-18 PROCEDURE — 120N000001 HC R&B MED SURG/OB

## 2023-01-18 PROCEDURE — 80053 COMPREHEN METABOLIC PANEL: CPT | Performed by: PHYSICIAN ASSISTANT

## 2023-01-18 PROCEDURE — 82248 BILIRUBIN DIRECT: CPT | Performed by: PHYSICIAN ASSISTANT

## 2023-01-18 PROCEDURE — 85027 COMPLETE CBC AUTOMATED: CPT | Performed by: HOSPITALIST

## 2023-01-18 PROCEDURE — 250N000013 HC RX MED GY IP 250 OP 250 PS 637: Performed by: NURSE PRACTITIONER

## 2023-01-18 PROCEDURE — 84100 ASSAY OF PHOSPHORUS: CPT | Performed by: HOSPITALIST

## 2023-01-18 PROCEDURE — 250N000011 HC RX IP 250 OP 636: Performed by: HOSPITALIST

## 2023-01-18 RX ORDER — ENOXAPARIN SODIUM 100 MG/ML
40 INJECTION SUBCUTANEOUS AT BEDTIME
Status: DISCONTINUED | OUTPATIENT
Start: 2023-01-18 | End: 2023-01-19 | Stop reason: HOSPADM

## 2023-01-18 RX ADMIN — HYDROMORPHONE HYDROCHLORIDE 2 MG: 2 TABLET ORAL at 15:22

## 2023-01-18 RX ADMIN — DICLOFENAC 2 G: 10 GEL TOPICAL at 06:01

## 2023-01-18 RX ADMIN — CYCLOBENZAPRINE 10 MG: 10 TABLET, FILM COATED ORAL at 13:14

## 2023-01-18 RX ADMIN — HYDROMORPHONE HYDROCHLORIDE 2 MG: 2 TABLET ORAL at 19:30

## 2023-01-18 RX ADMIN — DICLOFENAC 2 G: 10 GEL TOPICAL at 18:37

## 2023-01-18 RX ADMIN — SUCRALFATE 1 G: 1 TABLET ORAL at 06:34

## 2023-01-18 RX ADMIN — LIDOCAINE: 50 OINTMENT TOPICAL at 20:32

## 2023-01-18 RX ADMIN — LIDOCAINE: 50 OINTMENT TOPICAL at 10:19

## 2023-01-18 RX ADMIN — CYCLOBENZAPRINE 10 MG: 10 TABLET, FILM COATED ORAL at 20:32

## 2023-01-18 RX ADMIN — DICLOFENAC 2 G: 10 GEL TOPICAL at 10:19

## 2023-01-18 RX ADMIN — ENOXAPARIN SODIUM 40 MG: 100 INJECTION SUBCUTANEOUS at 20:32

## 2023-01-18 RX ADMIN — PANTOPRAZOLE SODIUM 40 MG: 20 TABLET, DELAYED RELEASE ORAL at 15:30

## 2023-01-18 RX ADMIN — PANTOPRAZOLE SODIUM 40 MG: 20 TABLET, DELAYED RELEASE ORAL at 05:58

## 2023-01-18 RX ADMIN — SODIUM CHLORIDE: 4.5 INJECTION, SOLUTION INTRAVENOUS at 10:50

## 2023-01-18 RX ADMIN — HYDROMORPHONE HYDROCHLORIDE 2 MG: 2 TABLET ORAL at 10:20

## 2023-01-18 RX ADMIN — SODIUM CHLORIDE: 4.5 INJECTION, SOLUTION INTRAVENOUS at 20:33

## 2023-01-18 RX ADMIN — LIDOCAINE: 50 OINTMENT TOPICAL at 13:14

## 2023-01-18 RX ADMIN — SENNOSIDES AND DOCUSATE SODIUM 2 TABLET: 50; 8.6 TABLET ORAL at 19:30

## 2023-01-18 RX ADMIN — CYCLOBENZAPRINE 10 MG: 10 TABLET, FILM COATED ORAL at 10:20

## 2023-01-18 RX ADMIN — HYDROMORPHONE HYDROCHLORIDE 0.5 MG: 1 INJECTION, SOLUTION INTRAMUSCULAR; INTRAVENOUS; SUBCUTANEOUS at 05:58

## 2023-01-18 RX ADMIN — SUCRALFATE 1 G: 1 TABLET ORAL at 15:30

## 2023-01-18 RX ADMIN — LIDOCAINE: 50 OINTMENT TOPICAL at 17:04

## 2023-01-18 RX ADMIN — SUCRALFATE 1 G: 1 TABLET ORAL at 10:49

## 2023-01-18 RX ADMIN — DICLOFENAC 2 G: 10 GEL TOPICAL at 15:23

## 2023-01-18 RX ADMIN — SUCRALFATE 1 G: 1 TABLET ORAL at 20:32

## 2023-01-18 ASSESSMENT — ACTIVITIES OF DAILY LIVING (ADL)
ADLS_ACUITY_SCORE: 37

## 2023-01-18 NOTE — PLAN OF CARE
Goal Outcome Evaluation:      Plan of Care Reviewed With: patient    Overall Patient Progress: improving    Alert and oriented x4, able to make needs known, tolerating diet well, denies nausea, up stand by assist, IVF running at 100 ml/hr, c/o abd pain 6-8/10, gave IV Dilaudid x 1 for little relief, refused oral pain medications, stated T-pump is providing some relief to back, gave PRN benadryl x1 for c/o itchiness on back/buttock which provided relief, slept most of the night.

## 2023-01-18 NOTE — PROGRESS NOTES
GI PROGRESS NOTE  1/18/2023  Eveline Gage  1995  [id number]    Subjective:   Having some upper back pain. Abdominal pain improved. She is tolerating diet. No new complaints.      Objective:     Blood pressure 109/53, pulse 87, temperature 98.4  F (36.9  C), temperature source Oral, resp. rate 15, weight 83.7 kg (184 lb 8 oz), SpO2 93 %.    Body mass index is 32.68 kg/m .  Gen: NAD  Eyes: Jaundiced  Cardio: RRR  GI: Non-distended, BS positive, soft, non-tender  Neuro: Alert and orientated    Laboratory:  BMP  Recent Labs   Lab 01/18/23  0730 01/17/23  0906 01/16/23  0846    137 139   POTASSIUM 3.5 3.6 4.1   CHLORIDE 106 105 110*   ALIZA 8.3* 8.6 8.6   CO2 23 22 20*   BUN 7* 6* 6*   CR 0.61 0.63 0.61   * 91 91     CBC  Recent Labs   Lab 01/18/23  0730 01/17/23  0906 01/16/23  0846   WBC 14.8* 15.7* 20.2*   RBC 3.01* 3.13* 3.15*   HGB 8.8* 9.1* 9.3*   HCT 25.3* 26.2* 26.6*   MCV 84 84 84   MCH 29.2 29.1 29.5   MCHC 34.8 34.7 35.0   RDW 18.7* 18.2* 18.4*    208 263     INR  Recent Labs   Lab 01/17/23  1225   INR 1.40*      LFTs  Recent Labs   Lab Test 01/18/23  0730 01/17/23  0906 01/16/23  0846 01/16/23  0010 01/14/23  1727 01/14/23  1555   ALBUMIN 2.7* 2.9* 2.9*  --   --  3.3*   BILITOTAL 15.0* 16.1* 18.2*  --   --  21.0*   * 883* 858*  --   --  1,000*   * 969* 905*  --   --  1,069*   PROTEIN  --   --   --  Negative Negative  --    LIPASE  --   --   --   --   --  42     Imaging:  EXAM: US ABDOMEN OR PELVIS DOPPLER COMPLETE  LOCATION: [facility]  DATE/TIME: 1/17/2023 10:16 AM     INDICATION: elevated LFTs, eval for vascular causes  COMPARISON: 01/14/2023.  TECHNIQUE: Complete abdominal ultrasound. Color flow with spectral Doppler and waveform analysis performed.     FINDINGS:  GALLBLADDER: Surgically absent.   BILE DUCTS: No biliary dilatation. The common duct measures for mm.  LIVER: Normal parenchyma with smooth contour. No focal mass.   The  kidneys spleen and pancreas were not imaged. AORTA: Normal in caliber.  IVC: Normal where visualized.  No ascites.     ABDOMINAL DUPLEX: Hepatic artery, hepatic veins, inferior vena cava, portal veins and splenic vein are patent with flow in the normal direction. There is mild elevation of resistive indices in the hepatic artery with the main hepatic artery resistive   index 0.86, right hepatic artery 0.89 and left hepatic artery 0.9. There is diastolic flow in all of these vessels.                                                IMPRESSION:  1.  Normal sonographic appearance of the liver.  2.  Mildly elevated resistive indices in the hepatic arteries. The hepatic artery, hepatic veins, inferior vena cava, portal vein and splenic vein are patent with flow in the normal direction.     Assessment:   1. Elevated LFTs - unknown etiology    27 year-old with sickle cell disease, hemolysis, elevated iron levels, positive CMV IgG. Doppler US negative, EUS 1/15/23 was LFTs are slowly improving. This may be related to sickle cell disease/hemolysis. With elevated CMV IgG, will await IgM and add quantitative CMV. Hemochromatosis labs pending.     Plan:   1. Trend LFTs  2. Follow-up pending labs.   3. Quantitative CMV     We will continue to follow                                                                         Rabia Haines PA-C  Thank you for the opportunity to participate in the care of this patient.   Please feel free to call me with any questions or concerns.  Phone number [phone].

## 2023-01-18 NOTE — PLAN OF CARE
Problem: Sickle Cell Disease  Goal: Absence of Infection Signs and Symptoms  Outcome: Progressing  Goal: Optimal Oxygenation  Outcome: Progressing  Intervention: Optimize Oxygenation  Recent Flowsheet Documentation  Taken 1/18/2023 0915 by Dot Palacio RN  Cough And Deep Breathing: done independently per patient     Problem: Pain Acute  Goal: Optimal Pain Control and Function  Outcome: Progressing  Intervention: Develop Pain Management Plan  Recent Flowsheet Documentation  Taken 1/18/2023 0915 by Dot Palacio RN  Pain Management Interventions: medication (see MAR)  Intervention: Prevent or Manage Pain  Recent Flowsheet Documentation  Taken 1/18/2023 0915 by Dot Palacio RN  Medication Review/Management: medications reviewed   Goal Outcome Evaluation: patient doing well today. At 1020 patient c/o abdominal and back pain 6/10.  Received 2mg dilaudid which she states was effective for a short time. Patient feels relief with topical voltern and lidocaine. Tolerating a regular diet. experiences some abdominal pain after urinating. At 1520 patient c/o generalized pain 6/10. Received 2mg dilaudid, which was mildly effective in relieving pain.

## 2023-01-18 NOTE — PROGRESS NOTES
[facility]    Medicine Progress Note - Hospitalist Service    Date of Admission:  1/15/2023    Assessment & Plan   Eveline Gage is a 27 year old female with history of sickle cell disease, adjustment disorder with mixed anxiety and depression, asthma, tobacco abuse, admitted on 1/14/2023 for elevated liver transaminases, hyperbilirubinemia and epigastric and right upper quadrant abdominal pain.  Liver transaminases are in cholestatic pattern, initial bilirubin of 21 on admission.  Transferred to [facility] for ERCP on 1/15/23 without obstruction visualized.  Evaluating for viral hepatitis versus autoimmune hepatitis.  Abdominal ultrasound with Doppler showed normal liver without evidence of portal vein thrombosis. GI, hematology, and pain management were consulted.      Patient also treated for sickle cell pain crisis with hydration and IV pain meds.  CT chest on admission was negative for pulmonary traits.    -Clinically improving, LFTs downtrending.  Still continues have significant pain likely due to sickle cell, will attempt to control pain.  Hopeful discharge in 1 to 2 days once pain improved.     Elevated liver transaminases  Hyperbilirubinemia  Epigastric/right upper quadrant abdominal pain  History of Cholecystectomy and Splenectomy  LFTs peaked on admission with T bili 21, alk phos 216, AST 1069, ALT 1000.  Lipase normal admission.  Tylenol and salicylate level negative.  Ferritin 8000, iron 55.  Ceruloplasmin level normal.  Smooth muscle antibody weakly positive.  CMV IgG positive, IgM negative.  EBV IgG positive.  HSV 1 IgG positive.  Hepatitis A, B, and C negative.  -Clinically improving, LFTs downtrending  -GI consulted, appreciate recs  -Hemochromatosis mutation pending  -CMV quantitative PCR pending  -Potential liver biopsy in the future    Sickle cell disease/anemia  History of Splenectomy  Sickle Cell Pain crisis  Hemoglobin 10.2->9.3 baseline 9.0.  - Hematology and  "Pain Medicine consulted, appreciate recs  - Dilaudid 2-4 mg q3h prn  - Hydromorphone 0.5mg every 2 hours as needed for breakthrough pain  - Flexeril 10 mg TID, Diclofenac 2 gm QID, Lidocaine ointment  - IVF    Chronic Problems  Mild remittent asthma  Resume home inhalers.      Tobacco abuse  Advised smoking cessation.        Diet: Room Service  Regular Diet Adult    DVT Prophylaxis: Enoxaparin (Lovenox) SQ  Echeverria Catheter: Not present  Lines: None     Cardiac Monitoring: None  Code Status: Full Code      Clinically Significant Risk Factors              # Hypoalbuminemia: Lowest albumin = 2.7 g/dL at 1/18/2023  7:30 AM, will monitor as appropriate           # Obesity: Estimated body mass index is 32.68 kg/m  as calculated from the following:    Height as of 1/14/23: 1.6 m (5' 3\").    Weight as of this encounter: 83.7 kg (184 lb 8 oz)., PRESENT ON ADMISSION         Disposition Plan      Expected Discharge Date: 01/20/2023        Discharge Comments: pain control          Mario Willams MD  Hospitalist Service  [facility]  Securely message with [employer] (more info)  Text page via [employer] Paging/Directory   ______________________________________________________________________    Interval History   No acute events overnight.    Patient seen and evaluated at bedside.  Continues to have pain, today is mostly in her back and shoulder.  Denies any significant chest pain or abdominal pain.  Although initially denied abdominal pain later that had some tenderness.  Explained that we are trying to optimize her pain control prior to her going home.  She had questions about whether she would need a liver biopsy, explained that it is a possibility if we are not sure what is causing her high liver enzymes.    Physical Exam   Vital Signs: Temp: 98.2  F (36.8  C) Temp src: Oral BP: 113/70 Pulse: 78   Resp: 16 SpO2: 98 % O2 Device: None (Room air)    Weight: 184 lbs 8 oz    General: NAD, laying in bed  HEENT: " NC/AT, anicteric sclera, external ears normal  Neck: supple  CV: S1/S2, no m/r/g, no peripheral edema  Resp: clear to auscultation bilaterally, good air entry  Abdomen: soft, RUQ tenderness with deep palpation, no guarding  Musculoskeletal: full ROM of upper extremities  Skin: warm, dry  Neuro: alert, cranial nerves grossly intact  Psych: normal insight and judgement, appropriate speech    Medical Decision Making       60 MINUTES SPENT BY ME on the date of service doing chart review, history, exam, documentation & further activities per the note.      Data     I have personally reviewed the following data over the past 24 hrs:    14.8 (H)  \   8.8 (L)   / 239     136 106 7 (L) /  141 (H)   3.5 23 0.61 \       ALT: 821 (H) AST: 893 (H) AP: 170 (H) TBILI: 15.0 (H)   ALB: 2.7 (L) TOT PROTEIN: 5.9 (L) LIPASE: N/A       Imaging results reviewed over the past 24 hrs:   No results found for this or any previous visit (from the past 24 hour(s)).

## 2023-01-18 NOTE — CONSULTS
"ACUPUNCTURIST TREATMENT NOTE    Name: Eveline Gage  :  1995  MRN:  [id number]    Acupuncture Treatment  Patient Type: Orthopedic  Intervention Reason: Pain  Pain Location: upper back pain  Pre-session Pain Ratin  Post-session Pain Ratin  Patient complaint:: upper back pain and foggy headed  Initial insertions: LI 10, Du 20, Du 22         \"Risks and benefits of acupuncture were discussed with patient. Consent for treatment was given. We thank you for the referral.\"     Josefina Brock L.Ac.     Date:  2023  Time:  12:28 PM    "

## 2023-01-18 NOTE — PROGRESS NOTES
[facility] ACUTE PAIN SERVICE    Daily PAIN Progress Note    Assessment/Plan:  Eveline Gage is a 27 year old female who was admitted on 1/15/2023. Pain team was asked to see the patient for sickle cell pain. Admitted for elevated liver transaminases, hyperbilirubinemia and epigastric and right upper quadrant abdominal pain on 1/14 and transferred to [facility] for ERCP on 1/15/23. Returned to  for evaluation for viral hepatitis vs.autoimmune hepatitis. History of sickle cell anemia, adjustment disorder with mixed anxiety and depression, asthma, tobacco abuse, asplenia. The patient does smoke and denies chemical dependency history.      PLAN:   1) Pain is consistent with sternal chest pain, mid back pain. Consider the following differentials sickle cell pain, sickle cell hepatology.  Consult and progress notes, labs and imaging: I have personally reviewed pertinent notes, labs, tests, and radiologic imaging in patient's chart. Treatment plan includes multimodal pain approach, Hospital Medicine Service for medical management, GI, Heme/Onc. Patient educated regarding multimodal pain approach, medications as listed below. Patient is understanding of the plan. All questions and concerns addressed to patient's satisfaction.   2)Multimodal Medication Therapy  Topical: lidocaine alternating with Voltaren   NSAID'S: none   Steroids: none   Muscle Relaxants: flexeril tid   Adjuvants: no APAP given LFTs   Antidepressants/anxiolytics: none  Opioids: dilaudid 2-4 mg q3h prn   IV Pain medication: discontinue dilaudid q6h prn  3)Non-medication interventions: heat, ice  Acupuncture consult - offered and agreeable    Integrative consult - offered and agreeable   4)Constipation Prophylaxis: prn ordered   5) Care Teams: Hospital Medicine Service, Heme/Onc, GI     -Opioid prescriber has been none  -[place]  pulled from system on 1/17/23. This indicates: none  Discharge Recommendations - We recommend prescribing the  following at the time of discharge: topicals, flexeril, small supply dilaudid if needed x8-10 tabs     Subjective:  Patient reports aching sternal pain rating this 6/10 and better than yesterday. She feels like heat and topicals helpful. She did like acupuncture and massage. No concerns.     Principal Problem:    Elevation of levels of liver transaminase levels      Objective:  Vital signs in last 24 hours:  /70 (BP Location: Left arm)   Pulse 78   Temp 98.2  F (36.8  C) (Oral)   Resp 16   Wt 83.7 kg (184 lb 8 oz)   SpO2 98%   BMI 32.68 kg/m    Weight:   Vitals:    01/18/23 0404   Weight: 83.7 kg (184 lb 8 oz)      Weight change:   Body mass index is 32.68 kg/m .    Intake/Output last 3 shifts:  I/O last 3 completed shifts:  In: 2031 [P.O.:360; I.V.:1671]  Out: -   Intake/Output this shift:  I/O this shift:  In: 240 [P.O.:240]  Out: -     Review of Systems:   As per subjective, all others negative.    Physical Exam:  General Appearance:  Alert, cooperative, no distress   Head:  Normocephalic, without obvious abnormality, atraumatic   Eyes:  PERRL, conjunctiva/corneas clear, EOM's intact   Nose: Nares normal, septum midline   Throat: Lips, mucosa, and tongue normal; teeth and gums normal   Neck: Supple, symmetrical, trachea midline   Back:   Symmetric, no curvature, ROM normal   Lungs:   Clear to auscultation bilaterally, respirations unlabored   Heart:  Regular rate and rhythm, S1, S2 normal    Abdomen:   Soft, non-tender, bowel sounds active all four quadrants,  no masses, no organomegaly    Extremities: Extremities normal, atraumatic   Skin: Skin warm, dry, sclera icterus    Neurologic: Alert and oriented X 3, Moves all 4 extremities     Imaging: Reviewed I have personally reviewed pertinent labs, tests, and radiologic imaging in patient's chart.      Labs: Reviewed I have personally reviewed pertinent labs, tests, and radiologic imaging in patient's chart.      Total time spent 35 minutes with greater  than 50% in consultation, education and coordination of care.     Also discussed with RN, MD, pharmacist.     Please see A&P for additional details of medical decision making.    Elements of Medical Decision Making as described above. High level of decision making required due to 1 or more chronic illness with severe exacerbation, progression, and side effects of treatment.  Acute or chronic illness or injury or surgery. High risk therapy including opioids, high risk drug therapy including oral and/or parenteral controlled substances.       KERI CopelandP-C  Acute Care Pain Management Program  [facility] ([place], [place], [place])  Monday-Friday 8a-4p   Page via online paging system or call [phone]

## 2023-01-18 NOTE — PLAN OF CARE
Problem: Plan of Care - These are the overarching goals to be used throughout the patient stay.    Goal: Absence of Hospital-Acquired Illness or Injury  Intervention: Prevent Skin Injury  Recent Flowsheet Documentation  Taken 1/17/2023 1630 by Darell Sun RN  Body Position: position changed independently  Taken 1/17/2023 0813 by Darell Sun RN  Body Position: position changed independently   Goal Outcome Evaluation:    Problem: Sickle Cell Disease  Goal: Optimal Pain Control and Function  1/17/2023 1804 by Darell Sun RN  Outcome: Progressing  1/17/2023 1155 by Darell Sun RN  Outcome: Progressing  Intervention: Manage Acute Pain  Recent Flowsheet Documentation  Taken 1/17/2023 1739 by Darell Sun RN  Pain Management Interventions: heat applied  Taken 1/17/2023 0813 by Darell Sun RN  Pain Management Interventions: heat applied  Intervention: Acknowledge Underlying Chronic Pain  Recent Flowsheet Documentation  Taken 1/17/2023 1630 by Darell Sun RN  Medication Review/Management: medications reviewed  Taken 1/17/2023 0813 by Darell Sun RN  Medication Review/Management: medications reviewed     Patient alert and oriented. Pain is controlled when maintaining the medication schedule. Patient up and showered today, feeling much better. O2 discontinued, oxygen saturation above 98%.   IVF 0.45% running continuously.

## 2023-01-19 VITALS
TEMPERATURE: 98.3 F | WEIGHT: 184.1 LBS | RESPIRATION RATE: 18 BRPM | DIASTOLIC BLOOD PRESSURE: 51 MMHG | BODY MASS INDEX: 32.61 KG/M2 | OXYGEN SATURATION: 92 % | HEART RATE: 66 BPM | SYSTOLIC BLOOD PRESSURE: 100 MMHG

## 2023-01-19 LAB
ALBUMIN SERPL-MCNC: 2.7 G/DL (ref 3.5–5)
ALP SERPL-CCNC: 182 U/L (ref 45–120)
ALT SERPL W P-5'-P-CCNC: 779 U/L (ref 0–45)
ANION GAP SERPL CALCULATED.3IONS-SCNC: 8 MMOL/L (ref 5–18)
AST SERPL W P-5'-P-CCNC: 792 U/L (ref 0–40)
BASOPHILS # BLD MANUAL: 0 10E3/UL (ref 0–0.2)
BASOPHILS NFR BLD MANUAL: 0 %
BILIRUB DIRECT SERPL-MCNC: 6.5 MG/DL
BILIRUB SERPL-MCNC: 13.4 MG/DL (ref 0–1)
BUN SERPL-MCNC: 7 MG/DL (ref 8–22)
CALCIUM SERPL-MCNC: 8.4 MG/DL (ref 8.5–10.5)
CHLORIDE BLD-SCNC: 107 MMOL/L (ref 98–107)
CO2 SERPL-SCNC: 21 MMOL/L (ref 22–31)
CREAT SERPL-MCNC: 0.57 MG/DL (ref 0.6–1.1)
DACRYOCYTES BLD QL SMEAR: SLIGHT
EOSINOPHIL # BLD MANUAL: 0.7 10E3/UL (ref 0–0.7)
EOSINOPHIL NFR BLD MANUAL: 4 %
ERYTHROCYTE [DISTWIDTH] IN BLOOD BY AUTOMATED COUNT: 19.3 % (ref 10–15)
FRAGMENTS BLD QL SMEAR: SLIGHT
GFR SERPL CREATININE-BSD FRML MDRD: >90 ML/MIN/1.73M2
GLUCOSE BLD-MCNC: 105 MG/DL (ref 70–125)
HCT VFR BLD AUTO: 27.7 % (ref 35–47)
HGB BLD-MCNC: 9.6 G/DL (ref 11.7–15.7)
LYMPHOCYTES # BLD MANUAL: 5 10E3/UL (ref 0.8–5.3)
LYMPHOCYTES NFR BLD MANUAL: 28 %
MAGNESIUM SERPL-MCNC: 1.8 MG/DL (ref 1.8–2.6)
MCH RBC QN AUTO: 29.9 PG (ref 26.5–33)
MCHC RBC AUTO-ENTMCNC: 34.7 G/DL (ref 31.5–36.5)
MCV RBC AUTO: 86 FL (ref 78–100)
MONOCYTES # BLD MANUAL: 2.9 10E3/UL (ref 0–1.3)
MONOCYTES NFR BLD MANUAL: 16 %
NEUTROPHILS # BLD MANUAL: 9.3 10E3/UL (ref 1.6–8.3)
NEUTROPHILS NFR BLD MANUAL: 52 %
NRBC # BLD AUTO: 0.4 10E3/UL
NRBC BLD MANUAL-RTO: 2 %
PHOSPHATE SERPL-MCNC: 3.5 MG/DL (ref 2.5–4.5)
PLAT MORPH BLD: ABNORMAL
PLATELET # BLD AUTO: 248 10E3/UL (ref 150–450)
POTASSIUM BLD-SCNC: 3.8 MMOL/L (ref 3.5–5)
PROT SERPL-MCNC: 6.1 G/DL (ref 6–8)
RBC # BLD AUTO: 3.21 10E6/UL (ref 3.8–5.2)
RBC MORPH BLD: ABNORMAL
SODIUM SERPL-SCNC: 136 MMOL/L (ref 136–145)
TARGETS BLD QL SMEAR: ABNORMAL
WBC # BLD AUTO: 17.9 10E3/UL (ref 4–11)

## 2023-01-19 PROCEDURE — 99239 HOSP IP/OBS DSCHRG MGMT >30: CPT | Performed by: HOSPITALIST

## 2023-01-19 PROCEDURE — 83735 ASSAY OF MAGNESIUM: CPT | Performed by: HOSPITALIST

## 2023-01-19 PROCEDURE — 250N000013 HC RX MED GY IP 250 OP 250 PS 637: Performed by: INTERNAL MEDICINE

## 2023-01-19 PROCEDURE — 80053 COMPREHEN METABOLIC PANEL: CPT | Performed by: HOSPITALIST

## 2023-01-19 PROCEDURE — 85041 AUTOMATED RBC COUNT: CPT | Performed by: HOSPITALIST

## 2023-01-19 PROCEDURE — 85007 BL SMEAR W/DIFF WBC COUNT: CPT | Performed by: HOSPITALIST

## 2023-01-19 PROCEDURE — 258N000002 HC RX IP 258 OP 250: Performed by: INTERNAL MEDICINE

## 2023-01-19 PROCEDURE — 82248 BILIRUBIN DIRECT: CPT | Performed by: HOSPITALIST

## 2023-01-19 PROCEDURE — 250N000013 HC RX MED GY IP 250 OP 250 PS 637: Performed by: NURSE PRACTITIONER

## 2023-01-19 PROCEDURE — 99232 SBSQ HOSP IP/OBS MODERATE 35: CPT | Performed by: NURSE PRACTITIONER

## 2023-01-19 PROCEDURE — 80069 RENAL FUNCTION PANEL: CPT | Performed by: HOSPITALIST

## 2023-01-19 PROCEDURE — 36415 COLL VENOUS BLD VENIPUNCTURE: CPT | Performed by: HOSPITALIST

## 2023-01-19 RX ORDER — SENNOSIDES 8.6 MG
2 TABLET ORAL DAILY PRN
Qty: 60 TABLET | Refills: 0 | Status: SHIPPED | OUTPATIENT
Start: 2023-01-19 | End: 2024-03-05

## 2023-01-19 RX ORDER — CYCLOBENZAPRINE HCL 10 MG
10 TABLET ORAL 3 TIMES DAILY PRN
Qty: 12 TABLET | Refills: 0 | Status: SHIPPED | OUTPATIENT
Start: 2023-01-19 | End: 2024-03-05

## 2023-01-19 RX ORDER — OXYCODONE HYDROCHLORIDE 5 MG/1
5-10 TABLET ORAL EVERY 4 HOURS PRN
Qty: 16 TABLET | Refills: 0 | Status: SHIPPED | OUTPATIENT
Start: 2023-01-19 | End: 2023-03-04

## 2023-01-19 RX ADMIN — SUCRALFATE 1 G: 1 TABLET ORAL at 06:37

## 2023-01-19 RX ADMIN — SODIUM CHLORIDE: 4.5 INJECTION, SOLUTION INTRAVENOUS at 06:37

## 2023-01-19 RX ADMIN — PANTOPRAZOLE SODIUM 40 MG: 20 TABLET, DELAYED RELEASE ORAL at 06:37

## 2023-01-19 RX ADMIN — HYDROMORPHONE HYDROCHLORIDE 2 MG: 2 TABLET ORAL at 04:32

## 2023-01-19 RX ADMIN — DICLOFENAC 2 G: 10 GEL TOPICAL at 06:37

## 2023-01-19 RX ADMIN — DICLOFENAC 2 G: 10 GEL TOPICAL at 12:06

## 2023-01-19 RX ADMIN — CYCLOBENZAPRINE 10 MG: 10 TABLET, FILM COATED ORAL at 10:33

## 2023-01-19 RX ADMIN — LIDOCAINE: 50 OINTMENT TOPICAL at 10:33

## 2023-01-19 RX ADMIN — SUCRALFATE 1 G: 1 TABLET ORAL at 12:06

## 2023-01-19 ASSESSMENT — ACTIVITIES OF DAILY LIVING (ADL)
ADLS_ACUITY_SCORE: 37

## 2023-01-19 NOTE — DISCHARGE SUMMARY
[facility]  Hospitalist Discharge Summary      Date of Admission:  1/15/2023  Date of Discharge:  1/19/2023  Discharging Provider: Mario Willams MD  Discharge Service: Hospitalist Service    Discharge Diagnoses   Elevated liver transaminases  Hyperbilirubinemia  Epigastric/right upper quadrant abdominal pain  History of Cholecystectomy and Splenectomy    Follow-ups Needed After Discharge   Follow-up Appointments     Follow-up and recommended labs and tests       Follow up with primary care provider, [facility],   within 7 days for hospital follow- up.  The following labs/tests are   recommended: CBC, CMP.    Follow up with [facility], within 2 to 4 weeks of discharge.  There is   numerous clinics throughout the [facility], they should call you with an   appointment in the next couple days.  If you do not hear from them in the   next week then please give them a call.  The number is [phone].    Mention that you were seen by them in the hospital and that you need an   appointment with a liver specialist.         Follow up test results from hospital    Unresulted Labs Ordered in the Past 30 Days of this Admission     No orders found from 12/16/2022 to 1/16/2023.      These results will be followed up by PCP and GI    Discharge Disposition   Discharged to home  Condition at discharge: Stable    Hospital Course   Eveline Gage is a 27 year old female with history of sickle cell disease, adjustment disorder with mixed anxiety and depression, asthma, tobacco abuse, admitted on 1/14/2023 for elevated liver transaminases, hyperbilirubinemia and epigastric and right upper quadrant abdominal pain.  Liver transaminases are in cholestatic pattern, initial bilirubin of 21 on admission.  Transferred to [facility] for ERCP on 1/15/23 without obstruction visualized.  Evaluating for viral hepatitis versus autoimmune hepatitis.  Abdominal ultrasound with Doppler showed normal  liver without evidence of portal vein thrombosis. GI, hematology, and pain management were consulted.  LFT was downtrending on discharge and clinically patient felt better with improvement of her pain.  Patient discharged home and follow-up with [facility] with liver specialist for potential liver biopsy as outpatient.    Patient also treated for sickle cell pain crisis with hydration and IV pain meds.  CT chest on admission was negative for pulmonary traits.     Elevated liver transaminases  Hyperbilirubinemia  Epigastric/right upper quadrant abdominal pain  History of Cholecystectomy and Splenectomy  LFTs peaked on admission with T bili 21, alk phos 216, AST 1069, ALT 1000.  Lipase normal admission.  Tylenol and salicylate level negative.  Ferritin 8000, iron 55.  Ceruloplasmin level normal.  Smooth muscle antibody weakly positive.  CMV IgG positive, IgM negative.  EBV IgG positive.  HSV 1 IgG positive.  Hepatitis A, B, and C negative.  -Hemochromatosis mutation pending  -Potential liver biopsy in the future  -Follow-up with [facility] as outpatient    Sickle cell disease/anemia  History of Splenectomy  Sickle Cell Pain crisis  Hemoglobin 10.2->9.3 baseline 9.0.  - Oxycodone PRN and Voltaren gel on discharge  -Counseled patient on staying well-hydrated    Chronic Problems  Mild remittent asthma  Resume home inhalers.      Tobacco abuse  Advised smoking cessation.       Consultations This Hospital Stay   GASTROENTEROLOGY IP CONSULT  SMOKING CESSATION PROGRAM IP CONSULT  PAIN MANAGEMENT ADULT IP CONSULT  HEMATOLOGY & ONCOLOGY IP CONSULT  ACUPUNCTURE IP CONSULT  INTEGRATIVE THERAPIES CONSULT    Code Status   Full Code    Time Spent on this Encounter   I, Mario Willams MD, personally saw the patient today and spent greater than 30 minutes discharging this patient.       Mario Willams MD  [address] [address]  Phone: [phone]  Fax:  [phone]  ______________________________________________________________________    Physical Exam   Vital Signs: Temp: 98.3  F (36.8  C) Temp src: Oral BP: 100/51 Pulse: 66   Resp: 18 SpO2: 92 % O2 Device: None (Room air)    Weight: 184 lbs 1.6 oz  General: NAD, resting comfortably  CV: +S1/S2, no m/r/g, no pitting edema  Respiratory: clear to auscultation bilaterally  GI: soft, no guarding  Neuro: alert, cranial nerves grossly intact       Primary Care Physician   [facility]    Discharge Orders      Reason for your hospital stay    Increased liver enzymes due to inflammation of the liver from an unclear cause.  You had extensive testing done with most of the results being negative.  You also had a procedure called an ERCP to look to see if there is a blockage in the bile duct leading to the gallbladder/liver which was negative.  For the time being I recommend you do not take any Tylenol to avoid any extra liver damage/pressure.  If you do need to take anything for pain you can take ibuprofen or other type of NSAIDs.  Be cautious about taking too many of them.  I also gave you a prescription for oxycodone which you can use for pain if you do needed over the next couple days.  Ensure you stay well-hydrated.  We will have you follow-up with your PCP in about a week to do some blood work to ensure the numbers are getting better.  Please call and set up an appointment.  I we will have a follow-up with the [facility] team with the liver specialist.     Follow-up and recommended labs and tests     Follow up with primary care provider, [facility], within 7 days for hospital follow- up.  The following labs/tests are recommended: CBC, CMP.    Follow up with [facility], within 2 to 4 weeks of discharge.  There is numerous clinics throughout the [facility], they should call you with an appointment in the next couple days.  If you do not hear from them in the next week then please  give them a call.  The number is [phone].  Mention that you were seen by them in the hospital and that you need an appointment with a liver specialist.     Activity    Your activity upon discharge: activity as tolerated     Diet    Follow this diet upon discharge:  Regular Diet Adult       Significant Results and Procedures   Most Recent 3 CBC's:  Recent Labs   Lab Test 01/19/23  0527 01/18/23  0730 01/17/23  0906   WBC 17.9* 14.8* 15.7*   HGB 9.6* 8.8* 9.1*   MCV 86 84 84    239 208     Most Recent 3 BMP's:  Recent Labs   Lab Test 01/19/23  0527 01/18/23  0730 01/17/23  0906    136 137   POTASSIUM 3.8 3.5 3.6   CHLORIDE 107 106 105   CO2 21* 23 22   BUN 7* 7* 6*   CR 0.57* 0.61 0.63   ANIONGAP 8 7 10   ALIZA 8.4* 8.3* 8.6    141* 91     Most Recent 2 LFT's:  Recent Labs   Lab Test 01/19/23 0527 01/18/23  0730   * 893*   * 821*   ALKPHOS 182* 170*   BILITOTAL 13.4* 15.0*     Most Recent 3 INR's:  Recent Labs   Lab Test 01/17/23  1225   INR 1.40*     Most Recent INR's and Anticoagulation Dosing History:  Anticoagulation Dose History     Recent Dosing and Labs Latest Ref Rng & Units 1/17/2023    INR 0.85 - 1.15 1.40(H)      ,   Results for orders placed or performed during the hospital encounter of 01/15/23   US Abdominal Doppler Complete    Narrative    EXAM: US ABDOMEN OR PELVIS DOPPLER COMPLETE  LOCATION: [facility]  DATE/TIME: 1/17/2023 10:16 AM    INDICATION: elevated LFTs, eval for vascular causes  COMPARISON: 01/14/2023.  TECHNIQUE: Complete abdominal ultrasound. Color flow with spectral Doppler and waveform analysis performed.     FINDINGS:    GALLBLADDER: Surgically absent.     BILE DUCTS: No biliary dilatation. The common duct measures for mm.    LIVER: Normal parenchyma with smooth contour. No focal mass.     The kidneys spleen and pancreas were not imaged. AORTA: Normal in caliber.    IVC: Normal where visualized.    No ascites.    ABDOMINAL  DUPLEX: Hepatic artery, hepatic veins, inferior vena cava, portal veins and splenic vein are patent with flow in the normal direction. There is mild elevation of resistive indices in the hepatic artery with the main hepatic artery resistive   index 0.86, right hepatic artery 0.89 and left hepatic artery 0.9. There is diastolic flow in all of these vessels.      Impression    IMPRESSION:  1.  Normal sonographic appearance of the liver.  2.  Mildly elevated resistive indices in the hepatic arteries. The hepatic artery, hepatic veins, inferior vena cava, portal vein and splenic vein are patent with flow in the normal direction.             Discharge Medications   Current Discharge Medication List      START taking these medications    Details   diclofenac (VOLTAREN) 1 % topical gel Apply 2 g topically 4 times daily Apply as needed to chest, back, or other painful areas  Qty: 100 g, Refills: 0    Associated Diagnoses: Elevation of levels of liver transaminase levels      oxyCODONE (ROXICODONE) 5 MG tablet Take 1-2 tablets (5-10 mg) by mouth every 4 hours as needed for pain (can take 5 mg for moderate pain and 10 mg for severe pain)  Qty: 16 tablet, Refills: 0    Associated Diagnoses: Sickle cell disease with crisis and other complication (H)      sennosides (SENOKOT) 8.6 MG tablet Take 2 tablets by mouth daily as needed for constipation  Qty: 60 tablet, Refills: 0    Associated Diagnoses: Elevation of levels of liver transaminase levels         CONTINUE these medications which have CHANGED    Details   cyclobenzaprine (FLEXERIL) 10 MG tablet Take 1 tablet (10 mg) by mouth 3 times daily as needed for muscle spasms  Qty: 12 tablet, Refills: 0    Associated Diagnoses: Elevation of levels of liver transaminase levels         CONTINUE these medications which have NOT CHANGED    Details   albuterol (PROAIR HFA/PROVENTIL HFA/VENTOLIN HFA) 108 (90 Base) MCG/ACT inhaler Inhale 2 puffs into the lungs every 6 hours as needed for  shortness of breath / dyspnea or wheezing  Qty: 18 g, Refills: 1    Comments: Pharmacy may dispense brand covered by insurance (Proair, or proventil or ventolin or generic albuterol inhaler)      calcium carbonate (TUMS) 500 MG chewable tablet Take 1-2 chew tab by mouth every 4 hours as needed for heartburn      ipratropium - albuterol 0.5 mg/2.5 mg/3 mL (DUONEB) 0.5-2.5 (3) MG/3ML neb solution Take 1 vial (3 mLs) by nebulization every 6 hours as needed for shortness of breath / dyspnea or wheezing  Qty: 90 mL, Refills: 1      multivitamin, therapeutic (THERA-VIT) TABS tablet Take 1 tablet by mouth daily      omeprazole (PRILOSEC) 20 MG DR capsule Take 1 capsule (20 mg) by mouth 2 times daily  Qty: 40 capsule, Refills: 0    Associated Diagnoses: Abdominal pain, epigastric      sucralfate (CARAFATE) 1 GM tablet Take 1 tablet (1 g) by mouth 4 times daily as needed for nausea (stomach pain)  Qty: 40 tablet, Refills: 0    Associated Diagnoses: Abdominal pain, epigastric      etonogestrel (NEXPLANON) 68 MG IMPL 1 each by Subdermal route      naproxen sodium (ANAPROX) 220 MG tablet Take 220-440 mg by mouth every 12 hours as needed for moderate pain (4-6)           Allergies   Allergies   Allergen Reactions     Blood-Group Specific Substance Other (See Comments)     Patient has anti-C, anti-S, anti-Jkb, anti-M, and a nonspecific antibody. Blood product orders may be delayed. Draw THREE purple top tubes for all Type and Screen/Type and crossmatch orders.  Patient has anti-C, anti-S, anti-Jkb, anti-M, and a nonspecific antibody. Blood product orders may be delayed. Draw THREE purple top tubes for all Type and Screen/Type and crossmatch orders.  Patient has anti-C, anti-S, anti-Jkb, anti-M, and a nonspecific antibody. Blood product orders may be delayed. Draw THREE purple top tubes for all Type and Screen/Type and crossmatch orders.  Patient has anti-C, anti-S, anti-Jkb, anti-M, and a nonspecific antibody. Blood product  orders may be delayed. Draw THREE purple top tubes for all Type and Screen/Type and crossmatch orders.       Penicillins Rash

## 2023-01-19 NOTE — PLAN OF CARE
Goal Outcome Evaluation:      Plan of Care Reviewed With: patient    Overall Patient Progress: improving    Alert and oriented x4, able to make needs known, VSS on RA, IVF running at 100 ml/hr, c/o abd/epigastric/back pain, gave PRN PO Dilaudid x 2 for good relief, up stand by assist, slept most of the night, on K protocol and to be rechecked in AM, c/o constipation, bowel sounds hypoactive, but passing flatus, gave PRN senna and prune juice.

## 2023-01-19 NOTE — PROGRESS NOTES
[facility] ACUTE PAIN SERVICE    Daily PAIN Progress Note    Assessment/Plan:  Eveline Gage is a 27 year old female who was admitted on 1/15/2023. Pain team was asked to see the patient for sickle cell pain. Admitted for elevated liver transaminases, hyperbilirubinemia and epigastric and right upper quadrant abdominal pain on 1/14 and transferred to [facility] for ERCP on 1/15/23. Returned to  for evaluation for viral hepatitis vs.autoimmune hepatitis. History of sickle cell anemia, adjustment disorder with mixed anxiety and depression, asthma, tobacco abuse, asplenia. The patient does smoke and denies chemical dependency history.      PLAN:   1) Pain is consistent with sternal chest pain, mid back pain. Consider the following differentials sickle cell pain, sickle cell hepatology.  Consult and progress notes, labs and imaging: I have personally reviewed pertinent notes, labs, tests, and radiologic imaging in patient's chart. Treatment plan includes multimodal pain approach, Hospital Medicine Service for medical management, GI, Heme/Onc. Patient educated regarding multimodal pain approach, medications as listed below, discharge reccs. Patient is understanding of the plan. All questions and concerns addressed to patient's satisfaction.   2)Multimodal Medication Therapy  Topical: lidocaine alternating with Voltaren   NSAID'S: none   Steroids: none   Muscle Relaxants: flexeril tid   Adjuvants: no APAP given LFTs   Antidepressants/anxiolytics: none  Opioids: dilaudid 2-4 mg q3h prn   IV Pain medication: none  3)Non-medication interventions: heat, ice  Acupuncture consult - offered and agreeable    Integrative consult - offered and agreeable   4)Constipation Prophylaxis: prn ordered   5) Care Teams: Hospital Medicine Service, Heme/Onc, GI     -Opioid prescriber has been none  -[place]  pulled from system on 1/17/23. This indicates: none    Discharge Recommendations - We recommend prescribing the following at  the time of discharge: topicals can be purchased OTC, flexeril x 3-5 days, small supply dilaudid if needed x8-10 tabs     Subjective:  Patient reports aching sternal pain rating this 4-6/10. No concerns. Hopes to go home today.      Principal Problem:    Elevation of levels of liver transaminase levels      Objective:  Vital signs in last 24 hours:  /51 (BP Location: Right arm)   Pulse 66   Temp 98.3  F (36.8  C) (Oral)   Resp 18   Wt 83.5 kg (184 lb 1.6 oz)   SpO2 92%   BMI 32.61 kg/m    Weight:     Vitals:    01/18/23 0404 01/19/23 0432   Weight: 83.7 kg (184 lb 8 oz) 83.5 kg (184 lb 1.6 oz)      Weight change: -0.181 kg (-6.4 oz)  Body mass index is 32.61 kg/m .    Intake/Output last 3 shifts:  I/O last 3 completed shifts:  In: 2676 [P.O.:420; I.V.:2256]  Out: -   Intake/Output this shift:  No intake/output data recorded.    Review of Systems:   As per subjective, all others negative.    Physical Exam:  General Appearance:  Alert, cooperative, no distress   Head:  Normocephalic, without obvious abnormality, atraumatic   Eyes:  PERRL, conjunctiva/corneas clear, EOM's intact   Nose: Nares normal, septum midline   Throat: Lips, mucosa, and tongue normal; teeth and gums normal   Neck: Supple, symmetrical, trachea midline   Back:   Symmetric, no curvature, ROM normal   Lungs:   Clear to auscultation bilaterally, respirations unlabored   Heart:  Regular rate and rhythm, S1, S2 normal    Abdomen:   Soft, non-tender, bowel sounds active all four quadrants,  no masses, no organomegaly    Extremities: Extremities normal, atraumatic   Skin: Skin warm, dry, sclera icterus    Neurologic: Alert and oriented X 3, Moves all 4 extremities     Imaging: Reviewed I have personally reviewed pertinent labs, tests, and radiologic imaging in patient's chart.      Labs: Reviewed I have personally reviewed pertinent labs, tests, and radiologic imaging in patient's chart.      Total time spent 35 minutes with greater than 50%  in consultation, education and coordination of care.     Also discussed with RN, MD, pharmacist.     Please see A&P for additional details of medical decision making.    Elements of Medical Decision Making as described above. High level of decision making required due to 1 or more chronic illness with severe exacerbation, progression, and side effects of treatment.  Acute or chronic illness or injury or surgery. High risk therapy including opioids, high risk drug therapy including oral and/or parenteral controlled substances.       KERI CopelandP-C  Acute Care Pain Management Program  [facility] ([place], [place], [place])  Monday-Friday 8a-4p   Page via online paging system or call [phone]

## 2023-01-19 NOTE — PLAN OF CARE
"  Problem: Plan of Care - These are the overarching goals to be used throughout the patient stay.    Goal: Plan of Care Review  Description: The Plan of Care Review/Shift note should be completed every shift.  The Outcome Evaluation is a brief statement about your assessment that the patient is improving, declining, or no change.  This information will be displayed automatically on your shift note.  Outcome: Adequate for Care Transition  Goal: Patient-Specific Goal (Individualized)  Description: You can add care plan individualizations to a care plan. Examples of Individualization might be:  \"Parent requests to be called daily at 9am for status\", \"I have a hard time hearing out of my right ear\", or \"Do not touch me to wake me up as it startles me\".  Outcome: Adequate for Care Transition  Goal: Absence of Hospital-Acquired Illness or Injury  Outcome: Adequate for Care Transition  Intervention: Identify and Manage Fall Risk  Recent Flowsheet Documentation  Taken 1/19/2023 0900 by Dot Palacio, RN  Safety Promotion/Fall Prevention:    activity supervised    assistive device/personal items within reach    fall prevention program maintained    lighting adjusted    nonskid shoes/slippers when out of bed    room near nurse's station    room organization consistent    safety round/check completed    supervised activity    clutter free environment maintained  Intervention: Prevent and Manage VTE (Venous Thromboembolism) Risk  Recent Flowsheet Documentation  Taken 1/19/2023 0900 by Dot Palacio, RN  VTE Prevention/Management: patient refused intervention  Goal: Optimal Comfort and Wellbeing  Outcome: Adequate for Care Transition  Intervention: Monitor Pain and Promote Comfort  Recent Flowsheet Documentation  Taken 1/19/2023 0900 by Dot Palacio RN  Pain Management Interventions: declines  Goal: Readiness for Transition of Care  Outcome: Adequate for Care Transition     Problem: Sickle Cell Disease  Goal: Optimal " Cerebral Tissue Perfusion  Outcome: Adequate for Care Transition  Goal: Optimal Coping with Sickle Cell Disease  Outcome: Adequate for Care Transition  Goal: Effective Tissue Perfusion  Outcome: Adequate for Care Transition  Goal: Absence of Infection Signs and Symptoms  Outcome: Adequate for Care Transition  Goal: Optimal Pain Control and Function  Outcome: Adequate for Care Transition  Intervention: Manage Acute Pain  Recent Flowsheet Documentation  Taken 1/19/2023 0900 by Dot Palacio RN  Pain Management Interventions: declines  Intervention: Acknowledge Underlying Chronic Pain  Recent Flowsheet Documentation  Taken 1/19/2023 0900 by Dot Palacio RN  Medication Review/Management: medications reviewed  Goal: Optimal Oxygenation  Outcome: Adequate for Care Transition  Intervention: Optimize Oxygenation  Recent Flowsheet Documentation  Taken 1/19/2023 0900 by Dot Palacio RN  Administration (IS): refused  Cough And Deep Breathing: done independently per patient     Problem: Pain Acute  Goal: Optimal Pain Control and Function  Outcome: Adequate for Care Transition  Intervention: Develop Pain Management Plan  Recent Flowsheet Documentation  Taken 1/19/2023 0900 by Dot Palacio RN  Pain Management Interventions: declines  Intervention: Prevent or Manage Pain  Recent Flowsheet Documentation  Taken 1/19/2023 0900 by Dot Palacio RN  Medication Review/Management: medications reviewed   Goal Outcome Evaluation: Patient met goals for discharged. Scheduled to be discharged this afternoon with ub[employer] to transport home.

## 2023-01-19 NOTE — PROGRESS NOTES
GI PROGRESS NOTE  1/18/2023  Eveline Gage  1995  [id number]    Subjective:    Abdominal pain improved. She is tolerating diet. No new complaints.      Objective:     Blood pressure 100/51, pulse 66, temperature 98.3  F (36.8  C), temperature source Oral, resp. rate 18, weight 83.5 kg (184 lb 1.6 oz), SpO2 92 %.    Body mass index is 32.61 kg/m .  Gen: NAD  Eyes: Jaundiced  Cardio: RRR  GI: Non-distended, BS positive, soft, non-tender  Neuro: Alert and orientated    Laboratory:  BMP  Recent Labs   Lab 01/19/23  0527 01/18/23  0730 01/17/23  0906    136 137   POTASSIUM 3.8 3.5 3.6   CHLORIDE 107 106 105   ALIZA 8.4* 8.3* 8.6   CO2 21* 23 22   BUN 7* 7* 6*   CR 0.57* 0.61 0.63    141* 91     CBC  Recent Labs   Lab 01/19/23  0527 01/18/23  0730 01/17/23  0906   WBC 17.9* 14.8* 15.7*   RBC 3.21* 3.01* 3.13*   HGB 9.6* 8.8* 9.1*   HCT 27.7* 25.3* 26.2*   MCV 86 84 84   MCH 29.9 29.2 29.1   MCHC 34.7 34.8 34.7   RDW 19.3* 18.7* 18.2*    239 208     INR  Recent Labs   Lab 01/17/23  1225   INR 1.40*      LFTs  Recent Labs   Lab Test 01/19/23  0527 01/18/23  0730 01/17/23  0906 01/16/23  0846 01/16/23  0010 01/14/23  1727 01/14/23  1555   ALBUMIN 2.7* 2.7* 2.9*   < >  --   --  3.3*   BILITOTAL 13.4* 15.0* 16.1*   < >  --   --  21.0*   * 821* 883*   < >  --   --  1,000*   * 893* 969*   < >  --   --  1,069*   PROTEIN  --   --   --   --  Negative Negative  --    LIPASE  --   --   --   --   --   --  42    < > = values in this interval not displayed.     Imaging:  EXAM: US ABDOMEN OR PELVIS DOPPLER COMPLETE  LOCATION: [facility]  DATE/TIME: 1/17/2023 10:16 AM     INDICATION: elevated LFTs, eval for vascular causes  COMPARISON: 01/14/2023.  TECHNIQUE: Complete abdominal ultrasound. Color flow with spectral Doppler and waveform analysis performed.     FINDINGS:  GALLBLADDER: Surgically absent.   BILE DUCTS: No biliary dilatation. The common duct measures for  mm.  LIVER: Normal parenchyma with smooth contour. No focal mass.   The kidneys spleen and pancreas were not imaged. AORTA: Normal in caliber.  IVC: Normal where visualized.  No ascites.     ABDOMINAL DUPLEX: Hepatic artery, hepatic veins, inferior vena cava, portal veins and splenic vein are patent with flow in the normal direction. There is mild elevation of resistive indices in the hepatic artery with the main hepatic artery resistive   index 0.86, right hepatic artery 0.89 and left hepatic artery 0.9. There is diastolic flow in all of these vessels.                                                IMPRESSION:  1.  Normal sonographic appearance of the liver.  2.  Mildly elevated resistive indices in the hepatic arteries. The hepatic artery, hepatic veins, inferior vena cava, portal vein and splenic vein are patent with flow in the normal direction.     Assessment:   1. Elevated LFTs    27 year-old with sickle cell disease, hemolysis, elevated iron levels, positive CMV IgG with negative quant. Doppler US negative, EUS 1/15/23 was negative. 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 Reveiwed with hepatologist, this is likely due to hepatic congestion and liver ischemia related to sickle cell disease. LFTs are slowly improving. Hemachromatosis labs are still pending.     Plan:   1. Trend LFTs as outpatient  2. No plans for further inpatient work-up  3. Follow-up [facility] liver clinic. Our office will arrange.     Ok from our standpoint for discharge.                                                                          Rabia Haines PA-C  Thank you for the opportunity to participate in the care of this patient.   Please feel free to call me with any questions or concerns.  Phone number [phone].

## 2023-01-19 NOTE — CONSULTS
Integrative Therapy Consult    Healing PresenceYes  Essential Oils: Topical (EO/Topical Oil), Inhalation (EO/Topical oil)     Calming Blend - HC, Lavender Massage Oil - HC       Healing Music:       Breathwork:       Guided Imagery:       Acupressure:       Oshibori:       Energy Therapy:       Healing Touch:       Reiki:       Qi Gong:     Massage: Hand, Foot      Targeted Massage:    Sleep Promotion:       Other Therapy:       Intervention Reason: Anxiety/Stress, Pain     Pre and Post Session Scores: Patient Desires Treatment: yes  Pre-Session Anxiety: 10  Post-session Anxiety: 8     Pre-session Pain: 4 Post-session Pain: 3               Delivery:         Referrals:      Richa Rocha

## 2023-01-20 ENCOUNTER — PATIENT OUTREACH (OUTPATIENT)
Dept: CARE COORDINATION | Facility: CLINIC | Age: 28
End: 2023-01-20
Payer: COMMERCIAL

## 2023-01-20 NOTE — PROGRESS NOTES
"Clinic Care Coordination Contact  [facility]: Post-Discharge Note  SITUATION                                                      Admission:    Admission Date: 01/15/23   Reason for Admission: elevated liver transaminases, hyperbilirubinemia and epigastric and right upper quadrant abdominal pain  Discharge:   Discharge Date: 01/19/23  Discharge Diagnosis: Elevated liver transaminases  Hyperbilirubinemia  Epigastric/right upper quadrant abdominal pain  History of Cholecystectomy and Splenectomy    BACKGROUND                                                      Per hospital discharge summary and inpatient provider notes:    Eveline Gage is a 27 year old female with history of sickle cell disease, adjustment disorder with mixed anxiety and depression, asthma, tobacco abuse, admitted on 1/14/2023 for elevated liver transaminases, hyperbilirubinemia and epigastric and right upper quadrant abdominal pain.     ASSESSMENT           Discharge Assessment  How are you doing now that you are home?: \"I'm doing OK. I'm just resting today. My pain level is a 4.\"  How are your symptoms? (Red Flag symptoms escalate to triage hotline per guidelines): Improved  Do you feel your condition is stable enough to be safe at home until your provider visit?: Yes  Does the patient have their discharge instructions? : Yes  Does the patient have questions regarding their discharge instructions? : No  Were you started on any new medications or were there changes to any of your previous medications? : Yes  Does the patient have all of their medications?: Yes  Do you have questions regarding any of your medications? : No  Discharge follow-up appointment scheduled within 14 calendar days? : No  Is patient agreeable to assistance with scheduling? : No (patient will call to schedule PCP appointment. Hasn't heard from GI clinic but is directed to phone number on AVS to schedule if needed)         Post-op (Clinicians Only)  Did the patient " have surgery or a procedure: Yes (ERCP)  Eating & Drinking: eating and drinking without complaints/concerns  PO Intake: regular diet  Additional Symptoms:  (none)  Bowel Function: normal  Date of last BM: 01/20/23  Urinary Status: voiding without complaint/concerns        PLAN                                                      Outpatient Plan:  Follow up with primary care provider, [facility],   within 7 days for hospital follow- up.  The following labs/tests are   recommended: CBC, CMP.    Follow up with [facility], within 2 to 4 weeks of discharge.  There is   numerous clinics throughout the [facility], they should call you with an   appointment in the next couple days.  If you do not hear from them in the   next week then please give them a call.  The number is [phone].    Mention that you were seen by them in the hospital and that you need an   appointment with a liver specialist    No future appointments.      For any urgent concerns, please contact our 24 hour nurse triage line: [phone] ([phone])         Nereida Coyne RN

## 2023-02-11 ENCOUNTER — APPOINTMENT (OUTPATIENT)
Dept: CT IMAGING | Facility: CLINIC | Age: 28
End: 2023-02-11
Attending: EMERGENCY MEDICINE
Payer: COMMERCIAL

## 2023-02-11 ENCOUNTER — HOSPITAL ENCOUNTER (EMERGENCY)
Facility: CLINIC | Age: 28
Discharge: HOME | End: 2023-02-11
Attending: EMERGENCY MEDICINE | Admitting: EMERGENCY MEDICINE
Payer: COMMERCIAL

## 2023-02-11 VITALS
DIASTOLIC BLOOD PRESSURE: 80 MMHG | OXYGEN SATURATION: 97 % | TEMPERATURE: 97.7 F | SYSTOLIC BLOOD PRESSURE: 123 MMHG | RESPIRATION RATE: 18 BRPM | HEART RATE: 88 BPM

## 2023-02-11 DIAGNOSIS — R52 PAIN OF RIGHT SIDE OF BODY: ICD-10-CM

## 2023-02-11 LAB
ABO/RH(D): ABNORMAL
ALBUMIN SERPL-MCNC: 2.6 G/DL (ref 3.5–5)
ALBUMIN UR-MCNC: NEGATIVE MG/DL
ALP SERPL-CCNC: 170 U/L (ref 45–120)
ALT SERPL W P-5'-P-CCNC: 218 U/L (ref 0–45)
AMPHETAMINES UR QL SCN: ABNORMAL
ANION GAP SERPL CALCULATED.3IONS-SCNC: 6 MMOL/L (ref 5–18)
ANTIBODY ID: NORMAL
ANTIBODY SCREEN: POSITIVE
APAP SERPL-MCNC: 15.4 UG/ML (ref 10–20)
APPEARANCE UR: CLEAR
APTT PPP: 32 SECONDS (ref 22–38)
AST SERPL W P-5'-P-CCNC: 249 U/L (ref 0–40)
BARBITURATES UR QL: ABNORMAL
BASOPHILS # BLD MANUAL: 0 10E3/UL (ref 0–0.2)
BASOPHILS NFR BLD MANUAL: 0 %
BENZODIAZ UR QL: ABNORMAL
BILIRUB SERPL-MCNC: 10.5 MG/DL (ref 0–1)
BILIRUB UR QL STRIP: NEGATIVE
BUN SERPL-MCNC: 9 MG/DL (ref 8–22)
C AG RBC QL: NEGATIVE
C REACTIVE PROTEIN LHE: 1.4 MG/DL (ref 0–?)
C TRACH DNA SPEC QL NAA+PROBE: NEGATIVE
CALCIUM SERPL-MCNC: 8.5 MG/DL (ref 8.5–10.5)
CANNABINOIDS UR QL SCN: ABNORMAL
CHLORIDE BLD-SCNC: 109 MMOL/L (ref 98–107)
CO2 SERPL-SCNC: 22 MMOL/L (ref 22–31)
COCAINE UR QL: ABNORMAL
COLOR UR AUTO: YELLOW
CREAT SERPL-MCNC: 0.56 MG/DL (ref 0.6–1.1)
CREAT UR-MCNC: 20 MG/DL
E AG RBC QL: NEGATIVE
ELLIPTOCYTES BLD QL SMEAR: SLIGHT
EOSINOPHIL # BLD MANUAL: 0.8 10E3/UL (ref 0–0.7)
EOSINOPHIL NFR BLD MANUAL: 5 %
ERYTHROCYTE [DISTWIDTH] IN BLOOD BY AUTOMATED COUNT: 19.2 % (ref 10–15)
FLUAV RNA SPEC QL NAA+PROBE: NEGATIVE
FLUBV RNA RESP QL NAA+PROBE: NEGATIVE
FRAGMENTS BLD QL SMEAR: SLIGHT
GFR SERPL CREATININE-BSD FRML MDRD: >90 ML/MIN/1.73M2
GLUCOSE BLD-MCNC: 101 MG/DL (ref 70–125)
GLUCOSE UR STRIP-MCNC: NEGATIVE MG/DL
HCG UR QL: NEGATIVE
HCT VFR BLD AUTO: 26.3 % (ref 35–47)
HGB BLD-MCNC: 9.3 G/DL (ref 11.7–15.7)
HGB UR QL STRIP: NEGATIVE
INR PPP: 1.41 (ref 0.85–1.15)
KETONES UR STRIP-MCNC: NEGATIVE MG/DL
LEUKOCYTE ESTERASE UR QL STRIP: NEGATIVE
LIPASE SERPL-CCNC: 117 U/L (ref 0–52)
LITTLE C AG RBC QL: POSITIVE
LYMPHOCYTES # BLD MANUAL: 5.7 10E3/UL (ref 0.8–5.3)
LYMPHOCYTES NFR BLD MANUAL: 35 %
MAGNESIUM SERPL-MCNC: 2 MG/DL (ref 1.8–2.6)
MCH RBC QN AUTO: 30.2 PG (ref 26.5–33)
MCHC RBC AUTO-ENTMCNC: 35.4 G/DL (ref 31.5–36.5)
MCV RBC AUTO: 85 FL (ref 78–100)
METHADONE UR QL SCN: ABNORMAL
MONOCYTES # BLD MANUAL: 2.1 10E3/UL (ref 0–1.3)
MONOCYTES NFR BLD MANUAL: 13 %
N GONORRHOEA DNA SPEC QL NAA+PROBE: NEGATIVE
NEUTROPHILS # BLD MANUAL: 7.7 10E3/UL (ref 1.6–8.3)
NEUTROPHILS NFR BLD MANUAL: 47 %
NITRATE UR QL: NEGATIVE
OPIATES UR QL SCN: ABNORMAL
OXYCODONE UR QL: ABNORMAL
PCP UR QL SCN: ABNORMAL
PH UR STRIP: 7 [PH] (ref 5–7)
PLAT MORPH BLD: ABNORMAL
PLATELET # BLD AUTO: 252 10E3/UL (ref 150–450)
POTASSIUM BLD-SCNC: 4.1 MMOL/L (ref 3.5–5)
PROT SERPL-MCNC: 7 G/DL (ref 6–8)
RBC # BLD AUTO: 3.08 10E6/UL (ref 3.8–5.2)
RBC MORPH BLD: ABNORMAL
RBC URINE: 0 /HPF
RETICS # AUTO: 0.23 10E6/UL (ref 0.01–0.11)
RETICS/RBC NFR AUTO: 7.3 % (ref 0.8–2.7)
RSV RNA SPEC NAA+PROBE: NEGATIVE
SARS-COV-2 RNA RESP QL NAA+PROBE: NEGATIVE
SICKLE CELLS BLD QL SMEAR: SLIGHT
SODIUM SERPL-SCNC: 137 MMOL/L (ref 136–145)
SP GR UR STRIP: 1.02 (ref 1–1.03)
SPECIMEN EXPIRATION DATE: ABNORMAL
SPECIMEN EXPIRATION DATE: NORMAL
SPECIMEN EXPIRATION DATE: NORMAL
SQUAMOUS EPITHELIAL: 1 /HPF
TARGETS BLD QL SMEAR: ABNORMAL
UROBILINOGEN UR STRIP-MCNC: <2 MG/DL
WBC # BLD AUTO: 16.4 10E3/UL (ref 4–11)
WBC URINE: 1 /HPF

## 2023-02-11 PROCEDURE — 85027 COMPLETE CBC AUTOMATED: CPT | Performed by: EMERGENCY MEDICINE

## 2023-02-11 PROCEDURE — 81003 URINALYSIS AUTO W/O SCOPE: CPT | Performed by: EMERGENCY MEDICINE

## 2023-02-11 PROCEDURE — 250N000011 HC RX IP 250 OP 636: Performed by: EMERGENCY MEDICINE

## 2023-02-11 PROCEDURE — 85045 AUTOMATED RETICULOCYTE COUNT: CPT | Performed by: EMERGENCY MEDICINE

## 2023-02-11 PROCEDURE — C9803 HOPD COVID-19 SPEC COLLECT: HCPCS

## 2023-02-11 PROCEDURE — 96361 HYDRATE IV INFUSION ADD-ON: CPT

## 2023-02-11 PROCEDURE — 36415 COLL VENOUS BLD VENIPUNCTURE: CPT | Performed by: EMERGENCY MEDICINE

## 2023-02-11 PROCEDURE — 87637 SARSCOV2&INF A&B&RSV AMP PRB: CPT | Performed by: EMERGENCY MEDICINE

## 2023-02-11 PROCEDURE — 87040 BLOOD CULTURE FOR BACTERIA: CPT | Performed by: EMERGENCY MEDICINE

## 2023-02-11 PROCEDURE — 99285 EMERGENCY DEPT VISIT HI MDM: CPT | Mod: CS,25

## 2023-02-11 PROCEDURE — 82374 ASSAY BLOOD CARBON DIOXIDE: CPT | Performed by: EMERGENCY MEDICINE

## 2023-02-11 PROCEDURE — 258N000003 HC RX IP 258 OP 636: Performed by: EMERGENCY MEDICINE

## 2023-02-11 PROCEDURE — 96376 TX/PRO/DX INJ SAME DRUG ADON: CPT

## 2023-02-11 PROCEDURE — 80307 DRUG TEST PRSMV CHEM ANLYZR: CPT | Performed by: EMERGENCY MEDICINE

## 2023-02-11 PROCEDURE — 81403 MOPATH PROCEDURE LEVEL 4: CPT | Mod: XU | Performed by: STUDENT IN AN ORGANIZED HEALTH CARE EDUCATION/TRAINING PROGRAM

## 2023-02-11 PROCEDURE — 86901 BLOOD TYPING SEROLOGIC RH(D): CPT | Performed by: EMERGENCY MEDICINE

## 2023-02-11 PROCEDURE — 85007 BL SMEAR W/DIFF WBC COUNT: CPT | Performed by: EMERGENCY MEDICINE

## 2023-02-11 PROCEDURE — 81025 URINE PREGNANCY TEST: CPT | Performed by: EMERGENCY MEDICINE

## 2023-02-11 PROCEDURE — 86850 RBC ANTIBODY SCREEN: CPT | Performed by: EMERGENCY MEDICINE

## 2023-02-11 PROCEDURE — 86140 C-REACTIVE PROTEIN: CPT | Performed by: EMERGENCY MEDICINE

## 2023-02-11 PROCEDURE — 86905 BLOOD TYPING RBC ANTIGENS: CPT | Performed by: EMERGENCY MEDICINE

## 2023-02-11 PROCEDURE — 96374 THER/PROPH/DIAG INJ IV PUSH: CPT | Mod: 25

## 2023-02-11 PROCEDURE — 80143 DRUG ASSAY ACETAMINOPHEN: CPT | Performed by: EMERGENCY MEDICINE

## 2023-02-11 PROCEDURE — 96375 TX/PRO/DX INJ NEW DRUG ADDON: CPT

## 2023-02-11 PROCEDURE — 85610 PROTHROMBIN TIME: CPT | Performed by: EMERGENCY MEDICINE

## 2023-02-11 PROCEDURE — 83735 ASSAY OF MAGNESIUM: CPT | Performed by: EMERGENCY MEDICINE

## 2023-02-11 PROCEDURE — 85730 THROMBOPLASTIN TIME PARTIAL: CPT | Performed by: EMERGENCY MEDICINE

## 2023-02-11 PROCEDURE — 87491 CHLMYD TRACH DNA AMP PROBE: CPT | Performed by: EMERGENCY MEDICINE

## 2023-02-11 PROCEDURE — 71275 CT ANGIOGRAPHY CHEST: CPT

## 2023-02-11 PROCEDURE — 84999 UNLISTED CHEMISTRY PROCEDURE: CPT | Mod: XU | Performed by: STUDENT IN AN ORGANIZED HEALTH CARE EDUCATION/TRAINING PROGRAM

## 2023-02-11 PROCEDURE — 87591 N.GONORRHOEAE DNA AMP PROB: CPT | Performed by: EMERGENCY MEDICINE

## 2023-02-11 PROCEDURE — 83690 ASSAY OF LIPASE: CPT | Performed by: EMERGENCY MEDICINE

## 2023-02-11 PROCEDURE — 74177 CT ABD & PELVIS W/CONTRAST: CPT

## 2023-02-11 PROCEDURE — 86870 RBC ANTIBODY IDENTIFICATION: CPT | Performed by: EMERGENCY MEDICINE

## 2023-02-11 RX ORDER — METHOCARBAMOL 500 MG/1
500 TABLET, FILM COATED ORAL 4 TIMES DAILY PRN
Qty: 20 TABLET | Refills: 0 | Status: SHIPPED | OUTPATIENT
Start: 2023-02-11 | End: 2024-03-05

## 2023-02-11 RX ORDER — IOPAMIDOL 755 MG/ML
100 INJECTION, SOLUTION INTRAVASCULAR ONCE
Status: COMPLETED | OUTPATIENT
Start: 2023-02-11 | End: 2023-02-11

## 2023-02-11 RX ORDER — KETOROLAC TROMETHAMINE 15 MG/ML
15 INJECTION, SOLUTION INTRAMUSCULAR; INTRAVENOUS ONCE
Status: COMPLETED | OUTPATIENT
Start: 2023-02-11 | End: 2023-02-11

## 2023-02-11 RX ORDER — MORPHINE SULFATE 4 MG/ML
4 INJECTION, SOLUTION INTRAMUSCULAR; INTRAVENOUS ONCE
Status: COMPLETED | OUTPATIENT
Start: 2023-02-11 | End: 2023-02-11

## 2023-02-11 RX ORDER — OXYCODONE HYDROCHLORIDE 5 MG/1
5 TABLET ORAL EVERY 6 HOURS PRN
Qty: 12 TABLET | Refills: 0 | Status: SHIPPED | OUTPATIENT
Start: 2023-02-11 | End: 2023-02-13

## 2023-02-11 RX ADMIN — KETOROLAC TROMETHAMINE 15 MG: 15 INJECTION, SOLUTION INTRAMUSCULAR; INTRAVENOUS at 04:11

## 2023-02-11 RX ADMIN — IOPAMIDOL 100 ML: 755 INJECTION, SOLUTION INTRAVENOUS at 04:40

## 2023-02-11 RX ADMIN — MORPHINE SULFATE 4 MG: 4 INJECTION, SOLUTION INTRAMUSCULAR; INTRAVENOUS at 04:06

## 2023-02-11 RX ADMIN — SODIUM CHLORIDE 1000 ML: 9 INJECTION, SOLUTION INTRAVENOUS at 04:11

## 2023-02-11 RX ADMIN — MORPHINE SULFATE 4 MG: 4 INJECTION, SOLUTION INTRAMUSCULAR; INTRAVENOUS at 05:17

## 2023-02-11 ASSESSMENT — ACTIVITIES OF DAILY LIVING (ADL): ADLS_ACUITY_SCORE: 35

## 2023-02-11 NOTE — Clinical Note
Inez accompanied Eveline Gage to the emergency department on 2/11/2023. They may return to work on 02/13/2023.      If you have any questions or concerns, please don't hesitate to call.      Emmanuelle Art MD

## 2023-02-11 NOTE — ED PROVIDER NOTES
EMERGENCY DEPARTMENT ENCOUNTER      NAME: Eveline Gage  AGE: 27 year old female  YOB: 1995  MRN: [id number]  EVALUATION DATE & TIME: 2/11/2023  3:15 AM    PCP: [facility]    ED PROVIDER: Emmanuelle Art M.D.      Chief Complaint   Patient presents with     Sickle Cell Pain Crisis         FINAL IMPRESSION:  1. Pain of right side of body          ED COURSE & MEDICAL DECISION MAKING:    ED Course as of 02/11/23 0539   Sat Feb 11, 2023   0334 Pt with h/o sickle cell disease with prior hepatitis wtih again diffuse atypical right midabdominal and RLQ abdominal pain with jaundiced sclera, dark urine and right shoulder discomfort which could certainly represent radiated pain in setting of hepatic dysfunction. No specific vaginal discharge or pelvic pain, no dysuria/hematuria/urgency/frequency but STI screen with UA and hcg pending, she notes no possibility of pregnancy. CBC pending with T&S in case of need for transfusion and reticulocyte count in setting of sickle cell disease with pain, cutlures pending with patient who is asplenic with possible acute pathology although reassuringly no history of fever or chest pain or SOB. With soulder pain, CTA r/o PE pending. LFTs and lipase pending with INR, magesium and patient given NSAID with morphine and IVF with likely pain crisis superimposed on underlying hepatic pathology recently sen by [facility] for this and with again worsening since Wednesday now 3 days ago. She will call to arrange for childcare tomorrow as she does have overnight childcare currently and COVID19 PCR underway. Over the last week she has used a total of 1g tylenol thus unlikely tylenol related, was using oxycodone as needed for pain but no vicodin or percocet, which is reassuring. CRP pending to trend while admitted for pain crisis and likely recurrent transaminitis/hepatitis with recent ERCP without findings and with presumed cholestasis recently improved but possibly  again recurring, she is amenable to alicia.   0509 Influenza and COVId19 negative, lilpase slightly elevated 117.    0510 LFTs downtrending with AST/ALT improved compared to 3 weeks ago with Tbili 13 -> 10 today, retic count elevated as expected, hemoglobin 9.3 (was 9.6 3 weeks ago)   0511 CT chest and abdomen and pelvis reassuringly unchanged and without acute abnormalities present, further morphine given for pain and willr eassess now.   0521 Patient reassessed and still afebrile and in light of her LFTs downtrending and improved compared to a few weeks ago without abnormal findings on imaging and CBC reassuring, she notes she would prefer discharge to home with follow up with her primary care and GI team ultimately if urine reassuring, UA pending now and in lab.   0530 UA reassuringly without cells to suggest UTI and hcg negative, will discuss plan with patient now       Pertinent Labs & Imaging studies reviewed. (See chart for details)    3:19 AM I met with the patient and performed my initial interview and exam.   PPE worn: Surgical mask     5:11 AM Nurse reports that the patient is complaining that their pain is worsening     Medical Decision Making    History:    Supplemental history from: Documented in chart, if applicable    External Record(s) reviewed: Documented in chart, if applicable. and Inpatient Record: 1/15-1/19 admssion to [facility]    Work Up:    Chart documentation includes differential considered and any EKGs or imaging independently interpreted by provider, where specified.    In additional to work up documented, I considered the following work up: Documented in chart, if applicable.    External consultation:    Discussion of management with another provider: Documented in chart, if applicable    Complicating factors:    Care impacted by chronic illness: Mental Health and Other: Sickle Cell disease    Care affected by social determinants of health: Alcohol Abuse and/or Recreational Drug  Use    Disposition considerations: Discharge. I prescribed additional prescription strength medication(s) as charted. I considered admission, but discharged patient after significant clinical improvement.        At the conclusion of the encounter I discussed the results of all of the tests and the disposition. The questions were answered. The patient or family acknowledged understanding and was agreeable with the care plan.     MEDICATIONS GIVEN IN THE EMERGENCY:  Medications   0.9% sodium chloride BOLUS (1,000 mLs Intravenous New Bag 2/11/23 0411)   ketorolac (TORADOL) injection 15 mg (15 mg Intravenous Given 2/11/23 0411)   morphine (PF) injection 4 mg (4 mg Intravenous Given 2/11/23 0406)   iopamidol (ISOVUE-370) solution 100 mL (100 mLs Intravenous Given 2/11/23 0440)   morphine (PF) injection 4 mg (4 mg Intravenous Given 2/11/23 0917)       NEW PRESCRIPTIONS STARTED AT TODAY'S ER VISIT  New Prescriptions    METHOCARBAMOL (ROBAXIN) 500 MG TABLET    Take 1 tablet (500 mg) by mouth 4 times daily as needed for muscle spasms    OXYCODONE (ROXICODONE) 5 MG TABLET    Take 1 tablet (5 mg) by mouth every 6 hours as needed for pain          =================================================================    HPI      Eveline Gage is a 27 year old female with PMHx of asthma, sickle cell disease, prior substance abuse, and mental health diagnoses who presents to the ED today via walk-in with abdominal pain     Per chart review: The patient was admitted to [facility] from 1/15/23-1/19/23 for elevated levels of liver transaminase. The patient presented with right upper quadrant abdominal pain and was found to have elevated liver transaminases and hyperbilirubinemia. The patient was transferred to [facility] for ERCP on 1/15 with no abnormality visualized, but determined cholestasis. The patient was discharged with a plan to follow up with [facility] as an outpatient.     The patient reports they woke up with left  "sided abdominal pain after their procedure (see chart review) and it was,\"fine up until Tuesday morning\" when the pain got worse (4 days ago). The patient took their prescribed oxycodone which helped relieve the pain. The patient notes they started to feel right shoulder pain, right breast pain, and noticed their urine was dark on Wednesday night. The patient thinks this could be related to their liver because these are similar symptoms that preceded their admission. The patient reports that the pain is 8/10 right now even after taking 500mg of tylenol twice today. The patient notes they had three episodes of diarrhea yesterday and felt some relief from their abdominal pain after having diarrhea. The patient notes they have a history of a cholecystectomy and splenectomy, but still has their appendix. The patient denies dysuria, fever, vomiting, sp, shortness of breath, cough, or any other symptoms or complaints.      REVIEW OF SYSTEMS   All other systems reviewed and are negative except as noted above in HPI.    PAST MEDICAL HISTORY:  History reviewed. No pertinent past medical history.    PAST SURGICAL HISTORY:  Past Surgical History:   Procedure Laterality Date     ESOPHAGOSCOPY, GASTROSCOPY, DUODENOSCOPY (EGD), COMBINED N/A 1/15/2023    Procedure: ENDOSCOPIC ULTRASOUND;  Surgeon: Marv Tomas MD;  Location: [facility] OR       CURRENT MEDICATIONS:    methocarbamol (ROBAXIN) 500 MG tablet  oxyCODONE (ROXICODONE) 5 MG tablet  albuterol (PROAIR HFA/PROVENTIL HFA/VENTOLIN HFA) 108 (90 Base) MCG/ACT inhaler  calcium carbonate (TUMS) 500 MG chewable tablet  cyclobenzaprine (FLEXERIL) 10 MG tablet  diclofenac (VOLTAREN) 1 % topical gel  etonogestrel (NEXPLANON) 68 MG IMPL  ipratropium - albuterol 0.5 mg/2.5 mg/3 mL (DUONEB) 0.5-2.5 (3) MG/3ML neb solution  multivitamin, therapeutic (THERA-VIT) TABS tablet  naproxen sodium (ANAPROX) 220 MG tablet  omeprazole (PRILOSEC) 20 MG DR capsule  oxyCODONE (ROXICODONE) 5 " MG tablet  sennosides (SENOKOT) 8.6 MG tablet  sucralfate (CARAFATE) 1 GM tablet        ALLERGIES:  Allergies   Allergen Reactions     Blood-Group Specific Substance Other (See Comments)     Patient has anti-C, anti-S, anti-Jkb, anti-M, and a nonspecific antibody. Blood product orders may be delayed. Draw THREE purple top tubes for all Type and Screen/Type and crossmatch orders.  Patient has anti-C, anti-S, anti-Jkb, anti-M, and a nonspecific antibody. Blood product orders may be delayed. Draw THREE purple top tubes for all Type and Screen/Type and crossmatch orders.  Patient has anti-C, anti-S, anti-Jkb, anti-M, and a nonspecific antibody. Blood product orders may be delayed. Draw THREE purple top tubes for all Type and Screen/Type and crossmatch orders.  Patient has anti-C, anti-S, anti-Jkb, anti-M, and a nonspecific antibody. Blood product orders may be delayed. Draw THREE purple top tubes for all Type and Screen/Type and crossmatch orders.       Penicillins Rash       FAMILY HISTORY:  History reviewed. No pertinent family history.    SOCIAL HISTORY:   Social History     Socioeconomic History     Marital status: Single     Spouse name: None     Number of children: None     Years of education: None     Highest education level: None   Tobacco Use     Smoking status: Every Day     Passive exposure: Never     Smokeless tobacco: Never   Vaping Use     Vaping Use: Never used   Substance and Sexual Activity     Alcohol use: Not Currently     Drug use: Not Currently     Sexual activity: Yes     Birth control/protection: Implant       VITALS:  Patient Vitals for the past 24 hrs:   BP Temp Temp src Pulse Resp SpO2   02/11/23 0330 120/68 -- -- 89 -- 98 %   02/11/23 0321 128/70 97.7  F (36.5  C) Oral 89 18 97 %       PHYSICAL EXAM    GENERAL: Awake, alert.  In no acute distress.   HEENT: Normocephalic, atraumatic.  Pupils equal, round and reactive.  EOMI. Jaundiced sclera   NECK: No stridor or apparent deformity.  PULMONARY:  Symmetrical breath sounds without distress.  Lungs clear to auscultation bilaterally without wheezes, rhonchi or rales.  CARDIO: Regular rate and rhythm.  No significant murmur, rub or gallop.  Radial pulses strong and symmetrical.  ABDOMINAL: Abdomen soft, non-distended. No CVAT, no palpable hepatosplenomegaly. Right mid abdominal tenderness and right lower quadrant tenderness   EXTREMITIES: No lower extremity swelling or edema.    NEURO: Alert and oriented to person, place and time.  Cranial nerves grossly intact.  No focal motor deficit.  PSYCH: Normal mood and affect  SKIN: No rashes      LAB:  All pertinent labs reviewed and interpreted.  Results for orders placed or performed during the hospital encounter of 02/11/23   CT Abdomen Pelvis w Contrast    Impression    IMPRESSION:   1.  No focal inflammatory change identified in the abdomen or pelvis.    2.  Slight intrahepatic biliary ductal prominence likely due to postcholecystectomy reservoir effect. This is unchanged compared to the prior CT.   CT Chest Pulmonary Embolism w Contrast    Impression    IMPRESSION:  1.  No pulmonary embolism.   Comprehensive metabolic panel   Result Value Ref Range    Sodium 137 136 - 145 mmol/L    Potassium 4.1 3.5 - 5.0 mmol/L    Chloride 109 (H) 98 - 107 mmol/L    Carbon Dioxide (CO2) 22 22 - 31 mmol/L    Anion Gap 6 5 - 18 mmol/L    Urea Nitrogen 9 8 - 22 mg/dL    Creatinine 0.56 (L) 0.60 - 1.10 mg/dL    Calcium 8.5 8.5 - 10.5 mg/dL    Glucose 101 70 - 125 mg/dL    Alkaline Phosphatase 170 (H) 45 - 120 U/L     (H) 0 - 40 U/L     (H) 0 - 45 U/L    Protein Total 7.0 6.0 - 8.0 g/dL    Albumin 2.6 (L) 3.5 - 5.0 g/dL    Bilirubin Total 10.5 (H) 0.0 - 1.0 mg/dL    GFR Estimate >90 >60 mL/min/1.73m2   Result Value Ref Range    INR 1.41 (H) 0.85 - 1.15   Partial thromboplastin time   Result Value Ref Range    aPTT 32 22 - 38 Seconds   Result Value Ref Range    Lipase 117 (H) 0 - 52 U/L   Result Value Ref Range     Magnesium 2.0 1.8 - 2.6 mg/dL   CRP inflammation   Result Value Ref Range    CRP 1.4 (H) 0.0 - <0.8 mg/dL   UA with Microscopic reflex to Culture    Specimen: Urine, Midstream   Result Value Ref Range    Color Urine Yellow Colorless, Straw, Light Yellow, Yellow    Appearance Urine Clear Clear    Glucose Urine Negative Negative mg/dL    Bilirubin Urine Negative Negative    Ketones Urine Negative Negative mg/dL    Specific Gravity Urine 1.020 1.001 - 1.030    Blood Urine Negative Negative    pH Urine 7.0 5.0 - 7.0    Protein Albumin Urine Negative Negative mg/dL    Urobilinogen Urine <2.0 <2.0 mg/dL    Nitrite Urine Negative Negative    Leukocyte Esterase Urine Negative Negative    RBC Urine 0 <=2 /HPF    WBC Urine 1 <=5 /HPF    Squamous Epithelials Urine 1 <=1 /HPF   HCG qualitative urine   Result Value Ref Range    hCG Urine Qualitative Negative Negative   Reticulocyte count   Result Value Ref Range    % Reticulocyte 7.3 (H) 0.8 - 2.7 %    Absolute Reticulocyte 0.226 (H) 0.010 - 0.110 10e6/uL   Asymptomatic Influenza A/B & SARS-CoV2 (COVID-19) Virus PCR Multiplex Nasopharyngeal    Specimen: Nasopharyngeal; Swab   Result Value Ref Range    Influenza A PCR Negative Negative    Influenza B PCR Negative Negative    RSV PCR Negative Negative    SARS CoV2 PCR Negative Negative   Acetaminophen level   Result Value Ref Range    Acetaminophen 15.4 10.0 - 20.0 ug/mL   CBC with platelets and differential   Result Value Ref Range    WBC Count 16.4 (H) 4.0 - 11.0 10e3/uL    RBC Count 3.08 (L) 3.80 - 5.20 10e6/uL    Hemoglobin 9.3 (L) 11.7 - 15.7 g/dL    Hematocrit 26.3 (L) 35.0 - 47.0 %    MCV 85 78 - 100 fL    MCH 30.2 26.5 - 33.0 pg    MCHC 35.4 31.5 - 36.5 g/dL    RDW 19.2 (H) 10.0 - 15.0 %    Platelet Count 252 150 - 450 10e3/uL   Manual Differential   Result Value Ref Range    % Neutrophils 47 %    % Lymphocytes 35 %    % Monocytes 13 %    % Eosinophils 5 %    % Basophils 0 %    Absolute Neutrophils 7.7 1.6 - 8.3 10e3/uL     Absolute Lymphocytes 5.7 (H) 0.8 - 5.3 10e3/uL    Absolute Monocytes 2.1 (H) 0.0 - 1.3 10e3/uL    Absolute Eosinophils 0.8 (H) 0.0 - 0.7 10e3/uL    Absolute Basophils 0.0 0.0 - 0.2 10e3/uL    RBC Morphology Confirmed RBC Indices     Platelet Assessment  Automated Count Confirmed. Platelet morphology is normal.     Automated Count Confirmed. Platelet morphology is normal.    Elliptocytes Slight (A) None Seen    RBC Fragments Slight (A) None Seen    Sickle Cells Slight (A) None Seen    Target Cells Moderate (A) None Seen   Adult Type and Screen   Result Value Ref Range    ABO/RH(D) A POS     Antibody Screen Positive (A) Negative    SPECIMEN EXPIRATION DATE [id number]    Red Cell Antigen Typing Non ABO:   Result Value Ref Range    C Antigen Type Negative     Little c Antigen Type Positive     E Antigen Type Negative     SPECIMEN EXPIRATION DATE [id number]        RADIOLOGY:  Reviewed all pertinent imaging. Please see official radiology report.  CT Chest Pulmonary Embolism w Contrast   Final Result   IMPRESSION:   1.  No pulmonary embolism.      CT Abdomen Pelvis w Contrast   Final Result   IMPRESSION:    1.  No focal inflammatory change identified in the abdomen or pelvis.      2.  Slight intrahepatic biliary ductal prominence likely due to postcholecystectomy reservoir effect. This is unchanged compared to the prior CT.                    I, Arlet Tamez, am serving as a scribe to document services personally performed by Dr. Emmanuelle Art based on my observation and the provider's statements to me. I, Emmanuelle Art MD attest that Arlet Tamez is acting in a scribe capacity, has observed my performance of the services and has documented them in accordance with my direction.     Emmanuelle Art MD  02/11/23 0566

## 2023-02-11 NOTE — ED TRIAGE NOTES
Pt c/o sickle cell pain crisis starting Tuesday. Used left over pain meds which helped but has run out now. Pt reports in past crisis related to elevated liver enzymes and she has been noticing darkening urine since Wednesday. RUQ and RLQ pain. Denies CP or SOB. Tongue pale. Ambulating with a limp.     Triage Assessment     Row Name 02/11/23 0323       Triage Assessment (Adult)    Airway WDL WDL       Respiratory WDL    Respiratory WDL WDL       Skin Circulation/Temperature WDL    Skin Circulation/Temperature WDL X  pale       Cardiac WDL    Cardiac WDL WDL  denies CP       Peripheral/Neurovascular WDL    Peripheral Neurovascular WDL WDL       Cognitive/Neuro/Behavioral WDL    Cognitive/Neuro/Behavioral WDL WDL       Sissy Coma Scale    Best Eye Response 4-->(E4) spontaneous    Best Motor Response 6-->(M6) obeys commands    Best Verbal Response 5-->(V5) oriented    Henrietta Coma Scale Score 15

## 2023-02-11 NOTE — Clinical Note
Eveline Gage was seen and treated in our emergency department on 2/11/2023.  She may return to work on 02/12/2023.       If you have any questions or concerns, please don't hesitate to call.      Emmanuelle Art MD

## 2023-02-13 ENCOUNTER — APPOINTMENT (OUTPATIENT)
Dept: RADIOLOGY | Facility: CLINIC | Age: 28
End: 2023-02-13
Attending: STUDENT IN AN ORGANIZED HEALTH CARE EDUCATION/TRAINING PROGRAM
Payer: COMMERCIAL

## 2023-02-13 ENCOUNTER — HOSPITAL ENCOUNTER (INPATIENT)
Facility: CLINIC | Age: 28
LOS: 7 days | Discharge: HOME | End: 2023-02-20
Attending: STUDENT IN AN ORGANIZED HEALTH CARE EDUCATION/TRAINING PROGRAM | Admitting: FAMILY MEDICINE
Payer: COMMERCIAL

## 2023-02-13 DIAGNOSIS — M79.622 PAIN OF LEFT UPPER ARM: ICD-10-CM

## 2023-02-13 DIAGNOSIS — D57.00 SICKLE CELL PAIN CRISIS (H): Primary | Chronic | ICD-10-CM

## 2023-02-13 DIAGNOSIS — M79.604 BILATERAL LEG PAIN: ICD-10-CM

## 2023-02-13 DIAGNOSIS — M79.605 BILATERAL LEG PAIN: ICD-10-CM

## 2023-02-13 PROBLEM — R79.9: Status: ACTIVE | Noted: 2023-02-13

## 2023-02-13 PROBLEM — Q89.01 ASPLENIA: Status: ACTIVE | Noted: 2021-06-17

## 2023-02-13 PROBLEM — R79.89 ELEVATED LFTS: Status: ACTIVE | Noted: 2023-02-13

## 2023-02-13 LAB
ALBUMIN SERPL-MCNC: 2.8 G/DL (ref 3.5–5)
ALP SERPL-CCNC: 196 U/L (ref 45–120)
ALT SERPL W P-5'-P-CCNC: 186 U/L (ref 0–45)
ANION GAP SERPL CALCULATED.3IONS-SCNC: 8 MMOL/L (ref 5–18)
AST SERPL W P-5'-P-CCNC: 219 U/L (ref 0–40)
ATRIAL RATE - MUSE: 69 BPM
BASOPHILS # BLD MANUAL: 0 10E3/UL (ref 0–0.2)
BASOPHILS NFR BLD MANUAL: 0 %
BILIRUB SERPL-MCNC: 11.2 MG/DL (ref 0–1)
BUN SERPL-MCNC: 9 MG/DL (ref 8–22)
C REACTIVE PROTEIN LHE: 2.6 MG/DL (ref 0–?)
CALCIUM SERPL-MCNC: 8.8 MG/DL (ref 8.5–10.5)
CHLORIDE BLD-SCNC: 109 MMOL/L (ref 98–107)
CO2 SERPL-SCNC: 22 MMOL/L (ref 22–31)
CREAT SERPL-MCNC: 0.62 MG/DL (ref 0.6–1.1)
DIASTOLIC BLOOD PRESSURE - MUSE: 77 MMHG
ELLIPTOCYTES BLD QL SMEAR: SLIGHT
EOSINOPHIL # BLD MANUAL: 0.9 10E3/UL (ref 0–0.7)
EOSINOPHIL NFR BLD MANUAL: 5 %
ERYTHROCYTE [DISTWIDTH] IN BLOOD BY AUTOMATED COUNT: 19.4 % (ref 10–15)
GFR SERPL CREATININE-BSD FRML MDRD: >90 ML/MIN/1.73M2
GLUCOSE BLD-MCNC: 96 MG/DL (ref 70–125)
HCT VFR BLD AUTO: 27.2 % (ref 35–47)
HGB BLD-MCNC: 9.7 G/DL (ref 11.7–15.7)
INR PPP: 1.27 (ref 0.85–1.15)
INTERPRETATION ECG - MUSE: NORMAL
LIPASE SERPL-CCNC: 67 U/L (ref 0–52)
LYMPHOCYTES # BLD MANUAL: 4.8 10E3/UL (ref 0.8–5.3)
LYMPHOCYTES NFR BLD MANUAL: 26 %
MCH RBC QN AUTO: 30.4 PG (ref 26.5–33)
MCHC RBC AUTO-ENTMCNC: 35.7 G/DL (ref 31.5–36.5)
MCV RBC AUTO: 85 FL (ref 78–100)
MONOCYTES # BLD MANUAL: 1.9 10E3/UL (ref 0–1.3)
MONOCYTES NFR BLD MANUAL: 10 %
NEUTROPHILS # BLD MANUAL: 10.9 10E3/UL (ref 1.6–8.3)
NEUTROPHILS NFR BLD MANUAL: 59 %
P AXIS - MUSE: 5 DEGREES
PLAT MORPH BLD: ABNORMAL
PLATELET # BLD AUTO: 250 10E3/UL (ref 150–450)
POLYCHROMASIA BLD QL SMEAR: SLIGHT
POTASSIUM BLD-SCNC: 4.6 MMOL/L (ref 3.5–5)
PR INTERVAL - MUSE: 134 MS
PROT SERPL-MCNC: 7.4 G/DL (ref 6–8)
QRS DURATION - MUSE: 70 MS
QT - MUSE: 364 MS
QTC - MUSE: 390 MS
R AXIS - MUSE: 72 DEGREES
RBC # BLD AUTO: 3.19 10E6/UL (ref 3.8–5.2)
RBC MORPH BLD: ABNORMAL
RETICS # AUTO: 0.26 10E6/UL (ref 0.01–0.11)
RETICS/RBC NFR AUTO: 8.2 % (ref 0.8–2.7)
SICKLE CELLS BLD QL SMEAR: SLIGHT
SODIUM SERPL-SCNC: 139 MMOL/L (ref 136–145)
SYSTOLIC BLOOD PRESSURE - MUSE: 148 MMHG
T AXIS - MUSE: 60 DEGREES
TARGETS BLD QL SMEAR: ABNORMAL
TROPONIN I SERPL-MCNC: 0.01 NG/ML (ref 0–0.29)
VENTRICULAR RATE- MUSE: 69 BPM
WBC # BLD AUTO: 18.5 10E3/UL (ref 4–11)

## 2023-02-13 PROCEDURE — 258N000003 HC RX IP 258 OP 636: Performed by: STUDENT IN AN ORGANIZED HEALTH CARE EDUCATION/TRAINING PROGRAM

## 2023-02-13 PROCEDURE — 250N000013 HC RX MED GY IP 250 OP 250 PS 637: Performed by: FAMILY MEDICINE

## 2023-02-13 PROCEDURE — 71046 X-RAY EXAM CHEST 2 VIEWS: CPT

## 2023-02-13 PROCEDURE — 250N000011 HC RX IP 250 OP 636: Performed by: NURSE PRACTITIONER

## 2023-02-13 PROCEDURE — 80053 COMPREHEN METABOLIC PANEL: CPT | Performed by: STUDENT IN AN ORGANIZED HEALTH CARE EDUCATION/TRAINING PROGRAM

## 2023-02-13 PROCEDURE — 86140 C-REACTIVE PROTEIN: CPT | Performed by: STUDENT IN AN ORGANIZED HEALTH CARE EDUCATION/TRAINING PROGRAM

## 2023-02-13 PROCEDURE — 96361 HYDRATE IV INFUSION ADD-ON: CPT

## 2023-02-13 PROCEDURE — 258N000003 HC RX IP 258 OP 636: Performed by: FAMILY MEDICINE

## 2023-02-13 PROCEDURE — 36415 COLL VENOUS BLD VENIPUNCTURE: CPT | Performed by: STUDENT IN AN ORGANIZED HEALTH CARE EDUCATION/TRAINING PROGRAM

## 2023-02-13 PROCEDURE — 99223 1ST HOSP IP/OBS HIGH 75: CPT | Mod: AI | Performed by: FAMILY MEDICINE

## 2023-02-13 PROCEDURE — 250N000011 HC RX IP 250 OP 636: Performed by: STUDENT IN AN ORGANIZED HEALTH CARE EDUCATION/TRAINING PROGRAM

## 2023-02-13 PROCEDURE — 99285 EMERGENCY DEPT VISIT HI MDM: CPT | Mod: 25

## 2023-02-13 PROCEDURE — 85027 COMPLETE CBC AUTOMATED: CPT | Performed by: STUDENT IN AN ORGANIZED HEALTH CARE EDUCATION/TRAINING PROGRAM

## 2023-02-13 PROCEDURE — 250N000013 HC RX MED GY IP 250 OP 250 PS 637: Performed by: INTERNAL MEDICINE

## 2023-02-13 PROCEDURE — 250N000011 HC RX IP 250 OP 636: Performed by: INTERNAL MEDICINE

## 2023-02-13 PROCEDURE — 250N000011 HC RX IP 250 OP 636: Performed by: FAMILY MEDICINE

## 2023-02-13 PROCEDURE — 120N000001 HC R&B MED SURG/OB

## 2023-02-13 PROCEDURE — 93005 ELECTROCARDIOGRAM TRACING: CPT | Performed by: STUDENT IN AN ORGANIZED HEALTH CARE EDUCATION/TRAINING PROGRAM

## 2023-02-13 PROCEDURE — 250N000013 HC RX MED GY IP 250 OP 250 PS 637: Performed by: NURSE PRACTITIONER

## 2023-02-13 PROCEDURE — 85045 AUTOMATED RETICULOCYTE COUNT: CPT | Performed by: STUDENT IN AN ORGANIZED HEALTH CARE EDUCATION/TRAINING PROGRAM

## 2023-02-13 PROCEDURE — 84484 ASSAY OF TROPONIN QUANT: CPT | Performed by: STUDENT IN AN ORGANIZED HEALTH CARE EDUCATION/TRAINING PROGRAM

## 2023-02-13 PROCEDURE — 85007 BL SMEAR W/DIFF WBC COUNT: CPT | Performed by: STUDENT IN AN ORGANIZED HEALTH CARE EDUCATION/TRAINING PROGRAM

## 2023-02-13 PROCEDURE — 96375 TX/PRO/DX INJ NEW DRUG ADDON: CPT

## 2023-02-13 PROCEDURE — 96374 THER/PROPH/DIAG INJ IV PUSH: CPT

## 2023-02-13 PROCEDURE — 96376 TX/PRO/DX INJ SAME DRUG ADON: CPT

## 2023-02-13 PROCEDURE — 99222 1ST HOSP IP/OBS MODERATE 55: CPT | Performed by: INTERNAL MEDICINE

## 2023-02-13 PROCEDURE — 83690 ASSAY OF LIPASE: CPT | Performed by: STUDENT IN AN ORGANIZED HEALTH CARE EDUCATION/TRAINING PROGRAM

## 2023-02-13 PROCEDURE — 85610 PROTHROMBIN TIME: CPT | Performed by: STUDENT IN AN ORGANIZED HEALTH CARE EDUCATION/TRAINING PROGRAM

## 2023-02-13 RX ORDER — HYDROXYUREA 500 MG/1
500 CAPSULE ORAL DAILY
Status: DISCONTINUED | OUTPATIENT
Start: 2023-02-13 | End: 2023-02-20 | Stop reason: HOSPADM

## 2023-02-13 RX ORDER — MORPHINE SULFATE 4 MG/ML
4 INJECTION, SOLUTION INTRAMUSCULAR; INTRAVENOUS ONCE
Status: COMPLETED | OUTPATIENT
Start: 2023-02-13 | End: 2023-02-13

## 2023-02-13 RX ORDER — OXYCODONE HYDROCHLORIDE 5 MG/1
5-10 TABLET ORAL EVERY 4 HOURS PRN
Status: DISCONTINUED | OUTPATIENT
Start: 2023-02-13 | End: 2023-02-13 | Stop reason: ALTCHOICE

## 2023-02-13 RX ORDER — AMOXICILLIN 250 MG
1 CAPSULE ORAL 2 TIMES DAILY
Status: DISCONTINUED | OUTPATIENT
Start: 2023-02-13 | End: 2023-02-20 | Stop reason: HOSPADM

## 2023-02-13 RX ORDER — NALOXONE HYDROCHLORIDE 0.4 MG/ML
0.2 INJECTION, SOLUTION INTRAMUSCULAR; INTRAVENOUS; SUBCUTANEOUS
Status: DISCONTINUED | OUTPATIENT
Start: 2023-02-13 | End: 2023-02-20 | Stop reason: HOSPADM

## 2023-02-13 RX ORDER — ALBUTEROL SULFATE 90 UG/1
2 AEROSOL, METERED RESPIRATORY (INHALATION) EVERY 6 HOURS PRN
Status: DISCONTINUED | OUTPATIENT
Start: 2023-02-13 | End: 2023-02-20 | Stop reason: HOSPADM

## 2023-02-13 RX ORDER — ONDANSETRON 2 MG/ML
4 INJECTION INTRAMUSCULAR; INTRAVENOUS EVERY 6 HOURS PRN
Status: DISCONTINUED | OUTPATIENT
Start: 2023-02-13 | End: 2023-02-20 | Stop reason: HOSPADM

## 2023-02-13 RX ORDER — ACETAMINOPHEN 650 MG/1
650 SUPPOSITORY RECTAL EVERY 6 HOURS PRN
Status: DISCONTINUED | OUTPATIENT
Start: 2023-02-13 | End: 2023-02-20 | Stop reason: HOSPADM

## 2023-02-13 RX ORDER — METHOCARBAMOL 500 MG/1
500 TABLET, FILM COATED ORAL 3 TIMES DAILY
Status: DISCONTINUED | OUTPATIENT
Start: 2023-02-13 | End: 2023-02-14

## 2023-02-13 RX ORDER — AMOXICILLIN 250 MG
2 CAPSULE ORAL 2 TIMES DAILY
Status: DISCONTINUED | OUTPATIENT
Start: 2023-02-13 | End: 2023-02-20 | Stop reason: HOSPADM

## 2023-02-13 RX ORDER — BISACODYL 5 MG
5 TABLET, DELAYED RELEASE (ENTERIC COATED) ORAL DAILY PRN
Status: DISCONTINUED | OUTPATIENT
Start: 2023-02-13 | End: 2023-02-20 | Stop reason: HOSPADM

## 2023-02-13 RX ORDER — HYDROMORPHONE HCL IN WATER/PF 6 MG/30 ML
0.2 PATIENT CONTROLLED ANALGESIA SYRINGE INTRAVENOUS
Status: COMPLETED | OUTPATIENT
Start: 2023-02-13 | End: 2023-02-13

## 2023-02-13 RX ORDER — SODIUM CHLORIDE 9 MG/ML
INJECTION, SOLUTION INTRAVENOUS CONTINUOUS
Status: DISCONTINUED | OUTPATIENT
Start: 2023-02-13 | End: 2023-02-20 | Stop reason: HOSPADM

## 2023-02-13 RX ORDER — MULTIVITAMIN,THERAPEUTIC
1 TABLET ORAL DAILY
Status: DISCONTINUED | OUTPATIENT
Start: 2023-02-13 | End: 2023-02-20 | Stop reason: HOSPADM

## 2023-02-13 RX ORDER — HYDROXYZINE HYDROCHLORIDE 25 MG/1
25 TABLET, FILM COATED ORAL 3 TIMES DAILY PRN
Status: DISCONTINUED | OUTPATIENT
Start: 2023-02-13 | End: 2023-02-14

## 2023-02-13 RX ORDER — LIDOCAINE 40 MG/G
CREAM TOPICAL
Status: DISCONTINUED | OUTPATIENT
Start: 2023-02-13 | End: 2023-02-20 | Stop reason: HOSPADM

## 2023-02-13 RX ORDER — POLYETHYLENE GLYCOL 3350 17 G/17G
17 POWDER, FOR SOLUTION ORAL DAILY PRN
Status: DISCONTINUED | OUTPATIENT
Start: 2023-02-13 | End: 2023-02-20 | Stop reason: HOSPADM

## 2023-02-13 RX ORDER — NALOXONE HYDROCHLORIDE 0.4 MG/ML
0.4 INJECTION, SOLUTION INTRAMUSCULAR; INTRAVENOUS; SUBCUTANEOUS
Status: DISCONTINUED | OUTPATIENT
Start: 2023-02-13 | End: 2023-02-20 | Stop reason: HOSPADM

## 2023-02-13 RX ORDER — SENNOSIDES 8.6 MG
2 TABLET ORAL DAILY PRN
Status: DISCONTINUED | OUTPATIENT
Start: 2023-02-13 | End: 2023-02-20 | Stop reason: HOSPADM

## 2023-02-13 RX ORDER — IPRATROPIUM BROMIDE AND ALBUTEROL SULFATE 2.5; .5 MG/3ML; MG/3ML
1 SOLUTION RESPIRATORY (INHALATION) EVERY 6 HOURS PRN
Status: DISCONTINUED | OUTPATIENT
Start: 2023-02-13 | End: 2023-02-20 | Stop reason: HOSPADM

## 2023-02-13 RX ORDER — ACETAMINOPHEN 325 MG/1
650 TABLET ORAL EVERY 6 HOURS PRN
Status: DISCONTINUED | OUTPATIENT
Start: 2023-02-13 | End: 2023-02-20 | Stop reason: HOSPADM

## 2023-02-13 RX ORDER — LANOLIN ALCOHOL/MO/W.PET/CERES
3 CREAM (GRAM) TOPICAL AT BEDTIME
Status: DISCONTINUED | OUTPATIENT
Start: 2023-02-13 | End: 2023-02-20 | Stop reason: HOSPADM

## 2023-02-13 RX ORDER — PANTOPRAZOLE SODIUM 40 MG/1
40 TABLET, DELAYED RELEASE ORAL 2 TIMES DAILY
Status: DISCONTINUED | OUTPATIENT
Start: 2023-02-13 | End: 2023-02-20 | Stop reason: HOSPADM

## 2023-02-13 RX ORDER — HYDROMORPHONE HYDROCHLORIDE 1 MG/ML
.5-1 INJECTION, SOLUTION INTRAMUSCULAR; INTRAVENOUS; SUBCUTANEOUS
Status: DISCONTINUED | OUTPATIENT
Start: 2023-02-13 | End: 2023-02-13

## 2023-02-13 RX ORDER — BISACODYL 5 MG
10 TABLET, DELAYED RELEASE (ENTERIC COATED) ORAL DAILY PRN
Status: DISCONTINUED | OUTPATIENT
Start: 2023-02-13 | End: 2023-02-20 | Stop reason: HOSPADM

## 2023-02-13 RX ORDER — ONDANSETRON 2 MG/ML
4 INJECTION INTRAMUSCULAR; INTRAVENOUS ONCE
Status: COMPLETED | OUTPATIENT
Start: 2023-02-13 | End: 2023-02-13

## 2023-02-13 RX ORDER — ONDANSETRON 4 MG/1
4 TABLET, ORALLY DISINTEGRATING ORAL EVERY 6 HOURS PRN
Status: DISCONTINUED | OUTPATIENT
Start: 2023-02-13 | End: 2023-02-20 | Stop reason: HOSPADM

## 2023-02-13 RX ORDER — LORAZEPAM 2 MG/ML
1 INJECTION INTRAMUSCULAR ONCE
Status: COMPLETED | OUTPATIENT
Start: 2023-02-13 | End: 2023-02-13

## 2023-02-13 RX ORDER — HYDROMORPHONE HYDROCHLORIDE 2 MG/1
2-4 TABLET ORAL EVERY 4 HOURS PRN
Status: DISCONTINUED | OUTPATIENT
Start: 2023-02-13 | End: 2023-02-14

## 2023-02-13 RX ORDER — CYCLOBENZAPRINE HCL 10 MG
10 TABLET ORAL 3 TIMES DAILY PRN
Status: DISCONTINUED | OUTPATIENT
Start: 2023-02-13 | End: 2023-02-13 | Stop reason: ALTCHOICE

## 2023-02-13 RX ORDER — CALCIUM CARBONATE 500 MG/1
500-1000 TABLET, CHEWABLE ORAL EVERY 4 HOURS PRN
Status: DISCONTINUED | OUTPATIENT
Start: 2023-02-13 | End: 2023-02-20 | Stop reason: HOSPADM

## 2023-02-13 RX ORDER — SUCRALFATE 1 G/1
1 TABLET ORAL 4 TIMES DAILY PRN
Status: DISCONTINUED | OUTPATIENT
Start: 2023-02-13 | End: 2023-02-20 | Stop reason: HOSPADM

## 2023-02-13 RX ORDER — HYDROMORPHONE HYDROCHLORIDE 1 MG/ML
0.5 INJECTION, SOLUTION INTRAMUSCULAR; INTRAVENOUS; SUBCUTANEOUS ONCE
Status: COMPLETED | OUTPATIENT
Start: 2023-02-13 | End: 2023-02-13

## 2023-02-13 RX ORDER — HYDROMORPHONE HYDROCHLORIDE 1 MG/ML
0.5 INJECTION, SOLUTION INTRAMUSCULAR; INTRAVENOUS; SUBCUTANEOUS
Status: DISCONTINUED | OUTPATIENT
Start: 2023-02-13 | End: 2023-02-13

## 2023-02-13 RX ORDER — METHOCARBAMOL 500 MG/1
500 TABLET, FILM COATED ORAL 4 TIMES DAILY PRN
Status: DISCONTINUED | OUTPATIENT
Start: 2023-02-13 | End: 2023-02-13

## 2023-02-13 RX ADMIN — THERA TABS 1 TABLET: TAB at 14:31

## 2023-02-13 RX ADMIN — HYDROMORPHONE HYDROCHLORIDE 1 MG: 1 INJECTION, SOLUTION INTRAMUSCULAR; INTRAVENOUS; SUBCUTANEOUS at 19:59

## 2023-02-13 RX ADMIN — ONDANSETRON 4 MG: 2 INJECTION INTRAMUSCULAR; INTRAVENOUS at 15:17

## 2023-02-13 RX ADMIN — LORAZEPAM 1 MG: 2 INJECTION INTRAMUSCULAR; INTRAVENOUS at 22:50

## 2023-02-13 RX ADMIN — HYDROMORPHONE HYDROCHLORIDE 1 MG: 1 INJECTION, SOLUTION INTRAMUSCULAR; INTRAVENOUS; SUBCUTANEOUS at 21:21

## 2023-02-13 RX ADMIN — DICLOFENAC SODIUM 2 G: 10 GEL TOPICAL at 16:17

## 2023-02-13 RX ADMIN — HYDROMORPHONE HYDROCHLORIDE 0.2 MG: 0.2 INJECTION, SOLUTION INTRAMUSCULAR; INTRAVENOUS; SUBCUTANEOUS at 09:34

## 2023-02-13 RX ADMIN — HYDROMORPHONE HYDROCHLORIDE 1 MG: 1 INJECTION, SOLUTION INTRAMUSCULAR; INTRAVENOUS; SUBCUTANEOUS at 13:37

## 2023-02-13 RX ADMIN — HYDROMORPHONE HYDROCHLORIDE 1 MG: 1 INJECTION, SOLUTION INTRAMUSCULAR; INTRAVENOUS; SUBCUTANEOUS at 16:15

## 2023-02-13 RX ADMIN — PANTOPRAZOLE SODIUM 40 MG: 40 TABLET, DELAYED RELEASE ORAL at 20:16

## 2023-02-13 RX ADMIN — PANTOPRAZOLE SODIUM 40 MG: 40 TABLET, DELAYED RELEASE ORAL at 14:44

## 2023-02-13 RX ADMIN — SENNOSIDES 2 TABLET: 8.6 TABLET, FILM COATED ORAL at 14:46

## 2023-02-13 RX ADMIN — SODIUM CHLORIDE 1000 ML: 9 INJECTION, SOLUTION INTRAVENOUS at 08:12

## 2023-02-13 RX ADMIN — MORPHINE SULFATE 4 MG: 4 INJECTION, SOLUTION INTRAMUSCULAR; INTRAVENOUS at 08:09

## 2023-02-13 RX ADMIN — Medication 3 MG: at 20:14

## 2023-02-13 RX ADMIN — METHOCARBAMOL 500 MG: 500 TABLET, FILM COATED ORAL at 14:31

## 2023-02-13 RX ADMIN — HYDROXYZINE HYDROCHLORIDE 25 MG: 25 TABLET ORAL at 22:51

## 2023-02-13 RX ADMIN — HYDROXYUREA 500 MG: 500 CAPSULE ORAL at 19:18

## 2023-02-13 RX ADMIN — HYDROMORPHONE HYDROCHLORIDE 0.5 MG: 1 INJECTION, SOLUTION INTRAMUSCULAR; INTRAVENOUS; SUBCUTANEOUS at 08:29

## 2023-02-13 RX ADMIN — HYDROMORPHONE HYDROCHLORIDE 1 MG: 1 INJECTION, SOLUTION INTRAMUSCULAR; INTRAVENOUS; SUBCUTANEOUS at 22:21

## 2023-02-13 RX ADMIN — SENNOSIDES AND DOCUSATE SODIUM 2 TABLET: 50; 8.6 TABLET ORAL at 20:16

## 2023-02-13 RX ADMIN — SODIUM CHLORIDE: 9 INJECTION, SOLUTION INTRAVENOUS at 14:36

## 2023-02-13 RX ADMIN — HYDROMORPHONE HYDROCHLORIDE 1 MG: 1 INJECTION, SOLUTION INTRAMUSCULAR; INTRAVENOUS; SUBCUTANEOUS at 23:37

## 2023-02-13 RX ADMIN — DICLOFENAC SODIUM 2 G: 10 GEL TOPICAL at 14:44

## 2023-02-13 RX ADMIN — HYDROMORPHONE HYDROCHLORIDE 4 MG: 2 TABLET ORAL at 15:24

## 2023-02-13 RX ADMIN — ACETAMINOPHEN 650 MG: 325 TABLET ORAL at 16:17

## 2023-02-13 RX ADMIN — METHOCARBAMOL 500 MG: 500 TABLET, FILM COATED ORAL at 20:15

## 2023-02-13 RX ADMIN — HYDROMORPHONE HYDROCHLORIDE 0.2 MG: 0.2 INJECTION, SOLUTION INTRAMUSCULAR; INTRAVENOUS; SUBCUTANEOUS at 11:53

## 2023-02-13 RX ADMIN — SODIUM CHLORIDE: 9 INJECTION, SOLUTION INTRAVENOUS at 22:21

## 2023-02-13 RX ADMIN — DICLOFENAC SODIUM 2 G: 10 GEL TOPICAL at 21:13

## 2023-02-13 RX ADMIN — ONDANSETRON 4 MG: 2 INJECTION INTRAMUSCULAR; INTRAVENOUS at 10:40

## 2023-02-13 RX ADMIN — HYDROMORPHONE HYDROCHLORIDE 1 MG: 1 INJECTION, SOLUTION INTRAMUSCULAR; INTRAVENOUS; SUBCUTANEOUS at 18:32

## 2023-02-13 RX ADMIN — HYDROMORPHONE HYDROCHLORIDE 0.5 MG: 1 INJECTION, SOLUTION INTRAMUSCULAR; INTRAVENOUS; SUBCUTANEOUS at 12:49

## 2023-02-13 ASSESSMENT — ENCOUNTER SYMPTOMS
BACK PAIN: 1
DIARRHEA: 0
VOMITING: 0
SHORTNESS OF BREATH: 1
DYSURIA: 0
ABDOMINAL PAIN: 0
FEVER: 0
MYALGIAS: 1
COUGH: 0
COLOR CHANGE: 0

## 2023-02-13 ASSESSMENT — ACTIVITIES OF DAILY LIVING (ADL)
ADLS_ACUITY_SCORE: 35
ADLS_ACUITY_SCORE: 37
ADLS_ACUITY_SCORE: 35
ADLS_ACUITY_SCORE: 37
ADLS_ACUITY_SCORE: 41
ADLS_ACUITY_SCORE: 37
DEPENDENT_IADLS:: TRANSPORTATION
ADLS_ACUITY_SCORE: 35
ADLS_ACUITY_SCORE: 35

## 2023-02-13 NOTE — ED TRIAGE NOTES
Patient returns to the ED via EMS with complaints of left arm and left leg pain due to sickle cell crisis. She reports that her pain moves around and she has been dealing with her pain for last several days.     Triage Assessment     Row Name 02/13/23 0750       Triage Assessment (Adult)    Airway WDL WDL       Respiratory WDL    Respiratory WDL WDL       Skin Circulation/Temperature WDL    Skin Circulation/Temperature WDL WDL       Cardiac WDL    Cardiac WDL WDL       Peripheral/Neurovascular WDL    Peripheral Neurovascular WDL X  sickle cell pain left upper ext.       Cognitive/Neuro/Behavioral WDL    Cognitive/Neuro/Behavioral WDL WDL

## 2023-02-13 NOTE — CONSULTS
Care Management Initial Consult    General Information  Assessment completed with: Patient,    Type of CM/SW Visit: Initial Assessment    Primary Care Provider verified and updated as needed:  (does not have PCP)   Readmission within the last 30 days: unable to assess         Advance Care Planning: Advance Care Planning Reviewed:  (does not have a HCD, provided blank Honoring Choices HCD, verbally stated would want S/O Joel to make medical decisions for her IF she were not able to make them for herself)        Communication Assessment  Patient's communication style: spoken language (English or Bilingual)      Cognitive  Cognitive/Neuro/Behavioral: WDL                      Living Environment:   People in home: child(narinder), dependent, significant other  boyfriend Joel and 5 year old daughter Kat  Current living Arrangements: apartment      Able to return to prior arrangements: yes     Family/Social Support:  Care provided by: self  Provides care for: child(narinder) (has 5 year old)  Marital Status: Lives with Significant Other  Significant Other, Sibling(s), Parent(s)       Joel  Description of Support System: Supportive, Involved       Current Resources:   Patient receiving home care services: No  Community Resources: County Worker  Equipment currently used at home:  (Rx glasses)  Supplies currently used at home: None    Employment/Financial:  Employment Status: employed part-time        Financial Concerns: No concerns identified   Referral to Financial Worker: No     Lifestyle & Psychosocial Needs:  Social Determinants of Health     Tobacco Use: High Risk     Smoking Tobacco Use: Every Day     Smokeless Tobacco Use: Never     Passive Exposure: Never   Alcohol Use: Not on file   Financial Resource Strain: Not on file   Food Insecurity: Not on file   Transportation Needs: Not on file   Physical Activity: Not on file   Stress: Not on file   Social Connections: Not on file   Intimate Partner Violence: Not on file  "  Depression: Not at risk     PHQ-2 Score: 0   Housing Stability: Not on file     Functional Status:  Prior to admission patient needed assistance:   Dependent ADLs:: Independent  Dependent IADLs:: Transportation (no vehicle currently so using LYFT)    Mental Health Status:  Mental Health Status:  (denies)       Chemical Dependency Status:  Chemical Dependency Status:  (denies)           Values/Beliefs:  Spiritual, Cultural Beliefs, Latter-day Practices, Values that affect care: no             Additional Information:  Chart reviewed. Discharged from [facility] on 1/19. Seen in ER at [facility] on 2/11.    CM met with patient; assessment completed. Lives in an apartment with her boyfriend Joel and 5 year old daughter. Independent with all cares, works part time, uses [facility] for transport as she currently doesn't have a vehicle. No Skilled HC services.      Patient does not have PCP and requested help in establishing a provider for hospital follow up in [place]. CM made hospital follow up appointment for patient and provided patient with date/time/provider/location per below:    [id number] ED/Hospital Follow Up with Ines Padilla PA-C  Thursday February 23 8:15 AM  Please plan to arrive at the clinic at your \"Arrive By\" time for your appointment. Our late policy will be enforced based on this time. [facility]  [address] [place] [id number] [phone]     Anticipate return home without additional needs or services. Plans to call a L[facility]FT for transport at hospital discharge.     Latisha Diaz RN        "

## 2023-02-13 NOTE — PROGRESS NOTES
[facility] ACUTE PAIN SERVICE     Date of Admission:  2/13/2023  Date of Consult : 02/13/23  Physician requesting consult: Dr. Marrufo   Reason for consult: pain management  Primary Care Physician: Jay chino visit with   [facility] Jan 2023     Assessment/Plan:     Eveline Gage is a 27 year old female who was admitted on 2/13/2023.  Pain team was asked to see the patient for pain management. Patient with recent admission 1/15 to 1/19/23, Acute Pain Management Team followed patient at that time for abdominal pain(per GI note possibly pain due to hepatic congestion and liver ischemia related to sickle cell disease).   Admitted this time for sickle cell crisis, pain on right side of body with RLQ abdominal pain, right breast pain, right shoulder pain.  History including sickle cell disease, Hx splenectomy, adjustment disorder, mixed anxiety and depression, asthma.  Locations of pain were right shoulder, both arms, lower back, and bilateral legs mostly in the knees. The pain is feeling of heaviness and deep pain, constant and at times severe, worse than pain during last admission. Patient reporting oxycodone was not helping previously.  Patient is denying abdominal pain, nor chest pain. Patient reporting IV Dilaudid has been helpful. I reviewed Pain Team note from last admission, and patient did well on IV Dilaudid with tapering down, and oral Dilaudid 2-4 mg. We discussed utilizing this regimen this time.  Diet is regular. The patient does  smoke and denies chemical dependency history.     Discussed plan with Fozia Lai CNP, Acute Pain Management Team      Opioid Induced Respiratory Depression Risk Assessment:?moderate-asthma, concomitant CNS depressants.      PLAN:   1) Pain is consistent with pain associated with sickle cell crisis.  Noted LFT elevation, WBC elevated, lipase elevated slightly. Hematology consulted-will be good source for interpreting results.   Patient educated  regarding multimodal pain approach, medications as listed below. Patient is understanding of the plan. All questions and concerns addressed to patient's satisfaction.   2)Multimodal Medication Therapy  Topical: diclofenac gel to bilateral knees qid  NSAID'S: CrCl 137 ml/min  Steroids: none  Muscle Relaxants: Change Flexeril to methocarbamol 750 mg po tid.  Adjuvants: LFTs elevated, prn Tylenol for now  Antidepressants/anxiolytics: none  Opioids: discontinue oxycodone, Dilaudid 2-4 mg po q 4 h prn   IV Pain medication: Decrease IV Dilaudid to 0.5 to 1 mg q 3 h prn, 0.5 mg was not effective for severe pain.  3)Non-medication interventions: heat vs heat  Acupuncture consult - if agreeable  Integrative consult - if agreeable  4)Constipation Prophylaxis: senna/docusate 1-2 po bid, Miralax and more senna prn  5) Care Teams: [facility], Hematology  -Opioid prescriber has been : last prescriber ED  -MN  pulled from system on  This indicates minimal previous opioid use.  Most recent MN  entries:  02/1/23 oxycodone 5 mg #12  01/27/23 oxycodone 5 mg #16    Discharge Recommendations - We recommend prescribing the following at the time of discharge: Possibly oral Dilaudid small supply, methocarbamol.       History of Present Illness (HPI):       Eveline Gage is a 27 year old female who presented for sickle cell crisis.  Past medical history as above. The pain is reported to be acute pain bilateral legs, left shoulder, low back pain.      Per MN  review, the patient does have small opioid tolerance, 2 recent Rx.    Reviewed medical record, labs, imaging, ED note, and care everywhere.     Past pain treatments have included: Pain Clinic-no      Home pain medications/psych medications/anticoagulation medications include: oxycodone, oral Dilaudid in the past during admission, naproxen, diclofenac gel.    Medical History   PAST MEDICAL HISTORY:   Past Medical History:   Diagnosis Date     Adjustment disorder with mixed anxiety  and depressed mood 10/05/2017     Anemia, unspecified type 01/14/2023     Antibody to blood group S antigen positive     Patient has anti-C, anti-S, anti-Jkb, anti-M, and a nonspecific antibody. Blood product orders may be delayed. Draw THREE purple top tubes for all Type and Screen/Type and crossmatch orders- per [facility]     Asplenia 06/17/2021     Asthma 03/09/2017     Sickle cell disease (H) 06/15/2020       PAST SURGICAL HISTORY:   Past Surgical History:   Procedure Laterality Date     CHOLECYSTECTOMY       ESOPHAGOSCOPY, GASTROSCOPY, DUODENOSCOPY (EGD), COMBINED N/A 01/15/2023    Procedure: ENDOSCOPIC ULTRASOUND;  Surgeon: Marv Tomas MD;  [facility]         FAMILY HISTORY:   Family History   Problem Relation Age of Onset     Sickle Cell Trait Mother      Hypertension Mother      Diabetes Father      Kidney Disease Father      Liver Disease Father        SOCIAL HISTORY:   Social History     Tobacco Use     Smoking status: Every Day     Passive exposure: Never     Smokeless tobacco: Never   Substance Use Topics     Alcohol use: Not Currently        [facility]  Tobacco:  reports that she has been smoking. She has never been exposed to tobacco smoke. She has never used smokeless tobacco.  Alcohol:  reports that she does not currently use alcohol.  Illicit drugs:  reports that she does not currently use drugs after having used the following drugs: Marijuana.    Allergies  Allergies   Allergen Reactions     Blood-Group Specific Substance Other (See Comments)     Patient has anti-C, anti-S, anti-Jkb, anti-M, and a nonspecific antibody. Blood product orders may be delayed. Draw THREE purple top tubes for all Type and Screen/Type and crossmatch orders.  Patient has anti-C, anti-S, anti-Jkb, anti-M, and a nonspecific antibody. Blood product orders may be delayed. Draw THREE purple top tubes for all Type and Screen/Type and crossmatch orders.  Patient has  anti-C, anti-S, anti-Jkb, anti-M, and a nonspecific antibody. Blood product orders may be delayed. Draw THREE purple top tubes for all Type and Screen/Type and crossmatch orders.  Patient has anti-C, anti-S, anti-Jkb, anti-M, and a nonspecific antibody. Blood product orders may be delayed. Draw THREE purple top tubes for all Type and Screen/Type and crossmatch orders.       Penicillins Rash       Problem List  Patient Active Problem List    Diagnosis Date Noted     Sickle cell pain crisis (H) 02/13/2023     Priority: Medium     Antibody to blood group S antigen positive 02/13/2023     Priority: Medium     Patient has anti-C, anti-S, anti-Jkb, anti-M, and a nonspecific antibody. Blood product orders may be delayed. Draw THREE purple top tubes for all Type and Screen/Type and crossmatch orders- per [facility]       Elevation of levels of liver transaminase levels 01/16/2023     Priority: Medium     Abdominal pain, epigastric 01/14/2023     Priority: Medium     Anemia, unspecified type 01/14/2023     Priority: Medium     SGA (small for gestational age) 11/21/2022     Priority: Medium     Asplenia 06/17/2021     Priority: Medium     Sickle cell disease (H) 06/15/2020     Priority: Medium     Elevated liver enzymes 03/12/2018     Priority: Medium     Diarrhea 03/12/2018     Priority: Medium     Iron deficiency anemia 03/12/2018     Priority: Medium     Adjustment disorder with mixed anxiety and depressed mood 10/05/2017     Priority: Medium     Postpartum mood disturbance 10/05/2017     Priority: Medium     Abnormal antibody titer 05/30/2017     Priority: Medium     Blood typing encounter 04/21/2017     Priority: Medium     Formatting of this note might be different from the original.  Blood Bank Antibody(s) Present.  Allow 12 to 36 hours for compatible RBC's. Recommend future transfusions with antigen negative. See transfusion reaction report from 4/19/17 for further details      ; ALERT - Blood Typing Antibody  anti-C, -Jkb, -M       Asthma 03/09/2017     Priority: Medium     Supervision of high risk pregnancy, unspecified, unspecified trimester 03/02/2017     Priority: Medium     Formatting of this note might be different from the original.  Overview:   Bobby Jama [phone],cell [phone]  Formatting of this note might be different from the original.  Bobby Sutton PHN Penny Jama [phone],cell [phone]       Screening for malignant neoplasm of cervix 12/19/2016     Priority: Medium     Formatting of this note might be different from the original.   Per visit note dated Dec.  9 , 2016: History of abnormal Pap: Never had a Pap test  2016 NILM 21 y.o.  PLAN:  pap test 12/2019    ;  Pap test history       Drug use during pregnancy 12/13/2016     Priority: Medium     Formatting of this note might be different from the original.  Overview:   THC on UDS. Pt quit thc and cigarettes at dx. Followed by brennon Loo. Repeat drug screen with 26 week labs.(  Pt has been taking narcotics to manage pain of her sickle cell crisis as prescribed by drCaio!)  Formatting of this note might be different from the original.  THC on UDS. Pt quit thc and cigarettes at dx. Followed by brennon Loo. Repeat drug screen with 26 week labs.(  Pt has been taking narcotics to manage pain of her sickle cell crisis as prescribed by !)       Immune to rubella 12/13/2016     Priority: Medium     Need for hepatitis B vaccination 12/12/2016     Priority: Medium     Formatting of this note might be different from the original.  1st given 2/2/17.  2nd given 3/10/17.  3rd DUE 8/2/17.       Need for vaccination 12/12/2016     Priority: Medium     Formatting of this note might be different from the original.  Overview:   1st given 2/2/17.  2nd given 3/10/17.  3rd DUE 8/2/17.       Anemia complicating pregnancy 12/09/2016     Priority: Medium       Prior to Admission Medications   Medications Prior  to Admission   Medication Sig Dispense Refill Last Dose     albuterol (PROAIR HFA/PROVENTIL HFA/VENTOLIN HFA) 108 (90 Base) MCG/ACT inhaler Inhale 2 puffs into the lungs every 6 hours as needed for shortness of breath / dyspnea or wheezing 18 g 1 Unknown     calcium carbonate (TUMS) 500 MG chewable tablet Take 1-2 chew tab by mouth every 4 hours as needed for heartburn   Unknown     cyclobenzaprine (FLEXERIL) 10 MG tablet Take 1 tablet (10 mg) by mouth 3 times daily as needed for muscle spasms 12 tablet 0 Unknown     diclofenac (VOLTAREN) 1 % topical gel Apply 2 g topically 4 times daily Apply as needed to chest, back, or other painful areas 100 g 0 Unknown     etonogestrel (NEXPLANON) 68 MG IMPL 1 each by Subdermal route   Unknown     ipratropium - albuterol 0.5 mg/2.5 mg/3 mL (DUONEB) 0.5-2.5 (3) MG/3ML neb solution Take 1 vial (3 mLs) by nebulization every 6 hours as needed for shortness of breath / dyspnea or wheezing 90 mL 1 Unknown     methocarbamol (ROBAXIN) 500 MG tablet Take 1 tablet (500 mg) by mouth 4 times daily as needed for muscle spasms 20 tablet 0 Unknown     multivitamin, therapeutic (THERA-VIT) TABS tablet Take 1 tablet by mouth daily   Past Week     naproxen sodium (ANAPROX) 220 MG tablet Take 220-440 mg by mouth every 12 hours as needed for moderate pain (4-6)   2/13/2023     omeprazole (PRILOSEC) 20 MG DR capsule Take 1 capsule (20 mg) by mouth 2 times daily 40 capsule 0 2/12/2023     oxyCODONE (ROXICODONE) 5 MG tablet Take 1-2 tablets (5-10 mg) by mouth every 4 hours as needed for pain (can take 5 mg for moderate pain and 10 mg for severe pain) (Patient taking differently: Take 10 mg by mouth every 4 hours as needed for pain (can take 5 mg for moderate pain and 10 mg for severe pain)) 16 tablet 0 2/13/2023 at am     sennosides (SENOKOT) 8.6 MG tablet Take 2 tablets by mouth daily as needed for constipation 60 tablet 0 Unknown     sucralfate (CARAFATE) 1 GM tablet Take 1 tablet (1 g) by mouth 4  times daily as needed for nausea (stomach pain) 40 tablet 0 Unknown       Review of Systems  Complete ROS reviewed, unless noted in HPI, all other systems reviewed (with patient) and all others found to be negative.            Karin Norman [facility]  Acute Care Pain Management Program  [facility] ([place], [place], [place])  Monday-Friday 8a-4p   Page via online paging system or call [phone]

## 2023-02-13 NOTE — CONSULTS
"ACUPUNCTURIST TREATMENT NOTE    Name: Eveline Gage  :  1995  MRN:  [id number]    Acupuncture Treatment  Patient Type: Medical  Intervention Reason: Pain  Pain Location: Low back, knees and (R) arm  Pre-session Pain Rating: 10  Post-session Pain Rating: 10  Patient complaint:: Pain of low back, knees and (R) arm  Initial insertions: Yin colindres, (L) Jeffers men, Li 4, 10, (L) Si 3, (R) Bl 60, 62, 65, St 36, Sp 10  Number of needles inserted: 14  Number of needles removed: 14  Treatment Observations: I spent 20 minutes holding the patient's hand and trying to  her through deep, slow breaths.         \"Risks and benefits of acupuncture were discussed with patient. Consent for treatment was given. We thank you for the referral.\"     Ivan Kebede    Date:  2023  Time:  4:08 PM    "

## 2023-02-13 NOTE — PHARMACY-ADMISSION MEDICATION HISTORY
Pharmacy Note - Admission Medication History    Pertinent Provider Information:     ______________________________________________________________________    Prior To Admission (PTA) med list completed and updated in EMR.       PTA Med List   Medication Sig Last Dose     albuterol (PROAIR HFA/PROVENTIL HFA/VENTOLIN HFA) 108 (90 Base) MCG/ACT inhaler Inhale 2 puffs into the lungs every 6 hours as needed for shortness of breath / dyspnea or wheezing Unknown     calcium carbonate (TUMS) 500 MG chewable tablet Take 1-2 chew tab by mouth every 4 hours as needed for heartburn Unknown     cyclobenzaprine (FLEXERIL) 10 MG tablet Take 1 tablet (10 mg) by mouth 3 times daily as needed for muscle spasms Unknown     diclofenac (VOLTAREN) 1 % topical gel Apply 2 g topically 4 times daily Apply as needed to chest, back, or other painful areas Unknown     etonogestrel (NEXPLANON) 68 MG IMPL 1 each by Subdermal route Unknown     ipratropium - albuterol 0.5 mg/2.5 mg/3 mL (DUONEB) 0.5-2.5 (3) MG/3ML neb solution Take 1 vial (3 mLs) by nebulization every 6 hours as needed for shortness of breath / dyspnea or wheezing Unknown     methocarbamol (ROBAXIN) 500 MG tablet Take 1 tablet (500 mg) by mouth 4 times daily as needed for muscle spasms Unknown     multivitamin, therapeutic (THERA-VIT) TABS tablet Take 1 tablet by mouth daily Past Week     naproxen sodium (ANAPROX) 220 MG tablet Take 220-440 mg by mouth every 12 hours as needed for moderate pain (4-6) 2/13/2023     omeprazole (PRILOSEC) 20 MG DR capsule Take 1 capsule (20 mg) by mouth 2 times daily 2/12/2023     oxyCODONE (ROXICODONE) 5 MG tablet Take 1-2 tablets (5-10 mg) by mouth every 4 hours as needed for pain (can take 5 mg for moderate pain and 10 mg for severe pain) (Patient taking differently: Take 10 mg by mouth every 4 hours as needed for pain (can take 5 mg for moderate pain and 10 mg for severe pain)) 2/13/2023 at am     sennosides (SENOKOT) 8.6 MG tablet Take 2 tablets  by mouth daily as needed for constipation Unknown     sucralfate (CARAFATE) 1 GM tablet Take 1 tablet (1 g) by mouth 4 times daily as needed for nausea (stomach pain) Unknown       Information source(s): Patient  Method of interview communication: in-person    Summary of Changes to PTA Med List  New:   Discontinued:   Changed: oxycodone     Patient was asked about OTC/herbal products specifically.  PTA med list reflects this.    In the past week, patient estimated taking medication this percent of the time:  greater than 90%.    Medication Affordability:       Allergies were reviewed, assessed, and updated with the patient.      Patient does not use any multi-dose medications prior to admission.    The information provided in this note is only as accurate as the sources available at the time of the update(s).    Thank you for the opportunity to participate in the care of this patient.    Maranda Boyd PharmSHERRY  2/13/2023 11:07 AM

## 2023-02-13 NOTE — ED PROVIDER NOTES
"NAME: Eveline Gage  AGE: 27 year old female  YOB: 1995  MRN: [id number]  EVALUATION DATE & TIME: 2023  7:27 AM    PCP: [facility]  ED PROVIDER: Milady Moya MD.    Chief Complaint   Patient presents with     Sickle Cell Pain Crisis       FINAL IMPRESSION:  1. Pain of left upper arm    2. Bilateral leg pain        MEDICAL DECISION MAKIN:32 AM I met with the patient, obtained history, performed an initial exam, and discussed options and plan for diagnostics and treatment here in the ED. PPE: surgical mask and gloves  9:19 AM I rechecked the patient, who is on the phone with her boss. Will come back to update her.   9:26 AM I rechecked and updated the patient on her results. She is still having some pain, so we will plan for admission.   10:20 AM I discussed the case with hospitalist, Dr Marrufo, who accepts the patient.   11:29 AM I spoke to Dr. Mendoza, hematology.     MDM: 28 y/o F with h/o sickle cell disease, adjustment disorder, asthma who present with pain. Recent admission in January for elevated LFTs, ERCP without obstruction. RUQ US with doppler which was normal. LFTs have been down trending, following with GI. Also had recent visit for pain 4 days ago, labs were generally reassuring, sent with oxycodone.    She reports increased pain that feels like a sickle cell pain crisis to her. The pain \"moves around\" but is mostly in her left arm and her bilateral upper legs. No abdominal pain today. Labs with Hgb near baseline and elevated reticulocytes. LFTs fairly similar to prior. Do not feel needs repeat abdominal imaging today. She mentioned very mild chest pain during review of symptoms, troponin WNL, EKG without ischemic changes, do not suspect ACS. CXR with mild bronchial wall thickening but no focal consolidation. Without new infiltrates, fevers, tachypnea/hypoxia, cough, have low suspicion for acute chest. Given dilaudid, morphine, fluids without " significant improvement in her extremity pain. No signs of fracture or infectious process. Plan for admission and patient in agreement. Hospitalist recommended hematology consult--no emergent recs from them but can see during admission.    Medical Decision Making    History:    Supplemental history from: Documented in chart, if applicable    External Record(s) reviewed: Documented in chart, if applicable. and Outpatient Record: 2/11/23 ED visit and inpatient 1/14/23-1/15/23 admission    Work Up:    Chart documentation includes differential considered and any EKGs or imaging interpreted by provider.    In additional to work up documented, I considered the following work up: Documented in chart, if applicable.    External consultation:    Discussion of management with another provider: Documented in chart, if applicable and Hospitalist    Complicating factors:    Care impacted by chronic illness: Chronic Lung Disease, Mental Health and Other: Sickle Cell disease    Care affected by social determinants of health: N/A    Disposition considerations: Admit.      MEDICATIONS GIVEN IN THE EMERGENCY:  Medications   HYDROmorphone (DILAUDID) injection 0.2 mg (0.2 mg Intravenous Given 2/13/23 0934)   0.9% sodium chloride BOLUS (0 mLs Intravenous Stopped 2/13/23 1032)   morphine (PF) injection 4 mg (4 mg Intravenous Given 2/13/23 0809)   HYDROmorphone (PF) (DILAUDID) injection 0.5 mg (0.5 mg Intravenous Given 2/13/23 0829)   ondansetron (ZOFRAN) injection 4 mg (4 mg Intravenous Given 2/13/23 1040)       NEW PRESCRIPTIONS STARTED AT TODAY'S ER VISIT:  New Prescriptions    No medications on file        =================================================================  HPI    Patient information was obtained from: patient   Use of : N/A       Eveline Gage is a 27 year old female with a past medical history of sickle cell disease, asthma, adjustment disorder with mixed anxiety and depressed mood, and anemia who  presents via EMS for evaluation of myalgias.     Patient reports a sickle cell crisis that began about 1 week ago. Her pain began in her RLQ but now jumps around between her left arm and elbow, left thigh and knee, left lower back, and then back to her right groin. She also notes mild swelling in her right upper thigh and mild shortness of breath due to the pain. Patient endorses mild chest pain. She denies any fever, cough, redness, abdominal pain, vomiting, diarrhea, or dysuria. Patient notes her urine has been darker than normal lately. She does not currently have a hematologist but previously followed at [facility]. No other complaints or concerns expressed at this time.     Per chart review, patient presented to [facility] ED on 1/14/23 for generalized upper abdominal pain with associated nausea and vomiting. Labs remarkable for AST of 1069, ALT of 1000, and total bilirubin of 21. The alkaline phosphatase was minimally elevated at 216 and the lipase was normal. The patient's white blood cell count was 14.7 which appeared to be her baseline. Imaging showed abdominal CT and RUQ ultrasound that were non diagnostic. Treated with fluids, dilaudid, Zofran, and Pepcid. Patient admitted for elevated liver transaminases, hyperbilirubinemia and epigastric and right upper quadrant abdominal pain. Hospital course was notable for transfer to [place] on 1/15/23 for ERCP. Abdominal ultrasound with Doppler showed normal liver without evidence of portal vein thrombosis. GI, hematology, and pain management were consulted.  LFT was down trending on discharge and clinically patient felt better with improvement of her pain.  Patient discharged home on 1/19/23 and follow-up with [facility] with liver specialist for potential liver biopsy as outpatient.     Per chart review, patient presented to [facility] ED on 2/11/23 for right sided abdominal pain. Labs remarkable for down trending AST/ALT compared to 3 weeks prior, with total  bilirubin at 10.5. Imaging showed unchanged CT chest, abdomen, pelvis without acute abnormalities.Treated with morphine. Patient discharged with PCP and GI follow up .     REVIEW OF SYSTEMS   Review of Systems   Constitutional: Negative for fever.   Respiratory: Positive for shortness of breath (mild from pain). Negative for cough.    Cardiovascular: Positive for chest pain (mild).   Gastrointestinal: Negative for abdominal pain, diarrhea and vomiting.   Genitourinary: Negative for dysuria.        Positive for right groin pain  Positive for dark urine   Musculoskeletal: Positive for back pain (left lower) and myalgias (left leg and arm).        Positive for right upper thigh swelling   Skin: Negative for color change (redness).   All other systems reviewed and are negative.       PAST MEDICAL HISTORY:  History reviewed. No pertinent past medical history.    PAST SURGICAL HISTORY:  Past Surgical History:   Procedure Laterality Date     ESOPHAGOSCOPY, GASTROSCOPY, DUODENOSCOPY (EGD), COMBINED N/A 1/15/2023    Procedure: ENDOSCOPIC ULTRASOUND;  Surgeon: Marv Tomas MD;  Location: [facility] [place], [place] OR       CURRENT MEDICATIONS:      Current Facility-Administered Medications:      HYDROmorphone (DILAUDID) injection 0.2 mg, 0.2 mg, Intravenous, Q2H PRN, Milady Moya MD, 0.2 mg at 02/13/23 0934    Current Outpatient Medications:      albuterol (PROAIR HFA/PROVENTIL HFA/VENTOLIN HFA) 108 (90 Base) MCG/ACT inhaler, Inhale 2 puffs into the lungs every 6 hours as needed for shortness of breath / dyspnea or wheezing, Disp: 18 g, Rfl: 1     calcium carbonate (TUMS) 500 MG chewable tablet, Take 1-2 chew tab by mouth every 4 hours as needed for heartburn, Disp: , Rfl:      cyclobenzaprine (FLEXERIL) 10 MG tablet, Take 1 tablet (10 mg) by mouth 3 times daily as needed for muscle spasms, Disp: 12 tablet, Rfl: 0     diclofenac (VOLTAREN) 1 % topical gel, Apply 2 g topically 4 times daily Apply as needed to chest, back, or other  painful areas, Disp: 100 g, Rfl: 0     etonogestrel (NEXPLANON) 68 MG IMPL, 1 each by Subdermal route, Disp: , Rfl:      ipratropium - albuterol 0.5 mg/2.5 mg/3 mL (DUONEB) 0.5-2.5 (3) MG/3ML neb solution, Take 1 vial (3 mLs) by nebulization every 6 hours as needed for shortness of breath / dyspnea or wheezing, Disp: 90 mL, Rfl: 1     methocarbamol (ROBAXIN) 500 MG tablet, Take 1 tablet (500 mg) by mouth 4 times daily as needed for muscle spasms, Disp: 20 tablet, Rfl: 0     multivitamin, therapeutic (THERA-VIT) TABS tablet, Take 1 tablet by mouth daily, Disp: , Rfl:      naproxen sodium (ANAPROX) 220 MG tablet, Take 220-440 mg by mouth every 12 hours as needed for moderate pain (4-6), Disp: , Rfl:      omeprazole (PRILOSEC) 20 MG DR capsule, Take 1 capsule (20 mg) by mouth 2 times daily, Disp: 40 capsule, Rfl: 0     oxyCODONE (ROXICODONE) 5 MG tablet, Take 1-2 tablets (5-10 mg) by mouth every 4 hours as needed for pain (can take 5 mg for moderate pain and 10 mg for severe pain) (Patient taking differently: Take 10 mg by mouth every 4 hours as needed for pain (can take 5 mg for moderate pain and 10 mg for severe pain)), Disp: 16 tablet, Rfl: 0     sennosides (SENOKOT) 8.6 MG tablet, Take 2 tablets by mouth daily as needed for constipation, Disp: 60 tablet, Rfl: 0     sucralfate (CARAFATE) 1 GM tablet, Take 1 tablet (1 g) by mouth 4 times daily as needed for nausea (stomach pain), Disp: 40 tablet, Rfl: 0    ALLERGIES:  Allergies   Allergen Reactions     Blood-Group Specific Substance Other (See Comments)     Patient has anti-C, anti-S, anti-Jkb, anti-M, and a nonspecific antibody. Blood product orders may be delayed. Draw THREE purple top tubes for all Type and Screen/Type and crossmatch orders.  Patient has anti-C, anti-S, anti-Jkb, anti-M, and a nonspecific antibody. Blood product orders may be delayed. Draw THREE purple top tubes for all Type and Screen/Type and crossmatch orders.  Patient has anti-C, anti-S,  "anti-Jkb, anti-M, and a nonspecific antibody. Blood product orders may be delayed. Draw THREE purple top tubes for all Type and Screen/Type and crossmatch orders.  Patient has anti-C, anti-S, anti-Jkb, anti-M, and a nonspecific antibody. Blood product orders may be delayed. Draw THREE purple top tubes for all Type and Screen/Type and crossmatch orders.       Penicillins Rash       FAMILY HISTORY:  History reviewed. No pertinent family history.    SOCIAL HISTORY:   Social History     Socioeconomic History     Marital status: Single   Tobacco Use     Smoking status: Every Day     Passive exposure: Never     Smokeless tobacco: Never   Vaping Use     Vaping Use: Never used   Substance and Sexual Activity     Alcohol use: Not Currently     Drug use: Not Currently     Sexual activity: Yes     Birth control/protection: Implant       PHYSICAL EXAM:    Vitals: BP (!) 152/82   Pulse 103   Temp 98  F (36.7  C) (Oral)   Resp 21   Ht 1.6 m (5' 3\")   Wt 83.5 kg (184 lb)   SpO2 96%   BMI 32.59 kg/m     Constitutional: Well developed, well nourished. Occasionally moaning during exam  HENT: Normocephalic, atraumatic, mucous membranes moist. Neck-gross ROM intact.   Eyes: Pupils mid-range, sclera white  Respiratory: CTAB, no respiratory distress, no wheezing, speaks full sentences easily.  Cardiovascular: Normal heart rate, regular rhythm. No LE edema. Extremities with warm with good perfusion.  GI: Soft, not distended, not tender to palpation  Musculoskeletal: Some mild tenderness to her left forearm and bilateral thighs, no erythema/warmth or focal bony tenderness or deformities  Skin: Warm, dry, no rash.  Neurologic: Alert & oriented, speech clear, moving all extremities spontaneously     LAB:  All pertinent labs reviewed and interpreted.  Labs Ordered and Resulted from Time of ED Arrival to Time of ED Departure   COMPREHENSIVE METABOLIC PANEL - Abnormal       Result Value    Sodium 139      Potassium 4.6      Chloride 109 " (*)     Carbon Dioxide (CO2) 22      Anion Gap 8      Urea Nitrogen 9      Creatinine 0.62      Calcium 8.8      Glucose 96      Alkaline Phosphatase 196 (*)      (*)      (*)     Protein Total 7.4      Albumin 2.8 (*)     Bilirubin Total 11.2 (*)     GFR Estimate >90     CRP INFLAMMATION - Abnormal    CRP 2.6 (*)    INR - Abnormal    INR 1.27 (*)    RETICULOCYTE COUNT - Abnormal    % Reticulocyte 8.2 (*)     Absolute Reticulocyte 0.263 (*)    CBC WITH PLATELETS AND DIFFERENTIAL - Abnormal    WBC Count 18.5 (*)     RBC Count 3.19 (*)     Hemoglobin 9.7 (*)     Hematocrit 27.2 (*)     MCV 85      MCH 30.4      MCHC 35.7      RDW 19.4 (*)     Platelet Count 250     LIPASE - Abnormal    Lipase 67 (*)    DIFFERENTIAL - Abnormal    % Neutrophils 59      % Lymphocytes 26      % Monocytes 10      % Eosinophils 5      % Basophils 0      Absolute Neutrophils 10.9 (*)     Absolute Lymphocytes 4.8      Absolute Monocytes 1.9 (*)     Absolute Eosinophils 0.9 (*)     Absolute Basophils 0.0      RBC Morphology Confirmed RBC Indices      Platelet Assessment        Value: Automated Count Confirmed. Platelet morphology is normal.    Elliptocytes Slight (*)     Polychromasia Slight (*)     Sickle Cells Slight (*)     Target Cells Moderate (*)    TROPONIN I - Normal    Troponin I 0.01     LABORATORY MISCELLANEOUS ORDER       RADIOLOGY:  Chest XR,  PA & LAT    (Results Pending)       EKG:   Performed at: 07:44  Impression: Sinus rhythm. Normal ECG.  Rate: 69 BPM  Rhythm: Sinus  QRS Interval: 70 ms  QTc Interval: 390 ms  Comparison: When compared with ECG of 25-AUG-2021, no significant change was found.   I have independently reviewed and interpreted the EKG(s) documented above.     PROCEDURES:   Procedures     I, Lakeshia Wiggins, am serving as a scribe to document services personally performed by Dr. Milady Moya based on my observation and the provider's statements to me. I, Milady Moya MD attest that Lakeshia Wiggins is  acting in a scribe capacity, has observed my performance of the services and has documented them in accordance with my direction.    Milady Moya M.D.  Emergency Medicine  [facility] EMERGENCY ROOM  [address] [address]  [phone]  Dept: [phone]     Milady Moya MD  02/13/23 6202

## 2023-02-13 NOTE — PLAN OF CARE
Problem: Plan of Care - These are the overarching goals to be used throughout the patient stay.    Goal: Optimal Comfort and Wellbeing  Outcome: Progressing   Goal Outcome Evaluation:    Pt in continuous pain r/t sickle cell.Tachypneic/shallow breathing when pain is at its worst. Calming blend used on gown to focus pt on slow breathing during pain. Referral placed for acupuncture; saw pt at bedside ASAP. IVF NS @ 150mL/hr. PRN methocarbamol given; Flexeril also available.

## 2023-02-13 NOTE — CONSULTS
[facility] Hematology and Oncology Consult Note      Patient: Eveline Gage  MRN: [id number]  Date of Service: Feb 13, 2023      We have been asked by Dr. Marrufo to evaluate Eveline Gage for sickle cell pain crisis.    Assessment/Plan:    1.  Sickle cell disease: Supportive care with hydration, supplemental oxygen, pain management. The pain service was then consulted.  She has been started on hydromorphone.  She is having an appropriate reticulocyte response.  This should be checked daily.  Going to start her on hydroxyurea 500 mg daily.  We can see her in clinic after discharge to manage this medication.    2.  Elevated LFTs: Much improved when compared to her LFTs on the 19th.  I do not think a clear cause was elucidated during that hospitalization.  Continue to follow her numbers.  CT abdomen pelvis from February 11 showed a normal-appearing liver.  Ultrasound from January 17 showed normal bile ducts and normal liver parenchyma.  Total bilirubin is also generally trending down when compared to January 14.  Some of this elevation may be due to chronic hemolysis.    3.  Anemia: Her hemoglobin today appears to be at her baseline.  No transfusions needed.    4.  Elevated white count, mostly neutrophilia which is frequently seen in sickle cell disease/vaso-occlusive crisis.  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  No evidence of infection/abscess on recent CT imaging.    ECOG Performance  1    Staging History:    Cancer Staging   No matching staging information was found for the patient.    History:    Eveline is a 27-year-old woman with with known sickle cell disease confirmed by hemoglobin electrophoresis from February 2, 2017.  She has previously had more infrequent admissions for sickle cell disease.  Never been on Hydrea.  Admitted now with pain.  She had CT imaging of the body which showed no evidence of pneumonia, pulmonary embolism, biliary dilatation or obstruction or any other  acute process.  She has been started on Dilaudid by the pain service.  This evening she states that she still having pain, mostly in her legs.  However, she does doze off a little bit during the interview.    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 Blood product orders may be delayed. Draw THREE purple top tubes for all Type and Screen/Type and crossmatch orders- per [facility]     Asplenia 06/17/2021     Asthma 03/09/2017     Cannabis use disorder      Sickle cell disease (H) 06/15/2020    Family History   Problem Relation Age of Onset     Sickle Cell Trait Mother      Hypertension Mother      Diabetes Father      Kidney Disease Father      Liver Disease Father      Sickle Cell Trait Maternal Grandfather      Sickle Cell Trait Paternal Grandmother       [unfilled] Social History     Socioeconomic History     Marital status: Single     Spouse name: Not on file     Number of children: Not on file     Years of education: Not on file     Highest education level: Not on file   Occupational History     Not on file   Tobacco Use     Smoking status: Every Day     Packs/day: 0.50     Types: Cigarettes     Passive exposure: Never     Smokeless tobacco: Never   Vaping Use     Vaping Use: Never used   Substance and Sexual Activity     Alcohol use: Not Currently     Drug use: Yes     Frequency: 7.0 times per week     Types: Marijuana     Sexual activity: Yes     Birth control/protection: Implant   Other Topics Concern     Not on file   Social History Narrative     Not on file     Social Determinants of Health     Financial Resource Strain: Not on file   Food Insecurity: Not on file   Transportation Needs: Not on file   Physical Activity: Not on file   Stress: Not on file    Social Connections: Not on file   Intimate Partner Violence: Not on file   Housing Stability: Not on file        Allergies:    Allergies   Allergen Reactions     Blood-Group Specific Substance Other (See Comments)     Patient has anti-C, anti-S, anti-Jkb, anti-M, and a nonspecific antibody. Blood product orders may be delayed. Draw THREE purple top tubes for all Type and Screen/Type and crossmatch orders.  Patient has anti-C, anti-S, anti-Jkb, anti-M, and a nonspecific antibody. Blood product orders may be delayed. Draw THREE purple top tubes for all Type and Screen/Type and crossmatch orders.  Patient has anti-C, anti-S, anti-Jkb, anti-M, and a nonspecific antibody. Blood product orders may be delayed. Draw THREE purple top tubes for all Type and Screen/Type and crossmatch orders.  Patient has anti-C, anti-S, anti-Jkb, anti-M, and a nonspecific antibody. Blood product orders may be delayed. Draw THREE purple top tubes for all Type and Screen/Type and crossmatch orders.       Penicillins Rash     Review of Systems:    As above in the history.     Review of Systems otherwise Negative for:  General: chills, fever or night sweats  Psychological: anxiety or depression  Ophthalmic: blurry vision, double vision or loss of vision, vision change  ENT: epistaxis, oral lesions, hearing changes  Hematological and Lymphatic: bleeding, bruising, jaundice, swollen lymph nodes  Endocrine: hot flashes, unexpected weight changes  Respiratory: cough, hemoptysis, orthopnea or shortness of breath/LAWS  Cardiovascular: chest pain, edema, palpitations or PND  Gastrointestinal: blood in stools, change in bowel habits, constipation, diarrhea or nausea/vomiting  Genito-Urinary: change in urinary stream, incontinence, frequency/urgency  Musculoskeletal: joint stiffness, swelling  Neurological: dizziness, headaches, numbness/tingling  Dermatological: lumps and rash    Physical Exam:    BP (!) 133/91 (BP Location: Right arm, Patient Position:  "Sitting)   Pulse 80   Temp 97.1  F (36.2  C) (Oral)   Resp 14   Ht 1.6 m (5' 3\")   Wt 83.5 kg (184 lb)   SpO2 99%   BMI 32.59 kg/m      General: patient appears stated age of 27 year old. Nontoxic and in no distress.   HEENT: Head: atraumatic, normocephalic. Sclerae anicteric.  Chest:  Normal respiratory effort  Cardiac:  No edema.   Abdomen: abdomen is non-distended  Extremities: normal tone and muscle bulk.   Skin: no lesions or rash on visible skin. Warm and dry.   CNS: Awake.  Oriented.  Groggy.    Lab Results:    Recent Results (from the past 168 hour(s))   Comprehensive metabolic panel   Result Value Ref Range    Sodium 137 136 - 145 mmol/L    Potassium 4.1 3.5 - 5.0 mmol/L    Chloride 109 (H) 98 - 107 mmol/L    Carbon Dioxide (CO2) 22 22 - 31 mmol/L    Anion Gap 6 5 - 18 mmol/L    Urea Nitrogen 9 8 - 22 mg/dL    Creatinine 0.56 (L) 0.60 - 1.10 mg/dL    Calcium 8.5 8.5 - 10.5 mg/dL    Glucose 101 70 - 125 mg/dL    Alkaline Phosphatase 170 (H) 45 - 120 U/L     (H) 0 - 40 U/L     (H) 0 - 45 U/L    Protein Total 7.0 6.0 - 8.0 g/dL    Albumin 2.6 (L) 3.5 - 5.0 g/dL    Bilirubin Total 10.5 (H) 0.0 - 1.0 mg/dL    GFR Estimate >90 >60 mL/min/1.73m2   INR   Result Value Ref Range    INR 1.41 (H) 0.85 - 1.15   Partial thromboplastin time   Result Value Ref Range    aPTT 32 22 - 38 Seconds   Lipase   Result Value Ref Range    Lipase 117 (H) 0 - 52 U/L   Magnesium   Result Value Ref Range    Magnesium 2.0 1.8 - 2.6 mg/dL   CRP inflammation   Result Value Ref Range    CRP 1.4 (H) 0.0 - <0.8 mg/dL   Reticulocyte count   Result Value Ref Range    % Reticulocyte 7.3 (H) 0.8 - 2.7 %    Absolute Reticulocyte 0.226 (H) 0.010 - 0.110 10e6/uL   Blood Culture Line, venous    Specimen: Line, venous; Blood   Result Value Ref Range    Culture No growth after 2 days    Acetaminophen level   Result Value Ref Range    Acetaminophen 15.4 10.0 - 20.0 ug/mL   CBC with platelets and differential   Result Value Ref Range "    WBC Count 16.4 (H) 4.0 - 11.0 10e3/uL    RBC Count 3.08 (L) 3.80 - 5.20 10e6/uL    Hemoglobin 9.3 (L) 11.7 - 15.7 g/dL    Hematocrit 26.3 (L) 35.0 - 47.0 %    MCV 85 78 - 100 fL    MCH 30.2 26.5 - 33.0 pg    MCHC 35.4 31.5 - 36.5 g/dL    RDW 19.2 (H) 10.0 - 15.0 %    Platelet Count 252 150 - 450 10e3/uL   Adult Type and Screen   Result Value Ref Range    ABO/RH(D) A POS     Antibody Screen Positive (A) Negative    SPECIMEN EXPIRATION DATE [id number]    Red Cell Antigen Typing Non ABO:   Result Value Ref Range    C Antigen Type Negative     Little c Antigen Type Positive     E Antigen Type Negative     SPECIMEN EXPIRATION DATE [id number]    Manual Differential   Result Value Ref Range    % Neutrophils 47 %    % Lymphocytes 35 %    % Monocytes 13 %    % Eosinophils 5 %    % Basophils 0 %    Absolute Neutrophils 7.7 1.6 - 8.3 10e3/uL    Absolute Lymphocytes 5.7 (H) 0.8 - 5.3 10e3/uL    Absolute Monocytes 2.1 (H) 0.0 - 1.3 10e3/uL    Absolute Eosinophils 0.8 (H) 0.0 - 0.7 10e3/uL    Absolute Basophils 0.0 0.0 - 0.2 10e3/uL    RBC Morphology Confirmed RBC Indices     Platelet Assessment  Automated Count Confirmed. Platelet morphology is normal.     Automated Count Confirmed. Platelet morphology is normal.    Elliptocytes Slight (A) None Seen    RBC Fragments Slight (A) None Seen    Sickle Cells Slight (A) None Seen    Target Cells Moderate (A) None Seen   Antibody identification   Result Value Ref Range    Antibody Identification Anti-S     SPECIMEN EXPIRATION DATE [id number]    Blood Culture Line, venous    Specimen: Line, venous; Blood   Result Value Ref Range    Culture No growth after 2 days    Asymptomatic Influenza A/B & SARS-CoV2 (COVID-19) Virus PCR Multiplex Nasopharyngeal    Specimen: Nasopharyngeal; Swab   Result Value Ref Range    Influenza A PCR Negative Negative    Influenza B PCR Negative Negative    RSV PCR Negative Negative    SARS CoV2 PCR Negative Negative   UA with Microscopic reflex to  Culture    Specimen: Urine, Midstream   Result Value Ref Range    Color Urine Yellow Colorless, Straw, Light Yellow, Yellow    Appearance Urine Clear Clear    Glucose Urine Negative Negative mg/dL    Bilirubin Urine Negative Negative    Ketones Urine Negative Negative mg/dL    Specific Gravity Urine 1.020 1.001 - 1.030    Blood Urine Negative Negative    pH Urine 7.0 5.0 - 7.0    Protein Albumin Urine Negative Negative mg/dL    Urobilinogen Urine <2.0 <2.0 mg/dL    Nitrite Urine Negative Negative    Leukocyte Esterase Urine Negative Negative    RBC Urine 0 <=2 /HPF    WBC Urine 1 <=5 /HPF    Squamous Epithelials Urine 1 <=1 /HPF   HCG qualitative urine   Result Value Ref Range    hCG Urine Qualitative Negative Negative   Chlamydia trachomatis PCR    Specimen: Urine, Voided   Result Value Ref Range    Chlamydia trachomatis Negative Negative   Neisseria gonorrhoea PCR    Specimen: Urine, Voided   Result Value Ref Range    Neisseria gonorrhoeae Negative Negative   Drugs of Abuse 1+ Panel, Urine ([facility] Only)   Result Value Ref Range    Amphetamines Urine Screen Negative Screen Negative    Benzodiazepines Urine Screen Negative Screen Negative    Opiates Urine Screen Positive (A) Screen Negative    PCP Urine Screen Negative Screen Negative    Cannabinoids Urine Screen Positive (A) Screen Negative    Barbiturates Urine Screen Negative Screen Negative    Cocaine Urine Screen Negative Screen Negative    Methadone Urine Screen Negative Screen Negative    Oxycodone Urine Screen Positive (A) Screen Negative    Creatinine Urine mg/dL 20 mg/dL   ECG 12-LEAD WITH MUSE (LHE)   Result Value Ref Range    Systolic Blood Pressure 148 mmHg    Diastolic Blood Pressure 77 mmHg    Ventricular Rate 69 BPM    Atrial Rate 69 BPM    HI Interval 134 ms    QRS Duration 70 ms     ms    QTc 390 ms    P Axis 5 degrees    R AXIS 72 degrees    T Axis 60 degrees    Interpretation ECG       Sinus rhythm  Normal ECG  When compared with ECG of  25-AUG-2021 16:48,  No significant change was found  Confirmed by SEE ED PROVIDER NOTE FOR, ECG INTERPRETATION ([id number]),  DENNIS BECKETT ([id number]) on 2/13/2023 8:29:59 AM     Comprehensive metabolic panel   Result Value Ref Range    Sodium 139 136 - 145 mmol/L    Potassium 4.6 3.5 - 5.0 mmol/L    Chloride 109 (H) 98 - 107 mmol/L    Carbon Dioxide (CO2) 22 22 - 31 mmol/L    Anion Gap 8 5 - 18 mmol/L    Urea Nitrogen 9 8 - 22 mg/dL    Creatinine 0.62 0.60 - 1.10 mg/dL    Calcium 8.8 8.5 - 10.5 mg/dL    Glucose 96 70 - 125 mg/dL    Alkaline Phosphatase 196 (H) 45 - 120 U/L     (H) 0 - 40 U/L     (H) 0 - 45 U/L    Protein Total 7.4 6.0 - 8.0 g/dL    Albumin 2.8 (L) 3.5 - 5.0 g/dL    Bilirubin Total 11.2 (H) 0.0 - 1.0 mg/dL    GFR Estimate >90 >60 mL/min/1.73m2   CRP inflammation   Result Value Ref Range    CRP 2.6 (H) 0.0 - <0.8 mg/dL   INR   Result Value Ref Range    INR 1.27 (H) 0.85 - 1.15   Reticulocyte count   Result Value Ref Range    % Reticulocyte 8.2 (H) 0.8 - 2.7 %    Absolute Reticulocyte 0.263 (H) 0.010 - 0.110 10e6/uL   Troponin I (now)   Result Value Ref Range    Troponin I 0.01 0.00 - 0.29 ng/mL   CBC with platelets and differential   Result Value Ref Range    WBC Count 18.5 (H) 4.0 - 11.0 10e3/uL    RBC Count 3.19 (L) 3.80 - 5.20 10e6/uL    Hemoglobin 9.7 (L) 11.7 - 15.7 g/dL    Hematocrit 27.2 (L) 35.0 - 47.0 %    MCV 85 78 - 100 fL    MCH 30.4 26.5 - 33.0 pg    MCHC 35.7 31.5 - 36.5 g/dL    RDW 19.4 (H) 10.0 - 15.0 %    Platelet Count 250 150 - 450 10e3/uL   Lipase   Result Value Ref Range    Lipase 67 (H) 0 - 52 U/L   Manual Differential   Result Value Ref Range    % Neutrophils 59 %    % Lymphocytes 26 %    % Monocytes 10 %    % Eosinophils 5 %    % Basophils 0 %    Absolute Neutrophils 10.9 (H) 1.6 - 8.3 10e3/uL    Absolute Lymphocytes 4.8 0.8 - 5.3 10e3/uL    Absolute Monocytes 1.9 (H) 0.0 - 1.3 10e3/uL    Absolute Eosinophils 0.9 (H) 0.0 - 0.7 10e3/uL    Absolute Basophils 0.0  0.0 - 0.2 10e3/uL    RBC Morphology Confirmed RBC Indices     Platelet Assessment  Automated Count Confirmed. Platelet morphology is normal.     Automated Count Confirmed. Platelet morphology is normal.    Elliptocytes Slight (A) None Seen    Polychromasia Slight (A) None Seen    Sickle Cells Slight (A) None Seen    Target Cells Moderate (A) None Seen       Imaging Results:    Chest XR,  PA & LAT    Result Date: 2/13/2023  EXAM: XR CHEST 2 VIEWS LOCATION: [facility] DATE/TIME: 2/13/2023 11:42 AM INDICATION: Chest pain. COMPARISON: Chest CTA 02/11/2023     IMPRESSION: Heart size and vascularity are normal. Mild bronchial wall thickening suggests airway inflammation. No focal consolidation, pneumothorax nor pleural effusion.    Abdomen US, limited (RUQ only)    Result Date: 1/14/2023  EXAM: US ABDOMEN LIMITED LOCATION: [facility] DATE/TIME: 1/14/2023 5:18 PM INDICATION: Elevated total bilirubin. Epigastric abdominal pain. COMPARISON: CT abdomen/pelvis 01/14/2023 TECHNIQUE: Limited abdominal ultrasound. FINDINGS: GALLBLADDER: Status post cholecystectomy. BILE DUCTS: No biliary dilatation. The common duct measures 5 mm. LIVER: Normal parenchyma with smooth contour. No focal mass. RIGHT KIDNEY: No hydronephrosis. PANCREAS: The visualized portions are normal. No ascites.     IMPRESSION: 1.  Status post cholecystectomy. No biliary dilatation.     US Abdominal Doppler Complete    Result Date: 1/17/2023  EXAM: US ABDOMEN OR PELVIS DOPPLER COMPLETE LOCATION: [facility] DATE/TIME: 1/17/2023 10:16 AM INDICATION: elevated LFTs, eval for vascular causes COMPARISON: 01/14/2023. TECHNIQUE: Complete abdominal ultrasound. Color flow with spectral Doppler and waveform analysis performed.  FINDINGS: GALLBLADDER: Surgically absent. BILE DUCTS: No biliary dilatation. The common duct measures for mm. LIVER: Normal parenchyma with smooth contour. No focal mass.  The kidneys spleen and pancreas were not imaged. AORTA: Normal in caliber. IVC: Normal where visualized. No ascites. ABDOMINAL DUPLEX: Hepatic artery, hepatic veins, inferior vena cava, portal veins and splenic vein are patent with flow in the normal direction. There is mild elevation of resistive indices in the hepatic artery with the main hepatic artery resistive index 0.86, right hepatic artery 0.89 and left hepatic artery 0.9. There is diastolic flow in all of these vessels.     IMPRESSION: 1.  Normal sonographic appearance of the liver. 2.  Mildly elevated resistive indices in the hepatic arteries. The hepatic artery, hepatic veins, inferior vena cava, portal vein and splenic vein are patent with flow in the normal direction.     CT Abdomen Pelvis w Contrast    Result Date: 2/11/2023  EXAM: CT ABDOMEN PELVIS W CONTRAST LOCATION: [facility] DATE/TIME: 2/11/2023 4:49 AM INDICATION: right midabdominal and RLQ pain, jaundice COMPARISON: Ultrasound from 01/17/2023, CT from 01/14/2023. TECHNIQUE: CT scan of the abdomen and pelvis was performed following injection of IV contrast. Multiplanar reformats were obtained. Dose reduction techniques were used. CONTRAST: isovue 370 100 mls FINDINGS: LOWER CHEST: Please see separate dictation for detailed findings above the diaphragm. HEPATOBILIARY: Absent gallbladder. Liver within normal limits. Minimal central biliary ductal prominence likely due to postcholecystectomy reservoir effect. Normal caliber common bile duct. No calcified CBD stone. PANCREAS: Normal. SPLEEN: Absent. ADRENAL GLANDS: Normal. KIDNEYS/BLADDER: Symmetric enhancement. No hydronephrosis. Bladder is normal. BOWEL: No bowel obstruction or free air. Appendix is normal. LYMPH NODES: Normal. VASCULATURE: Unremarkable. PELVIC ORGANS: Enhancing focus at the uterine fundus consistent with a fibroid. MUSCULOSKELETAL: Normal.     IMPRESSION: 1.  No focal inflammatory change identified in  the abdomen or pelvis. 2.  Slight intrahepatic biliary ductal prominence likely due to postcholecystectomy reservoir effect. This is unchanged compared to the prior CT.    CT Chest Pulmonary Embolism w Contrast    Result Date: 2/11/2023  EXAM: CT CHEST PULMONARY EMBOLISM W CONTRAST LOCATION: [facility] DATE/TIME: 2/11/2023 4:49 AM INDICATION: r o PE COMPARISON: None. TECHNIQUE: CT chest pulmonary angiogram during arterial phase injection of IV contrast. Multiplanar reformats and MIP reconstructions were performed. Dose reduction techniques were used. CONTRAST: isovue 370 100 mls FINDINGS: ANGIOGRAM CHEST: Pulmonary arteries are normal caliber and negative for pulmonary emboli. Thoracic aorta is negative for dissection. No CT evidence of right heart strain. LUNGS AND PLEURA: Subsegmental atelectasis lung bases. MEDIASTINUM/AXILLAE: Normal. CORONARY ARTERY CALCIFICATION: None. UPPER ABDOMEN: Normal. MUSCULOSKELETAL: Normal.     IMPRESSION: 1.  No pulmonary embolism.      Signed by: Joel Burroughs MD

## 2023-02-13 NOTE — H&P
[facility] MEDICINE ADMISSION HISTORY AND PHYSICAL   Date of Admission: 2/13/2023  Eveline Gage, 1995, [id number]    Identification/Summary:   Eveline Gage is a 27 year old female with PMH signficant for sickle cell disease, asthma, splinting, adjustment disorder and chronic anemia.  Presented with severe pain secondary to sickle cell crisis.    Assessment and Plan:  -Sickle cell crisis  Inpatient [facility] admission.  Expect length of stay greater than 2 nights.  Order Dilaudid 1 mg IV every 2 hours as needed, oral oxycodone, Voltaren gel, Robaxin and Flexeril.  Hold on her home naproxen.  Placed on continuous pulse oximeter.  Pain team has being consulted.  Also order intravenous fluids normal saline at 150 mL/h.  Order consult for [facility] hematology.  Has not followed up with them since last hospitalization.  -Leukocytosis  -Chronic anemia  White count 18.5 increased compared to 2 days ago.    Hemoglobin 9.7 mildly increased from 9.4 2 days ago.  Follow daily CBC.  Defer further evaluation per hematology.  -Elevated LFTs  -Elevated lipase  Admission bilirubin 11.2, , , lipase 67.  LFTs lower versus essentially stable compared to 2 days ago.  Lipase better than 2 days ago.  Lab results significantly improved compared to January.  Continue treatment as above.  Follow daily LFTs.  -Mild intermittent asthma  Ordered as needed albuterol and DuoNebs.  -GERD  Order home omeprazole twice daily, Carafate 4 times daily as needed, Tums and senna.  -Tobacco abuse  -Cannabis use  Patient smokes quarter pack tobacco per day.  Does admit to daily THC use.  Does intermittently help with her symptoms.  Hold off on nicotine patches as we do not want to contribute to vasoconstriction.  Patient could be a very good candidate for medicinal cannabis.  Encouraged her to be evaluated and certified.    -Anticoagulation   Encourage ambulation at this time.    -COVID19 PCR  "influenza a/B/RSV negative from 2/11/2023  Noted.  -Marsha present    Code Status: Full Code    Disposition: Inpatient     Clinically Significant Risk Factors Present on Admission              # Hypoalbuminemia: Lowest albumin = 2.8 g/dL at 2/13/2023  8:08 AM, will monitor as appropriate  # Coagulation Defect: INR = 1.27 (Ref range: 0.85 - 1.15) and/or PTT = 32 Seconds (Ref range: 22 - 38 Seconds), will monitor for bleeding         # Obesity: Estimated body mass index is 32.59 kg/m  as calculated from the following:    Height as of this encounter: 1.6 m (5' 3\").    Weight as of this encounter: 83.5 kg (184 lb).           Chief Complaint  diffuse sickle cell pain     HISTORY     Eveline Gage is a 27 year old female with PMH of sickle cell disease, asthma, splinting, adjustment disorder and chronic anemia.  Presented with severe pain secondary to sickle cell crisis.  Hospitalized 1/14 - 1/19 at [facility] for sickle cell crisis and elevated LFTs.  Negative work-up with endoscopic ultrasound.  LFTs improved with pain control.  2/11 seen and [facility] ED for sickle cell crisis.  Improved with hydration and discharged with oxycodone.  2/13 returned with worsening severe pain.  Admitted for sickle cell crisis.  Here on the floor having severe diffuse pain difficult for her to quantify but very very uncomfortable.  Finds it difficult to speak.  Complaining of shortness of breath.  Oxygen saturations 97% on room air.  Has not slept for 2 days.  Blood work remarkable for elevated LFTs though essentially unchanged from 2 days ago and substantially improved from last month.  Denies personal history of heart attack, stroke, cancer, diabetes, DVT, PE, peptic ulcer disease or sleep apnea.  Questions answered to verbalize satisfaction.    Past Medical History     Past Medical History:  10/05/2017: Adjustment disorder with mixed anxiety and depressed mood  01/14/2023: Anemia, unspecified type  No " date: Antibody to blood group S antigen positive      Comment:  Patient has anti-C, anti-S, anti-Jkb, anti-M, and a                nonspecific antibody. Blood product orders may be                delayed. Draw THREE purple top tubes for all Type and                Screen/Type and crossmatch orders- per [facility]  06/17/2021: Asplenia  03/09/2017: Asthma  No date: Cannabis use disorder  06/15/2020: Sickle cell disease (H)     Patient Active Problem List    Diagnosis Date Noted     Sickle cell pain crisis (H) 02/13/2023     Priority: Medium     Antibody to blood group S antigen positive 02/13/2023     Priority: Medium     Patient has anti-C, anti-S, anti-Jkb, anti-M, and a nonspecific antibody. Blood product orders may be delayed. Draw THREE purple top tubes for all Type and Screen/Type and crossmatch orders- per [facility]       Elevation of levels of liver transaminase levels 01/16/2023     Priority: Medium     Abdominal pain, epigastric 01/14/2023     Priority: Medium     Anemia, unspecified type 01/14/2023     Priority: Medium     SGA (small for gestational age) 11/21/2022     Priority: Medium     Asplenia 06/17/2021     Priority: Medium     Sickle cell disease (H) 06/15/2020     Priority: Medium     Elevated liver enzymes 03/12/2018     Priority: Medium     Diarrhea 03/12/2018     Priority: Medium     Iron deficiency anemia 03/12/2018     Priority: Medium     Adjustment disorder with mixed anxiety and depressed mood 10/05/2017     Priority: Medium     Postpartum mood disturbance 10/05/2017     Priority: Medium     Abnormal antibody titer 05/30/2017     Priority: Medium     Blood typing encounter 04/21/2017     Priority: Medium     Formatting of this note might be different from the original.  Blood Bank Antibody(s) Present.  Allow 12 to 36 hours for compatible RBC's. Recommend future transfusions with antigen negative. See transfusion reaction report from 4/19/17 for further details      ; ALERT - Blood  Typing Antibody anti-C, -Jkb, -M       Asthma 03/09/2017     Priority: Medium     Supervision of high risk pregnancy, unspecified, unspecified trimester 03/02/2017     Priority: Medium     Formatting of this note might be different from the original.  Overview:   Bobby Jama [phone],cell [phone]  Formatting of this note might be different from the original.  Bobby Sutton PHN Penny Jama [phone],cell [phone]       Screening for malignant neoplasm of cervix 12/19/2016     Priority: Medium     Formatting of this note might be different from the original.   Per visit note dated Dec.  9 , 2016: History of abnormal Pap: Never had a Pap test  2016 NILM 21 y.o.  PLAN:  pap test 12/2019    ;  Pap test history       Drug use during pregnancy 12/13/2016     Priority: Medium     Formatting of this note might be different from the original.  Overview:   THC on UDS. Pt quit thc and cigarettes at dx. Followed by brennon Loo. Repeat drug screen with 26 week labs.(  Pt has been taking narcotics to manage pain of her sickle cell crisis as prescribed by drCaio!)  Formatting of this note might be different from the original.  THC on UDS. Pt quit thc and cigarettes at dx. Followed by brennon Loo. Repeat drug screen with 26 week labs.(  Pt has been taking narcotics to manage pain of her sickle cell crisis as prescribed by !)       Immune to rubella 12/13/2016     Priority: Medium     Need for hepatitis B vaccination 12/12/2016     Priority: Medium     Formatting of this note might be different from the original.  1st given 2/2/17.  2nd given 3/10/17.  3rd DUE 8/2/17.       Need for vaccination 12/12/2016     Priority: Medium     Formatting of this note might be different from the original.  Overview:   1st given 2/2/17.  2nd given 3/10/17.  3rd DUE 8/2/17.       Anemia complicating pregnancy 12/09/2016     Priority: Medium     Surgical History     Past Surgical  History:   Procedure Laterality Date     CHOLECYSTECTOMY       ESOPHAGOSCOPY, GASTROSCOPY, DUODENOSCOPY (EGD), COMBINED N/A 01/15/2023    Procedure: ENDOSCOPIC ULTRASOUND;  Surgeon: Marv Tomas MD;  Location: [facility] OR     SPLENECTOMY       TONSILLECTOMY       Family History      Family History   Problem Relation Age of Onset     Sickle Cell Trait Mother      Hypertension Mother      Diabetes Father      Kidney Disease Father      Liver Disease Father      Sickle Cell Trait Maternal Grandfather      Sickle Cell Trait Paternal Grandmother       Social History      Social History     Tobacco Use     Smoking status: Every Day     Packs/day: 0.50     Types: Cigarettes     Passive exposure: Never     Smokeless tobacco: Never   Vaping Use     Vaping Use: Never used   Substance Use Topics     Alcohol use: Not Currently     Drug use: Yes     Frequency: 7.0 times per week     Types: Marijuana      Allergies     Allergies   Allergen Reactions     Blood-Group Specific Substance Other (See Comments)     Patient has anti-C, anti-S, anti-Jkb, anti-M, and a nonspecific antibody. Blood product orders may be delayed. Draw THREE purple top tubes for all Type and Screen/Type and crossmatch orders.  Patient has anti-C, anti-S, anti-Jkb, anti-M, and a nonspecific antibody. Blood product orders may be delayed. Draw THREE purple top tubes for all Type and Screen/Type and crossmatch orders.  Patient has anti-C, anti-S, anti-Jkb, anti-M, and a nonspecific antibody. Blood product orders may be delayed. Draw THREE purple top tubes for all Type and Screen/Type and crossmatch orders.  Patient has anti-C, anti-S, anti-Jkb, anti-M, and a nonspecific antibody. Blood product orders may be delayed. Draw THREE purple top tubes for all Type and Screen/Type and crossmatch orders.       Penicillins Rash     Prior to Admission Medications      Prior to Admission Medications   Prescriptions Last Dose Informant Patient Reported? Taking?    albuterol (PROAIR HFA/PROVENTIL HFA/VENTOLIN HFA) 108 (90 Base) MCG/ACT inhaler Unknown  No Yes   Sig: Inhale 2 puffs into the lungs every 6 hours as needed for shortness of breath / dyspnea or wheezing   calcium carbonate (TUMS) 500 MG chewable tablet Unknown  Yes Yes   Sig: Take 1-2 chew tab by mouth every 4 hours as needed for heartburn   cyclobenzaprine (FLEXERIL) 10 MG tablet Unknown  No Yes   Sig: Take 1 tablet (10 mg) by mouth 3 times daily as needed for muscle spasms   diclofenac (VOLTAREN) 1 % topical gel Unknown  No Yes   Sig: Apply 2 g topically 4 times daily Apply as needed to chest, back, or other painful areas   etonogestrel (NEXPLANON) 68 MG IMPL Unknown  Yes Yes   Si each by Subdermal route   ipratropium - albuterol 0.5 mg/2.5 mg/3 mL (DUONEB) 0.5-2.5 (3) MG/3ML neb solution Unknown  No Yes   Sig: Take 1 vial (3 mLs) by nebulization every 6 hours as needed for shortness of breath / dyspnea or wheezing   methocarbamol (ROBAXIN) 500 MG tablet Unknown  No Yes   Sig: Take 1 tablet (500 mg) by mouth 4 times daily as needed for muscle spasms   multivitamin, therapeutic (THERA-VIT) TABS tablet Past Week  Yes Yes   Sig: Take 1 tablet by mouth daily   naproxen sodium (ANAPROX) 220 MG tablet 2023  Yes Yes   Sig: Take 220-440 mg by mouth every 12 hours as needed for moderate pain (4-6)   omeprazole (PRILOSEC) 20 MG DR capsule 2023  No Yes   Sig: Take 1 capsule (20 mg) by mouth 2 times daily   oxyCODONE (ROXICODONE) 5 MG tablet 2023 at am  No Yes   Sig: Take 1-2 tablets (5-10 mg) by mouth every 4 hours as needed for pain (can take 5 mg for moderate pain and 10 mg for severe pain)   Patient taking differently: Take 10 mg by mouth every 4 hours as needed for pain (can take 5 mg for moderate pain and 10 mg for severe pain)   sennosides (SENOKOT) 8.6 MG tablet Unknown  No Yes   Sig: Take 2 tablets by mouth daily as needed for constipation   sucralfate (CARAFATE) 1 GM tablet Unknown  No Yes    Sig: Take 1 tablet (1 g) by mouth 4 times daily as needed for nausea (stomach pain)      Facility-Administered Medications: None      Review of Systems     A 12 point comprehensive review of systems was negative except as noted above in HPI.    PHYSICAL EXAMINATION     Vitals      Temp:  [97.4  F (36.3  C)-98  F (36.7  C)] 97.4  F (36.3  C)  Pulse:  [] 68  Resp:  [10-59] 20  BP: (136-152)/(71-82) 136/71  SpO2:  [96 %-97 %] 97 %    Examination     Constitutional: awake, alert, cooperative, moderate discomfort, appears stated age and moderately obese  Eyes: Lids and lashes normal, pupils equal, round and reactive to light, extra ocular muscles intact, sclera clear, conjunctiva normal  ENT: Normocephalic, without obvious abnormality, atraumatic, sinuses nontender on palpation, external ears without lesions, oral pharynx with moist mucous membranes, tonsils without erythema or exudates, gums normal and good dentition.  Hematologic / Lymphatic: no cervical lymphadenopathy and no supraclavicular lymphadenopathy  Respiratory: Tachypneic but, good air exchange, clear to auscultation bilaterally, no crackles or wheezing  Cardiovascular: Normal apical impulse, regular rate and rhythm, normal S1 and S2, no S3 or S4, and no murmur noted  GI: Diminished bowel sounds soft nondistended.  Mild diffuse tenderness.  Skin: normal skin color, texture, turgor, no redness, warmth, or swelling, and no rashes  Musculoskeletal: There is no redness, warmth, or swelling of the joints.  Full range of motion noted.  Motor strength is 5 out of 5 all extremities bilaterally.  Tone is normal. no lower extremity pitting edema present  Neurologic: Cranial nerves II-XII are grossly intact. Sensory:  Sensory intact Deep Tendon Reflexes:  Reflexes are intact and symmetrical bilaterally  Neuropsychiatric: General: restless and normal eye contact Level of consciousness: alert / normal Affect: anxious Orientation: oriented to self, place, time and  situation Memory and insight: normal, memory for past and recent events intact and thought process normal      Pertinent Lab     Results for orders placed or performed during the hospital encounter of 02/13/23 (from the past 24 hour(s))   ECG 12-LEAD WITH MUSE ([facility])   Result Value Ref Range    Systolic Blood Pressure 148 mmHg    Diastolic Blood Pressure 77 mmHg    Ventricular Rate 69 BPM    Atrial Rate 69 BPM    MD Interval 134 ms    QRS Duration 70 ms     ms    QTc 390 ms    P Axis 5 degrees    R AXIS 72 degrees    T Axis 60 degrees    Interpretation ECG       Sinus rhythm  Normal ECG  When compared with ECG of 25-AUG-2021 16:48,  No significant change was found  Confirmed by SEE ED PROVIDER NOTE FOR, ECG INTERPRETATION ([id number]),  DENNIS BECKETT ([id number]) on 2/13/2023 8:29:59 AM     CBC with platelets + differential    Narrative    The following orders were created for panel order CBC with platelets + differential.  Procedure                               Abnormality         Status                     ---------                               -----------         ------                     CBC with platelets and d...[[id number]]  Abnormal            Final result               Manual Differential[[id number]]          Abnormal            Final result                 Please view results for these tests on the individual orders.   Comprehensive metabolic panel   Result Value Ref Range    Sodium 139 136 - 145 mmol/L    Potassium 4.6 3.5 - 5.0 mmol/L    Chloride 109 (H) 98 - 107 mmol/L    Carbon Dioxide (CO2) 22 22 - 31 mmol/L    Anion Gap 8 5 - 18 mmol/L    Urea Nitrogen 9 8 - 22 mg/dL    Creatinine 0.62 0.60 - 1.10 mg/dL    Calcium 8.8 8.5 - 10.5 mg/dL    Glucose 96 70 - 125 mg/dL    Alkaline Phosphatase 196 (H) 45 - 120 U/L     (H) 0 - 40 U/L     (H) 0 - 45 U/L    Protein Total 7.4 6.0 - 8.0 g/dL    Albumin 2.8 (L) 3.5 - 5.0 g/dL    Bilirubin Total 11.2 (H) 0.0 - 1.0 mg/dL    GFR Estimate >90 >60  mL/min/1.73m2   CRP inflammation   Result Value Ref Range    CRP 2.6 (H) 0.0 - <0.8 mg/dL   INR   Result Value Ref Range    INR 1.27 (H) 0.85 - 1.15   Reticulocyte count   Result Value Ref Range    % Reticulocyte 8.2 (H) 0.8 - 2.7 %    Absolute Reticulocyte 0.263 (H) 0.010 - 0.110 10e6/uL   Troponin I (now)   Result Value Ref Range    Troponin I 0.01 0.00 - 0.29 ng/mL   CBC with platelets and differential   Result Value Ref Range    WBC Count 18.5 (H) 4.0 - 11.0 10e3/uL    RBC Count 3.19 (L) 3.80 - 5.20 10e6/uL    Hemoglobin 9.7 (L) 11.7 - 15.7 g/dL    Hematocrit 27.2 (L) 35.0 - 47.0 %    MCV 85 78 - 100 fL    MCH 30.4 26.5 - 33.0 pg    MCHC 35.7 31.5 - 36.5 g/dL    RDW 19.4 (H) 10.0 - 15.0 %    Platelet Count 250 150 - 450 10e3/uL   Lipase   Result Value Ref Range    Lipase 67 (H) 0 - 52 U/L   Manual Differential   Result Value Ref Range    % Neutrophils 59 %    % Lymphocytes 26 %    % Monocytes 10 %    % Eosinophils 5 %    % Basophils 0 %    Absolute Neutrophils 10.9 (H) 1.6 - 8.3 10e3/uL    Absolute Lymphocytes 4.8 0.8 - 5.3 10e3/uL    Absolute Monocytes 1.9 (H) 0.0 - 1.3 10e3/uL    Absolute Eosinophils 0.9 (H) 0.0 - 0.7 10e3/uL    Absolute Basophils 0.0 0.0 - 0.2 10e3/uL    RBC Morphology Confirmed RBC Indices     Platelet Assessment  Automated Count Confirmed. Platelet morphology is normal.     Automated Count Confirmed. Platelet morphology is normal.    Elliptocytes Slight (A) None Seen    Polychromasia Slight (A) None Seen    Sickle Cells Slight (A) None Seen    Target Cells Moderate (A) None Seen   Chest XR,  PA & LAT    Narrative    EXAM: XR CHEST 2 VIEWS  LOCATION: [facility]  DATE/TIME: 2/13/2023 11:42 AM    INDICATION: Chest pain.  COMPARISON: Chest CTA 02/11/2023      Impression    IMPRESSION: Heart size and vascularity are normal. Mild bronchial wall thickening suggests airway inflammation. No focal consolidation, pneumothorax nor pleural effusion.       Pertinent Radiology      Radiology Results:   Recent Results (from the past 24 hour(s))   Chest XR,  PA & LAT    Narrative    EXAM: XR CHEST 2 VIEWS  LOCATION: [facility]  DATE/TIME: 2/13/2023 11:42 AM    INDICATION: Chest pain.  COMPARISON: Chest CTA 02/11/2023      Impression    IMPRESSION: Heart size and vascularity are normal. Mild bronchial wall thickening suggests airway inflammation. No focal consolidation, pneumothorax nor pleural effusion.     EKG Results: personally reviewed.   Sinus rhythm no ST-T wave changes.  Heart rate 69.  No significant change compared to previous study.    Advance Care Planning        Miky Marrufo MD  [facility]  [facility]   Phone: #[phone]

## 2023-02-14 PROBLEM — F41.9 ANXIETY: Status: ACTIVE | Noted: 2023-02-14

## 2023-02-14 LAB
ALBUMIN SERPL-MCNC: 3 G/DL (ref 3.5–5)
ALP SERPL-CCNC: 322 U/L (ref 45–120)
ALT SERPL W P-5'-P-CCNC: 191 U/L (ref 0–45)
AST SERPL W P-5'-P-CCNC: 279 U/L (ref 0–40)
BILIRUB DIRECT SERPL-MCNC: 7.3 MG/DL
BILIRUB SERPL-MCNC: 12.9 MG/DL (ref 0–1)
ERYTHROCYTE [DISTWIDTH] IN BLOOD BY AUTOMATED COUNT: 19.7 % (ref 10–15)
HCT VFR BLD AUTO: 25.2 % (ref 35–47)
HGB BLD-MCNC: 8.7 G/DL (ref 11.7–15.7)
MCH RBC QN AUTO: 30.5 PG (ref 26.5–33)
MCHC RBC AUTO-ENTMCNC: 34.5 G/DL (ref 31.5–36.5)
MCV RBC AUTO: 88 FL (ref 78–100)
PLATELET # BLD AUTO: 121 10E3/UL (ref 150–450)
PROT SERPL-MCNC: 7.5 G/DL (ref 6–8)
RBC # BLD AUTO: 2.85 10E6/UL (ref 3.8–5.2)
WBC # BLD AUTO: 21.3 10E3/UL (ref 4–11)

## 2023-02-14 PROCEDURE — 36415 COLL VENOUS BLD VENIPUNCTURE: CPT | Performed by: FAMILY MEDICINE

## 2023-02-14 PROCEDURE — 250N000011 HC RX IP 250 OP 636: Performed by: NURSE PRACTITIONER

## 2023-02-14 PROCEDURE — 272N000727 HC KIT, CATH 5FR  DUAL LUMEN POWERMIDLINE

## 2023-02-14 PROCEDURE — 82040 ASSAY OF SERUM ALBUMIN: CPT | Performed by: FAMILY MEDICINE

## 2023-02-14 PROCEDURE — 250N000013 HC RX MED GY IP 250 OP 250 PS 637: Performed by: INTERNAL MEDICINE

## 2023-02-14 PROCEDURE — 250N000013 HC RX MED GY IP 250 OP 250 PS 637: Performed by: FAMILY MEDICINE

## 2023-02-14 PROCEDURE — 85027 COMPLETE CBC AUTOMATED: CPT | Performed by: FAMILY MEDICINE

## 2023-02-14 PROCEDURE — 36569 INSJ PICC 5 YR+ W/O IMAGING: CPT

## 2023-02-14 PROCEDURE — 250N000011 HC RX IP 250 OP 636: Performed by: FAMILY MEDICINE

## 2023-02-14 PROCEDURE — 258N000003 HC RX IP 258 OP 636: Performed by: FAMILY MEDICINE

## 2023-02-14 PROCEDURE — 99232 SBSQ HOSP IP/OBS MODERATE 35: CPT | Performed by: FAMILY MEDICINE

## 2023-02-14 PROCEDURE — 250N000009 HC RX 250: Performed by: NURSE PRACTITIONER

## 2023-02-14 PROCEDURE — 99223 1ST HOSP IP/OBS HIGH 75: CPT | Performed by: NURSE PRACTITIONER

## 2023-02-14 PROCEDURE — 250N000013 HC RX MED GY IP 250 OP 250 PS 637: Performed by: HOSPITALIST

## 2023-02-14 PROCEDURE — 250N000013 HC RX MED GY IP 250 OP 250 PS 637: Performed by: NURSE PRACTITIONER

## 2023-02-14 PROCEDURE — 120N000001 HC R&B MED SURG/OB

## 2023-02-14 PROCEDURE — 99418 PROLNG IP/OBS E/M EA 15 MIN: CPT | Performed by: NURSE PRACTITIONER

## 2023-02-14 PROCEDURE — 250N000011 HC RX IP 250 OP 636: Performed by: INTERNAL MEDICINE

## 2023-02-14 RX ORDER — LIDOCAINE 50 MG/G
OINTMENT TOPICAL 4 TIMES DAILY
Status: DISCONTINUED | OUTPATIENT
Start: 2023-02-14 | End: 2023-02-20 | Stop reason: HOSPADM

## 2023-02-14 RX ORDER — LIDOCAINE 40 MG/G
CREAM TOPICAL
Status: DISCONTINUED | OUTPATIENT
Start: 2023-02-14 | End: 2023-02-14 | Stop reason: ALTCHOICE

## 2023-02-14 RX ORDER — LORAZEPAM 2 MG/ML
1 INJECTION INTRAMUSCULAR EVERY 6 HOURS
Status: DISCONTINUED | OUTPATIENT
Start: 2023-02-14 | End: 2023-02-14

## 2023-02-14 RX ORDER — LORAZEPAM 2 MG/ML
1 INJECTION INTRAMUSCULAR EVERY 6 HOURS PRN
Status: DISCONTINUED | OUTPATIENT
Start: 2023-02-14 | End: 2023-02-16

## 2023-02-14 RX ORDER — HYDROXYZINE HYDROCHLORIDE 25 MG/1
25 TABLET, FILM COATED ORAL EVERY 6 HOURS PRN
Status: DISCONTINUED | OUTPATIENT
Start: 2023-02-14 | End: 2023-02-20 | Stop reason: HOSPADM

## 2023-02-14 RX ORDER — METHOCARBAMOL 500 MG/1
500 TABLET, FILM COATED ORAL EVERY 6 HOURS
Status: DISCONTINUED | OUTPATIENT
Start: 2023-02-14 | End: 2023-02-20 | Stop reason: HOSPADM

## 2023-02-14 RX ORDER — HYDROMORPHONE HYDROCHLORIDE 2 MG/1
2-4 TABLET ORAL
Status: DISCONTINUED | OUTPATIENT
Start: 2023-02-14 | End: 2023-02-14

## 2023-02-14 RX ORDER — LIDOCAINE 40 MG/G
CREAM TOPICAL
Status: DISCONTINUED | OUTPATIENT
Start: 2023-02-14 | End: 2023-02-14

## 2023-02-14 RX ADMIN — LIDOCAINE: 50 OINTMENT TOPICAL at 15:00

## 2023-02-14 RX ADMIN — DICLOFENAC SODIUM 2 G: 10 GEL TOPICAL at 16:13

## 2023-02-14 RX ADMIN — HYDROMORPHONE HYDROCHLORIDE 2 MG: 2 TABLET ORAL at 08:23

## 2023-02-14 RX ADMIN — HYDROMORPHONE HYDROCHLORIDE 1 MG: 1 INJECTION, SOLUTION INTRAMUSCULAR; INTRAVENOUS; SUBCUTANEOUS at 10:45

## 2023-02-14 RX ADMIN — METHOCARBAMOL 500 MG: 500 TABLET, FILM COATED ORAL at 15:00

## 2023-02-14 RX ADMIN — METHOCARBAMOL 500 MG: 500 TABLET, FILM COATED ORAL at 20:21

## 2023-02-14 RX ADMIN — ACETAMINOPHEN 650 MG: 325 TABLET ORAL at 04:10

## 2023-02-14 RX ADMIN — METHOCARBAMOL 500 MG: 500 TABLET, FILM COATED ORAL at 08:31

## 2023-02-14 RX ADMIN — PANTOPRAZOLE SODIUM 40 MG: 40 TABLET, DELAYED RELEASE ORAL at 08:34

## 2023-02-14 RX ADMIN — DICLOFENAC SODIUM 2 G: 10 GEL TOPICAL at 07:49

## 2023-02-14 RX ADMIN — SENNOSIDES AND DOCUSATE SODIUM 1 TABLET: 50; 8.6 TABLET ORAL at 20:21

## 2023-02-14 RX ADMIN — PANTOPRAZOLE SODIUM 40 MG: 40 TABLET, DELAYED RELEASE ORAL at 20:21

## 2023-02-14 RX ADMIN — Medication 3 MG: at 20:21

## 2023-02-14 RX ADMIN — SENNOSIDES AND DOCUSATE SODIUM 1 TABLET: 50; 8.6 TABLET ORAL at 08:32

## 2023-02-14 RX ADMIN — HYDROXYZINE HYDROCHLORIDE 25 MG: 25 TABLET ORAL at 08:23

## 2023-02-14 RX ADMIN — THERA TABS 1 TABLET: TAB at 08:33

## 2023-02-14 RX ADMIN — HYDROMORPHONE HYDROCHLORIDE 4 MG: 2 TABLET ORAL at 00:30

## 2023-02-14 RX ADMIN — DICLOFENAC SODIUM 2 G: 10 GEL TOPICAL at 12:41

## 2023-02-14 RX ADMIN — Medication: at 11:12

## 2023-02-14 RX ADMIN — LIDOCAINE: 50 OINTMENT TOPICAL at 18:03

## 2023-02-14 RX ADMIN — ONDANSETRON 4 MG: 2 INJECTION INTRAMUSCULAR; INTRAVENOUS at 05:58

## 2023-02-14 RX ADMIN — SODIUM CHLORIDE: 9 INJECTION, SOLUTION INTRAVENOUS at 12:41

## 2023-02-14 RX ADMIN — HYDROMORPHONE HYDROCHLORIDE 1 MG: 1 INJECTION, SOLUTION INTRAMUSCULAR; INTRAVENOUS; SUBCUTANEOUS at 02:24

## 2023-02-14 RX ADMIN — HYDROMORPHONE HYDROCHLORIDE 1 MG: 1 INJECTION, SOLUTION INTRAMUSCULAR; INTRAVENOUS; SUBCUTANEOUS at 04:00

## 2023-02-14 RX ADMIN — ACETAMINOPHEN 650 MG: 325 TABLET ORAL at 22:02

## 2023-02-14 RX ADMIN — SODIUM CHLORIDE: 9 INJECTION, SOLUTION INTRAVENOUS at 17:52

## 2023-02-14 RX ADMIN — HYDROMORPHONE HYDROCHLORIDE 1 MG: 1 INJECTION, SOLUTION INTRAMUSCULAR; INTRAVENOUS; SUBCUTANEOUS at 06:41

## 2023-02-14 RX ADMIN — HYDROMORPHONE HYDROCHLORIDE 2 MG: 2 TABLET ORAL at 08:44

## 2023-02-14 RX ADMIN — DICLOFENAC SODIUM 2 G: 10 GEL TOPICAL at 20:21

## 2023-02-14 RX ADMIN — HYDROXYUREA 500 MG: 500 CAPSULE ORAL at 08:26

## 2023-02-14 RX ADMIN — HYDROMORPHONE HYDROCHLORIDE 4 MG: 2 TABLET ORAL at 05:09

## 2023-02-14 ASSESSMENT — ACTIVITIES OF DAILY LIVING (ADL)
ADLS_ACUITY_SCORE: 41

## 2023-02-14 NOTE — PROGRESS NOTES
[facility] MEDICINE  PROGRESS NOTE     Code Status: Full Code       Identification/Summary:   Eveline Gage is a 27 year old female with PMH signficant for sickle cell disease, asthma, splinting, adjustment disorder and chronic anemia.   2/13 presented with severe pain secondary to sickle cell crisis.  Admitted.  Pain team consulted.  Given IV hydration.  Multiple modalities utilized to try and help with pain but still having significant struggles.  Hematology consulted.  2/14 we will place midline and start Dilaudid PCA.  Has an anxiety component so we will consult psychiatry.     Assessment and Plan:  -Sickle cell crisis  Initially started Dilaudid 1 mg IV every 2 hours as needed, oral oxycodone, Voltaren gel, Robaxin and Flexeril.  Holding her home naproxen.  Keep on continuous pulse oximeter.  Hydrate with normal saline 150 mL/h.  Pain team consult appreciated.  Appreciate consultation with [facility] hematology.  Started on hydroxyurea.  Had significant challenges with pain control.  2/14 will transition to Dilaudid PCA.  -Leukocytosis  -Chronic anemia  White count 18.5--> 21.3  Hemoglobin 9.7--> 8.7.  Follow daily CBC.  Defer further evaluation per hematology.  -Elevated LFTs  -Elevated lipase  Admission bilirubin 11.2, , , lipase 67.  LFTs lower versus essentially stable compared to 2 days ago.  Lipase better than 2 days ago.  Lab results significantly improved compared to January.  On 2/14 alk phos 322, , , bili total 2.9.  Continue treatment as above.    Follow daily LFTs.  -Mild intermittent asthma  Continue as needed albuterol and DuoNebs.  -GERD  Continue home omeprazole twice daily, Carafate 4 times daily as needed, Tums and senna.  -Tobacco abuse  -Cannabis use  Patient smokes quarter pack tobacco per day.  Does admit to daily THC use.  Does intermittently help with her symptoms.  Hold off on nicotine patches as we do not want  "to contribute to vasoconstriction.  Patient could be a very good candidate for medicinal cannabis.  Encouraged her to be evaluated and certified.     -Anticoagulation   Encourage ambulation at this time.     -COVID19 PCR influenza a/B/RSV negative from 2/11/2023  Noted.    Fluids: Normal saline at 150 mL/h  Pain meds: Dilaudid PCA  Therapy: na  Echeverria:Not present  Lines: None       Current Diet  Orders Placed This Encounter      Combination Diet Regular Diet Adult    Supplements  None    Barriers to Discharge: Pain control, IV fluids, hematology and psychiatry consult    Disposition: Likely here at least 2-3 more days    Clinically Significant Risk Factors Present on Admission              # Hypoalbuminemia: Lowest albumin = 2.8 g/dL at 2/13/2023  8:08 AM, will monitor as appropriate  # Coagulation Defect: INR = 1.27 (Ref range: 0.85 - 1.15) and/or PTT = 32 Seconds (Ref range: 22 - 38 Seconds), will monitor for bleeding  # Thrombocytopenia: Lowest platelets = 121 in last 2 days, will monitor for bleeding        # Obesity: Estimated body mass index is 34.17 kg/m  as calculated from the following:    Height as of this encounter: 1.6 m (5' 3\").    Weight as of this encounter: 87.5 kg (192 lb 14.4 oz).           Interval History/Subjective:  Patient had very difficult evening.  Shouting out frequently.  Had significant pain overnight and had a lot of difficulty sleeping.  Anxiety is certainly a component of this.  Complains of diffuse pain but no chest pain.  Oxygenating well though she complains of shortness of breath.  Current localized pain is the right side.  Using heat to the site with some improvement.  Questions answered to verbalized satisfaction.    Physical Exam/Objective:  Temp:  [97.1  F (36.2  C)-97.8  F (36.6  C)] 97.8  F (36.6  C)  Pulse:  [] 71  Resp:  [6-59] 30  BP: (133-185)/() 138/100  SpO2:  [96 %-100 %] 100 %  Wt Readings from Last 4 Encounters:   02/14/23 87.5 kg (192 lb 14.4 oz) "   01/19/23 83.5 kg (184 lb 1.6 oz)   01/14/23 73.5 kg (162 lb)   08/04/22 82.6 kg (182 lb)     Body mass index is 34.17 kg/m .    Constitutional: awake, alert, difficult to have changing positions due to her level of discomfort, moderate distress, appears stated age and moderately obese  ENT: Normocephalic, without obvious abnormality, atraumatic, external ears without lesions, oral pharynx with moist mucous membranes, tonsils without erythema or exudates, gums normal and good dentition.  Respiratory: No increased work of breathing, good air exchange, clear to auscultation bilaterally, no crackles or wheezing  Cardiovascular: Normal apical impulse, regular rate and rhythm, normal S1 and S2, no S3 or S4, and no murmur noted  GI: No scars, normal bowel sounds, soft, non-distended, non-tender, no masses palpated, no hepatosplenomegally  Skin: normal skin color, texture, turgor, no redness, warmth, or swelling, and no rashes  Musculoskeletal: There is no redness, warmth, or swelling of the joints.  Motor strength is 5 out of 5 all extremities bilaterally.  Tone is normal. no lower extremity pitting edema present  Neurologic: Cranial nerves II-XII are grossly intact. Sensory:  Sensory intact  Neuropsychiatric: General: normal, very anxious and poor eye contact Level of consciousness: alert / normal Affect: Fearful orientation: oriented to self, place, time and situation Memory and insight: normal, memory for past and recent events intact and thought process normal      Medications:   Personally Reviewed.  Medications     sodium chloride 150 mL/hr at 02/13/23 2221       diclofenac  2 g Topical 4x Daily     hydroxyurea  500 mg Oral Daily     melatonin  3 mg Oral At Bedtime     methocarbamol  500 mg Oral TID     multivitamin, therapeutic  1 tablet Oral Daily     pantoprazole  40 mg Oral BID     senna-docusate  1 tablet Oral BID    Or     senna-docusate  2 tablet Oral BID     sodium chloride (PF)  3 mL Intracatheter Q8H        Data reviewed today: I personally reviewed all new medications, labs, imaging/diagnostics reports over the past 24 hours. Pertinent findings include:    Imaging:   Recent Results (from the past 24 hour(s))   Chest XR,  PA & LAT    Narrative    EXAM: XR CHEST 2 VIEWS  LOCATION: [facility]  DATE/TIME: 2/13/2023 11:42 AM    INDICATION: Chest pain.  COMPARISON: Chest CTA 02/11/2023      Impression    IMPRESSION: Heart size and vascularity are normal. Mild bronchial wall thickening suggests airway inflammation. No focal consolidation, pneumothorax nor pleural effusion.       Labs:  Chest XR,  PA & LAT   Final Result   IMPRESSION: Heart size and vascularity are normal. Mild bronchial wall thickening suggests airway inflammation. No focal consolidation, pneumothorax nor pleural effusion.        Recent Results (from the past 24 hour(s))   Hepatic function panel    Collection Time: 02/14/23  7:23 AM   Result Value Ref Range    Bilirubin Total 12.9 (H) 0.0 - 1.0 mg/dL    Bilirubin Direct 7.3 (H) <=0.5 mg/dL    Protein Total 7.5 6.0 - 8.0 g/dL    Albumin 3.0 (L) 3.5 - 5.0 g/dL    Alkaline Phosphatase 322 (H) 45 - 120 U/L     (H) 0 - 40 U/L     (H) 0 - 45 U/L   CBC with platelets    Collection Time: 02/14/23  7:23 AM   Result Value Ref Range    WBC Count 21.3 (H) 4.0 - 11.0 10e3/uL    RBC Count 2.85 (L) 3.80 - 5.20 10e6/uL    Hemoglobin 8.7 (L) 11.7 - 15.7 g/dL    Hematocrit 25.2 (L) 35.0 - 47.0 %    MCV 88 78 - 100 fL    MCH 30.5 26.5 - 33.0 pg    MCHC 34.5 31.5 - 36.5 g/dL    RDW 19.7 (H) 10.0 - 15.0 %    Platelet Count 121 (L) 150 - 450 10e3/uL       Pending Labs:  Unresulted Labs Ordered in the Past 30 Days of this Admission     Date and Time Order Name Status Description    2/13/2023  7:44 AM Other Laboratory; [facility]; Antigen Typing (Laboratory Miscellaneous Order) In process     2/11/2023  3:33 AM Blood Culture Line, venous Preliminary     2/11/2023  3:33 AM  Blood Culture Line, venous Preliminary           Miky Marrufo MD  [facility] Medicine  [facility]  Phone: #[phone]

## 2023-02-14 NOTE — CONSULTS
[facility] ACUTE PAIN SERVICE CONSULTATION     Date of Admission:  2/13/2023  Date of Consult (When I saw the patient): 02/14/23  Physician requesting consult: Dr Miky Marrufo   Reason for consult: sickle cell crisis      Assessment/Plan:     Eveline Gage is a 27 year old female who was admitted on 2/13/2023.  Pain team was asked to see the patient for sickle cell pain. Patient with recent admission 1/15 to 1/19/23, Acute Pain Management Team followed patient at that time for abdominal pain (per GI note possibly pain due to hepatic congestion and liver ischemia related to sickle cell disease). Admitted this hospitalization for sickle cell crisis. History including sickle cell disease, Hx splenectomy, adjustment disorder, mixed anxiety and depression, asthma. The patient does  smoke and denies chemical dependency history.     Pain this AM mostly in right thigh. Locations of pain are right shoulder, both arms, lower back, and bilateral legs mostly in the knees and thighs. The pain is sharp,  constant and severe. She reports worse pain overnight. Patient reporting oxycodone was not helping previously.  IV Dilaudid has been helpful.     In 24 hours, patient has had 12 mg Po dilaudid and 11.4 mg IV Dilaudid = 304 MME.   Recommend Dilaudid PCA, with current PCA orders MME would be up to 672. During interview she does go from periods of being alert to dozing mid sentence during interview. Will need to monitor closely. Continuous pulse ox recommended.     PLAN:   1) Pain is consistent with sickle cell crisis. Labs and imaging indicated: I have personally reviewed pertinent notes, labs, tests, and radiologic imaging in patient's chart. Treatment plan includes multimodal pain approach, Hospital Medicine Service for medical management, Heme/Onc. Patient educated regarding multimodal pain approach, medications as listed below. Patient is understanding of the plan. All questions and concerns addressed to  patient's satisfaction.   2)Multimodal Medication Therapy  Topical: voltaren alternating with lidocaine   NSAID'S: none    Muscle Relaxants: Robaxin tid scheduled - increase to q6h prn  Adjuvants: APAP prn and keep less than 2g given LFTs, hydroxyurea daily   Antidepressants/anxiolytics: hydroxyzine tid prn- increase to q6h prn   Opioids: discontinue PO Dilaudid - start Dilaudid PCA - 0.2 continuous, 0.2 bolus, lockout 10 mins  IV Pain medication: discontinue IV Dilaudid and start PCA. Will order one time IV 1 mg dose now due to severe pain   3)Non-medication interventions: heat, ice   Acupuncture consult - offered and agreeable   Integrative consult - offered and agreeable   4)Constipation Prophylaxis: Scheduled and prn    -MN  pulled from system on 2/14/23. This indicates   2/11/23 Oxycodone 5 mg #12 by Emmanuelle Art  1/27/23 Oxycodone 5 mg #16 by Mario Willams  Discharge Recommendations - We recommend prescribing the following at the time of discharge: TBD     History of Present Illness (HPI):       Eveline Gage is a 27 year old female who presented for sickle cell pain crisis.  Past medical history as above. She has known sickle cell disease confirmed by hemoglobin electrophoresis from February 2, 2017.  She has had previous admissions for sickle cell disease. She had CT imaging which showed no evidence of pneumonia, pulmonary embolism, biliary dilatation or obstruction or any other acute process.  Today she reports sever pain mostly in right thigh. During interview she does go from periods of being alert to dozing mid sentence during interview.    The pain is reported to be acute, located in both legs, right shoulder, back. Pain does not radiate.  Current pain is rated at 10/10 and goal is 2/10.      Per MN  review, the patient does have a mild opioid tolerance. Opioid induced side effects noted and include: sedation, constipation.       Reviewed medical record, labs, imaging, ED note, and care  everywhere.     Past pain treatments have included: Oxycodone, APAP, Dilaudid, Robaxin    Home pain medications/psych medications/anticoagulation medications include: oxycodone, oral Dilaudid in the past during admission, naproxen, diclofenac gel.    Medical History   PAST MEDICAL HISTORY:   Past Medical History:   Diagnosis Date     Adjustment disorder with mixed anxiety and depressed mood 10/05/2017     Anemia, unspecified type 01/14/2023     Antibody to blood group S antigen positive     Patient has anti-C, anti-S, anti-Jkb, anti-M, and a nonspecific antibody. Blood product orders may be delayed. Draw THREE purple top tubes for all Type and Screen/Type and crossmatch orders- per [employer]     Asplenia 06/17/2021     Asthma 03/09/2017     Cannabis use disorder      Sickle cell disease (H) 06/15/2020       PAST SURGICAL HISTORY:   Past Surgical History:   Procedure Laterality Date     CHOLECYSTECTOMY       ESOPHAGOSCOPY, GASTROSCOPY, DUODENOSCOPY (EGD), COMBINED N/A 01/15/2023    Procedure: ENDOSCOPIC ULTRASOUND;  Surgeon: Marv Tomas MD;  Location: [facility] OR     SPLENECTOMY       TONSILLECTOMY         FAMILY HISTORY:   Family History   Problem Relation Age of Onset     Sickle Cell Trait Mother      Hypertension Mother      Diabetes Father      Kidney Disease Father      Liver Disease Father      Sickle Cell Trait Maternal Grandfather      Sickle Cell Trait Paternal Grandmother        SOCIAL HISTORY:   Social History     Tobacco Use     Smoking status: Every Day     Packs/day: 0.50     Types: Cigarettes     Passive exposure: Never     Smokeless tobacco: Never   Substance Use Topics     Alcohol use: Not Currently        HEALTH & LIFESTYLE PRACTICES  Tobacco:  reports that she has been smoking cigarettes. She has been smoking an average of .5 packs per day. She has never been exposed to tobacco smoke. She has never used smokeless tobacco.  Alcohol:  reports that she does not currently use  alcohol.  Illicit drugs:  reports current drug use. Frequency: 7.00 times per week. Drug: Marijuana.    Allergies  Allergies   Allergen Reactions     Blood-Group Specific Substance Other (See Comments)     Patient has anti-C, anti-S, anti-Jkb, anti-M, and a nonspecific antibody. Blood product orders may be delayed. Draw THREE purple top tubes for all Type and Screen/Type and crossmatch orders.  Patient has anti-C, anti-S, anti-Jkb, anti-M, and a nonspecific antibody. Blood product orders may be delayed. Draw THREE purple top tubes for all Type and Screen/Type and crossmatch orders.  Patient has anti-C, anti-S, anti-Jkb, anti-M, and a nonspecific antibody. Blood product orders may be delayed. Draw THREE purple top tubes for all Type and Screen/Type and crossmatch orders.  Patient has anti-C, anti-S, anti-Jkb, anti-M, and a nonspecific antibody. Blood product orders may be delayed. Draw THREE purple top tubes for all Type and Screen/Type and crossmatch orders.       Penicillins Rash       Problem List  Patient Active Problem List    Diagnosis Date Noted     Sickle cell pain crisis (H) 02/13/2023     Priority: Medium     Antibody to blood group S antigen positive 02/13/2023     Priority: Medium     Patient has anti-C, anti-S, anti-Jkb, anti-M, and a nonspecific antibody. Blood product orders may be delayed. Draw THREE purple top tubes for all Type and Screen/Type and crossmatch orders- per [facility]       Elevated LFTs 02/13/2023     Priority: Medium     Elevation of levels of liver transaminase levels 01/16/2023     Priority: Medium     Abdominal pain, epigastric 01/14/2023     Priority: Medium     Anemia, unspecified type 01/14/2023     Priority: Medium     SGA (small for gestational age) 11/21/2022     Priority: Medium     Asplenia 06/17/2021     Priority: Medium     Sickle cell disease (H) 06/15/2020     Priority: Medium     Elevated liver enzymes 03/12/2018     Priority: Medium     Diarrhea 03/12/2018      Priority: Medium     Iron deficiency anemia 03/12/2018     Priority: Medium     Adjustment disorder with mixed anxiety and depressed mood 10/05/2017     Priority: Medium     Postpartum mood disturbance 10/05/2017     Priority: Medium     Abnormal antibody titer 05/30/2017     Priority: Medium     Blood typing encounter 04/21/2017     Priority: Medium     Formatting of this note might be different from the original.  Blood Bank Antibody(s) Present.  Allow 12 to 36 hours for compatible RBC's. Recommend future transfusions with antigen negative. See transfusion reaction report from 4/19/17 for further details      ; ALERT - Blood Typing Antibody anti-C, -Jkb, -M       Asthma 03/09/2017     Priority: Medium     Supervision of high risk pregnancy, unspecified, unspecified trimester 03/02/2017     Priority: Medium     Formatting of this note might be different from the original.  Overview:   Bobby Sutton [facility] [phone],cell [phone]  Formatting of this note might be different from the original.  Bobby Sutton [facility] [phone],cell [phone]       Screening for malignant neoplasm of cervix 12/19/2016     Priority: Medium     Formatting of this note might be different from the original.   Per visit note dated Dec.  9 , 2016: History of abnormal Pap: Never had a Pap test  2016 NILM 21 y.o.  PLAN:  pap test 12/2019    ;  Pap test history       Drug use during pregnancy 12/13/2016     Priority: Medium     Formatting of this note might be different from the original.  Overview:   THC on UDS. Pt quit thc and cigarettes at dx. Followed by brennon Loo. Repeat drug screen with 26 week labs.(  Pt has been taking narcotics to manage pain of her sickle cell crisis as prescribed by !)  Formatting of this note might be different from the original.  THC on UDS. Pt quit thc and cigarettes at dx. Followed by brennon Loo. Repeat drug screen with 26 week labs.(  Pt has  been taking narcotics to manage pain of her sickle cell crisis as prescribed by !)       Immune to rubella 12/13/2016     Priority: Medium     Need for hepatitis B vaccination 12/12/2016     Priority: Medium     Formatting of this note might be different from the original.  1st given 2/2/17.  2nd given 3/10/17.  3rd DUE 8/2/17.       Need for vaccination 12/12/2016     Priority: Medium     Formatting of this note might be different from the original.  Overview:   1st given 2/2/17.  2nd given 3/10/17.  3rd DUE 8/2/17.       Anemia complicating pregnancy 12/09/2016     Priority: Medium       Prior to Admission Medications   Medications Prior to Admission   Medication Sig Dispense Refill Last Dose     albuterol (PROAIR HFA/PROVENTIL HFA/VENTOLIN HFA) 108 (90 Base) MCG/ACT inhaler Inhale 2 puffs into the lungs every 6 hours as needed for shortness of breath / dyspnea or wheezing 18 g 1 Unknown     calcium carbonate (TUMS) 500 MG chewable tablet Take 1-2 chew tab by mouth every 4 hours as needed for heartburn   Unknown     cyclobenzaprine (FLEXERIL) 10 MG tablet Take 1 tablet (10 mg) by mouth 3 times daily as needed for muscle spasms 12 tablet 0 Unknown     diclofenac (VOLTAREN) 1 % topical gel Apply 2 g topically 4 times daily Apply as needed to chest, back, or other painful areas 100 g 0 Unknown     etonogestrel (NEXPLANON) 68 MG IMPL 1 each by Subdermal route   Unknown     ipratropium - albuterol 0.5 mg/2.5 mg/3 mL (DUONEB) 0.5-2.5 (3) MG/3ML neb solution Take 1 vial (3 mLs) by nebulization every 6 hours as needed for shortness of breath / dyspnea or wheezing 90 mL 1 Unknown     methocarbamol (ROBAXIN) 500 MG tablet Take 1 tablet (500 mg) by mouth 4 times daily as needed for muscle spasms 20 tablet 0 Unknown     multivitamin, therapeutic (THERA-VIT) TABS tablet Take 1 tablet by mouth daily   Past Week     naproxen sodium (ANAPROX) 220 MG tablet Take 220-440 mg by mouth every 12 hours as needed for moderate pain  "(4-6)   2/13/2023     omeprazole (PRILOSEC) 20 MG DR capsule Take 1 capsule (20 mg) by mouth 2 times daily 40 capsule 0 2/12/2023     oxyCODONE (ROXICODONE) 5 MG tablet Take 1-2 tablets (5-10 mg) by mouth every 4 hours as needed for pain (can take 5 mg for moderate pain and 10 mg for severe pain) (Patient taking differently: Take 10 mg by mouth every 4 hours as needed for pain (can take 5 mg for moderate pain and 10 mg for severe pain)) 16 tablet 0 2/13/2023 at am     sennosides (SENOKOT) 8.6 MG tablet Take 2 tablets by mouth daily as needed for constipation 60 tablet 0 Unknown     sucralfate (CARAFATE) 1 GM tablet Take 1 tablet (1 g) by mouth 4 times daily as needed for nausea (stomach pain) 40 tablet 0 Unknown       Review of Systems  Complete ROS reviewed, unless noted in HPI, all other systems reviewed (with patient) and all others found to be negative.      Objective:     Physical Exam:  BP (!) 138/100 (BP Location: Right arm)   Pulse 71   Temp 97.8  F (36.6  C) (Oral)   Resp 30   Ht 1.6 m (5' 3\")   Wt 87.5 kg (192 lb 14.4 oz)   SpO2 100%   BMI 34.17 kg/m    Weight:   Vitals:    02/13/23 0728 02/14/23 0500   Weight: 83.5 kg (184 lb) 87.5 kg (192 lb 14.4 oz)      Body mass index is 34.17 kg/m .    General Appearance:  Alert, restless, tearful, moaning out in pain, in distress   Head:  Normocephalic, without obvious abnormality, atraumatic   Eyes:  PERRL, conjunctiva/corneas clear, EOM's intact   ENT/Throat: Lips, mucosa, and tongue normal; teeth and gums normal   Lymph/Neck: Supple, symmetrical, trachea midline   Lungs:   Clear to auscultation bilaterally, respirations unlabored, room air   Cardiovascular/Heart:  Regular rate and rhythm, S1, S2 normal   Abdomen:   Soft, non-tender, bowel sounds active all four quadrants   Musculoskeletal: Extremities normal, atraumatic   Skin: Skin color, texture, turgor normal, no rashes or lesions   Neurologic: Alert and oriented X 3, Moves all 4 extremities     Psych: " Affect is tearful, restless      Imaging: Reviewed I have personally reviewed pertinent labs, tests, and radiologic imaging in patient's chart.  Chest XR,  PA & LAT    Result Date: 2/13/2023  EXAM: XR CHEST 2 VIEWS LOCATION: [facility] DATE/TIME: 2/13/2023 11:42 AM INDICATION: Chest pain. COMPARISON: Chest CTA 02/11/2023     IMPRESSION: Heart size and vascularity are normal. Mild bronchial wall thickening suggests airway inflammation. No focal consolidation, pneumothorax nor pleural effusion.    US Abdominal Doppler Complete    Result Date: 1/17/2023  EXAM: US ABDOMEN OR PELVIS DOPPLER COMPLETE LOCATION: [facility] DATE/TIME: 1/17/2023 10:16 AM INDICATION: elevated LFTs, eval for vascular causes COMPARISON: 01/14/2023. TECHNIQUE: Complete abdominal ultrasound. Color flow with spectral Doppler and waveform analysis performed.  FINDINGS: GALLBLADDER: Surgically absent. BILE DUCTS: No biliary dilatation. The common duct measures for mm. LIVER: Normal parenchyma with smooth contour. No focal mass. The kidneys spleen and pancreas were not imaged. AORTA: Normal in caliber. IVC: Normal where visualized. No ascites. ABDOMINAL DUPLEX: Hepatic artery, hepatic veins, inferior vena cava, portal veins and splenic vein are patent with flow in the normal direction. There is mild elevation of resistive indices in the hepatic artery with the main hepatic artery resistive index 0.86, right hepatic artery 0.89 and left hepatic artery 0.9. There is diastolic flow in all of these vessels.     IMPRESSION: 1.  Normal sonographic appearance of the liver. 2.  Mildly elevated resistive indices in the hepatic arteries. The hepatic artery, hepatic veins, inferior vena cava, portal vein and splenic vein are patent with flow in the normal direction.     CT Abdomen Pelvis w Contrast    Result Date: 2/11/2023  EXAM: CT ABDOMEN PELVIS W CONTRAST LOCATION: [facility]  DATE/TIME: 2/11/2023 4:49 AM INDICATION: right midabdominal and RLQ pain, jaundice COMPARISON: Ultrasound from 01/17/2023, CT from 01/14/2023. TECHNIQUE: CT scan of the abdomen and pelvis was performed following injection of IV contrast. Multiplanar reformats were obtained. Dose reduction techniques were used. CONTRAST: isovue 370 100 mls FINDINGS: LOWER CHEST: Please see separate dictation for detailed findings above the diaphragm. HEPATOBILIARY: Absent gallbladder. Liver within normal limits. Minimal central biliary ductal prominence likely due to postcholecystectomy reservoir effect. Normal caliber common bile duct. No calcified CBD stone. PANCREAS: Normal. SPLEEN: Absent. ADRENAL GLANDS: Normal. KIDNEYS/BLADDER: Symmetric enhancement. No hydronephrosis. Bladder is normal. BOWEL: No bowel obstruction or free air. Appendix is normal. LYMPH NODES: Normal. VASCULATURE: Unremarkable. PELVIC ORGANS: Enhancing focus at the uterine fundus consistent with a fibroid. MUSCULOSKELETAL: Normal.     IMPRESSION: 1.  No focal inflammatory change identified in the abdomen or pelvis. 2.  Slight intrahepatic biliary ductal prominence likely due to postcholecystectomy reservoir effect. This is unchanged compared to the prior CT.    CT Chest Pulmonary Embolism w Contrast    Result Date: 2/11/2023  EXAM: CT CHEST PULMONARY EMBOLISM W CONTRAST LOCATION: [facility] DATE/TIME: 2/11/2023 4:49 AM INDICATION: r o PE COMPARISON: None. TECHNIQUE: CT chest pulmonary angiogram during arterial phase injection of IV contrast. Multiplanar reformats and MIP reconstructions were performed. Dose reduction techniques were used. CONTRAST: isovue 370 100 mls FINDINGS: ANGIOGRAM CHEST: Pulmonary arteries are normal caliber and negative for pulmonary emboli. Thoracic aorta is negative for dissection. No CT evidence of right heart strain. LUNGS AND PLEURA: Subsegmental atelectasis lung bases. MEDIASTINUM/AXILLAE: Normal. CORONARY  ARTERY CALCIFICATION: None. UPPER ABDOMEN: Normal. MUSCULOSKELETAL: Normal.     IMPRESSION: 1.  No pulmonary embolism.      Labs: Reviewed I have personally reviewed pertinent labs, tests, and radiologic imaging in patient's chart.  Recent Results (from the past 24 hour(s))   Hepatic function panel    Collection Time: 02/14/23  7:23 AM   Result Value Ref Range    Bilirubin Total 12.9 (H) 0.0 - 1.0 mg/dL    Bilirubin Direct 7.3 (H) <=0.5 mg/dL    Protein Total 7.5 6.0 - 8.0 g/dL    Albumin 3.0 (L) 3.5 - 5.0 g/dL    Alkaline Phosphatase 322 (H) 45 - 120 U/L     (H) 0 - 40 U/L     (H) 0 - 45 U/L   CBC with platelets    Collection Time: 02/14/23  7:23 AM   Result Value Ref Range    WBC Count 21.3 (H) 4.0 - 11.0 10e3/uL    RBC Count 2.85 (L) 3.80 - 5.20 10e6/uL    Hemoglobin 8.7 (L) 11.7 - 15.7 g/dL    Hematocrit 25.2 (L) 35.0 - 47.0 %    MCV 88 78 - 100 fL    MCH 30.5 26.5 - 33.0 pg    MCHC 34.5 31.5 - 36.5 g/dL    RDW 19.7 (H) 10.0 - 15.0 %    Platelet Count 121 (L) 150 - 450 10e3/uL       Total time spent 90 minutes with greater than 50% in consultation, education and coordination of care.     Also discussed with RN, MD, Senior Acupuncturist, integrative therapy, pharmacist.     Please see A&P for additional details of medical decision making.    Elements of Medical Decision Making as described above. High level of decision making required due to 1 or more chronic illness with severe exacerbation, progression, and side effects of treatment.  High risk therapy including opioids, high risk drug therapy including oral and/or parenteral controlled substances    Thank you for this consultation.    Fozia SOTO, FNP-C  Acute Care Pain Management Program  [facility] ([place], [place], [place])  Monday-Friday 8a-4p   Page via online [employer] system or call [phone]

## 2023-02-14 NOTE — PROCEDURES
"Midline Insertion Procedure Note  Pt. Name: Eveline Gage  MRN:        [id number]    Procedure: Insertion of a  single Lumen  4 fr  BARD PROVENA (non-valved) Power MIDLINE catheter.  Lot number [id number].     Indications: poor access    Procedure Details   Patient identified with 2 identifiers and \"Time Out\" conducted.  Contraindications reviewed: none.     Central line insertion bundle followed: hand hygeine performed prior to procedure, site cleansed with cholraprep, hat, mask, sterile gloves,sterile gown worn, patient draped with maximum barrier head to toe drape, sterile field maintained.    2 ml 1% Lidocaine administered sq to the insertion site. A 4 Fr catheter was inserted into the brachial vein of the left arm with ultrasound guidance. 2 attempt(s) required to access vein.   Catheter threaded without difficulty. Good blood return noted.    Modified Seldinger Technique used for insertion.    Catheter secured with Statlock, biopatch and Tegaderm dressing applied.    Findings:  Total catheter length  16 cm, with 0 cm exposed. Mid upper arm circumference is 32 cm. Catheter was flushed with 20 cc NS. Patient  tolerated procedure well.    Tip placement verified by ultrasound placement and good blood return.  Tip placement approximately in the axillary region.    Patient's primary RN notified catheter is a MIDLINE catheter and is ready for use.    Comments:      A midline catheter is a form of peripheral venous access. Not recommended for the infusion of vesicants (Vancomycin, Vasopressors, TPN, etc.)    Ricarda Wolf RN    [facility]  [phone]    "

## 2023-02-14 NOTE — SIGNIFICANT EVENT
Significant Event Note    Time of event: 7:45 PM February 13, 2023    Description of event:  Received a phone call from the patient's bedside nurse stating that the pain meds were not working    Plan:  Patient has received only 1 mg of IV Dilaudid in the last 80 minutes.  Oral medications are unlikely going to work in this given crisis situation.  Prudent thing would be to give her IV hydromorphone, change the frequency to every 1 hour as needed before we consider PCA pump  Patient is going to receive another milligram of IV Dilaudid nursing is going to contact me at about 2200 hrs. to update me    Discussed with: bedside nurse    Krystina West MD      Addendum 2200 hrs.:  Patient was given 1 mg of IV Ativan and scheduled hydroxyzine

## 2023-02-14 NOTE — SIGNIFICANT EVENT
Significant Event Note    Time of event: 6:24 PM February 13, 2023    Description of event:  Received a call from nursing stating that the few doses of Dilaudid that were given to her amounting total of 1 mg of IV Dilaudid has not worked and wondering if she could receive Toradol    Plan:  As documented patient is in sickle cell crisis.  IV ketorolac is not helpful in this situation.  Additionally her pain is being under managed.  I understand and desire in the requirement to manage with least amount of pain medication however given the fact that she is in sickle cell crisis with a well-established an appropriate diagnosis I think under treating the pain at this time is not an appropriate choice I do suggest at this time that be treated aggressively along with the other measures which have been placed including IV fluids.  IV hydromorphone should be switched over to 1 mg every 2 hours as needed.  Giving it a decent trial for at least 6 to 8 hours prior to initiation of PCA should be considered    Discussed with: bedside nurse    Krystina West MD

## 2023-02-14 NOTE — PLAN OF CARE
Problem: Plan of Care - These are the overarching goals to be used throughout the patient stay.    Goal: Optimal Comfort and Wellbeing  Outcome: Progressing   Goal Outcome Evaluation:  Pt started shift with pain 10/10. Discussed PCA pump with MD; pain team agreeable. Set up and verified with charge nurse and educator. PRN atarax given. Pain rated 8/10 by end of shift; pt resting more comfortably. IVF NS @ 150mL/hr.

## 2023-02-14 NOTE — PLAN OF CARE
Problem: Plan of Care - These are the overarching goals to be used throughout the patient stay.    Goal: Absence of Hospital-Acquired Illness or Injury  Intervention: Identify and Manage Fall Risk  Recent Flowsheet Documentation  Taken 2/13/2023 3737 by Lennie Santana, RN  Safety Promotion/Fall Prevention:    clutter free environment maintained    bed alarm on    nonskid shoes/slippers when out of bed     Problem: Plan of Care - These are the overarching goals to be used throughout the patient stay.    Goal: Optimal Comfort and Wellbeing  Outcome: Not Progressing  Intervention: Monitor Pain and Promote Comfort  Recent Flowsheet Documentation  Taken 2/14/2023 5865 by Lennie Santana, RN  Pain Management Interventions: medication (see MAR)   Goal Outcome Evaluation:     Pt is alert and oriented. Has been in a lot of pain all night. Pt is restless and unable to stay asleep. Pt transfers from bed to recliner to couch to find a place to be comfortable. Pt c/.o back  and bilat. Knee pain. IV dilaudid hourly given but seemed to be ineffective. Pt had 15-20 mins sleep after having the Dilaudid 4mg tablet. On continuous Oxygen at 2LPM for comfort. Encouraged to do deep breathing when pt becomes anxious and restless. Pt also feels hot during the night and she had removed her gown twice. IV fluids is occluded every now and then d/t to its position in left AC. Pt can't seem to straighten her arm d/t restlessness.       Pt able to sleep for 30-40 mins in a recliner then back pain starts creeping up again. Pt had been moving around to find a comfortable spot. Transferred to the couch then later back to bed. Tried heat application on knee but does not work as per pt. Also the T-pump placed under her back.    Pt feels pain on lower back, bilat knee and now  having cramps under her breasts. Paged Dr. Stockton re ineffectiveness of pain. MD increase frequency of Dilaudid tab to Q3H. Pain team to see pt soon.

## 2023-02-14 NOTE — PLAN OF CARE
Problem: Plan of Care - These are the overarching goals to be used throughout the patient stay.    Goal: Optimal Comfort and Wellbeing  Outcome: Not Progressing     Problem: Plan of Care - These are the overarching goals to be used throughout the patient stay.    Goal: Absence of Hospital-Acquired Illness or Injury  Intervention: Prevent Skin Injury  Recent Flowsheet Documentation  Taken 2/13/2023 2128 by Christal Roman, RN  Body Position: position changed independently  Taken 2/13/2023 1625 by Christal Roman, RN  Body Position: position changed independently   Goal Outcome Evaluation:       Pt alert and oriented. Complains of severe 10/10 pain. Pt constantly crying out in pain. Gave pt every PRN able and interventions not effective.Pt has tachypneic /shallow breathing, RN tried breathing exercise to calm pt down. Tried heat therapy with warm water in a glove. Got a t pump for heat therapy. Pt had an acupuncture consult. Paged MD and pain team did not hear back. Finally paged cross over at 6:15 pm and cross over changed dilaudid to every 2 hours IV 1 mg. Interventions sill not effective, so cross over changed pain medication to 1 mg dilaudid every hour. Cross over recommend not to give oral dilaudid medication, because it would not be effective during sickle cell pain.  Cross over recommend to give IV pain medication every hour. Around 10 pm cross over prescribed ativan and atarax. Medication given still not effective in calming down the pt. Call light within reach and bed alarm on. Pt has NS running at 150. Report given to next nurse.

## 2023-02-14 NOTE — PROVIDER NOTIFICATION
Page Dr. Stockton re:  Pt SR in Rm 202 is constantly in pain. I gave her Dilaudid 4mg at 00:30. Can I give her Dilaudid 1mg inj hourly too?    MD response:   Yes. Ok to try this as long as it doesn't cause excessive sedation.

## 2023-02-15 ENCOUNTER — APPOINTMENT (OUTPATIENT)
Dept: CT IMAGING | Facility: CLINIC | Age: 28
End: 2023-02-15
Attending: FAMILY MEDICINE
Payer: COMMERCIAL

## 2023-02-15 PROBLEM — J96.01 ACUTE HYPOXEMIC RESPIRATORY FAILURE (H): Status: ACTIVE | Noted: 2023-02-15

## 2023-02-15 LAB
ALBUMIN SERPL-MCNC: 2.5 G/DL (ref 3.5–5)
ALBUMIN SERPL-MCNC: 2.6 G/DL (ref 3.5–5)
ALP SERPL-CCNC: 702 U/L (ref 45–120)
ALP SERPL-CCNC: 840 U/L (ref 45–120)
ALT SERPL W P-5'-P-CCNC: 149 U/L (ref 0–45)
ALT SERPL W P-5'-P-CCNC: 169 U/L (ref 0–45)
ANION GAP SERPL CALCULATED.3IONS-SCNC: 10 MMOL/L (ref 5–18)
AST SERPL W P-5'-P-CCNC: 271 U/L (ref 0–40)
AST SERPL W P-5'-P-CCNC: 309 U/L (ref 0–40)
BASE EXCESS BLDV CALC-SCNC: -0.5 MMOL/L
BILIRUB DIRECT SERPL-MCNC: 6.8 MG/DL
BILIRUB DIRECT SERPL-MCNC: 6.8 MG/DL
BILIRUB SERPL-MCNC: 12 MG/DL (ref 0–1)
BILIRUB SERPL-MCNC: 13.1 MG/DL (ref 0–1)
BLD PROD TYP BPU: NORMAL
BLOOD COMPONENT TYPE: NORMAL
BNP SERPL-MCNC: 365 PG/ML (ref 0–64)
BUN SERPL-MCNC: 11 MG/DL (ref 8–22)
CALCIUM SERPL-MCNC: 8.5 MG/DL (ref 8.5–10.5)
CHLORIDE BLD-SCNC: 102 MMOL/L (ref 98–107)
CO2 SERPL-SCNC: 22 MMOL/L (ref 22–31)
CODING SYSTEM: NORMAL
CREAT SERPL-MCNC: 0.5 MG/DL (ref 0.6–1.1)
CROSSMATCH: NORMAL
ERYTHROCYTE [DISTWIDTH] IN BLOOD BY AUTOMATED COUNT: 18.7 % (ref 10–15)
ERYTHROCYTE [DISTWIDTH] IN BLOOD BY AUTOMATED COUNT: 19 % (ref 10–15)
GFR SERPL CREATININE-BSD FRML MDRD: >90 ML/MIN/1.73M2
GLUCOSE BLD-MCNC: 107 MG/DL (ref 70–125)
HCO3 BLDV-SCNC: 24 MMOL/L (ref 24–30)
HCT VFR BLD AUTO: 18.4 % (ref 35–47)
HCT VFR BLD AUTO: 21.2 % (ref 35–47)
HGB BLD-MCNC: 6.5 G/DL (ref 11.7–15.7)
HGB BLD-MCNC: 7.5 G/DL (ref 11.7–15.7)
ISSUE DATE AND TIME: NORMAL
MAGNESIUM SERPL-MCNC: 2.1 MG/DL (ref 1.8–2.6)
MCH RBC QN AUTO: 30.4 PG (ref 26.5–33)
MCH RBC QN AUTO: 30.6 PG (ref 26.5–33)
MCHC RBC AUTO-ENTMCNC: 35.3 G/DL (ref 31.5–36.5)
MCHC RBC AUTO-ENTMCNC: 35.4 G/DL (ref 31.5–36.5)
MCV RBC AUTO: 86 FL (ref 78–100)
MCV RBC AUTO: 87 FL (ref 78–100)
OXYHGB MFR BLDV: 89.7 % (ref 70–75)
PCO2 BLDV: 36 MM HG (ref 35–50)
PH BLDV: 7.43 [PH] (ref 7.35–7.45)
PHOSPHATE SERPL-MCNC: 2.1 MG/DL (ref 2.5–4.5)
PLATELET # BLD AUTO: 62 10E3/UL (ref 150–450)
PLATELET # BLD AUTO: 82 10E3/UL (ref 150–450)
PO2 BLDV: 67 MM HG (ref 25–47)
POTASSIUM BLD-SCNC: 3.9 MMOL/L (ref 3.5–5)
PROT SERPL-MCNC: 6.6 G/DL (ref 6–8)
PROT SERPL-MCNC: 6.8 G/DL (ref 6–8)
RBC # BLD AUTO: 2.14 10E6/UL (ref 3.8–5.2)
RBC # BLD AUTO: 2.45 10E6/UL (ref 3.8–5.2)
SAO2 % BLDV: 93 % (ref 70–75)
SODIUM SERPL-SCNC: 134 MMOL/L (ref 136–145)
TROPONIN I SERPL-MCNC: 1.1 NG/ML (ref 0–0.29)
TROPONIN I SERPL-MCNC: 1.24 NG/ML (ref 0–0.29)
UNIT ABO/RH: NORMAL
UNIT NUMBER: NORMAL
UNIT STATUS: NORMAL
UNIT TYPE ISBT: 600
WBC # BLD AUTO: 13.3 10E3/UL (ref 4–11)
WBC # BLD AUTO: 16.4 10E3/UL (ref 4–11)

## 2023-02-15 PROCEDURE — 250N000013 HC RX MED GY IP 250 OP 250 PS 637: Performed by: FAMILY MEDICINE

## 2023-02-15 PROCEDURE — 70450 CT HEAD/BRAIN W/O DYE: CPT

## 2023-02-15 PROCEDURE — 250N000013 HC RX MED GY IP 250 OP 250 PS 637: Performed by: NURSE PRACTITIONER

## 2023-02-15 PROCEDURE — 83880 ASSAY OF NATRIURETIC PEPTIDE: CPT | Performed by: HOSPITALIST

## 2023-02-15 PROCEDURE — 250N000011 HC RX IP 250 OP 636: Performed by: HOSPITALIST

## 2023-02-15 PROCEDURE — 85027 COMPLETE CBC AUTOMATED: CPT | Performed by: FAMILY MEDICINE

## 2023-02-15 PROCEDURE — 84100 ASSAY OF PHOSPHORUS: CPT | Performed by: FAMILY MEDICINE

## 2023-02-15 PROCEDURE — 71275 CT ANGIOGRAPHY CHEST: CPT

## 2023-02-15 PROCEDURE — 120N000001 HC R&B MED SURG/OB

## 2023-02-15 PROCEDURE — 82805 BLOOD GASES W/O2 SATURATION: CPT | Performed by: HOSPITALIST

## 2023-02-15 PROCEDURE — 99233 SBSQ HOSP IP/OBS HIGH 50: CPT | Performed by: FAMILY MEDICINE

## 2023-02-15 PROCEDURE — 250N000013 HC RX MED GY IP 250 OP 250 PS 637: Performed by: INTERNAL MEDICINE

## 2023-02-15 PROCEDURE — 258N000003 HC RX IP 258 OP 636: Performed by: FAMILY MEDICINE

## 2023-02-15 PROCEDURE — 84484 ASSAY OF TROPONIN QUANT: CPT | Performed by: FAMILY MEDICINE

## 2023-02-15 PROCEDURE — 83735 ASSAY OF MAGNESIUM: CPT | Performed by: FAMILY MEDICINE

## 2023-02-15 PROCEDURE — 36415 COLL VENOUS BLD VENIPUNCTURE: CPT | Performed by: FAMILY MEDICINE

## 2023-02-15 PROCEDURE — 250N000011 HC RX IP 250 OP 636: Performed by: FAMILY MEDICINE

## 2023-02-15 PROCEDURE — 82248 BILIRUBIN DIRECT: CPT | Performed by: FAMILY MEDICINE

## 2023-02-15 PROCEDURE — 93005 ELECTROCARDIOGRAM TRACING: CPT

## 2023-02-15 PROCEDURE — 99232 SBSQ HOSP IP/OBS MODERATE 35: CPT | Mod: 25 | Performed by: REGISTERED NURSE

## 2023-02-15 PROCEDURE — 93005 ELECTROCARDIOGRAM TRACING: CPT | Performed by: HOSPITALIST

## 2023-02-15 PROCEDURE — 250N000013 HC RX MED GY IP 250 OP 250 PS 637: Performed by: HOSPITALIST

## 2023-02-15 PROCEDURE — 250N000009 HC RX 250: Performed by: NURSE PRACTITIONER

## 2023-02-15 PROCEDURE — 93010 ELECTROCARDIOGRAM REPORT: CPT | Performed by: INTERNAL MEDICINE

## 2023-02-15 RX ORDER — SALIVA STIMULANT COMB. NO.3
2 SPRAY, NON-AEROSOL (ML) MUCOUS MEMBRANE 4 TIMES DAILY PRN
Status: DISCONTINUED | OUTPATIENT
Start: 2023-02-15 | End: 2023-02-20 | Stop reason: HOSPADM

## 2023-02-15 RX ORDER — FUROSEMIDE 10 MG/ML
20 INJECTION INTRAMUSCULAR; INTRAVENOUS ONCE
Status: COMPLETED | OUTPATIENT
Start: 2023-02-15 | End: 2023-02-15

## 2023-02-15 RX ORDER — IOPAMIDOL 755 MG/ML
75 INJECTION, SOLUTION INTRAVASCULAR ONCE
Status: COMPLETED | OUTPATIENT
Start: 2023-02-15 | End: 2023-02-15

## 2023-02-15 RX ADMIN — IOPAMIDOL 75 ML: 755 INJECTION, SOLUTION INTRAVENOUS at 17:54

## 2023-02-15 RX ADMIN — SENNOSIDES AND DOCUSATE SODIUM 2 TABLET: 50; 8.6 TABLET ORAL at 10:02

## 2023-02-15 RX ADMIN — DICLOFENAC SODIUM 2 G: 10 GEL TOPICAL at 21:08

## 2023-02-15 RX ADMIN — Medication 3 MG: at 21:07

## 2023-02-15 RX ADMIN — Medication: at 22:56

## 2023-02-15 RX ADMIN — SODIUM CHLORIDE: 9 INJECTION, SOLUTION INTRAVENOUS at 01:16

## 2023-02-15 RX ADMIN — SENNOSIDES AND DOCUSATE SODIUM 1 TABLET: 50; 8.6 TABLET ORAL at 21:08

## 2023-02-15 RX ADMIN — LIDOCAINE: 50 OINTMENT TOPICAL at 16:26

## 2023-02-15 RX ADMIN — METHOCARBAMOL 500 MG: 500 TABLET, FILM COATED ORAL at 21:07

## 2023-02-15 RX ADMIN — PANTOPRAZOLE SODIUM 40 MG: 40 TABLET, DELAYED RELEASE ORAL at 21:07

## 2023-02-15 RX ADMIN — PANTOPRAZOLE SODIUM 40 MG: 40 TABLET, DELAYED RELEASE ORAL at 10:02

## 2023-02-15 RX ADMIN — ONDANSETRON 4 MG: 4 TABLET, ORALLY DISINTEGRATING ORAL at 00:34

## 2023-02-15 RX ADMIN — SODIUM CHLORIDE: 9 INJECTION, SOLUTION INTRAVENOUS at 17:01

## 2023-02-15 RX ADMIN — METHOCARBAMOL 500 MG: 500 TABLET, FILM COATED ORAL at 10:02

## 2023-02-15 RX ADMIN — LIDOCAINE: 50 OINTMENT TOPICAL at 12:37

## 2023-02-15 RX ADMIN — ALBUTEROL SULFATE 2 PUFF: 90 AEROSOL, METERED RESPIRATORY (INHALATION) at 06:10

## 2023-02-15 RX ADMIN — DICLOFENAC SODIUM 2 G: 10 GEL TOPICAL at 10:03

## 2023-02-15 RX ADMIN — LIDOCAINE: 50 OINTMENT TOPICAL at 18:45

## 2023-02-15 RX ADMIN — LIDOCAINE: 50 OINTMENT TOPICAL at 06:47

## 2023-02-15 RX ADMIN — Medication 2 SPRAY: at 06:11

## 2023-02-15 RX ADMIN — HYDROXYUREA 500 MG: 500 CAPSULE ORAL at 10:19

## 2023-02-15 RX ADMIN — FUROSEMIDE 20 MG: 10 INJECTION, SOLUTION INTRAVENOUS at 12:36

## 2023-02-15 RX ADMIN — THERA TABS 1 TABLET: TAB at 10:02

## 2023-02-15 RX ADMIN — HYDROXYZINE HYDROCHLORIDE 25 MG: 25 TABLET ORAL at 00:34

## 2023-02-15 RX ADMIN — METHOCARBAMOL 500 MG: 500 TABLET, FILM COATED ORAL at 15:57

## 2023-02-15 RX ADMIN — DICLOFENAC SODIUM 2 G: 10 GEL TOPICAL at 12:37

## 2023-02-15 RX ADMIN — METHOCARBAMOL 500 MG: 500 TABLET, FILM COATED ORAL at 01:16

## 2023-02-15 ASSESSMENT — ACTIVITIES OF DAILY LIVING (ADL)
ADLS_ACUITY_SCORE: 37
ADLS_ACUITY_SCORE: 41
ADLS_ACUITY_SCORE: 41
ADLS_ACUITY_SCORE: 40
ADLS_ACUITY_SCORE: 39
ADLS_ACUITY_SCORE: 37
ADLS_ACUITY_SCORE: 39
ADLS_ACUITY_SCORE: 40
ADLS_ACUITY_SCORE: 39

## 2023-02-15 NOTE — CONSULTS
"      Initial Psychiatric Consult   Consult date: February 15, 2023         Reason for Consult, requesting source:    Severe anxiety related to sickle cell crisis    Requesting source: Miky Marrufo    Labs and imaging reviewed. Nursing consulted and notes reviewed by LISSETT Hobbs, CHARLES.    Total time spent in card review, patient interview and coordination of care: 45 minutes    Telemedicine Visit: The patient was seen for a visit utilizing the [facility] system. Permission from the patient to conduct the exam by telemedicine was obtained prior to proceeding.  Eveline was also informed that insurance will be billed for this contact.   Start Time: 1130  Stop Time: 1145  Patient Location):  [facility]  Provider Location: [facility]  As the provider I attest to compliance with applicable laws and regulations related to telemedicine          HPI:   Psychiatry is seeing patient today to assess for severe anxiety related to current sickle cell crisis.     Per ED provider note from 2/13/2023: \"Eveline Gage is a 27 year old female with a past medical history of sickle cell disease, asthma, adjustment disorder with mixed anxiety and depressed mood, and anemia who presents via EMS for evaluation of myalgias.      Patient reports a sickle cell crisis that began about 1 week ago. Her pain began in her RLQ but now jumps around between her left arm and elbow, left thigh and knee, left lower back, and then back to her right groin. She also notes mild swelling in her right upper thigh and mild shortness of breath due to the pain. Patient endorses mild chest pain. She denies any fever, cough, redness, abdominal pain, vomiting, diarrhea, or dysuria. Patient notes her urine has been darker than normal lately. She does not currently have a hematologist but previously followed at [facility]. No other complaints or concerns expressed at this time.\"    Patient met via " video from her hospital room. She reports that her anxiety has been well managed while in the hospital and she does not generally experience anxiety outside of sickle cell crisis. Patient states that she has not been sleeping well and often wakes up in the middle of the night. Denies the presence of depression or suicidal ideation.         Past Psychiatric History:   No past psychiatric hospitalizations. Does have prior diagnosis in 10/2017 of adjustment disorder with mixed anxiety and depressed mood. No past suicide attempts or suicidal ideation. No family history of psychiatric diagnosis.         Substance Use and History:     Tobacco Use     Smoking status: Every Day     Packs/day: 0.50     Types: Cigarettes     Passive exposure: Never     Smokeless tobacco: Never   Substance Use Topics     Alcohol use: Not Currently           Past Medical History:   PAST MEDICAL HISTORY:   Past Medical History:   Diagnosis Date     Adjustment disorder with mixed anxiety and depressed mood 10/05/2017     Anemia, unspecified type 01/14/2023     Antibody to blood group S antigen positive     Patient has anti-C, anti-S, anti-Jkb, anti-M, and a nonspecific antibody. Blood product orders may be delayed. Draw THREE purple top tubes for all Type and Screen/Type and crossmatch orders- per [facility]     Asplenia 06/17/2021     Asthma 03/09/2017     Cannabis use disorder      Sickle cell disease (H) 06/15/2020       PAST SURGICAL HISTORY:   Past Surgical History:   Procedure Laterality Date     CHOLECYSTECTOMY       ESOPHAGOSCOPY, GASTROSCOPY, DUODENOSCOPY (EGD), COMBINED N/A 01/15/2023    Procedure: ENDOSCOPIC ULTRASOUND;  Surgeon: Marv Tomas MD;  Location: [facility] OR     MIDLINE SINGLE LUMEN PLACEMENT  2/14/2023          SPLENECTOMY       TONSILLECTOMY               Family History:   FAMILY HISTORY:   Family History   Problem Relation Age of Onset     Sickle Cell Trait Mother      Hypertension Mother      Diabetes  Father      Kidney Disease Father      Liver Disease Father      Sickle Cell Trait Maternal Grandfather      Sickle Cell Trait Paternal Grandmother            Social History:   Per chart review, patient lives with boyfriend, Joel, and 5 year old daughter Kat in an apartment.          Physical ROS:   The 10 point Review of Systems is negative other than noted in the HPI or here.           Medications:       diclofenac  2 g Topical 4x Daily     hydroxyurea  500 mg Oral Daily     lidocaine   Topical 4x Daily     melatonin  3 mg Oral At Bedtime     methocarbamol  500 mg Oral Q6H     multivitamin, therapeutic  1 tablet Oral Daily     pantoprazole  40 mg Oral BID     senna-docusate  1 tablet Oral BID    Or     senna-docusate  2 tablet Oral BID     sodium chloride (PF)  10 mL Intracatheter Q8H     sodium chloride (PF)  10-40 mL Intracatheter Q7 Days     sodium chloride (PF)  3 mL Intracatheter Q8H              Allergies:     Allergies   Allergen Reactions     Blood-Group Specific Substance Other (See Comments)     Patient has anti-C, anti-S, anti-Jkb, anti-M, and a nonspecific antibody. Blood product orders may be delayed. Draw THREE purple top tubes for all Type and Screen/Type and crossmatch orders.         Penicillins Rash          Labs:     Recent Results (from the past 48 hour(s))   Hepatic function panel    Collection Time: 02/14/23  7:23 AM   Result Value Ref Range    Bilirubin Total 12.9 (H) 0.0 - 1.0 mg/dL    Bilirubin Direct 7.3 (H) <=0.5 mg/dL    Protein Total 7.5 6.0 - 8.0 g/dL    Albumin 3.0 (L) 3.5 - 5.0 g/dL    Alkaline Phosphatase 322 (H) 45 - 120 U/L     (H) 0 - 40 U/L     (H) 0 - 45 U/L   CBC with platelets    Collection Time: 02/14/23  7:23 AM   Result Value Ref Range    WBC Count 21.3 (H) 4.0 - 11.0 10e3/uL    RBC Count 2.85 (L) 3.80 - 5.20 10e6/uL    Hemoglobin 8.7 (L) 11.7 - 15.7 g/dL    Hematocrit 25.2 (L) 35.0 - 47.0 %    MCV 88 78 - 100 fL    MCH 30.5 26.5 - 33.0 pg    MCHC 34.5  "31.5 - 36.5 g/dL    RDW 19.7 (H) 10.0 - 15.0 %    Platelet Count 121 (L) 150 - 450 10e3/uL   Hepatic function panel    Collection Time: 02/15/23  4:34 AM   Result Value Ref Range    Bilirubin Total 12.0 (H) 0.0 - 1.0 mg/dL    Bilirubin Direct 6.8 (H) <=0.5 mg/dL    Protein Total 6.8 6.0 - 8.0 g/dL    Albumin 2.6 (L) 3.5 - 5.0 g/dL    Alkaline Phosphatase 702 (H) 45 - 120 U/L     (H) 0 - 40 U/L     (H) 0 - 45 U/L   CBC with platelets    Collection Time: 02/15/23  4:34 AM   Result Value Ref Range    WBC Count 16.4 (H) 4.0 - 11.0 10e3/uL    RBC Count 2.45 (L) 3.80 - 5.20 10e6/uL    Hemoglobin 7.5 (L) 11.7 - 15.7 g/dL    Hematocrit 21.2 (L) 35.0 - 47.0 %    MCV 87 78 - 100 fL    MCH 30.6 26.5 - 33.0 pg    MCHC 35.4 31.5 - 36.5 g/dL    RDW 18.7 (H) 10.0 - 15.0 %    Platelet Count 82 (L) 150 - 450 10e3/uL   Blood gas venous    Collection Time: 02/15/23  4:34 AM   Result Value Ref Range    pH Venous 7.43 7.35 - 7.45    pCO2 Venous 36 35 - 50 mm Hg    pO2 Venous 67 (H) 25 - 47 mm Hg    Bicarbonate Venous 24 24 - 30 mmol/L    Base Excess/Deficit (+/-) -0.5   mmol/L    Oxyhemoglobin Venous 89.7 (H) 70.0 - 75.0 %    O2 Sat, Venous 93.0 (H) 70.0 - 75.0 %   B-Type Natriuretic Peptide ([facility] Only)    Collection Time: 02/15/23  4:34 AM   Result Value Ref Range     (H) 0 - 64 pg/mL     QTc: 390 as of 2/13/2023         Physical and Psychiatric Examination:     BP (!) 162/93 (BP Location: Right arm, Patient Position: Semi-Nicole's)   Pulse 116   Temp 98.8  F (37.1  C) (Axillary)   Resp 20   Ht 1.6 m (5' 3\")   Wt 87.5 kg (192 lb 14.4 oz)   SpO2 94%   BMI 34.17 kg/m    Weight is 192 lbs 14.44 oz  Body mass index is 34.17 kg/m .    Physical Exam:  I have reviewed the physical exam as documented by by the medical team and agree with findings and assessment and have no additional findings to add at this time.         MSE:   Appearance: somnolent  Attitude:  cooperative  Eye Contact:  Eyes were closed " "intermittently throughout meeting  Mood:  \"fair\"  Affect:  : slightly restricted  Speech:  clear, coherent  Psychomotor Behavior:  no evidence of tardive dyskinesia, dystonia, or tics  Muscle strength and tone: Meeting was done via video, but appears average  Thought Process:  logical and linear  Associations:  no loose associations  Thought Content:  no evidence of suicidal ideation or homicidal ideation, no evidence of psychotic thought, no auditory hallucinations present and no visual hallucinations present  Insight:  fair  Judgement:  fair  Oriented to:  time, person, and place  Attention Span and Concentration:  limited; patient was drowsy and in quite a bit of pain  Recent and Remote Memory:  intact              DSM-5 Diagnosis:   293.84 (F06.4) Anxiety Disorder Due to sickle cell crisis (Medical Condition)          Assessment:   Patient was understandably distracted and physically restless during our meeting due to her pain level. It sounds like patient does not typically have baseline anxiety when she is not experiencing a sickle cell crisis. However, I would suggest increasing hydroxyzine to 25-50 mg q6h prn to ensure that patient anxiety continues to be well managed given her current level of discomfort. There would be room to increase this hydroxyzine further if needed.            Summary of Recommendations:   Consider increasing hydroxyzine to 25-50 mg q6h prn and administering one of these at bedtime to help patient sleep        Page me or re-consult psychiatry as needed (psychiatry is signing off).       Miryam Aguilar, JAYLYNP, APRN  Consult/Liaison Psychiatry  [facility]              "

## 2023-02-15 NOTE — PLAN OF CARE
"  Problem: Pain Acute  Goal: Optimal Pain Control and Function  Intervention: Prevent or Manage Pain  Recent Flowsheet Documentation  Taken 2/15/2023 0900 by Dayton Patel RN  Medication Review/Management: medications reviewed   Goal Outcome Evaluation:    Patient A&Ox3, restless in bed. Patient verbalized, \"I just can't get comfortable\". On PCA dilaudid, c/o generalized pain worse on lower extremities. O2 saturation 90-94% on 4L/NC. Patient has a habit of removing nasal canula or mask, needs frequent reminders. Has been up to BSC multiple times requiring assist x2 due to weakness. IV lasix given and IV fluids decreased.   "

## 2023-02-15 NOTE — PROGRESS NOTES
[facility] MEDICINE  PROGRESS NOTE     Code Status: Full Code       Identification/Summary:   Eveline Gage is a 27 year old female with PMH signficant for sickle cell disease, asthma, adjustment disorder and chronic anemia.   2/13 presented with severe pain secondary to sickle cell crisis.  Admitted.  Pain team consulted.  Given IV hydration.  Multiple modalities utilized to try and help with pain but still having significant struggles.  Hematology consulted.  2/14 started Dilaudid PCA.  Has an anxiety component so  consulted psychiatry.  2/15 having some hypoxia.  Decreased IV fluids, give Lasix IV x1 and decrease Dilaudid continuous rate.  Monitor response.     Assessment and Plan:  -Sickle cell crisis  Initially started Dilaudid 1 mg IV every 2 hours as needed, oral oxycodone, Voltaren gel, Robaxin and Flexeril.  Holding her home naproxen.  Utilize continuous pulse oximeter.  Hydrated with normal saline 150 mL/h.  Pain team consult appreciated.  Started on Dilaudid PCA.  Appreciate consultation with [facility] hematology.  Started on hydroxyurea.  Having significant challenges with pain control.  -Acute hypoxemic respiratory failure  May be a component of both her hydration as well as sedation with pain control.  BNP elevated.  Using 4 L with oxymask due to her being a persistent mouth breather.  Decrease IV fluids to 75 mL/h.  Give IV Lasix 20 mg x 1.  Discussed with pain team and will decrease continuous Dilaudid PCA rate.  Monitor response.  -Severe anxiety  -History of adjustment disorder  Psychiatry consultation ordered.  -Leukocytosis  -Chronic anemia  White count 18.5--> 21.3--> 16.4  Hemoglobin 9.7--> 8.7--> 7.5.  Follow daily CBC.  Defer further evaluation per hematology.  -Elevated LFTs  -Elevated lipase  Admission bilirubin 11.2, , , lipase 67.  LFTs lower versus essentially stable compared to 2 days ago.  Lipase better than 2 days ago.  " Lab results significantly improved compared to January.  LFTs slowly improving.  Continue treatment as above.    Follow daily LFTs.  -Mild intermittent asthma  Continue as needed albuterol and DuoNebs.  -GERD  Continue home omeprazole twice daily, Carafate 4 times daily as needed, Tums and senna.  -Tobacco abuse  -Cannabis use  Patient smokes quarter pack tobacco per day.  Does admit to daily THC use.  This does intermittently help with her symptoms.  Hold off on nicotine patches as we do not want to contribute to vasoconstriction.  Patient could be a very good candidate for medicinal cannabis.  Encouraged her to be evaluated and certified as an outpatient.     -Anticoagulation   Encourage ambulation at this time.     -COVID19 PCR influenza a/B/RSV negative from 2/11/2023  Noted.     Fluids: Normal saline at 75 mL/h  Pain meds: Dilaudid PCA per pain team  Therapy: na   Echeverria:Not present  Lines: PRESENT             Current Diet  Orders Placed This Encounter      Combination Diet Regular Diet Adult    Supplements  None    Barriers to Discharge: Dilaudid PCA, pain control, IV fluids,    Disposition: Likely here at least 3 more days    Clinically Significant Risk Factors              # Hypoalbuminemia: Lowest albumin = 2.6 g/dL at 2/15/2023  4:34 AM, will monitor as appropriate   # Thrombocytopenia: Lowest platelets = 82 in last 2 days, will monitor for bleeding         # Obesity: Estimated body mass index is 34.17 kg/m  as calculated from the following:    Height as of this encounter: 1.6 m (5' 3\").    Weight as of this encounter: 87.5 kg (192 lb 14.4 oz)., PRESENT ON ADMISSION         Interval History/Subjective:  Patient had a difficult evening.  Dilaudid PCA initially gave her some relief but later on began to have more discomfort.  Difficult to sleep.  Patient was requiring supplemental oxygen as high as 15 L on the oxymask.  Is now on 4 L.  Despite frequent reminders patient has a persistent mouth breather so was " transition to oxymask with good results.  Difficult to get patient to focus and answer questions.  Rated her bilateral knee pain at 7 out of 10 and abdominal pain as 6 out of 10.  No family present.  Questions answered to verbalized satisfaction.    Physical Exam/Objective:  Temp:  [98.1  F (36.7  C)-98.8  F (37.1  C)] 98.8  F (37.1  C)  Pulse:  [] 116  Resp:  [20-32] 20  BP: (125-165)/(67-93) 162/93  SpO2:  [77 %-100 %] 94 %  Wt Readings from Last 4 Encounters:   02/14/23 87.5 kg (192 lb 14.4 oz)   01/19/23 83.5 kg (184 lb 1.6 oz)   01/14/23 73.5 kg (162 lb)   08/04/22 82.6 kg (182 lb)     Body mass index is 34.17 kg/m .    Constitutional: awake, alert, intermittently cooperative, mild distress, appears stated age and moderately obese  ENT: Normocephalic, without obvious abnormality, atraumatic, external ears without lesions, oral pharynx with moist mucous membranes, tonsils without erythema or exudates, gums normal and good dentition.  Respiratory: Shallow breathing.  No wheezing.  Slight crackles at the bases.  Cardiovascular: Normal apical impulse, regular rate and rhythm, normal S1 and S2, no S3 or S4, and no murmur noted  GI: Positive bowel sounds soft nondistended.  Complains of diffuse tenderness.  Skin: normal skin color, texture, turgor, no redness, warmth, or swelling, and no rashes  Musculoskeletal: There is no redness, warmth, or swelling of the joints.  Motor strength is 5 out of 5 all extremities bilaterally.  Tone is normal. no lower extremity pitting edema present  Neurologic: Cranial nerves II-XII are grossly intact. Sensory:  Sensory intact  Neuropsychiatric: General: normal, calm and normal eye contact Level of consciousness: alert / normal Affect: normal Orientation: oriented to self, place, time and situation Memory and insight: normal, memory for past and recent events intact and thought process normal      Medications:   Personally Reviewed.  Medications     HYDROmorphone       sodium  chloride 150 mL/hr at 02/15/23 0116       diclofenac  2 g Topical 4x Daily     hydroxyurea  500 mg Oral Daily     lidocaine   Topical 4x Daily     melatonin  3 mg Oral At Bedtime     methocarbamol  500 mg Oral Q6H     multivitamin, therapeutic  1 tablet Oral Daily     pantoprazole  40 mg Oral BID     senna-docusate  1 tablet Oral BID    Or     senna-docusate  2 tablet Oral BID     sodium chloride (PF)  10 mL Intracatheter Q8H     sodium chloride (PF)  10-40 mL Intracatheter Q7 Days     sodium chloride (PF)  3 mL Intracatheter Q8H       Data reviewed today: I personally reviewed all new medications, labs, imaging/diagnostics reports over the past 24 hours. Pertinent findings include:    Imaging:   No results found for this or any previous visit (from the past 24 hour(s)).    Labs:  Chest XR,  PA & LAT   Final Result   IMPRESSION: Heart size and vascularity are normal. Mild bronchial wall thickening suggests airway inflammation. No focal consolidation, pneumothorax nor pleural effusion.        Recent Results (from the past 24 hour(s))   Hepatic function panel    Collection Time: 02/15/23  4:34 AM   Result Value Ref Range    Bilirubin Total 12.0 (H) 0.0 - 1.0 mg/dL    Bilirubin Direct 6.8 (H) <=0.5 mg/dL    Protein Total 6.8 6.0 - 8.0 g/dL    Albumin 2.6 (L) 3.5 - 5.0 g/dL    Alkaline Phosphatase 702 (H) 45 - 120 U/L     (H) 0 - 40 U/L     (H) 0 - 45 U/L   CBC with platelets    Collection Time: 02/15/23  4:34 AM   Result Value Ref Range    WBC Count 16.4 (H) 4.0 - 11.0 10e3/uL    RBC Count 2.45 (L) 3.80 - 5.20 10e6/uL    Hemoglobin 7.5 (L) 11.7 - 15.7 g/dL    Hematocrit 21.2 (L) 35.0 - 47.0 %    MCV 87 78 - 100 fL    MCH 30.6 26.5 - 33.0 pg    MCHC 35.4 31.5 - 36.5 g/dL    RDW 18.7 (H) 10.0 - 15.0 %    Platelet Count 82 (L) 150 - 450 10e3/uL   Blood gas venous    Collection Time: 02/15/23  4:34 AM   Result Value Ref Range    pH Venous 7.43 7.35 - 7.45    pCO2 Venous 36 35 - 50 mm Hg    pO2 Venous 67 (H)  25 - 47 mm Hg    Bicarbonate Venous 24 24 - 30 mmol/L    Base Excess/Deficit (+/-) -0.5   mmol/L    Oxyhemoglobin Venous 89.7 (H) 70.0 - 75.0 %    O2 Sat, Venous 93.0 (H) 70.0 - 75.0 %   B-Type Natriuretic Peptide ([facility] Only)    Collection Time: 02/15/23  4:34 AM   Result Value Ref Range     (H) 0 - 64 pg/mL       Pending Labs:  Unresulted Labs Ordered in the Past 30 Days of this Admission     Date and Time Order Name Status Description    2/13/2023  7:44 AM Other Laboratory; [facility]; Antigen Typing (Laboratory Miscellaneous Order) In process     2/11/2023  3:33 AM Blood Culture Line, venous Preliminary     2/11/2023  3:33 AM Blood Culture Line, venous Preliminary             Miky Marrufo MD  [facility]  [facility]  [facility]  Phone: #[phone]

## 2023-02-15 NOTE — SIGNIFICANT EVENT
Significant Event Note    Time of event: 4:58 PM February 15, 2023    Description of event:  Notified by nursing staff that patient is confused and still complaining of significant pain.  Chart review shows her persistently hypoxic despite receiving Lasix.  On evaluation patient is a little bit more comfortable than my previous visit but still is tachypneic and tachycardic.  Difficult to have patient focus and answer questions.  Did state that she is having some chest pain.  Abdomen feeling better.  No focal neurologic abnormalities appreciated.  Moving all 4 extremities without difficulty.  Lungs CTA bilaterally.  Heart tachycardic and regular.  Extremities feel warm.  Good pulses in the lower extremities.  No lower extremity edema.    Plan:  Obtain stat head CT and chest CT PE run.  Check a stat troponin x3, BMP, hepatic, CBC, magnesium and phosphorus.  Nursing staff to update pain team.  Difficult situation.  If her evaluation above is negative then may be agreeable to continuing current pain medication regimen as I would rather have some mild confusion than severe uncontrolled pain.    Discussed with: bedside nurse    Miky Marrufo MD

## 2023-02-15 NOTE — PROGRESS NOTES
"Pt A&O x4. Slow to respond. Pt states she is in \"excruiciating pain.\" PCA pump running. Tylenol, atarax, and zofran given overnight. Midline IV CDI. NS running at 150 mL/hr. Pt is an A2. Pt unsteady and \"feels weak.\" Pt was restless and unable to get up to bedside commode. One time straight cath done, yielded 700 mL output. Pt has labile Spo2. Pt currently on oxymask, spo2 93-95%. Pt tachycardic and RR ranging in 20-30s.     0630 - Pt A2 to bedside commode. Voids spontaneously. Pt requires repositioning every 15-30 min due to pain and discomfort.     Will continue to monitor. IS in room, will teach pt how to use when pt is awake this AM.   "

## 2023-02-15 NOTE — PROGRESS NOTES
Spoke with Dr. Stockton over the phone. Pt spo2 dropped to 88% at 4 L oxygen. Pt currently on 6 L oxygen via nasal cannula. Gums are bleeding and lips are dry. Bladder scan was 676. Pt is in a lot of pain and seems more confused and restless. Plan of care is to straight cath pt, get a venous blood gas labs, begin IS, and give biotene. Will update provider if status changes.

## 2023-02-15 NOTE — PROGRESS NOTES
[facility] ACUTE PAIN SERVICE    Daily PAIN Progress Note    Assessment/Plan:  Eveline Gage is a 27 year old female who was admitted on 2/13/2023.  Pain team was asked to see the patient for sickle cell pain. Patient with recent admission 1/15 to 1/19/23, Acute Pain Management Team followed patient at that time for abdominal pain (per GI note possibly pain due to hepatic congestion and liver ischemia related to sickle cell disease). Admitted this hospitalization for sickle cell crisis. History including sickle cell disease, Hx splenectomy, adjustment disorder, mixed anxiety and depression, asthma. The patient does  smoke and denies chemical dependency history.      In 24 hours, patient used 12.5 mg IV dilaudid via PCA = 250 MME. Patient anxious sitting on edge of bed during assessment, spoke with Dr. Marrufo, will try lasix to see if breathing improves. D/t increased O2 requirements and on mask - will decrease continuous rate to 0.1 mg and re-evalute. Discussed ketamine as an option for pain w/out effecting resp status.      PLAN:   1) Pain is consistent with sickle cell crisis. Labs and imaging indicated: I have personally reviewed pertinent notes, labs, tests, and radiologic imaging in patient's chart. Treatment plan includes multimodal pain approach, Hospital Medicine Service for medical management, Heme/Onc. Patient educated regarding multimodal pain approach, medications as listed below. Patient is understanding of the plan. All questions and concerns addressed to patient's satisfaction.   2)Multimodal Medication Therapy  Topical: voltaren alternating with lidocaine   NSAID'S: none, hgb 7.5  Muscle Relaxants: Robaxin tid scheduled - increase to q6h prn  Adjuvants: APAP prn and keep less than 2g given LFTs, hydroxyurea daily   Antidepressants/anxiolytics: hydroxyzine tid prn- increase to q6h prn   Opioids: no oral now  IV Pain medication: Dilaudid PCA - 0.2 continuous, 0.2 bolus, lockout 10 mins -  decrease continuous to 0.1 mg   3)Non-medication interventions: heat, ice   Acupuncture consult - offered and agreeable   Integrative consult - offered and agreeable   4)Constipation Prophylaxis: Scheduled and prn     -MN  pulled from system on 2/14/23. 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

## 2023-02-15 NOTE — PROGRESS NOTES
Hospitalist follow up note:    Called regarding increased confusion, restlessness, increased O2 needs. Possibly related to dilaudid. Also with over 600ml in bladder, ordered straight cath. Checking VBG to eval for CO2 retention, BNP.     Joel Stockton DO   Pager #: [phone]

## 2023-02-16 ENCOUNTER — APPOINTMENT (OUTPATIENT)
Dept: CARDIOLOGY | Facility: CLINIC | Age: 28
End: 2023-02-16
Attending: HOSPITALIST
Payer: COMMERCIAL

## 2023-02-16 PROBLEM — Z90.81 HISTORY OF SPLENECTOMY: Status: ACTIVE | Noted: 2023-02-16

## 2023-02-16 PROBLEM — F43.22 ADJUSTMENT DISORDER WITH ANXIOUS MOOD: Status: ACTIVE | Noted: 2023-02-16

## 2023-02-16 PROBLEM — R40.0 SOMNOLENCE: Status: ACTIVE | Noted: 2023-02-16

## 2023-02-16 PROBLEM — R79.89 ELEVATED LFTS: Chronic | Status: ACTIVE | Noted: 2023-02-13

## 2023-02-16 PROBLEM — F41.9 ANXIETY: Chronic | Status: ACTIVE | Noted: 2023-02-14

## 2023-02-16 PROBLEM — D57.1 SICKLE CELL DISEASE (H): Status: ACTIVE | Noted: 2020-06-15

## 2023-02-16 PROBLEM — D57.00 SICKLE CELL PAIN CRISIS (H): Chronic | Status: ACTIVE | Noted: 2023-02-13

## 2023-02-16 PROBLEM — F12.90 CONTINUOUS CANNABIS USE: Status: ACTIVE | Noted: 2023-02-16

## 2023-02-16 PROBLEM — J96.01 ACUTE HYPOXEMIC RESPIRATORY FAILURE (H): Chronic | Status: ACTIVE | Noted: 2023-02-15

## 2023-02-16 PROBLEM — F17.200 SMOKES TOBACCO DAILY: Status: ACTIVE | Noted: 2023-02-16

## 2023-02-16 PROBLEM — R40.0 SOMNOLENCE: Chronic | Status: ACTIVE | Noted: 2023-02-16

## 2023-02-16 PROBLEM — F43.23 ADJUSTMENT DISORDER WITH MIXED ANXIETY AND DEPRESSED MOOD: Chronic | Status: ACTIVE | Noted: 2017-10-05

## 2023-02-16 PROBLEM — Z90.49 HISTORY OF CHOLECYSTECTOMY: Status: ACTIVE | Noted: 2023-02-16

## 2023-02-16 PROBLEM — F43.23 ADJUSTMENT DISORDER WITH MIXED ANXIETY AND DEPRESSED MOOD: Status: ACTIVE | Noted: 2017-10-05

## 2023-02-16 LAB
ABO/RH(D): ABNORMAL
ALBUMIN SERPL-MCNC: 2.5 G/DL (ref 3.5–5)
ALP SERPL-CCNC: 812 U/L (ref 45–120)
ALT SERPL W P-5'-P-CCNC: 137 U/L (ref 0–45)
ANION GAP SERPL CALCULATED.3IONS-SCNC: 9 MMOL/L (ref 5–18)
ANTIBODY SCREEN: POSITIVE
ANTIBODY, PREVIOUSLY IDENTIFIED: NORMAL
AST SERPL W P-5'-P-CCNC: 228 U/L (ref 0–40)
ATRIAL RATE - MUSE: 129 BPM
BACTERIA BLD CULT: NO GROWTH
BACTERIA BLD CULT: NO GROWTH
BILIRUB DIRECT SERPL-MCNC: 6.9 MG/DL
BILIRUB SERPL-MCNC: 13 MG/DL (ref 0–1)
BUN SERPL-MCNC: 10 MG/DL (ref 8–22)
CALCIUM SERPL-MCNC: 8.9 MG/DL (ref 8.5–10.5)
CHLORIDE BLD-SCNC: 103 MMOL/L (ref 98–107)
CO2 SERPL-SCNC: 24 MMOL/L (ref 22–31)
CREAT SERPL-MCNC: 0.53 MG/DL (ref 0.6–1.1)
DIASTOLIC BLOOD PRESSURE - MUSE: NORMAL MMHG
ERYTHROCYTE [DISTWIDTH] IN BLOOD BY AUTOMATED COUNT: 19.5 % (ref 10–15)
GFR SERPL CREATININE-BSD FRML MDRD: >90 ML/MIN/1.73M2
GLUCOSE BLD-MCNC: 120 MG/DL (ref 70–125)
HCT VFR BLD AUTO: 18.7 % (ref 35–47)
HGB BLD-MCNC: 7 G/DL (ref 11.7–15.7)
INTERPRETATION ECG - MUSE: NORMAL
LACTATE SERPL-SCNC: 2.1 MMOL/L (ref 0.7–2)
LACTATE SERPL-SCNC: 2.2 MMOL/L (ref 0.7–2)
LACTATE SERPL-SCNC: 2.2 MMOL/L (ref 0.7–2)
LACTATE SERPL-SCNC: 2.9 MMOL/L (ref 0.7–2)
LVEF ECHO: NORMAL
MCH RBC QN AUTO: 30.7 PG (ref 26.5–33)
MCHC RBC AUTO-ENTMCNC: 37.4 G/DL (ref 31.5–36.5)
MCV RBC AUTO: 82 FL (ref 78–100)
P AXIS - MUSE: 42 DEGREES
PLATELET # BLD AUTO: 67 10E3/UL (ref 150–450)
POTASSIUM BLD-SCNC: 3.7 MMOL/L (ref 3.5–5)
PR INTERVAL - MUSE: 120 MS
PROCALCITONIN SERPL-MCNC: 1.2 NG/ML (ref 0–0.49)
PROT SERPL-MCNC: 6.7 G/DL (ref 6–8)
QRS DURATION - MUSE: 66 MS
QT - MUSE: 310 MS
QTC - MUSE: 454 MS
R AXIS - MUSE: 57 DEGREES
RBC # BLD AUTO: 2.28 10E6/UL (ref 3.8–5.2)
RETICS # AUTO: 0.16 10E6/UL (ref 0.01–0.11)
RETICS/RBC NFR AUTO: 6.9 % (ref 0.8–2.7)
SODIUM SERPL-SCNC: 136 MMOL/L (ref 136–145)
SPECIMEN EXPIRATION DATE: ABNORMAL
SPECIMEN EXPIRATION DATE: NORMAL
SYSTOLIC BLOOD PRESSURE - MUSE: NORMAL MMHG
T AXIS - MUSE: 32 DEGREES
TROPONIN I SERPL-MCNC: 0.81 NG/ML (ref 0–0.29)
VENTRICULAR RATE- MUSE: 129 BPM
WBC # BLD AUTO: 11.6 10E3/UL (ref 4–11)

## 2023-02-16 PROCEDURE — 99233 SBSQ HOSP IP/OBS HIGH 50: CPT | Mod: 25 | Performed by: REGISTERED NURSE

## 2023-02-16 PROCEDURE — 258N000003 HC RX IP 258 OP 636: Performed by: HOSPITALIST

## 2023-02-16 PROCEDURE — 250N000013 HC RX MED GY IP 250 OP 250 PS 637: Performed by: NURSE PRACTITIONER

## 2023-02-16 PROCEDURE — 99231 SBSQ HOSP IP/OBS SF/LOW 25: CPT | Performed by: INTERNAL MEDICINE

## 2023-02-16 PROCEDURE — 87040 BLOOD CULTURE FOR BACTERIA: CPT | Performed by: HOSPITALIST

## 2023-02-16 PROCEDURE — 84145 PROCALCITONIN (PCT): CPT | Performed by: HOSPITALIST

## 2023-02-16 PROCEDURE — 83605 ASSAY OF LACTIC ACID: CPT | Performed by: INTERNAL MEDICINE

## 2023-02-16 PROCEDURE — 86902 BLOOD TYPE ANTIGEN DONOR EA: CPT | Performed by: INTERNAL MEDICINE

## 2023-02-16 PROCEDURE — 255N000002 HC RX 255 OP 636: Performed by: INTERNAL MEDICINE

## 2023-02-16 PROCEDURE — 85660 RBC SICKLE CELL TEST: CPT | Performed by: FAMILY MEDICINE

## 2023-02-16 PROCEDURE — 86922 COMPATIBILITY TEST ANTIGLOB: CPT | Performed by: HOSPITALIST

## 2023-02-16 PROCEDURE — 250N000011 HC RX IP 250 OP 636: Performed by: INTERNAL MEDICINE

## 2023-02-16 PROCEDURE — 250N000011 HC RX IP 250 OP 636: Performed by: HOSPITALIST

## 2023-02-16 PROCEDURE — 84999 UNLISTED CHEMISTRY PROCEDURE: CPT | Performed by: FAMILY MEDICINE

## 2023-02-16 PROCEDURE — 86922 COMPATIBILITY TEST ANTIGLOB: CPT | Performed by: INTERNAL MEDICINE

## 2023-02-16 PROCEDURE — 85027 COMPLETE CBC AUTOMATED: CPT | Performed by: FAMILY MEDICINE

## 2023-02-16 PROCEDURE — 250N000013 HC RX MED GY IP 250 OP 250 PS 637: Performed by: INTERNAL MEDICINE

## 2023-02-16 PROCEDURE — 120N000001 HC R&B MED SURG/OB

## 2023-02-16 PROCEDURE — 85660 RBC SICKLE CELL TEST: CPT | Performed by: INTERNAL MEDICINE

## 2023-02-16 PROCEDURE — 250N000013 HC RX MED GY IP 250 OP 250 PS 637: Performed by: FAMILY MEDICINE

## 2023-02-16 PROCEDURE — 84999 UNLISTED CHEMISTRY PROCEDURE: CPT | Performed by: INTERNAL MEDICINE

## 2023-02-16 PROCEDURE — P9016 RBC LEUKOCYTES REDUCED: HCPCS | Performed by: HOSPITALIST

## 2023-02-16 PROCEDURE — 36415 COLL VENOUS BLD VENIPUNCTURE: CPT | Performed by: INTERNAL MEDICINE

## 2023-02-16 PROCEDURE — 84484 ASSAY OF TROPONIN QUANT: CPT | Performed by: FAMILY MEDICINE

## 2023-02-16 PROCEDURE — 86902 BLOOD TYPE ANTIGEN DONOR EA: CPT | Performed by: FAMILY MEDICINE

## 2023-02-16 PROCEDURE — 85045 AUTOMATED RETICULOCYTE COUNT: CPT | Performed by: INTERNAL MEDICINE

## 2023-02-16 PROCEDURE — 99222 1ST HOSP IP/OBS MODERATE 55: CPT | Performed by: INTERNAL MEDICINE

## 2023-02-16 PROCEDURE — 82248 BILIRUBIN DIRECT: CPT | Performed by: FAMILY MEDICINE

## 2023-02-16 PROCEDURE — 36415 COLL VENOUS BLD VENIPUNCTURE: CPT | Performed by: HOSPITALIST

## 2023-02-16 PROCEDURE — 258N000003 HC RX IP 258 OP 636: Performed by: FAMILY MEDICINE

## 2023-02-16 PROCEDURE — 36415 COLL VENOUS BLD VENIPUNCTURE: CPT | Performed by: FAMILY MEDICINE

## 2023-02-16 PROCEDURE — 93306 TTE W/DOPPLER COMPLETE: CPT | Mod: 26 | Performed by: INTERNAL MEDICINE

## 2023-02-16 PROCEDURE — 83605 ASSAY OF LACTIC ACID: CPT | Performed by: FAMILY MEDICINE

## 2023-02-16 PROCEDURE — 86850 RBC ANTIBODY SCREEN: CPT | Performed by: FAMILY MEDICINE

## 2023-02-16 RX ORDER — PIPERACILLIN SODIUM, TAZOBACTAM SODIUM 3; .375 G/15ML; G/15ML
3.38 INJECTION, POWDER, LYOPHILIZED, FOR SOLUTION INTRAVENOUS EVERY 8 HOURS
Status: DISCONTINUED | OUTPATIENT
Start: 2023-02-17 | End: 2023-02-16

## 2023-02-16 RX ORDER — LORAZEPAM 2 MG/ML
1 INJECTION INTRAMUSCULAR EVERY 4 HOURS PRN
Status: DISCONTINUED | OUTPATIENT
Start: 2023-02-16 | End: 2023-02-17

## 2023-02-16 RX ORDER — HALOPERIDOL 5 MG/ML
2 INJECTION INTRAMUSCULAR EVERY 4 HOURS PRN
Status: DISCONTINUED | OUTPATIENT
Start: 2023-02-16 | End: 2023-02-20 | Stop reason: HOSPADM

## 2023-02-16 RX ORDER — PIPERACILLIN SODIUM, TAZOBACTAM SODIUM 3; .375 G/15ML; G/15ML
3.38 INJECTION, POWDER, LYOPHILIZED, FOR SOLUTION INTRAVENOUS ONCE
Status: DISCONTINUED | OUTPATIENT
Start: 2023-02-16 | End: 2023-02-16

## 2023-02-16 RX ADMIN — THERA TABS 1 TABLET: TAB at 08:43

## 2023-02-16 RX ADMIN — Medication 1 MG: at 03:25

## 2023-02-16 RX ADMIN — CEFEPIME 2 G: 2 INJECTION, POWDER, FOR SOLUTION INTRAVENOUS at 21:56

## 2023-02-16 RX ADMIN — PANTOPRAZOLE SODIUM 40 MG: 40 TABLET, DELAYED RELEASE ORAL at 08:43

## 2023-02-16 RX ADMIN — SODIUM CHLORIDE: 9 INJECTION, SOLUTION INTRAVENOUS at 23:05

## 2023-02-16 RX ADMIN — METHOCARBAMOL 500 MG: 500 TABLET, FILM COATED ORAL at 03:25

## 2023-02-16 RX ADMIN — DICLOFENAC SODIUM 2 G: 10 GEL TOPICAL at 20:45

## 2023-02-16 RX ADMIN — LIDOCAINE: 50 OINTMENT TOPICAL at 19:43

## 2023-02-16 RX ADMIN — Medication: at 12:13

## 2023-02-16 RX ADMIN — PANTOPRAZOLE SODIUM 40 MG: 40 TABLET, DELAYED RELEASE ORAL at 20:44

## 2023-02-16 RX ADMIN — METHOCARBAMOL 500 MG: 500 TABLET, FILM COATED ORAL at 08:43

## 2023-02-16 RX ADMIN — Medication: at 07:30

## 2023-02-16 RX ADMIN — HYDROXYZINE HYDROCHLORIDE 25 MG: 25 TABLET ORAL at 03:25

## 2023-02-16 RX ADMIN — ACETAMINOPHEN 650 MG: 325 TABLET ORAL at 20:44

## 2023-02-16 RX ADMIN — SENNOSIDES AND DOCUSATE SODIUM 2 TABLET: 50; 8.6 TABLET ORAL at 20:43

## 2023-02-16 RX ADMIN — HALOPERIDOL LACTATE 2 MG: 5 INJECTION, SOLUTION INTRAMUSCULAR at 09:09

## 2023-02-16 RX ADMIN — SODIUM CHLORIDE: 9 INJECTION, SOLUTION INTRAVENOUS at 17:08

## 2023-02-16 RX ADMIN — SENNOSIDES AND DOCUSATE SODIUM 1 TABLET: 50; 8.6 TABLET ORAL at 08:43

## 2023-02-16 RX ADMIN — SODIUM CHLORIDE 1000 ML: 9 INJECTION, SOLUTION INTRAVENOUS at 19:39

## 2023-02-16 RX ADMIN — DICLOFENAC SODIUM 2 G: 10 GEL TOPICAL at 18:01

## 2023-02-16 RX ADMIN — HYDROXYUREA 500 MG: 500 CAPSULE ORAL at 08:43

## 2023-02-16 RX ADMIN — METHOCARBAMOL 500 MG: 500 TABLET, FILM COATED ORAL at 20:43

## 2023-02-16 RX ADMIN — PERFLUTREN 1.5 ML: 6.52 INJECTION, SUSPENSION INTRAVENOUS at 11:00

## 2023-02-16 ASSESSMENT — ACTIVITIES OF DAILY LIVING (ADL)
ADLS_ACUITY_SCORE: 32
WEAR_GLASSES_OR_BLIND: NO
CONCENTRATING,_REMEMBERING_OR_MAKING_DECISIONS_DIFFICULTY: NO
WALKING_OR_CLIMBING_STAIRS_DIFFICULTY: NO
ADLS_ACUITY_SCORE: 40
TOILETING_ISSUES: NO
CHANGE_IN_FUNCTIONAL_STATUS_SINCE_ONSET_OF_CURRENT_ILLNESS/INJURY: NO
ADLS_ACUITY_SCORE: 45
DOING_ERRANDS_INDEPENDENTLY_DIFFICULTY: NO
HEARING_DIFFICULTY_OR_DEAF: NO
ADLS_ACUITY_SCORE: 45
ADLS_ACUITY_SCORE: 45
ADLS_ACUITY_SCORE: 32
DRESSING/BATHING_DIFFICULTY: NO
ADLS_ACUITY_SCORE: 32
ADLS_ACUITY_SCORE: 32
FALL_HISTORY_WITHIN_LAST_SIX_MONTHS: NO
DIFFICULTY_COMMUNICATING: NO
ADLS_ACUITY_SCORE: 32
ADLS_ACUITY_SCORE: 32
DIFFICULTY_EATING/SWALLOWING: NO
ADLS_ACUITY_SCORE: 45
ADLS_ACUITY_SCORE: 32

## 2023-02-16 NOTE — PLAN OF CARE
Problem: Plan of Care - These are the overarching goals to be used throughout the patient stay.    Goal: Optimal Comfort and Wellbeing  Outcome: Progressing     Problem: Pain Acute  Goal: Optimal Pain Control and Function  Outcome: Progressing   Goal Outcome Evaluation:    Patient oriented x4 and slightly sedated today. Pt received 1U of blood today with no reaction. Pt is on PCA pump on continuous 0.1mg/hr. Pt was in extreme pain this AM and was also heavily sedated so RN paged MD to evaluate patient for guidance. 2mg Haldol IV given per MD, which was effective. Patient was more comfortable and has slept a majority of the shift. Patients s/o called for update on patient status. Tele reading sinus tachycardia. Pt now on 4L oxymask and sats are in mid 90s. Patient seems to be more comfortable and improving. Pending discharge once pain is controlled.

## 2023-02-16 NOTE — PLAN OF CARE
Problem: Pain Acute  Goal: Optimal Pain Control and Function  Intervention: Prevent or Manage Pain  Recent Flowsheet Documentation  Taken 2/15/2023 1600 by Stephan Cintron RN  Medication Review/Management: medications reviewed   Goal Outcome Evaluation:       Sedated but arousable. Oriented to self; disoriented to place and time. C/o 10/10 pain, receiving dilaudid PCA. Tachypneic and tachycardic. Dr Marrufo was updated. Dr Marrufo requested that pain team be notified about pain control and patient's mentation. Writer was unable to get a hold of pain team; Dr Davey (remote coverage) was updated, no new orders at this time. Continue to monitor.

## 2023-02-16 NOTE — PROGRESS NOTES
Lab called, stated pt type and screen positive for antibodies and that this would take longer to obtain red blood cells for pt. Primary RN and MD updated. Lab to call and update when blood ready for pt.

## 2023-02-16 NOTE — PLAN OF CARE
"Problem: Pain Acute  Goal: Optimal Pain Control and Function  Outcome: Not Progressing  Intervention: Develop Pain Management Plan  Intervention: Prevent or Manage Pain   Goal Outcome Evaluation:    Pt orientation and LOC fluctuating on shift.  Intermittently alert and able to answer simple yes/no questions, other times very lethargic and confused, pt once stated \"I need to get up and go to the store\".  Labs drawn and multiple criticals came back, MD paged and paused the continuous PCA dilaudid, however button still available for pt to push for doses.  Pt became more confused through the night, moaning and groaning in pain and crying out.  When attempting to educate pt on use of the PCA pump button, pt often times would not utilize it.  Despite pt crying out in pain, when asked if she were in pain pt mostly stated no.  When pt did say she was in pain, RN educated pt to utilize pump and pt would fall back asleep or attempt to get OOB.  Tele sinus tachycardia.  No blood return to midline.  5L oxymask, however pt keeps pulling mask off face.  Heavy assist of 2 to BSC.  "

## 2023-02-16 NOTE — PROGRESS NOTES
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  There may be some emotional and psychological stressors involved it almost appears as if she is acting out she goes from sedation to agitation  She cannot describe specifically what it is that hurts or what the problem is from her perspective does not appear to be any previous pattern of this type of response to pain in the past  Plan:   Inconsolable with symptoms seeming disproportionate to objective findings despite PCA/diluadid  Will ask psych to see and pain help appreciated.   Add antipsychotics (haldol) and low dose benzo with trepidation. Difficult situation  Restart IV fluids; packed red blood cells transfused this a.m.  Hydroxyurea started per heme  Pain team to see patient and psychiatry    CT chest/abdomen and head unremarkable. Negative PE run     Diet: Combination Diet Regular Diet Adult    DVT Prophylaxis: Low Risk/Ambulatory with no VTE prophylaxis indicated  Echeverria Catheter: Not present  Lines: PRESENT             Cardiac Monitoring: ACTIVE order. Indication: AMI (NSTEMI/ STEMI) (48 hours)  Code Status: Full Code      Clinically Significant Risk Factors              # Hypoalbuminemia: Lowest albumin = 2.5 g/dL at 2/16/2023   "6:26 AM, will monitor as appropriate   # Thrombocytopenia: Lowest platelets = 62 in last 2 days, will monitor for bleeding         # Obesity: Estimated body mass index is 35.15 kg/m  as calculated from the following:    Height as of this encounter: 1.6 m (5' 3\").    Weight as of this encounter: 90 kg (198 lb 6.6 oz)., PRESENT ON ADMISSION         Disposition Plan      Expected Discharge Date: 02/18/2023      Destination: home with family            Vikram Iraheta MD  Hospitalist Service  [facility]  Securely message with [employer] (more info)  Text page via [facility] Paging/Directory   ______________________________________________________________________    Interval History   See above    Physical Exam   Vital Signs: Temp: 99.3  F (37.4  C) Temp src: Oral BP: (!) 141/87 Pulse: 102   Resp: 24 SpO2: 94 % O2 Device: Oxymask Oxygen Delivery: 5 LPM  Weight: 198 lbs 6.62 oz  She goes from writhing in bed moaning groaning which is easily heard down the hallway to be in nearly catatonic      Medical Decision Making   35 minutes  Data     I have personally reviewed the following data over the past 24 hrs:    11.6 (H)  \   7.0 (L)   / 67 (L)     136 103 10 /  120   3.7 24 0.53 (L) \       ALT: 137 (H) AST: 228 (H) AP: 812 (H) TBILI: 13.0 (H)   ALB: 2.5 (L) TOT PROTEIN: 6.7 LIPASE: N/A       Procal: N/A CRP: N/A Lactic Acid: 2.9 (H)       Ferritin:  N/A % Retic:  6.9 (H) LDH:  N/A       "

## 2023-02-16 NOTE — PROGRESS NOTES
Pt orientation and level of consciousness fluctuating. Lab called this AM to state blood ready for pt. Pt unable to provide consent when writer spoke with her, falling asleep while writer was talking to her. Consulted with MD. RN phoned pt's significant other Joel Jaimes at [phone], Joel did provide consent for pt to receive a blood transfusion via telephone, Alina Crook RN witnessed. MD notified that consent obtained

## 2023-02-16 NOTE — H&P
Hospitalist follow up note:    Called regarding drop in hgb below 7, elevated troponin of 1.24. On exam she is very somnolent, only wakes for a second or 2 to answer questions. Denies pain or dyspnea to me but hx is not reliable due to sedation.     Earlier in the evening she complained of chest pain and troponin was ordered. This resulted at 8:44pm. Earlier in the day BNP was checked and elevated at 365. She is hypoxic and requiring 5lpm of oxygen via mask. She is on dilaudid PCA due to sickle cell pain crisis.     Will continue to trend troponin, check stat EKG, start tele, check CXR. Will discuss with cardiology. Pt does not need to be seen by cardiology but would like to discuss. Will order one unit of blood. Pt unable to sign consent now due to excessive sedation. Will stop continue dilaudid and change to PCA only. Monitor neuro checks. CT head without contrast done today was negative. CT PE run was also neg earlier today.    Joel Stockton DO   Pager #: [phone]

## 2023-02-16 NOTE — PROGRESS NOTES
[facility] ACUTE PAIN SERVICE    Daily PAIN Progress Note    Assessment/Plan:  Eveline Gage is a 27 year old female who was admitted on 2/13/2023.  Pain team was asked to see the patient for sickle cell pain. Patient with recent admission 1/15 to 1/19/23, Acute Pain Management Team followed patient at that time for abdominal pain (per GI note possibly pain due to hepatic congestion and liver ischemia related to sickle cell disease). Admitted this hospitalization for sickle cell crisis. History including sickle cell disease, Hx splenectomy, adjustment disorder, mixed anxiety and depression, asthma. The patient does  smoke and denies chemical dependency history.      In 24 hours, patient used 9.1mg IV dilaudid via PCA = 182 MME (down from 250 previous 24 hours).   Patient sleeping soundly in chair. Does awaken to verbal stimuli, immediately shuts eyes again and continues sleeping. We do this about 5 times. Unsuccessful interview. Discussed with RN who reports this is the first time that patient has been calm and sleeping in quite some time. Did receive Haldol. Psych consult today.   Will let patient sleep.  Will revisit patient after she is able to rest and nap.     Addendum 1430: revisited patient, now in bed (vs chair), snoring loudly. Again awakens to her name, again falls right back asleep. Discussed with RN, will keep on PCA x one more overnight and plan to rotate to oral tomorrow: Could try Dilaudid 2-4mg q4h and 2mg q3hprn.   Psych notes reviewed.     Addendum 1600: able to talk to patient for firs time today, she reports extreme pain. Tells me that she was not sleeping all day she was just resting, just wasn't talking to me due to severe pain. (I do not think this is true). She describes pain so severe she 'feels she is in shock'. Asks for PCA increase. Describe that her sleeping most of today would not support benefit:risk of increasing opioid. Will plan to transition to oral tomorrow. She is  frustrated we aren't 'doing anything' for pain. Described all of the modalities we are using. She is not currently sedated, appropriate for PCA still. However if moderate sedation begins, would not hesitate to stop continuous dosing of PCA overnight, and use bolus only. Plan to transition to oral per our team tomorrow.       PLAN:   1) Pain is consistent with sickle cell crisis.Treatment plan includes multimodal pain approach, Hospital Medicine Service for medical management, Heme/Onc. Patient educated regarding multimodal pain approach, medications as listed below. Patient is understanding of the plan. All questions and concerns addressed to patient's satisfaction.   2)Multimodal Medication Therapy  Topical: voltaren alternating with lidocaine   NSAID'S: none, hgb 7.0  Muscle Relaxants: Robaxin  q6h 500mg  Adjuvants: APAP prn and keep less than 2g given LFTs increased, hydroxyurea daily   Antidepressants/anxiolytics: hydroxyzine tid prn- increase to q6h prn   Opioids: no oral now  IV Pain medication: Dilaudid PCA - 0.1 continuous, 0.2 bolus, lockout 10 mins   3)Non-medication interventions: heat, ice   Acupuncture consult - offered and agreeable   Integrative consult - offered and agreeable   4)Constipation Prophylaxis: Scheduled and prn     -MN  pulled from system on 2/14/23. This indicates   2/11/23 Oxycodone 5 mg #12 by Emmanuelle Art  1/27/23 Oxycodone 5 mg #16 by Mario Willams  Discharge Recommendations - We recommend prescribing the following at the time of discharge: TBD      Principal Problem:    Sickle cell pain crisis (H)  Active Problems:    Adjustment disorder with mixed anxiety and depressed mood    Sickle cell disease (H)    Asplenia    Antibody to blood group S antigen positive    Elevated LFTs    Anxiety    Acute hypoxemic respiratory failure (H)    Continuous cannabis use    Smokes tobacco daily    History of cholecystectomy    History of splenectomy    Somnolence    Chrissie Peña, PharmD  Acute  [facility] Pain Management Program  [facility] ([facility], [place], [place])  Monday-Friday 8a-4p   Page via online paging system or call [phone]

## 2023-02-16 NOTE — CONSULTS
HEART CARE CONSULTATON NOTE        Assessment/Recommendations   Assessment/Plan:  1. Sickle Cell Disease with acute chest pain  2. Severe Anemia secondary to #1  3.  Acute hypoxia likely secondary to severe anemia  4.  Elevated troponin secondary to demand ischemia in the setting of sickle cell acute chest pain, hypoxia and severe anemia.  5.  Lethargic    Plan:   1.  No clear clinical evidence of congestive heart failure.  Would avoid diuretic therapy in the setting of sickle cell crisis.  2.  Patient has been given transfusion which I agree.    3.  Continue supportive oxygen therapy.  4.  Given patient has received sedating medications consider checking ABG to rule out hypercapnic respiratory failure.  5.  Complete echocardiogram pending  6.  No significant EKG findings concerning for active ischemia.  Would not recommend urgent coronary angiogram.    We will follow         History of Present Illness/Subjective    HPI: Eveline Gage is a 27 year old female history of sickle cell disease who presented with acute sickle cell crisis associate with severe anemia, hypoxia elevated troponin and mildly elevated BNP level along with acute respiratory distress and acute hypoxia.    Cardiology is being consulted for elevated troponin and elevated BNP level.    Based on physical exam she has no lower extremity edema, no JVD.  There is no evidence of crackles.  Prior chest x-ray did not demonstrate pulmonary edema.  BNP is slightly elevated in the setting of sickle cell crisis.  Troponin level is downtrending and was elevated in the setting of severe sickle cell crisis along with anemia.  No acute ST segment abnormalities on EKG concerning for acute occlusion of coronary artery.  CT scan demonstrated no coronary calcification.    She was given IV Lasix which could worsen her sickle cell crisis.  Would avoid Lasix at this time.    She appears fairly somnolent unable to answer questions appropriately.  Has required  "sedating medications.  She cannot provide a complete review of systems.  Reviewed chart by primary team.  Reviewed hematology note.     Physical Examination  Review of Systems   VITALS: /81 (BP Location: Right arm)   Pulse (!) 128   Temp 99.8  F (37.7  C) (Oral)   Resp 30   Ht 1.6 m (5' 3\")   Wt 90 kg (198 lb 6.6 oz)   SpO2 100%   BMI 35.15 kg/m    BMI: Body mass index is 35.15 kg/m .  Wt Readings from Last 3 Encounters:   02/15/23 90 kg (198 lb 6.6 oz)   01/19/23 83.5 kg (184 lb 1.6 oz)   01/14/23 73.5 kg (162 lb)       Intake/Output Summary (Last 24 hours) at 2/16/2023 1033  Last data filed at 2/16/2023 0845  Gross per 24 hour   Intake 3423 ml   Output 5350 ml   Net -1927 ml     General Appearance:    Somnolent.  Not answering my questions   ENT/Mouth:  Nasal O2 mask      EYES:   Unable to assess   Neck: no carotid bruits or thyromegaly   Chest/Lungs:    No rales.  No sternal scar.   Cardiovascular:    Regular rate and rhythm.  No murmurs.  No JVD.  No lower extremity edema.   Abdomen:   No evidence of tenderness; bowel sounds are present   Extremities: no cyanosis or clubbing   Skin: no xanthelasma, warm.    Neurologic: normal  bilateral, no tremors     Psychiatric:  Somnolent.    Review Of Systems: Unable to complete due to patient's mental status        Lab Results    Chemistry/lipid CBC Cardiac Enzymes/BNP/TSH/INR   No results for input(s): CHOL, HDL, LDL, TRIG, CHOLHDLRATIO in the last 57317 hours.  No results for input(s): LDL in the last 00568 hours.  Recent Labs   Lab Test 02/16/23  0626      POTASSIUM 3.7   CHLORIDE 103   CO2 24      BUN 10   CR 0.53*   GFRESTIMATED >90   ALIZA 8.9     Recent Labs   Lab Test 02/16/23  0626 02/15/23  2044 02/13/23  0808   CR 0.53* 0.50* 0.62     No results for input(s): A1C in the last 18177 hours.       Recent Labs   Lab Test 02/16/23  0626   WBC 11.6*   HGB 7.0*   HCT 18.7*   MCV 82   PLT 67*     Recent Labs   Lab Test 02/16/23  0626 " 02/15/23  2044 02/15/23  0434   HGB 7.0* 6.5* 7.5*    Recent Labs   Lab Test 02/16/23  0626 02/15/23  2305 02/15/23  2044   TROPONINI 0.81* 1.10* 1.24*     Recent Labs   Lab Test 02/15/23  0434   *     No results for input(s): TSH in the last 19073 hours.  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 Blood product orders may be delayed. Draw THREE purple top tubes for all Type and Screen/Type and crossmatch orders- per [facility]     Asplenia 06/17/2021     Asthma 03/09/2017     Cannabis use disorder      Sickle cell disease (H) 06/15/2020     Somnolence 2/16/2023     Past Surgical History:   Procedure Laterality Date     CHOLECYSTECTOMY       ESOPHAGOSCOPY, GASTROSCOPY, DUODENOSCOPY (EGD), COMBINED N/A 01/15/2023    Procedure: ENDOSCOPIC ULTRASOUND;  Surgeon: Marv Tomas MD;  Location: [facility] OR     [facility] SINGLE LUMEN PLACEMENT  2/14/2023          SPLENECTOMY       TONSILLECTOMY       Family History   Problem Relation Age of Onset     Sickle Cell Trait Mother      Hypertension Mother      Diabetes Father      Kidney Disease Father      Liver Disease Father      Sickle Cell Trait Maternal Grandfather      Sickle Cell Trait Paternal Grandmother         Social History     Socioeconomic History     Marital status: Single     Spouse name: Not on file     Number of children: Not on file     Years of education: Not on file     Highest education level: Not on file   Occupational History     Not on file   Tobacco Use     Smoking status: Every Day     Packs/day: 0.50     Types: Cigarettes     Passive  exposure: Never     Smokeless tobacco: Never   Vaping Use     Vaping Use: Never used   Substance and Sexual Activity     Alcohol use: Not Currently     Drug use: Yes     Frequency: 7.0 times per week     Types: Marijuana     Sexual activity: Yes     Birth control/protection: Implant   Other Topics Concern     Not on file   Social History Narrative     Not on file     Social Determinants of Health     Financial Resource Strain: Not on file   Food Insecurity: Not on file   Transportation Needs: Not on file   Physical Activity: Not on file   Stress: Not on file   Social Connections: Not on file   Intimate Partner Violence: Not on file   Housing Stability: Not on file         Medications  Allergies   No current outpatient medications on file.        Allergies   Allergen Reactions     Blood-Group Specific Substance Other (See Comments)     Patient has anti-C, anti-S, anti-Jkb, anti-M, and a nonspecific antibody. Blood product orders may be delayed. Draw THREE purple top tubes for all Type and Screen/Type and crossmatch orders.         Penicillins Keila Marcus, DO

## 2023-02-16 NOTE — PROGRESS NOTES
Care Management Follow Up    Length of Stay (days): 3    Expected Discharge Date: 02/18/2023     Concerns to be Addressed:       Patient plan of care discussed at interdisciplinary rounds: Yes    Anticipated Discharge Disposition: Home  Disposition Comments: Anticipate return home via [facility] transport.  Anticipated Discharge Services: None  Anticipated Discharge DME: None        Additional Information:  Chart reviewed, pt lives in an apartment with her boyfriend and 5 year old daughter.  Pt independent at baseline.  Anticipate home at discharge.        NABOR Coker

## 2023-02-16 NOTE — CONSULTS
"      Psychiatry Consultation; Follow up - chart review             Reason for Consult, requesting source:    Agitation related to pain associated with sickle cell anemia crisis    Requesting source: Vikram Iraheta    Labs and imaging reviewed. Notes reviewed by CARLITOS Hobbs APRN. CHARLES Tellez and Dr. Francis Delgadillo were also consulted.     Total time spent in chart review, patient interview and coordination of care: 60 minutes               Interim history:    Psychiatry reviewing patient chart as follow up to psychiatry consult performed 2/15/23 and continued agitation related to pain associated with sickle cell anemia crisis.    Per nursing note from 2/16/2023: \"Pt orientation and LOC fluctuating on shift.  Intermittently alert and able to answer simple yes/no questions, other times very lethargic and confused, pt once stated 'I need to get up and go to the store'.  Labs drawn and multiple criticals came back, MD paged and paused the continuous PCA dilaudid, however button still available for pt to push for doses.  Pt became more confused through the night, moaning and groaning in pain and crying out.  When attempting to educate pt on use of the PCA pump button, pt often times would not utilize it.  Despite pt crying out in pain, when asked if she were in pain pt mostly stated no.  When pt did say she was in pain, RN educated pt to utilize pump and pt would fall back asleep or attempt to get OOB.  Tele sinus tachycardia.  No blood return to midline.  5L oxymask, however pt keeps pulling mask off face.  Heavy assist of 2 to BSC.\"    It appears that patient's LOC has been fluctuating and she often appears sedated. It also seems that patient has been experiencing increased confusion throughout the day. Patient was also started on haldol 2 mg IV q4h prn and ativan 1 mg q4h prn by a different provider.         Current Medications:       diclofenac  2 g Topical 4x Daily     hydroxyurea  500 mg Oral " "Daily     lidocaine   Topical 4x Daily     melatonin  3 mg Oral At Bedtime     methocarbamol  500 mg Oral Q6H     multivitamin, therapeutic  1 tablet Oral Daily     pantoprazole  40 mg Oral BID     senna-docusate  1 tablet Oral BID    Or     senna-docusate  2 tablet Oral BID     sodium chloride (PF)  10 mL Intracatheter Q8H     sodium chloride (PF)  10-40 mL Intracatheter Q7 Days     sodium chloride (PF)  3 mL Intracatheter Q8H       Vital signs:  Temp: 99.3  F (37.4  C) Temp src: Oral BP: (!) 141/87 Pulse: 102   Resp: 24 SpO2: 94 % O2 Device: Oxymask Oxygen Delivery: 5 LPM Height: 160 cm (5' 3\") Weight: 90 kg (198 lb 6.6 oz)  Estimated body mass index is 35.15 kg/m  as calculated from the following:    Height as of this encounter: 1.6 m (5' 3\").    Weight as of this encounter: 90 kg (198 lb 6.6 oz).    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 My recommendation on 2/15/23 was to increase hydroxyzine dose to further target anxiety, as well as administering a dose at HS to assist with sleep. It looks like patient was started on Haldol 2 mg IV q4 h, as well as Ativan 1 mg IV q4h. I would advise against Ativan IV, as this can lead to increased confusion and sedation. It seems that patient has been sedated and LOC has been fluctuating quite a bit. For agitation, I would consider Zyprexa Zydis 5 mg at bedtime and 2.5 mg tid prn before giving Haldol IV.           Summary of Recommendations:   Consider Zyprexa Zydis 5 mg ODT at bedtime and 2.5 mg tid prn before giving Haldol prn    Avoid Ativan IV        Miryam Aguilar, PMHNP, APRN  Consult/Liaison Psychiatry  [facility]             "

## 2023-02-17 LAB
ALBUMIN SERPL-MCNC: 2 G/DL (ref 3.5–5)
ALBUMIN UR-MCNC: 50 MG/DL
ALP SERPL-CCNC: 610 U/L (ref 45–120)
ALT SERPL W P-5'-P-CCNC: 88 U/L (ref 0–45)
APPEARANCE UR: CLEAR
AST SERPL W P-5'-P-CCNC: 121 U/L (ref 0–40)
BACTERIA #/AREA URNS HPF: ABNORMAL /HPF
BILIRUB DIRECT SERPL-MCNC: 6.7 MG/DL
BILIRUB SERPL-MCNC: 12.5 MG/DL (ref 0–1)
BILIRUB UR QL STRIP: ABNORMAL
COLOR UR AUTO: ABNORMAL
ERYTHROCYTE [DISTWIDTH] IN BLOOD BY AUTOMATED COUNT: 19.1 % (ref 10–15)
GLUCOSE UR STRIP-MCNC: NEGATIVE MG/DL
HCT VFR BLD AUTO: 20.5 % (ref 35–47)
HGB BLD-MCNC: 7.4 G/DL (ref 11.7–15.7)
HGB UR QL STRIP: ABNORMAL
KETONES UR STRIP-MCNC: NEGATIVE MG/DL
LEUKOCYTE ESTERASE UR QL STRIP: NEGATIVE
MCH RBC QN AUTO: 29.2 PG (ref 26.5–33)
MCHC RBC AUTO-ENTMCNC: 36.1 G/DL (ref 31.5–36.5)
MCV RBC AUTO: 81 FL (ref 78–100)
NITRATE UR QL: NEGATIVE
PH UR STRIP: 6 [PH] (ref 5–7)
PLATELET # BLD AUTO: 45 10E3/UL (ref 150–450)
PROT SERPL-MCNC: 5.6 G/DL (ref 6–8)
RBC # BLD AUTO: 2.53 10E6/UL (ref 3.8–5.2)
RBC URINE: 1 /HPF
SP GR UR STRIP: 1.01 (ref 1–1.03)
SQUAMOUS EPITHELIAL: <1 /HPF
UROBILINOGEN UR STRIP-MCNC: 4 MG/DL
WBC # BLD AUTO: 7.6 10E3/UL (ref 4–11)
WBC URINE: 2 /HPF

## 2023-02-17 PROCEDURE — 36415 COLL VENOUS BLD VENIPUNCTURE: CPT | Performed by: FAMILY MEDICINE

## 2023-02-17 PROCEDURE — 85027 COMPLETE CBC AUTOMATED: CPT | Performed by: FAMILY MEDICINE

## 2023-02-17 PROCEDURE — 250N000011 HC RX IP 250 OP 636: Performed by: HOSPITALIST

## 2023-02-17 PROCEDURE — 120N000001 HC R&B MED SURG/OB

## 2023-02-17 PROCEDURE — 258N000003 HC RX IP 258 OP 636: Performed by: FAMILY MEDICINE

## 2023-02-17 PROCEDURE — 80076 HEPATIC FUNCTION PANEL: CPT | Performed by: FAMILY MEDICINE

## 2023-02-17 PROCEDURE — 99231 SBSQ HOSP IP/OBS SF/LOW 25: CPT | Performed by: INTERNAL MEDICINE

## 2023-02-17 PROCEDURE — 250N000013 HC RX MED GY IP 250 OP 250 PS 637: Performed by: NURSE PRACTITIONER

## 2023-02-17 PROCEDURE — 250N000013 HC RX MED GY IP 250 OP 250 PS 637: Performed by: FAMILY MEDICINE

## 2023-02-17 PROCEDURE — 99232 SBSQ HOSP IP/OBS MODERATE 35: CPT | Performed by: INTERNAL MEDICINE

## 2023-02-17 PROCEDURE — 99223 1ST HOSP IP/OBS HIGH 75: CPT | Performed by: STUDENT IN AN ORGANIZED HEALTH CARE EDUCATION/TRAINING PROGRAM

## 2023-02-17 PROCEDURE — 250N000013 HC RX MED GY IP 250 OP 250 PS 637: Performed by: INTERNAL MEDICINE

## 2023-02-17 PROCEDURE — 81001 URINALYSIS AUTO W/SCOPE: CPT | Performed by: HOSPITALIST

## 2023-02-17 RX ORDER — HYDROMORPHONE HYDROCHLORIDE 2 MG/1
2 TABLET ORAL EVERY 4 HOURS
Status: DISCONTINUED | OUTPATIENT
Start: 2023-02-17 | End: 2023-02-18

## 2023-02-17 RX ORDER — HYDROMORPHONE HYDROCHLORIDE 2 MG/1
2 TABLET ORAL EVERY 4 HOURS PRN
Status: DISCONTINUED | OUTPATIENT
Start: 2023-02-17 | End: 2023-02-18

## 2023-02-17 RX ORDER — HYDROMORPHONE HCL IN WATER/PF 6 MG/30 ML
0.2 PATIENT CONTROLLED ANALGESIA SYRINGE INTRAVENOUS EVERY 4 HOURS PRN
Status: DISCONTINUED | OUTPATIENT
Start: 2023-02-17 | End: 2023-02-18

## 2023-02-17 RX ORDER — HYDROMORPHONE HYDROCHLORIDE 4 MG/1
4 TABLET ORAL EVERY 4 HOURS PRN
Status: DISCONTINUED | OUTPATIENT
Start: 2023-02-17 | End: 2023-02-18

## 2023-02-17 RX ADMIN — CEFEPIME 2 G: 2 INJECTION, POWDER, FOR SOLUTION INTRAVENOUS at 06:47

## 2023-02-17 RX ADMIN — METHOCARBAMOL 500 MG: 500 TABLET, FILM COATED ORAL at 13:32

## 2023-02-17 RX ADMIN — HYDROXYUREA 500 MG: 500 CAPSULE ORAL at 08:38

## 2023-02-17 RX ADMIN — DICLOFENAC SODIUM 2 G: 10 GEL TOPICAL at 08:33

## 2023-02-17 RX ADMIN — DICLOFENAC SODIUM 2 G: 10 GEL TOPICAL at 13:12

## 2023-02-17 RX ADMIN — METHOCARBAMOL 500 MG: 500 TABLET, FILM COATED ORAL at 02:16

## 2023-02-17 RX ADMIN — ACETAMINOPHEN 650 MG: 325 TABLET ORAL at 08:25

## 2023-02-17 RX ADMIN — Medication 3 MG: at 20:42

## 2023-02-17 RX ADMIN — DICLOFENAC SODIUM 2 G: 10 GEL TOPICAL at 16:50

## 2023-02-17 RX ADMIN — SENNOSIDES AND DOCUSATE SODIUM 1 TABLET: 50; 8.6 TABLET ORAL at 20:43

## 2023-02-17 RX ADMIN — LIDOCAINE: 50 OINTMENT TOPICAL at 16:22

## 2023-02-17 RX ADMIN — METHOCARBAMOL 500 MG: 500 TABLET, FILM COATED ORAL at 08:33

## 2023-02-17 RX ADMIN — CEFEPIME 2 G: 2 INJECTION, POWDER, FOR SOLUTION INTRAVENOUS at 22:55

## 2023-02-17 RX ADMIN — METHOCARBAMOL 500 MG: 500 TABLET, FILM COATED ORAL at 20:43

## 2023-02-17 RX ADMIN — SENNOSIDES AND DOCUSATE SODIUM 2 TABLET: 50; 8.6 TABLET ORAL at 08:32

## 2023-02-17 RX ADMIN — PANTOPRAZOLE SODIUM 40 MG: 40 TABLET, DELAYED RELEASE ORAL at 08:26

## 2023-02-17 RX ADMIN — HYDROMORPHONE HYDROCHLORIDE 2 MG: 2 TABLET ORAL at 16:20

## 2023-02-17 RX ADMIN — CEFEPIME 2 G: 2 INJECTION, POWDER, FOR SOLUTION INTRAVENOUS at 13:13

## 2023-02-17 RX ADMIN — LIDOCAINE: 50 OINTMENT TOPICAL at 20:47

## 2023-02-17 RX ADMIN — PANTOPRAZOLE SODIUM 40 MG: 40 TABLET, DELAYED RELEASE ORAL at 20:42

## 2023-02-17 RX ADMIN — LIDOCAINE: 50 OINTMENT TOPICAL at 06:48

## 2023-02-17 RX ADMIN — HYDROMORPHONE HYDROCHLORIDE 2 MG: 2 TABLET ORAL at 20:42

## 2023-02-17 RX ADMIN — SODIUM CHLORIDE: 9 INJECTION, SOLUTION INTRAVENOUS at 22:50

## 2023-02-17 RX ADMIN — THERA TABS 1 TABLET: TAB at 08:31

## 2023-02-17 RX ADMIN — HYDROMORPHONE HYDROCHLORIDE 2 MG: 2 TABLET ORAL at 13:12

## 2023-02-17 ASSESSMENT — ACTIVITIES OF DAILY LIVING (ADL)
ADLS_ACUITY_SCORE: 26
ADLS_ACUITY_SCORE: 28
ADLS_ACUITY_SCORE: 28
ADLS_ACUITY_SCORE: 26
ADLS_ACUITY_SCORE: 28
ADLS_ACUITY_SCORE: 26
ADLS_ACUITY_SCORE: 28
ADLS_ACUITY_SCORE: 26
ADLS_ACUITY_SCORE: 26
ADLS_ACUITY_SCORE: 28
ADLS_ACUITY_SCORE: 32
ADLS_ACUITY_SCORE: 26

## 2023-02-17 NOTE — SIGNIFICANT EVENT
Significant Event Note    Time of event: 7:16 PM February 16, 2023    Description of event:  RN reported lactic acid 2.2 from sepsis BPA  Temp 101.2, has had fever all day today,   CT chest 2/15/23 no PE, trace bilateral effusion  On 4L oxygen nasal canula     Plan:  NS 1L bolus  Blood cultures  procalcitonin  UA/UC    Discussed with: bedside nurse    Emory Dunham MD      ADDENDUM:  Procalcitonin 1.2  Start broad spectrum antibiotics pending cultures.   Emory Dunham MD

## 2023-02-17 NOTE — PROGRESS NOTES
Problem: Pain Acute  Goal: Optimal Pain Control and Function  Outcome: Progressing  Intervention: Develop Pain Management Plan  Recent Flowsheet Documentation    Pain Management Interventions:    medication (see MAR)  Intervention: Prevent or Manage Pain  Recent Flowsheet Documentation  Medication Review/Management: medications reviewed   Goal Outcome Evaluation:    Patient orientated x4 calls appropriately..  Pain pump removed, has scheduled pain medication Q4 and PRN. NS running at 75. Up to bedside commode with assist of 1-2. No fever on shift. Able to slowly wean patient off of Oxygen, O2 stats in the 90's.

## 2023-02-17 NOTE — PROGRESS NOTES
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 (2/13/2023)    Elevated LFTs (2/13/2023)    Continuous cannabis use (2/16/2023)    Smokes tobacco daily (2/16/2023)    History of cholecystectomy (2/16/2023)    History of splenectomy (2/16/2023)    Somnolence (2/16/2023)    Anxiety (2/14/2023)    #Sickle crisis/pain  She was inconsolable yesterday and screaming out unable to communicate coherently and today things are remarkably better she is on the PCA infusion Haldol was started pain is still there but not as troublesome as yesterday she states she has never had this type of crisis in the past she actually has an appetite and is eating breakfast today and smiles  Plan continue with same medications pain consultation team help is greatly appreciated eventually transition to oral she is on the PCA Dilaudid now  Per hematology hydroxyurea started  #Fever  She developed a fever 101 last night on-call was notified cultures have been drawn she was started on empiric antibiotics no evident source of infection the fever may be related to hemolysis  We will ask ID to consult and review antibiotic plan await cultures; history of splenectomy noted    #Hypoxia  Her oxygen O2 sats have improved continue to wean O2  #  Elevated troponin discounted as significant    Plan at this time is to continue inpatient care we will wean her off the PCA Dilaudid pump review antibiotic plan if any with ID consultant.  Consultant and nursing help greatly appreciated in this particular complicated patient     Diet: Combination Diet Regular  "Diet Adult    DVT Prophylaxis:   Echeverria Catheter: Not present  Lines:PICC line          Cardiac Monitoring: ACTIVE order. Indication: AMI (NSTEMI/ STEMI) (48 hours)  Code Status: Full Code      Clinically Significant Risk Factors              # Hypoalbuminemia: Lowest albumin = 2.5 g/dL at 2/16/2023  6:26 AM, will monitor as appropriate   # Thrombocytopenia: Lowest platelets = 62 in last 2 days, will monitor for bleeding         # Obesity: Estimated body mass index is 35.15 kg/m  as calculated from the following:    Height as of this encounter: 1.6 m (5' 3\").    Weight as of this encounter: 90 kg (198 lb 6.6 oz)., PRESENT ON ADMISSION         Disposition Plan     Expected Discharge Date: 02/18/2023      Destination: home with family            Vikram Iraheta MD  Hospitalist Service  [facility]  Securely message with [employer] (more info)  Text page via [employer] Paging/Directory   ______________________________________________________________________    Interval History   \"What a difference a day makes\" yesterday she was writhing in bed thrashing, inconsolable, screaming with moans and  outbursts that could be heard all the way down the hallway.  This morning she is sitting up smiling eating breakfast proclaiming \"I have never had a crisis like this before\". She smiles and is appreciative of care  She continues on the PCA Dilaudid pump empiric antibiotics for fever history of splenectomy noted ID consultation pending    Physical Exam   Vital Signs: Temp: 97.9  F (36.6  C) Temp src: Oral BP: (!) 143/82 Pulse: 91   Resp: 20 SpO2: 94 % O2 Device: Nasal cannula Oxygen Delivery: 2 LPM  Weight: 198 lbs 6.62 oz    \See above    Medical Decision Making   Time reviewing chart reviewing consult notes discussion with bedside nurse and patient 35 minutes        Data     "

## 2023-02-17 NOTE — PLAN OF CARE
Problem: Pain Acute  Goal: Optimal Pain Control and Function  Intervention: Prevent or Manage Pain  Recent Flowsheet Documentation  Taken 2/16/2023 1723 by Stephan Cintron RN  Medication Review/Management: medications reviewed   Goal Outcome Evaluation:         Continues to rate pain between 8/10 and 10/10, receiving dilaudid via PCA pump. Continues to be tachycardic, had a T max of 101.2 axillary and elevated lactic as well as procalcitonin. Dr Dunham was updated and ordered iv bolus of 1000 ml, iv antibiotics, UA and blood cultures. Intermittent confusion, sleepy most of time. Maintaining oxygen saturation in the mid 90's on 4 liters of oxygen via nasal cannula.

## 2023-02-17 NOTE — PLAN OF CARE
Problem: Pain Acute  Goal: Optimal Pain Control and Function  Outcome: Progressing  Intervention: Develop Pain Management Plan  Recent Flowsheet Documentation  Taken 2/16/2023 2356 by Mely Chaparro RN  Pain Management Interventions:    medication (see MAR)    pain pump in use  Intervention: Prevent or Manage Pain  Recent Flowsheet Documentation  Taken 2/16/2023 2356 by Mely Chaparro, RN  Medication Review/Management: medications reviewed   Goal Outcome Evaluation:        Patient has pain pump for pain she is having in the back and buttocks that is radiating down the thighs, also has scheduled pain medication. Patient has midline. IVF running at 75. A little lethargic on shift but is oriented and calls appropriately. Up to bedside commode with assist of 1-2. No fever on shift. UA was collected on shift. Able to slowly wean patient off of Oxygen, staying at mid to high 90's for O2 sats.

## 2023-02-17 NOTE — CONSULTS
Consultation - INFECTIOUS DISEASE CONSULTATION  Eveline Gage,  1995, MRN [id number]      Bilateral leg pain [M79.604, M79.605]  Sickle cell pain crisis (H) [D57.00]  Pain of left upper arm [M79.622]    PCP: [facility], [phone]   Code status:  Full Code               Chief Complaint:  Fever     Assessment:  Eveline Gage is a 27 year old old female with   1.  History of sickle cell disease with crisis in 2023.  2.  Asplenia.  3.  History of cholecystectomy  4.  Abnormal liver function test with indirect hyperbilirubinemia consistent with hemolysis.  5.  Admitted with sickle cell crisis on 2023.  6.  New fever on 2023.  Blood cultures in process.  So far no growth.  Started on IV cefepime.  Fever looks better.  7.  Acute thrombocytopenia with platelet count of 45 on  from 250 on .  8.  Left upper extremity PICC line in place.    Recommendations:   Continue IV cefepime.  Monitor pain in the pelvic area.  Monitor platelet count and temperature.    Discussed with the patient, family, nursing staff.    Thank you for letting us be part of the patient care team. We will follow up    Semaj Vega MD,MD  [facility].   [facility]  Office Telephone [phone].  Fax [phone]  [facility] paging    HPI:    Eveline Gage is a 27 year old old female. History is provided by the patient, chart review.    ID is asked to see this patient for fever.  The patient has a history of sickle cell disease, status postcholecystectomy, asplenia, and abnormal liver function test.  She was admitted several days ago with sickle cell crisis.  Abnormal liver function test hospitalization, the patient developed a new fever on 2023.  Started on IV cefepime.  Reports feeling much better today.  Complains of right shoulder area pain, and area she has had sickle cell crisis before.  He also complains of pain in  her private area that is getting better.  No significant cough or dysuria.        ===========================================    Medical History  Past Medical History:   Diagnosis Date     Adjustment disorder with mixed anxiety and depressed mood 10/05/2017     Anemia, unspecified type 01/14/2023     Antibody to blood group S antigen positive     Patient has anti-C, anti-S, anti-Jkb, anti-M, and a nonspecific antibody. Blood product orders may be delayed. Draw THREE purple top tubes for all Type and Screen/Type and crossmatch orders- per [employer]     Asplenia 06/17/2021     Asthma 03/09/2017     Cannabis use disorder      Sickle cell disease (H) 06/15/2020     Somnolence 2/16/2023        Surgical History  Past Surgical History:   Procedure Laterality Date     CHOLECYSTECTOMY       ESOPHAGOSCOPY, GASTROSCOPY, DUODENOSCOPY (EGD), COMBINED N/A 01/15/2023    Procedure: ENDOSCOPIC ULTRASOUND;  Surgeon: Marv Tomas MD;  Location: [facility] OR     MIDLINE SINGLE LUMEN PLACEMENT  2/14/2023          SPLENECTOMY       TONSILLECTOMY              Social History  Reviewed, and she  reports that she has been smoking cigarettes. She has been smoking an average of .5 packs per day. She has never been exposed to tobacco smoke. She has never used smokeless tobacco. She reports that she does not currently use alcohol. She reports current drug use. Frequency: 7.00 times per week. Drug: Marijuana.        Family History  Family History   Problem Relation Age of Onset     Sickle Cell Trait Mother      Hypertension Mother      Diabetes Father      Kidney Disease Father      Liver Disease Father      Sickle Cell Trait Maternal Grandfather      Sickle Cell Trait Paternal Grandmother       Psychosocial Needs  Social History     Social History Narrative     Not on file     Additional psychosocial needs reviewed per nursing assessment.       Allergies   Allergen Reactions     Blood-Group Specific Substance Other (See Comments)      Patient has anti-C, anti-S, anti-Jkb, anti-M, and a nonspecific antibody. Blood product orders may be delayed. Draw THREE purple top tubes for all Type and Screen/Type and crossmatch orders.         Penicillins Rash        Medications Prior to Admission   Medication Sig Dispense Refill Last Dose     albuterol (PROAIR HFA/PROVENTIL HFA/VENTOLIN HFA) 108 (90 Base) MCG/ACT inhaler Inhale 2 puffs into the lungs every 6 hours as needed for shortness of breath / dyspnea or wheezing 18 g 1 Unknown     calcium carbonate (TUMS) 500 MG chewable tablet Take 1-2 chew tab by mouth every 4 hours as needed for heartburn   Unknown     cyclobenzaprine (FLEXERIL) 10 MG tablet Take 1 tablet (10 mg) by mouth 3 times daily as needed for muscle spasms 12 tablet 0 Unknown     diclofenac (VOLTAREN) 1 % topical gel Apply 2 g topically 4 times daily Apply as needed to chest, back, or other painful areas 100 g 0 Unknown     etonogestrel (NEXPLANON) 68 MG IMPL 1 each by Subdermal route   Unknown     ipratropium - albuterol 0.5 mg/2.5 mg/3 mL (DUONEB) 0.5-2.5 (3) MG/3ML neb solution Take 1 vial (3 mLs) by nebulization every 6 hours as needed for shortness of breath / dyspnea or wheezing 90 mL 1 Unknown     methocarbamol (ROBAXIN) 500 MG tablet Take 1 tablet (500 mg) by mouth 4 times daily as needed for muscle spasms 20 tablet 0 Unknown     multivitamin, therapeutic (THERA-VIT) TABS tablet Take 1 tablet by mouth daily   Past Week     naproxen sodium (ANAPROX) 220 MG tablet Take 220-440 mg by mouth every 12 hours as needed for moderate pain (4-6)   2/13/2023     omeprazole (PRILOSEC) 20 MG DR capsule Take 1 capsule (20 mg) by mouth 2 times daily 40 capsule 0 2/12/2023     oxyCODONE (ROXICODONE) 5 MG tablet Take 1-2 tablets (5-10 mg) by mouth every 4 hours as needed for pain (can take 5 mg for moderate pain and 10 mg for severe pain) (Patient taking differently: Take 10 mg by mouth every 4 hours as needed for pain (can take 5 mg for moderate  pain and 10 mg for severe pain)) 16 tablet 0 2/13/2023 at am     sennosides (SENOKOT) 8.6 MG tablet Take 2 tablets by mouth daily as needed for constipation 60 tablet 0 Unknown     sucralfate (CARAFATE) 1 GM tablet Take 1 tablet (1 g) by mouth 4 times daily as needed for nausea (stomach pain) 40 tablet 0 Unknown        Review of Systems:  Negative except for findings in the HPI Physical Exam:  Temp:  [97.6  F (36.4  C)-101.2  F (38.4  C)] 97.6  F (36.4  C)  Pulse:  [] 91  Resp:  [20-28] 20  BP: (119-185)/() 119/63  SpO2:  [90 %-100 %] 95 %    Gen: Pleasant in no acute distress.  HEENT: NCAT. EOMI. PERRL.  Neck: No bruit, JVD or thyromegaly.  Lungs: Clear to ascultation bilat with no crackles or wheezes.  Card: RRR. NSR. No RMG. Peripheral pulses present and symmetric. No edema.  Abd: Soft NT ND. No mass. Normal bowel sounds.  Pelvic: Deferred.  Skin: No rash.  Extr: Right shoulder area with some tenderness although no evidence of abscess or infection.  Bilateral thighs and legs with no evidence of infection.  Neuro: Alert and oriented to place time and person.          ============================    Pertinent Labs  personally reviewed.   Recent Labs   Lab 02/17/23  1003 02/16/23  0626 02/15/23  2044   WBC 7.6 11.6* 13.3*   HGB 7.4* 7.0* 6.5*   HCT 20.5* 18.7* 18.4*   PLT 45* 67* 62*       Recent Labs   Lab 02/17/23  1003 02/16/23  0626 02/15/23  2044 02/14/23  0723 02/13/23  0808   NA  --  136 134*  --  139   CO2  --  24 22  --  22   BUN  --  10 11  --  9   ALBUMIN 2.0* 2.5* 2.5*   < > 2.8*   ALKPHOS 610* 812* 840*   < > 196*   ALT 88* 137* 149*   < > 186*   * 228* 271*   < > 219*    < > = values in this interval not displayed.       MICROBIOLOGY DATA:  Personally reviewed      Pertinent Radiology  personally reviewed.  Study Result    Narrative & Impression   EXAM: CT CHEST PULMONARY EMBOLISM W CONTRAST  LOCATION: [facility]  DATE/TIME: 2/15/2023 6:00 PM     INDICATION:  hypoxia and diffuse pain  hx of sickle cell crisis  COMPARISON: 02/01/2023.  TECHNIQUE: CT chest pulmonary angiogram during arterial phase injection of IV contrast. Multiplanar reformats and MIP reconstructions were performed. Dose reduction techniques were used.   CONTRAST: Isovue 370 75mL     FINDINGS:  ANGIOGRAM CHEST: No pulmonary embolism. Pulmonary arteries normal in caliber. Thoracic aorta normal in caliber. No aortic dissection.     HEART: Cardiac chambers within normal limits. No pericardial effusion. No coronary artery calcification.     MEDIASTINUM: Borderline hilar adenopathy. No abnormally enlarged axillary or mediastinal lymph nodes.     LUNGS AND PLEURA: No pulmonary mass or consolidation. Trace pleural effusions and dependent subsegmental atelectasis in the lower lobes. No pneumothorax.     LIMITED UPPER ABDOMEN: Prior cholecystectomy. Absent spleen likely related to sequelae of sickle cell disease.     MUSCULOSKELETAL: Negative.                                                                      IMPRESSION:  1.  No evidence for pulmonary embolism.   2.  Trace bilateral pleural effusions.

## 2023-02-17 NOTE — PROGRESS NOTES
[facility] ACUTE PAIN SERVICE    Daily PAIN Progress Note     Assessment/Plan:  Eveline Gage is a 27 year old female who was admitted on 2/13/2023.  Pain team was asked to see the patient for sickle cell pain. Patient with recent admission 1/15 to 1/19/23, Acute Pain Management Team followed patient at that time for abdominal pain (per GI note possibly pain due to hepatic congestion and liver ischemia related to sickle cell disease). Admitted this hospitalization for sickle cell crisis. History including sickle cell disease, Hx splenectomy, adjustment disorder, mixed anxiety and depression, asthma. The patient does  smoke and denies chemical dependency history.      In 24 hours, patient used 9.15mg IV dilaudid via PCA = 183 MME - unchanged from yesterday.      Patient reporting pain 7/10 during assessment - on board with transitioning to oral dilaudid today. RASS - 1 during assessment, not using IV ativan - will stop this. Will schedule 50% of 24 MME and adjust for cross tolerance = Dilaudid 2 mg po q4h scheduled with dilaudid 2-4 mg q4h prn pain first line line, and dilaudid 0.2 mg IV q4h prn second line     PLAN:   1) Pain is consistent with sickle cell crisis.Treatment plan includes multimodal pain approach, Hospital Medicine Service for medical management, Heme/Onc. Patient educated regarding multimodal pain approach, medications as listed below. Patient is understanding of the plan. All questions and concerns addressed to patient's satisfaction.   2)Multimodal Medication Therapy  Topical: voltaren alternating with lidocaine   NSAID'S: none, hgb 7.4  Muscle Relaxants: Robaxin  q6h 500mg  Adjuvants: APAP prn and keep less than 2g given LFTs increased, hydroxyurea daily   Antidepressants/anxiolytics: hydroxyzine q6h prn, discontinue ativan  Opioids: dilaudid 2 mg po q4h, with dilaudid 2-4 mg po q4h prn pain first line  IV Pain medication: stop dilaudid PCA - add IV dilaudid 0.2 mg IV pain second  line  3)Non-medication interventions: heat, ice   Acupuncture consult - offered and agreeable   Integrative consult - offered and agreeable   4)Constipation Prophylaxis: Scheduled and prn     -MN  pulled from system on 2/14/23. This indicates   2/11/23 Oxycodone 5 mg #12 by Emmanuelle Art  1/27/23 Oxycodone 5 mg #16 by Mario Willams  Discharge Recommendations - We recommend prescribing the following at the time of discharge: MU Neville, PharmD, BCPS, CPE  Acute Care Pain Management Program  [facility] ([place], [place], [place])  Monday-Friday 8a-4p   Page via online paging system or call [phone]

## 2023-02-17 NOTE — PROGRESS NOTES
"  HEART CARE CONSULTATON NOTE        Assessment/Recommendations   Assessment/Plan:  1. Sickle Cell Disease with acute chest pain  2. Severe Anemia secondary to #1  3.  Acute hypoxia likely secondary to severe anemia  4.  Elevated troponin secondary to demand ischemia in the setting of sickle cell acute chest pain, hypoxia and severe anemia.  5.  Lethargic, resolved.     Plan:   1.  No clear clinical evidence of congestive heart failure.  Would avoid diuretic therapy in the setting of sickle cell crisis. Normal LVEF on echo.   2.  Continue supportive oxygen therapy.    No further cardiac work-up recommended at this time. Cardiology will sign off          History of Present Illness/Subjective    HPI: Eveline Gage is a 27 year old female history of sickle cell disease who presented with acute sickle cell crisis associate with severe anemia, hypoxia elevated troponin and mildly elevated BNP level along with acute respiratory distress and acute hypoxia.    Cardiology is being consulted for elevated troponin and elevated BNP level.    Significant improvement in symptoms today.   No edema.   No active chest pain.  BP stable.  Discussed lab findings and echo with patient      .     Physical Examination  Review of Systems   VITALS: /63 (BP Location: Right leg)   Pulse 91   Temp 97.6  F (36.4  C) (Oral)   Resp 20   Ht 1.6 m (5' 3\")   Wt 90 kg (198 lb 6.6 oz)   SpO2 95%   BMI 35.15 kg/m    BMI: Body mass index is 35.15 kg/m .  Wt Readings from Last 3 Encounters:   02/15/23 90 kg (198 lb 6.6 oz)   01/19/23 83.5 kg (184 lb 1.6 oz)   01/14/23 73.5 kg (162 lb)       Intake/Output Summary (Last 24 hours) at 2/16/2023 1033  Last data filed at 2/16/2023 0845  Gross per 24 hour   Intake 3423 ml   Output 5350 ml   Net -1927 ml     General Appearance:    Awake.   ENT/Mouth:  O2 NC     EYES:      Neck: no carotid bruits or thyromegaly   Chest/Lungs:    No rales.  No sternal scar.   Cardiovascular:    Regular rate and " rhythm.  No murmurs.  No JVD.  No lower extremity edema.   Abdomen:   No evidence of tenderness; bowel sounds are present   Extremities: no cyanosis or clubbing   Skin: no xanthelasma, warm.    Neurologic: normal  bilateral, no tremors     Psychiatric: Pleasant.    Review Of Systems: Unable to complete due to patient's mental status        Lab Results    Chemistry/lipid CBC Cardiac Enzymes/BNP/TSH/INR   No results for input(s): CHOL, HDL, LDL, TRIG, CHOLHDLRATIO in the last 00561 hours.  No results for input(s): LDL in the last 69766 hours.  Recent Labs   Lab Test 02/16/23  0626      POTASSIUM 3.7   CHLORIDE 103   CO2 24      BUN 10   CR 0.53*   GFRESTIMATED >90   ALIZA 8.9     Recent Labs   Lab Test 02/16/23  0626 02/15/23  2044 02/13/23  0808   CR 0.53* 0.50* 0.62     No results for input(s): A1C in the last 15451 hours.       Recent Labs   Lab Test 02/16/23  0626   WBC 11.6*   HGB 7.0*   HCT 18.7*   MCV 82   PLT 67*     Recent Labs   Lab Test 02/16/23  0626 02/15/23  2044 02/15/23  0434   HGB 7.0* 6.5* 7.5*    Recent Labs   Lab Test 02/16/23  0626 02/15/23  2305 02/15/23  2044   TROPONINI 0.81* 1.10* 1.24*     Recent Labs   Lab Test 02/15/23  0434   *     No results for input(s): TSH in the last 16234 hours.  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 Blood product orders may be delayed. Draw THREE purple top tubes for all Type and Screen/Type and crossmatch orders- per On license of [facility]     Asplenia 06/17/2021     Asthma 03/09/2017     Cannabis use disorder      Sickle cell disease (H) 06/15/2020      Somnolence 2/16/2023     Past Surgical History:   Procedure Laterality Date     CHOLECYSTECTOMY       ESOPHAGOSCOPY, GASTROSCOPY, DUODENOSCOPY (EGD), COMBINED N/A 01/15/2023    Procedure: ENDOSCOPIC ULTRASOUND;  Surgeon: Marv Tomas MD;  Location: [facility] OR     MIDLINE SINGLE LUMEN PLACEMENT  2/14/2023          SPLENECTOMY       TONSILLECTOMY       Family History   Problem Relation Age of Onset     Sickle Cell Trait Mother      Hypertension Mother      Diabetes Father      Kidney Disease Father      Liver Disease Father      Sickle Cell Trait Maternal Grandfather      Sickle Cell Trait Paternal Grandmother         Social History     Socioeconomic History     Marital status: Single     Spouse name: Not on file     Number of children: Not on file     Years of education: Not on file     Highest education level: Not on file   Occupational History     Not on file   Tobacco Use     Smoking status: Every Day     Packs/day: 0.50     Types: Cigarettes     Passive exposure: Never     Smokeless tobacco: Never   Vaping Use     Vaping Use: Never used   Substance and Sexual Activity     Alcohol use: Not Currently     Drug use: Yes     Frequency: 7.0 times per week     Types: Marijuana     Sexual activity: Yes     Birth control/protection: Implant   Other Topics Concern     Not on file   Social History Narrative     Not on file     Social Determinants of Health     Financial Resource Strain: Not on file   Food Insecurity: Not on file   Transportation Needs: Not on file   Physical Activity: Not on file   Stress: Not on file   Social Connections: Not on file   Intimate Partner Violence: Not on file   Housing Stability: Not on file         Medications  Allergies   No current outpatient medications on file.        Allergies   Allergen Reactions     Blood-Group Specific Substance Other (See Comments)     Patient has anti-C, anti-S, anti-Jkb, anti-M, and a nonspecific antibody. Blood product orders may be delayed. Draw  THREE purple top tubes for all Type and Screen/Type and crossmatch orders.         Penicillins Keila Marcus, DO

## 2023-02-17 NOTE — CONSULTS
GI CONSULT NOTE      Name: Eveline Gage  Medical Record #: [id number]  YOB: 1995  Date of Admission: 2/13/2023  Date/Time: 2/17/2023/7:58 AM     CHIEF COMPLAINT: left arm and leg pain, Sickle Cell Pain Crisis     HISTORY OF PRESENT ILLNESS: We were asked to see Eveline Gage by Dr. Stockton for rising LFTs.    Eveline Gage is a 27 year old year old female with history of sickle cell disease, sickle cell pain crisis, elevated LFTs felt 2/2 sickle cell crisis and hemolysis, cholecystectomy, anemia who was admitted 2/13/23 with sickle cell pain crisis. She has been evaluated by cardiology, hematology, psychiatry and the pain service. She is currently on a PCA. She describes her pain as low back pain that wraps around her right upper thigh and sometimes shoots into the groin. She denies any significant abdominal pain. No nausea or vomiting. PCA has been helpful.     On admission, LFTs were elevated: total bilirubin 11.2, , , alk phos 196. She had similar elevations during her last admission 1/15-1/19/23 for sickle cell pain crisis, and levels have actually improved since that admission. We were also asked to consult then, and extensive work-up including Doppler US, EUS 1/15/23, acetaminophen and salicylate levels, acute hepatitis panel, evaluation for Daniel's disease, hemachromatosis, CMV, EBV, HSV was  viral illness, hematochromatosis was all essentially unremarkable. Her case was reviewed with a hepatologist at that time, who felt elevations likely due to hepatic congestion and liver ischemia related to sickle cell disease. Elevated CRISPIN and F actin EIA were noted; we did set up liver clinic follow-up, which is scheduled 4/6/23.     Her LFTs are trending down today.     REVIEW OF SYSTEMS (ROS): Complete review of systems negative other than listed in HPI.    PAST MEDICAL HISTORY:  Past Medical History:   Diagnosis Date     Adjustment disorder with mixed anxiety and depressed  mood 10/05/2017     Anemia, unspecified type 01/14/2023     Antibody to blood group S antigen positive     Patient has anti-C, anti-S, anti-Jkb, anti-M, and a nonspecific antibody. Blood product orders may be delayed. Draw THREE purple top tubes for all Type and Screen/Type and crossmatch orders- per [facility]     Asplenia 06/17/2021     Asthma 03/09/2017     Cannabis use disorder      Sickle cell disease (H) 06/15/2020     Somnolence 2/16/2023      FAMILY HISTORY:  Family History   Problem Relation Age of Onset     Sickle Cell Trait Mother      Hypertension Mother      Diabetes Father      Kidney Disease Father      Liver Disease Father      Sickle Cell Trait Maternal Grandfather      Sickle Cell Trait Paternal Grandmother      SOCIAL HISTORY:  Social History     Socioeconomic History     Marital status: Single     Spouse name: Not on file     Number of children: Not on file     Years of education: Not on file     Highest education level: Not on file   Occupational History     Not on file   Tobacco Use     Smoking status: Every Day     Packs/day: 0.50     Types: Cigarettes     Passive exposure: Never     Smokeless tobacco: Never   Vaping Use     Vaping Use: Never used   Substance and Sexual Activity     Alcohol use: Not Currently     Drug use: Yes     Frequency: 7.0 times per week     Types: Marijuana     Sexual activity: Yes     Birth control/protection: Implant   Other Topics Concern     Not on file   Social History Narrative     Not on file     Social Determinants of Health     Financial Resource Strain: Not on file   Food Insecurity: Not on file   Transportation Needs: Not on file   Physical Activity: Not on file   Stress: Not on file   Social Connections: Not on file   Intimate Partner Violence: Not on file   Housing Stability: Not on file       MEDICATIONS PRIOR TO ADMISSION:   Medications Prior to Admission   Medication Sig Dispense Refill Last Dose     albuterol (PROAIR HFA/PROVENTIL HFA/VENTOLIN HFA)  108 (90 Base) MCG/ACT inhaler Inhale 2 puffs into the lungs every 6 hours as needed for shortness of breath / dyspnea or wheezing 18 g 1 Unknown     calcium carbonate (TUMS) 500 MG chewable tablet Take 1-2 chew tab by mouth every 4 hours as needed for heartburn   Unknown     cyclobenzaprine (FLEXERIL) 10 MG tablet Take 1 tablet (10 mg) by mouth 3 times daily as needed for muscle spasms 12 tablet 0 Unknown     diclofenac (VOLTAREN) 1 % topical gel Apply 2 g topically 4 times daily Apply as needed to chest, back, or other painful areas 100 g 0 Unknown     etonogestrel (NEXPLANON) 68 MG IMPL 1 each by Subdermal route   Unknown     ipratropium - albuterol 0.5 mg/2.5 mg/3 mL (DUONEB) 0.5-2.5 (3) MG/3ML neb solution Take 1 vial (3 mLs) by nebulization every 6 hours as needed for shortness of breath / dyspnea or wheezing 90 mL 1 Unknown     methocarbamol (ROBAXIN) 500 MG tablet Take 1 tablet (500 mg) by mouth 4 times daily as needed for muscle spasms 20 tablet 0 Unknown     multivitamin, therapeutic (THERA-VIT) TABS tablet Take 1 tablet by mouth daily   Past Week     naproxen sodium (ANAPROX) 220 MG tablet Take 220-440 mg by mouth every 12 hours as needed for moderate pain (4-6)   2/13/2023     omeprazole (PRILOSEC) 20 MG DR capsule Take 1 capsule (20 mg) by mouth 2 times daily 40 capsule 0 2/12/2023     oxyCODONE (ROXICODONE) 5 MG tablet Take 1-2 tablets (5-10 mg) by mouth every 4 hours as needed for pain (can take 5 mg for moderate pain and 10 mg for severe pain) (Patient taking differently: Take 10 mg by mouth every 4 hours as needed for pain (can take 5 mg for moderate pain and 10 mg for severe pain)) 16 tablet 0 2/13/2023 at am     sennosides (SENOKOT) 8.6 MG tablet Take 2 tablets by mouth daily as needed for constipation 60 tablet 0 Unknown     sucralfate (CARAFATE) 1 GM tablet Take 1 tablet (1 g) by mouth 4 times daily as needed for nausea (stomach pain) 40 tablet 0 Unknown          ALLERGIES: Blood-group specific  "substance and Penicillins    PHYSICAL EXAM:    /63 (BP Location: Right leg)   Pulse 91   Temp 97.6  F (36.4  C) (Oral)   Resp 20   Ht 1.6 m (5' 3\")   Wt 90 kg (198 lb 6.6 oz)   SpO2 95%   BMI 35.15 kg/m      GENERAL: Pleasant, mild discomfort, lying down in bed  NECK: Supple without adenopathy  EYES: Bilateral scleral icterus  LUNGS: Clear to auscultation bilaterally  HEART: Regular rate and rhythm, S1 and S2 present, mild bilateral lower extremity edema  ABDOMEN: Non-distended. Positive bowel sounds. Soft, non-tender, no guarding/rebound/mass, no obvious organomegaly  MUSKULOSKELETAL:  Warm and well perfused, moves all extremities well  SKIN: No rashes  NEUROLOGIC: Alert and oriented  PSYCHIATRIC: Normal affect    LAB DATA:  LFTs  Recent Labs   Lab Test 02/17/23  1003 02/17/23  0131 02/16/23  0626 02/15/23  2044 02/14/23  0723 02/13/23  0808 02/11/23  0509 02/11/23  0353 01/16/23  0846 01/16/23  0010 01/14/23  1727 01/14/23  1555   ALBUMIN 2.0*  --  2.5* 2.5*   < > 2.8*  --  2.6*   < >  --   --  3.3*   BILITOTAL 12.5*  --  13.0* 13.1*   < > 11.2*  --  10.5*   < >  --   --  21.0*   ALT 88*  --  137* 149*   < > 186*  --  218*   < >  --   --  1,000*   *  --  228* 271*   < > 219*  --  249*   < >  --   --  1,069*   PROTEIN  --  50*  --   --   --   --  Negative  --   --  Negative   < >  --    LIPASE  --   --   --   --   --  67*  --  117*  --   --   --  42    < > = values in this interval not displayed.     CBC  Recent Labs   Lab 02/17/23  1003 02/16/23  0626 02/15/23  2044 02/15/23  0434   WBC 7.6 11.6* 13.3* 16.4*   RBC 2.53* 2.28* 2.14* 2.45*   HGB 7.4* 7.0* 6.5* 7.5*   HCT 20.5* 18.7* 18.4* 21.2*   MCV 81 82 86 87   MCH 29.2 30.7 30.4 30.6   MCHC 36.1 37.4* 35.3 35.4   RDW 19.1* 19.5* 19.0* 18.7*   PLT 45* 67* 62* 82*     INR  Recent Labs   Lab 02/13/23  0808 02/11/23  0353   INR 1.27* 1.41*      Lipase   Date Value Ref Range Status   02/13/2023 67 (H) 0 - 52 U/L Final   02/11/2023 117 (H) 0 - 52 " U/L Final   01/14/2023 42 0 - 52 U/L Final     IMAGING:  EXAM: US ABDOMEN OR PELVIS DOPPLER COMPLETE  LOCATION: [facility]  DATE/TIME: 1/17/2023 10:16 AM     INDICATION: elevated LFTs, eval for vascular causes  COMPARISON: 01/14/2023.  TECHNIQUE: Complete abdominal ultrasound. Color flow with spectral Doppler and waveform analysis performed.     FINDINGS:  GALLBLADDER: Surgically absent.   BILE DUCTS: No biliary dilatation. The common duct measures for mm.  LIVER: Normal parenchyma with smooth contour. No focal mass.   The kidneys spleen and pancreas were not imaged. AORTA: Normal in caliber.  IVC: Normal where visualized.  No ascites.     ABDOMINAL DUPLEX: Hepatic artery, hepatic veins, inferior vena cava, portal veins and splenic vein are patent with flow in the normal direction. There is mild elevation of resistive indices in the hepatic artery with the main hepatic artery resistive   index 0.86, right hepatic artery 0.89 and left hepatic artery 0.9. There is diastolic flow in all of these vessels.                                                IMPRESSION:  1.  Normal sonographic appearance of the liver.  2.  Mildly elevated resistive indices in the hepatic arteries. The hepatic artery, hepatic veins, inferior vena cava, portal vein and splenic vein are patent with flow in the normal direction.    PROCEDURES:  EUS 1/15/23:  Findings:        ENDOSONOGRAPHIC FINDING: :        There was no sign of significant endosonographic abnormality in the        entire main bile duct. The maximum diameter of the duct was 3.2 mm. No        masses, no stones, no biliary sludge, ducts of normal caliber and ducts        with regular contour were identified.        The celiac axis including lymph nodes was unremarkable.        The left adrenal was examined and normal.        The examined portion of the left lobe of the liver and the gastrohepatic        ligament were evaluated and found to be without  abnormalities.        The ampulla was normal endoscopically and endosonographically. The bile        duct ranged in size from 1.8 mm to 3.2 mm without stones or sludge. The        gallbladder was surgically absent.                                                                                     Moderate Sedation:        MAC   Impression:            - There was no sign of significant pathology in the                          entire main bile duct.                          - No need for ERCP at this time.                          - No specimens collected.    ASSESSMENT:  1. Elevated LFTs  27 year-old with sickle cell disease, hemolysis, cholecystectomy with elevated LFTs felt due to hepatic congestion and liver ischemia related to sickle cell disease/hemolysis. LFTs are trending down. Extensive work-up including Doppler US negative, EUS 1/15/23, evaluation for Hep A/B/C, hemachromatosis, Daniel's disease was negative. 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 Positive CRISPIN and elevated F actin EIA (with normal ASMA) noted.     PLAN:  1. No further liver work-up planned at this time.   2. Keep outpatient liver clinic follow-up 4/6/23  3. Would trend LFTs.    Discussed with Dr. Morrissey who will also visit with the patient.     TIME SPENT: 55 min including chart review, patient interview and care coordination.                                                Rabia Haines PA-C  Thank you for the opportunity to participate in the care of this patient.   Please feel free to call me with any questions or concerns.  Phone number [phone].

## 2023-02-18 LAB
ALBUMIN SERPL-MCNC: 1.9 G/DL (ref 3.5–5)
ALP SERPL-CCNC: 506 U/L (ref 45–120)
ALT SERPL W P-5'-P-CCNC: 76 U/L (ref 0–45)
AST SERPL W P-5'-P-CCNC: 115 U/L (ref 0–40)
BILIRUB DIRECT SERPL-MCNC: 6.5 MG/DL
BILIRUB SERPL-MCNC: 13.3 MG/DL (ref 0–1)
BLD PROD TYP BPU: NORMAL
BLOOD COMPONENT TYPE: NORMAL
CODING SYSTEM: NORMAL
CROSSMATCH: NORMAL
ERYTHROCYTE [DISTWIDTH] IN BLOOD BY AUTOMATED COUNT: 18.6 % (ref 10–15)
HCT VFR BLD AUTO: 17.7 % (ref 35–47)
HGB BLD-MCNC: 6.2 G/DL (ref 11.7–15.7)
HGB BLD-MCNC: 7.4 G/DL (ref 11.7–15.7)
ISSUE DATE AND TIME: NORMAL
MCH RBC QN AUTO: 29.2 PG (ref 26.5–33)
MCHC RBC AUTO-ENTMCNC: 35 G/DL (ref 31.5–36.5)
MCV RBC AUTO: 84 FL (ref 78–100)
PLATELET # BLD AUTO: 44 10E3/UL (ref 150–450)
PROT SERPL-MCNC: 5.5 G/DL (ref 6–8)
RBC # BLD AUTO: 2.12 10E6/UL (ref 3.8–5.2)
UNIT ABO/RH: NORMAL
UNIT NUMBER: NORMAL
UNIT STATUS: NORMAL
UNIT TYPE ISBT: 9500
WBC # BLD AUTO: 10.7 10E3/UL (ref 4–11)

## 2023-02-18 PROCEDURE — 99232 SBSQ HOSP IP/OBS MODERATE 35: CPT | Performed by: INTERNAL MEDICINE

## 2023-02-18 PROCEDURE — 250N000013 HC RX MED GY IP 250 OP 250 PS 637: Performed by: FAMILY MEDICINE

## 2023-02-18 PROCEDURE — 85018 HEMOGLOBIN: CPT | Performed by: INTERNAL MEDICINE

## 2023-02-18 PROCEDURE — P9016 RBC LEUKOCYTES REDUCED: HCPCS | Performed by: INTERNAL MEDICINE

## 2023-02-18 PROCEDURE — 250N000011 HC RX IP 250 OP 636: Performed by: HOSPITALIST

## 2023-02-18 PROCEDURE — 250N000013 HC RX MED GY IP 250 OP 250 PS 637: Performed by: NURSE PRACTITIONER

## 2023-02-18 PROCEDURE — 250N000013 HC RX MED GY IP 250 OP 250 PS 637: Performed by: INTERNAL MEDICINE

## 2023-02-18 PROCEDURE — 80076 HEPATIC FUNCTION PANEL: CPT | Performed by: FAMILY MEDICINE

## 2023-02-18 PROCEDURE — 258N000003 HC RX IP 258 OP 636: Performed by: FAMILY MEDICINE

## 2023-02-18 PROCEDURE — 250N000013 HC RX MED GY IP 250 OP 250 PS 637: Performed by: CLINICAL NURSE SPECIALIST

## 2023-02-18 PROCEDURE — 120N000001 HC R&B MED SURG/OB

## 2023-02-18 PROCEDURE — 85027 COMPLETE CBC AUTOMATED: CPT | Performed by: FAMILY MEDICINE

## 2023-02-18 PROCEDURE — 36415 COLL VENOUS BLD VENIPUNCTURE: CPT | Performed by: FAMILY MEDICINE

## 2023-02-18 RX ORDER — HYDROMORPHONE HYDROCHLORIDE 2 MG/1
2 TABLET ORAL EVERY 4 HOURS PRN
Status: DISCONTINUED | OUTPATIENT
Start: 2023-02-18 | End: 2023-02-20 | Stop reason: HOSPADM

## 2023-02-18 RX ADMIN — CEFEPIME 2 G: 2 INJECTION, POWDER, FOR SOLUTION INTRAVENOUS at 21:20

## 2023-02-18 RX ADMIN — METHOCARBAMOL 500 MG: 500 TABLET, FILM COATED ORAL at 20:16

## 2023-02-18 RX ADMIN — METHOCARBAMOL 500 MG: 500 TABLET, FILM COATED ORAL at 13:43

## 2023-02-18 RX ADMIN — SENNOSIDES AND DOCUSATE SODIUM 2 TABLET: 50; 8.6 TABLET ORAL at 09:38

## 2023-02-18 RX ADMIN — HYDROMORPHONE HYDROCHLORIDE 2 MG: 2 TABLET ORAL at 18:23

## 2023-02-18 RX ADMIN — PANTOPRAZOLE SODIUM 40 MG: 40 TABLET, DELAYED RELEASE ORAL at 09:39

## 2023-02-18 RX ADMIN — HYDROMORPHONE HYDROCHLORIDE 2 MG: 2 TABLET ORAL at 09:38

## 2023-02-18 RX ADMIN — METHOCARBAMOL 500 MG: 500 TABLET, FILM COATED ORAL at 03:34

## 2023-02-18 RX ADMIN — THERA TABS 1 TABLET: TAB at 09:39

## 2023-02-18 RX ADMIN — LIDOCAINE: 50 OINTMENT TOPICAL at 21:20

## 2023-02-18 RX ADMIN — PANTOPRAZOLE SODIUM 40 MG: 40 TABLET, DELAYED RELEASE ORAL at 20:16

## 2023-02-18 RX ADMIN — HYDROMORPHONE HYDROCHLORIDE 2 MG: 2 TABLET ORAL at 13:51

## 2023-02-18 RX ADMIN — HYDROMORPHONE HYDROCHLORIDE 2 MG: 2 TABLET ORAL at 00:16

## 2023-02-18 RX ADMIN — Medication 3 MG: at 20:16

## 2023-02-18 RX ADMIN — CEFEPIME 2 G: 2 INJECTION, POWDER, FOR SOLUTION INTRAVENOUS at 13:43

## 2023-02-18 RX ADMIN — SODIUM CHLORIDE: 9 INJECTION, SOLUTION INTRAVENOUS at 13:09

## 2023-02-18 RX ADMIN — SENNOSIDES AND DOCUSATE SODIUM 1 TABLET: 50; 8.6 TABLET ORAL at 20:16

## 2023-02-18 RX ADMIN — LIDOCAINE: 50 OINTMENT TOPICAL at 16:06

## 2023-02-18 RX ADMIN — CEFEPIME 2 G: 2 INJECTION, POWDER, FOR SOLUTION INTRAVENOUS at 06:39

## 2023-02-18 RX ADMIN — ACETAMINOPHEN 650 MG: 325 TABLET ORAL at 23:33

## 2023-02-18 RX ADMIN — HYDROXYUREA 500 MG: 500 CAPSULE ORAL at 09:39

## 2023-02-18 RX ADMIN — HYDROMORPHONE HYDROCHLORIDE 2 MG: 2 TABLET ORAL at 03:34

## 2023-02-18 RX ADMIN — DICLOFENAC SODIUM 2 G: 10 GEL TOPICAL at 21:20

## 2023-02-18 RX ADMIN — DICLOFENAC SODIUM 2 G: 10 GEL TOPICAL at 13:44

## 2023-02-18 RX ADMIN — DICLOFENAC SODIUM 2 G: 10 GEL TOPICAL at 09:44

## 2023-02-18 RX ADMIN — METHOCARBAMOL 500 MG: 500 TABLET, FILM COATED ORAL at 09:38

## 2023-02-18 RX ADMIN — LIDOCAINE: 50 OINTMENT TOPICAL at 11:03

## 2023-02-18 ASSESSMENT — ACTIVITIES OF DAILY LIVING (ADL)
ADLS_ACUITY_SCORE: 26
ADLS_ACUITY_SCORE: 22
ADLS_ACUITY_SCORE: 26
ADLS_ACUITY_SCORE: 22
ADLS_ACUITY_SCORE: 26

## 2023-02-18 NOTE — PROGRESS NOTES
[facility]    Medicine Progress Note - Hospitalist Service    Date of Admission:  2/13/2023    Assessment & Plan   Eveline Gage is 27-year-old female with history of sickle cell disease, s/p cholecystectomy, asplenia admitted for sickle cell crisis.      #Sickle cell crisis.  Pain is in better control.  Appreciate pain management assistance for pain control.  She is currently on Voltarol alternating with lidocaine, Robaxin 500 mg every 6 hours, Dilaudid 2 mg p.o. every 4 hours as needed IV pain medication discontinued today.  Heme-onc recommended hydroxyurea 500 mg daily and outpatient follow-up.  Hemoglobin has been stable.  Continue to monitor transfuse if hemoglobin is less than 7  Platelet 45 continue to monitor.    #Fever 2/16/2023  Blood cultures negative to 4.  ID was consulted due to history of sickle cell anemia recommended IV cefepime for now.    #Nocturnal hypoxia suspected underlying sleep apnea.  Discussed with patient to get a referral for sleep clinic from PCP.  Currently on room air during awake.    #Elevated troponin likely in setting of sickle cell crisis.  Cardiology was consulted recommended echocardiogram showed normal left ventricular ejection fraction.    #Elevated LFTs likely in setting of sickle cell crisis due to hepatic congestion and hemolysis.  She had extensive work-up recently Extensive work-up including Doppler US negative, EUS 1/15/23, evaluation for Hep A/B/C, hemachromatosis, Daniel's disease was negative. 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 Positive CRSIPIN and elevated F actin EIA (with normal ASMA) noted.   GI recommended continue to monitor without any intervention.  LFTs trending down.         Diet: Combination Diet Regular Diet Adult  Room Service    DVT Prophylaxis: Pneumatic Compression Devices  Echeverria Catheter: Not present  Lines: PRESENT             Cardiac Monitoring: ACTIVE  "order. Indication: Syncope- high cardiac risk (48 hours)  Code Status: Full Code      Clinically Significant Risk Factors              # Hypoalbuminemia: Lowest albumin = 2 g/dL at 2/17/2023 10:03 AM, will monitor as appropriate   # Thrombocytopenia: Lowest platelets = 45 in last 2 days, will monitor for bleeding         # Obesity: Estimated body mass index is 36.36 kg/m  as calculated from the following:    Height as of this encounter: 1.6 m (5' 3\").    Weight as of this encounter: 93.1 kg (205 lb 4 oz).          Disposition Plan      Expected Discharge Date: 02/19/2023      Destination: home with family            Saul Ta MD  Hospitalist Service  [facility]  Securely message with GELI (more info)  Text page via [employer] Paging/Directory      Disclaimer: This note consists of symbols derived from keyboarding, dictation and/or voice recognition software. As a result, there may be errors in the script that have gone undetected. Please consider this when interpreting information found in this chart.    ______________________________________________________________________    Interval History   She is still complaining of back and some chest pain.  She is trying to get out of the bed.  Has been afebrile.  Hemoglobin has been stable.  She is complaining of some numbness around right jaw.    Physical Exam   Vital Signs: Temp: 98.4  F (36.9  C) Temp src: Oral BP: 131/62 Pulse: 105   Resp: 16 SpO2: 96 % O2 Device: Nasal cannula Oxygen Delivery: 2 LPM  Weight: 205 lbs 3.97 oz    Constitutional: awake, alert, cooperative, no apparent distress, and appears stated age  Respiratory: No increased work of breathing, good air exchange, clear to auscultation bilaterally, no crackles or wheezing  Cardiovascular: Normal apical impulse, regular rate and rhythm, normal S1 and S2, no S3 or S4, and no murmur noted  GI:, normal bowel sounds, soft, non-distended, non-tender, no masses " palpated,  Musculoskeletal: no lower extremity pitting edema present  Neurologic: Awake, alert, oriented to name, place and time.  No focal neurodeficit noted    Medical Decision Making       38 MINUTES SPENT BY ME on the date of service doing chart review, history, exam, documentation & further activities per the note.      Data         Imaging results reviewed over the past 24 hrs:   No results found for this or any previous visit (from the past 24 hour(s)).

## 2023-02-18 NOTE — PLAN OF CARE
Goal Outcome Evaluation:  Pt AO x4. Calm and cooperative. Pt on TELE, tachycardic. Pain on lower back and right arm controled with scheduled pain meds. Moves in bed independently. Continuous NS at 75 ml/hr. 2L O2 NC. VSS.     Problem: Plan of Care - These are the overarching goals to be used throughout the patient stay.    Goal: Absence of Hospital-Acquired Illness or Injury  Intervention: Identify and Manage Fall Risk  Recent Flowsheet Documentation  Taken 2/18/2023 0209 by Alyson Sanchez RN  Safety Promotion/Fall Prevention:   activity supervised   bed alarm on   increased rounding and observation   fall prevention program maintained   increase visualization of patient   clutter free environment maintained   room near nurse's station   room organization consistent   safety round/check completed   room door open  Taken 2/17/2023 2059 by Alyson Sanchez RN  Safety Promotion/Fall Prevention:   activity supervised   bed alarm on   increased rounding and observation   fall prevention program maintained   increase visualization of patient   clutter free environment maintained   room near nurse's station   room organization consistent   safety round/check completed   room door open  Intervention: Prevent Skin Injury  Recent Flowsheet Documentation  Taken 2/18/2023 0209 by Alyson Sanchez, RN  Body Position:   left   turned  Taken 2/17/2023 2059 by Alyson Sanchez RN  Body Position:   left   turned  Goal: Optimal Comfort and Wellbeing  Intervention: Monitor Pain and Promote Comfort  Recent Flowsheet Documentation  Taken 2/17/2023 2042 by Alyson Sanchez RN  Pain Management Interventions: medication (see MAR)     Problem: Pain Acute  Goal: Optimal Pain Control and Function  Intervention: Develop Pain Management Plan  Recent Flowsheet Documentation  Taken 2/17/2023 2042 by Alyson Sanchez RN  Pain Management Interventions: medication (see MAR)  Intervention: Prevent or Manage Pain  Recent Flowsheet  Documentation  Taken 2/18/2023 0209 by Alyson Sanchez, RN  Medication Review/Management: medications reviewed  Taken 2/17/2023 2059 by Alyson Sanchez, RN  Medication Review/Management: medications reviewed

## 2023-02-18 NOTE — PROGRESS NOTES
[facility] ACUTE PAIN SERVICE    Daily PAIN Progress Note     Assessment/Plan:  Eveline Gage is a 27 year old female who was admitted on 2/13/2023.  Pain team was asked to see the patient for sickle cell pain. Patient with recent admission 1/15 to 1/19/23, Acute Pain Management Team followed patient at that time for abdominal pain (per GI note possibly pain due to hepatic congestion and liver ischemia related to sickle cell disease). Admitted this hospitalization for sickle cell crisis. History including sickle cell disease, Hx splenectomy, adjustment disorder, mixed anxiety and depression, asthma. The patient does  smoke and denies chemical dependency history.      Pt only using dilaudid 2 mg q4h scheduled since PCA stopped yesterday afternoon, hasn't needed any prns or higher doses. Patient reports pain improvement today, pain 5/10 - feels muscle relaxant is effective - would like to taper off pain medication. Sitting up in chair and off O2. Will change scheduled dilaudid to prn. Discussed plan with Julia Peñaloza, Pain Team APRN     PLAN:   1) Pain is consistent with sickle cell crisis.Treatment plan includes multimodal pain approach, Hospital Medicine Service for medical management, Heme/Onc. Patient educated regarding multimodal pain approach, medications as listed below. Patient is understanding of the plan. All questions and concerns addressed to patient's satisfaction.   2)Multimodal Medication Therapy  Topical: voltaren alternating with lidocaine   NSAID'S: none, hgb 7.4  Muscle Relaxants: Robaxin  q6h 500mg  Adjuvants: APAP prn and keep less than 2g given LFTs increased, hydroxyurea daily   Antidepressants/anxiolytics: hydroxyzine q6h prn  Opioids: dilaudid 2 mg po q4h - change to q4h prn , with dilaudid 2-4 mg po q4h prn pain first line - discontinue  IV Pain medication:  IV dilaudid 0.2 mg IV pain second line - discontinue  3)Non-medication interventions: heat, ice   Acupuncture consult - offered  and agreeable   Integrative consult - offered and agreeable   4)Constipation Prophylaxis: Scheduled and prn     -MN  pulled from system on 2/14/23. This indicates   2/11/23 Oxycodone 5 mg #12 by Emmanuelle Art  1/27/23 Oxycodone 5 mg #16 by Mario Willams  Discharge Recommendations - We recommend prescribing the following at the time of discharge: TBD     Grover Neville, PharmD, BCPS, CPE  Acute Care Pain Management Program  [facility] ([facility], [place], [place])  Page via online paging system or call [phone]

## 2023-02-19 LAB
ALBUMIN SERPL-MCNC: 1.9 G/DL (ref 3.5–5)
ALP SERPL-CCNC: 476 U/L (ref 45–120)
ALT SERPL W P-5'-P-CCNC: 73 U/L (ref 0–45)
AST SERPL W P-5'-P-CCNC: 107 U/L (ref 0–40)
BILIRUB DIRECT SERPL-MCNC: 4.8 MG/DL
BILIRUB SERPL-MCNC: 9.3 MG/DL (ref 0–1)
ERYTHROCYTE [DISTWIDTH] IN BLOOD BY AUTOMATED COUNT: 17.5 % (ref 10–15)
HCT VFR BLD AUTO: 20.9 % (ref 35–47)
HGB BLD-MCNC: 7.3 G/DL (ref 11.7–15.7)
MCH RBC QN AUTO: 29.8 PG (ref 26.5–33)
MCHC RBC AUTO-ENTMCNC: 34.9 G/DL (ref 31.5–36.5)
MCV RBC AUTO: 85 FL (ref 78–100)
PLATELET # BLD AUTO: 48 10E3/UL (ref 150–450)
PROT SERPL-MCNC: 5.6 G/DL (ref 6–8)
RBC # BLD AUTO: 2.45 10E6/UL (ref 3.8–5.2)
WBC # BLD AUTO: 8.6 10E3/UL (ref 4–11)

## 2023-02-19 PROCEDURE — 99232 SBSQ HOSP IP/OBS MODERATE 35: CPT | Performed by: INTERNAL MEDICINE

## 2023-02-19 PROCEDURE — 80076 HEPATIC FUNCTION PANEL: CPT | Performed by: FAMILY MEDICINE

## 2023-02-19 PROCEDURE — 258N000003 HC RX IP 258 OP 636: Performed by: FAMILY MEDICINE

## 2023-02-19 PROCEDURE — 250N000013 HC RX MED GY IP 250 OP 250 PS 637: Performed by: NURSE PRACTITIONER

## 2023-02-19 PROCEDURE — 120N000001 HC R&B MED SURG/OB

## 2023-02-19 PROCEDURE — 85027 COMPLETE CBC AUTOMATED: CPT | Performed by: FAMILY MEDICINE

## 2023-02-19 PROCEDURE — 250N000013 HC RX MED GY IP 250 OP 250 PS 637: Performed by: FAMILY MEDICINE

## 2023-02-19 PROCEDURE — 250N000013 HC RX MED GY IP 250 OP 250 PS 637: Performed by: INTERNAL MEDICINE

## 2023-02-19 PROCEDURE — 250N000013 HC RX MED GY IP 250 OP 250 PS 637: Performed by: CLINICAL NURSE SPECIALIST

## 2023-02-19 PROCEDURE — 36415 COLL VENOUS BLD VENIPUNCTURE: CPT | Performed by: FAMILY MEDICINE

## 2023-02-19 PROCEDURE — 250N000011 HC RX IP 250 OP 636: Performed by: HOSPITALIST

## 2023-02-19 RX ADMIN — HYDROMORPHONE HYDROCHLORIDE 2 MG: 2 TABLET ORAL at 08:40

## 2023-02-19 RX ADMIN — PANTOPRAZOLE SODIUM 40 MG: 40 TABLET, DELAYED RELEASE ORAL at 08:36

## 2023-02-19 RX ADMIN — CEFEPIME 2 G: 2 INJECTION, POWDER, FOR SOLUTION INTRAVENOUS at 06:04

## 2023-02-19 RX ADMIN — DICLOFENAC SODIUM 2 G: 10 GEL TOPICAL at 21:02

## 2023-02-19 RX ADMIN — HYDROMORPHONE HYDROCHLORIDE 2 MG: 2 TABLET ORAL at 20:59

## 2023-02-19 RX ADMIN — HYDROXYUREA 500 MG: 500 CAPSULE ORAL at 08:35

## 2023-02-19 RX ADMIN — METHOCARBAMOL 500 MG: 500 TABLET, FILM COATED ORAL at 15:59

## 2023-02-19 RX ADMIN — SODIUM CHLORIDE: 9 INJECTION, SOLUTION INTRAVENOUS at 06:06

## 2023-02-19 RX ADMIN — HYDROXYZINE HYDROCHLORIDE 25 MG: 25 TABLET ORAL at 22:11

## 2023-02-19 RX ADMIN — SENNOSIDES AND DOCUSATE SODIUM 1 TABLET: 50; 8.6 TABLET ORAL at 08:36

## 2023-02-19 RX ADMIN — LIDOCAINE: 50 OINTMENT TOPICAL at 21:02

## 2023-02-19 RX ADMIN — PANTOPRAZOLE SODIUM 40 MG: 40 TABLET, DELAYED RELEASE ORAL at 20:59

## 2023-02-19 RX ADMIN — SENNOSIDES AND DOCUSATE SODIUM 1 TABLET: 50; 8.6 TABLET ORAL at 22:11

## 2023-02-19 RX ADMIN — METHOCARBAMOL 500 MG: 500 TABLET, FILM COATED ORAL at 02:36

## 2023-02-19 RX ADMIN — THERA TABS 1 TABLET: TAB at 08:36

## 2023-02-19 RX ADMIN — DICLOFENAC SODIUM 2 G: 10 GEL TOPICAL at 08:36

## 2023-02-19 RX ADMIN — CEFEPIME 2 G: 2 INJECTION, POWDER, FOR SOLUTION INTRAVENOUS at 22:12

## 2023-02-19 RX ADMIN — SODIUM CHLORIDE: 9 INJECTION, SOLUTION INTRAVENOUS at 20:53

## 2023-02-19 RX ADMIN — Medication 3 MG: at 20:59

## 2023-02-19 RX ADMIN — LIDOCAINE: 50 OINTMENT TOPICAL at 06:09

## 2023-02-19 RX ADMIN — SENNOSIDES AND DOCUSATE SODIUM 1 TABLET: 50; 8.6 TABLET ORAL at 20:58

## 2023-02-19 RX ADMIN — CEFEPIME 2 G: 2 INJECTION, POWDER, FOR SOLUTION INTRAVENOUS at 15:59

## 2023-02-19 RX ADMIN — METHOCARBAMOL 500 MG: 500 TABLET, FILM COATED ORAL at 08:36

## 2023-02-19 RX ADMIN — METHOCARBAMOL 500 MG: 500 TABLET, FILM COATED ORAL at 20:59

## 2023-02-19 ASSESSMENT — ACTIVITIES OF DAILY LIVING (ADL)
ADLS_ACUITY_SCORE: 23
ADLS_ACUITY_SCORE: 22
ADLS_ACUITY_SCORE: 23
ADLS_ACUITY_SCORE: 22
ADLS_ACUITY_SCORE: 22
ADLS_ACUITY_SCORE: 23
ADLS_ACUITY_SCORE: 22
ADLS_ACUITY_SCORE: 22
ADLS_ACUITY_SCORE: 23

## 2023-02-19 NOTE — PROGRESS NOTES
[facility]    Infectious Disease Progress Note     02/18/2023     Chart reviewed. Patient seen and updated    Assessment & Plan   Eveline Gage is a 27 year old female who was admitted on 2/13/2023.     ASSESSMENT:  1. Sickle cell crisis in January 2023  2. Asplenia  3. History of cholecystectomy  4. Abnormal liver function tests with indirect hyperbilirubinemia consistent with hemolysis  5. Admitted with sickle cell crisis on 2 12/13/2023  6. New fever 2/16/2023, blood cultures in process.  Started on cefepime  7. Acute thrombocytopenia  8. Left upper extremity PICC      RECOMMENDATIONS:    1. Antibiotics: Continue cefepime at least until 2/20/2023  2. Follow culture results   3. Focus/de-escalate antibiotics based on final culture results  4. Monitor CBC, CMP  5. ID will follow on 2/20/2023.  Please call with questions over the weekend      Alexia Pfeiffer MD  [facility]  [phone]      Interval History   Chart reviewed.  Fever curve better  Overall feels better, updated patient regarding antibiotic plan    Physical Exam   Temp: 99.2  F (37.3  C) Temp src: Oral BP: 125/62 Pulse: 100   Resp: 20 SpO2: 97 % O2 Device: None (Room air) Oxygen Delivery: 2 LPM  Vitals:    02/14/23 0500 02/15/23 2124 02/18/23 0300   Weight: 87.5 kg (192 lb 14.4 oz) 90 kg (198 lb 6.6 oz) 93.1 kg (205 lb 4 oz)     Vital Signs with Ranges  Temp:  [97.8  F (36.6  C)-99.2  F (37.3  C)] 99.2  F (37.3  C)  Pulse:  [100-118] 100  Resp:  [15-20] 20  BP: (109-184)/(60-83) 125/62  SpO2:  [92 %-97 %] 97 %        Gen: Pleasant in no acute distress.  HEENT: NCAT. EOMI. PERRL.  Neck: No bruit, JVD or thyromegaly.  Lungs: Clear to ascultation bilat with no crackles or wheezes.  Card: RRR. NSR. No RMG. Peripheral pulses present and symmetric. No edema.  Abd: Soft NT ND. No mass. Normal bowel sounds.  Pelvic: Deferred.  Skin: No rash.  Extr: Right shoulder area with some tenderness although no  evidence of abscess or infection.  Bilateral thighs and legs with no evidence of infection.  Neuro: Alert and oriented to place time and person.     Medications     sodium chloride 75 mL/hr at 02/18/23 1309       ceFEPIme  2 g Intravenous Q8H     diclofenac  2 g Topical 4x Daily     hydroxyurea  500 mg Oral Daily     lidocaine   Topical 4x Daily     melatonin  3 mg Oral At Bedtime     methocarbamol  500 mg Oral Q6H     multivitamin, therapeutic  1 tablet Oral Daily     pantoprazole  40 mg Oral BID     senna-docusate  1 tablet Oral BID    Or     senna-docusate  2 tablet Oral BID     sodium chloride (PF)  10 mL Intracatheter Q8H     sodium chloride (PF)  10-40 mL Intracatheter Q7 Days     sodium chloride (PF)  3 mL Intracatheter Q8H       Data   All microbiology laboratory data reviewed.  Recent Labs   Lab Test 02/18/23  1215 02/17/23  1003 02/16/23  0626   WBC 10.7 7.6 11.6*   HGB 6.2* 7.4* 7.0*   HCT 17.7* 20.5* 18.7*   MCV 84 81 82   PLT 44* 45* 67*     Recent Labs   Lab Test 02/16/23  0626 02/15/23  2044 02/13/23  0808   CR 0.53* 0.50* 0.62     No lab results found.  No lab results found.    Invalid input(s):     MICROBIOLOGY:    Reviewed    7-Day Micro Results     Collected Updated Procedure Result Status      02/16/2023 2001 02/18/2023 0016 Blood Culture Peripheral Blood [[id number]]   Peripheral Blood    Preliminary result Component Value   Culture No growth after 1 day  [P]                02/16/2023 2001 02/18/2023 0016 Blood Culture Peripheral Blood [[id number]]    Peripheral Blood    Preliminary result Component Value   Culture No growth after 1 day  [P]                       RADIOLOGY:    Reviewed  Chest XR,  PA & LAT    Result Date: 2/13/2023  EXAM: XR CHEST 2 VIEWS LOCATION: [facility] DATE/TIME: 2/13/2023 11:42 AM INDICATION: Chest pain. COMPARISON: Chest CTA 02/11/2023     IMPRESSION: Heart size and vascularity are normal. Mild bronchial wall thickening suggests airway  inflammation. No focal consolidation, pneumothorax nor pleural effusion.    Echocardiogram Complete    Result Date: 2023  [id number]^LEO^ARELY^[place] [address]  Name: NANCI SANTANA MRN: [id number] : 1995 Study Date: 2023 10:36 AM Age: 27 yrs Gender: Female Patient Location: [facility] Reason For Study: CHF Ordering Physician: ARELY BAILEY Performed By: LICHA  BSA: 1.9 m2 Height: 63 in Weight: 198 lb ______________________________________________________________________________ Procedure Complete Portable Echo Adult. Definity (NDC #31678-603) given intravenously. Technically difficult study. Technically difficult study.Extremely difficult acoustic windows despite the use of contrast for endcardial border definition. ______________________________________________________________________________ Interpretation Summary  Technically difficult study. Difficult acoustic windows despite the use of contrast for endcardial border definition.  1. The left ventricle is normal in size. Left ventricular function is normal.The ejection fraction is 60-65%. No regional wall motion abnormalities noted. 2. The right ventricle is not well visualized. Normal right ventricle size and systolic function. 3. Right ventricle systolic pressure estimate normal 4. There is no pericardial effusion. 5. No hemodynamically significant valvular abnormalities on 2D or color flow imaging. ______________________________________________________________________________ Left Ventricle The left ventricle is normal in size. Left ventricular function is normal.The ejection fraction is 60-65%. There is normal left ventricular wall thickness. No regional wall motion abnormalities noted.  Right Ventricle Normal right ventricle size and systolic function. The right ventricle is not well visualized.  Atria Normal left atrial size. Right atrial size is normal.  Mitral Valve  Mitral valve leaflets appear normal. There is no evidence of mitral stenosis or clinically significant mitral regurgitation. The mitral valve is not well visualized.  Tricuspid Valve The tricuspid valve is not well visualized. The right ventricular systolic pressure is approximated at 24.7 mmHg plus the right atrial pressure. Right ventricle systolic pressure estimate normal. There is no tricuspid stenosis.  Aortic Valve Aortic valve leaflets appear normal. There is no evidence of aortic stenosis or clinically significant aortic regurgitation. The aortic valve is not well visualized.  Pulmonic Valve The pulmonic valve is not well seen, but is grossly normal. This degree of valvular regurgitation is within normal limits. There is trace pulmonic valvular regurgitation.  Vessels Inferior vena cava not well visualized for estimation of right atrial pressure.  Pericardium There is no pericardial effusion. ______________________________________________________________________________ MMode/2D Measurements & Calculations IVSd: 0.92 cm  LVIDd: 4.9 cm LVIDs: 3.0 cm LVPWd: 0.92 cm FS: 39.1 % LV mass(C)d: 158.8 grams LV mass(C)dI: 82.5 grams/m2 Ao root diam: 2.3 cm LA dimension: 3.4 cm LA/Ao: 1.5 RWT: 0.38  Time Measurements MM HR: 113.0 BPM  Doppler Measurements & Calculations MV E max wallace: 92.2 cm/sec MV A max wallace: 103.8 cm/sec MV E/A: 0.89 MV dec slope: 638.4 cm/sec2 MV dec time: 0.14 sec LV V1 max PG: 3.9 mmHg LV V1 max: 99.0 cm/sec LV V1 VTI: 19.3 cm PA acc time: 0.08 sec TR max wallace: 248.4 cm/sec TR max P.7 mmHg  E/E' av.4 Lateral E/e': 8.1 Medial E/e': 8.6  ______________________________________________________________________________ Report approved by: Natalie Dukes 2023 12:21 PM       CT Abdomen Pelvis w Contrast    Result Date: 2023  EXAM: CT ABDOMEN PELVIS W CONTRAST LOCATION: [facility] DATE/TIME: 2023 4:49 AM INDICATION: right midabdominal and RLQ pain,  jaundice COMPARISON: Ultrasound from 01/17/2023, CT from 01/14/2023. TECHNIQUE: CT scan of the abdomen and pelvis was performed following injection of IV contrast. Multiplanar reformats were obtained. Dose reduction techniques were used. CONTRAST: isovue 370 100 mls FINDINGS: LOWER CHEST: Please see separate dictation for detailed findings above the diaphragm. HEPATOBILIARY: Absent gallbladder. Liver within normal limits. Minimal central biliary ductal prominence likely due to postcholecystectomy reservoir effect. Normal caliber common bile duct. No calcified CBD stone. PANCREAS: Normal. SPLEEN: Absent. ADRENAL GLANDS: Normal. KIDNEYS/BLADDER: Symmetric enhancement. No hydronephrosis. Bladder is normal. BOWEL: No bowel obstruction or free air. Appendix is normal. LYMPH NODES: Normal. VASCULATURE: Unremarkable. PELVIC ORGANS: Enhancing focus at the uterine fundus consistent with a fibroid. MUSCULOSKELETAL: Normal.     IMPRESSION: 1.  No focal inflammatory change identified in the abdomen or pelvis. 2.  Slight intrahepatic biliary ductal prominence likely due to postcholecystectomy reservoir effect. This is unchanged compared to the prior CT.    CT Chest Pulmonary Embolism w Contrast    Result Date: 2/15/2023  EXAM: CT CHEST PULMONARY EMBOLISM W CONTRAST LOCATION: [facility] DATE/TIME: 2/15/2023 6:00 PM INDICATION: hypoxia and diffuse pain  hx of sickle cell crisis COMPARISON: 02/01/2023. TECHNIQUE: CT chest pulmonary angiogram during arterial phase injection of IV contrast. Multiplanar reformats and MIP reconstructions were performed. Dose reduction techniques were used. CONTRAST: Isovue 370 75mL FINDINGS: ANGIOGRAM CHEST: No pulmonary embolism. Pulmonary arteries normal in caliber. Thoracic aorta normal in caliber. No aortic dissection. HEART: Cardiac chambers within normal limits. No pericardial effusion. No coronary artery calcification. MEDIASTINUM: Borderline hilar adenopathy. No  abnormally enlarged axillary or mediastinal lymph nodes. LUNGS AND PLEURA: No pulmonary mass or consolidation. Trace pleural effusions and dependent subsegmental atelectasis in the lower lobes. No pneumothorax. LIMITED UPPER ABDOMEN: Prior cholecystectomy. Absent spleen likely related to sequelae of sickle cell disease. MUSCULOSKELETAL: Negative.     IMPRESSION: 1.  No evidence for pulmonary embolism. 2.  Trace bilateral pleural effusions.    CT Chest Pulmonary Embolism w Contrast    Result Date: 2/11/2023  EXAM: CT CHEST PULMONARY EMBOLISM W CONTRAST LOCATION: [facility] DATE/TIME: 2/11/2023 4:49 AM INDICATION: r o PE COMPARISON: None. TECHNIQUE: CT chest pulmonary angiogram during arterial phase injection of IV contrast. Multiplanar reformats and MIP reconstructions were performed. Dose reduction techniques were used. CONTRAST: isovue 370 100 mls FINDINGS: ANGIOGRAM CHEST: Pulmonary arteries are normal caliber and negative for pulmonary emboli. Thoracic aorta is negative for dissection. No CT evidence of right heart strain. LUNGS AND PLEURA: Subsegmental atelectasis lung bases. MEDIASTINUM/AXILLAE: Normal. CORONARY ARTERY CALCIFICATION: None. UPPER ABDOMEN: Normal. MUSCULOSKELETAL: Normal.     IMPRESSION: 1.  No pulmonary embolism.    CT Head w/o Contrast    Result Date: 2/15/2023  EXAM: CT HEAD W/O CONTRAST LOCATION: [facility] DATE/TIME: 2/15/2023 6:01 PM INDICATION: Altered mental status. Admitted for sickle cell crisis. COMPARISON: None. TECHNIQUE: Routine CT Head without IV contrast. Multiplanar reformats. Dose reduction techniques were used. FINDINGS: INTRACRANIAL CONTENTS: Gray-white matter differentiation is maintained. No hemorrhage or extraaxial collection. No mass effect. Subarachnoid cisterns are patent. Normal parenchymal attenuation. Normal ventricles and sulci. VISUALIZED ORBITS/SINUSES/MASTOIDS: No visible intraorbital abnormality. Paranasal  sinuses are clear. No middle ear or mastoid effusion. BONES/SOFT TISSUES: No acute abnormality.     IMPRESSION: 1.  No acute transcortical infarct, intracranial hemorrhage, or mass effect.

## 2023-02-19 NOTE — PROGRESS NOTES
"GI PROGRESS NOTE  2/19/2023  Eveline Gage  1995  /-09    Subjective:   Patient notes pain has improved since admit. Denies any nausea, vomiting, melena, or hematochezia. Tolerating a regular diet without difficulty. Expresses desire to be discharged.      Objective:     Blood pressure 125/70, pulse 69, temperature 98.1  F (36.7  C), temperature source Oral, resp. rate 18, height 1.6 m (5' 3\"), weight 87.4 kg (192 lb 11.2 oz), SpO2 98 %.    Body mass index is 34.14 kg/m .  Gen: NAD  GI: Non-distended, BS positive, soft, slight tenderness    Laboratory:    BMP  Recent Labs   Lab 02/16/23  0626 02/15/23  2044 02/13/23  0808    134* 139   POTASSIUM 3.7 3.9 4.6   CHLORIDE 103 102 109*   ALIZA 8.9 8.5 8.8   CO2 24 22 22   BUN 10 11 9   CR 0.53* 0.50* 0.62    107 96     CBC  Recent Labs   Lab 02/18/23  2108 02/18/23  1215 02/17/23  1003 02/16/23  0626   WBC  --  10.7 7.6 11.6*   RBC  --  2.12* 2.53* 2.28*   HGB 7.4* 6.2* 7.4* 7.0*   HCT  --  17.7* 20.5* 18.7*   MCV  --  84 81 82   MCH  --  29.2 29.2 30.7   MCHC  --  35.0 36.1 37.4*   RDW  --  18.6* 19.1* 19.5*   PLT  --  44* 45* 67*     INR  Recent Labs   Lab 02/13/23  0808   INR 1.27*      LFTs  Recent Labs   Lab Test 02/19/23  1145 02/18/23  1215 02/17/23  1003 02/17/23  0131 02/14/23  0723 02/13/23  0808 02/11/23  0509 02/11/23  0353 01/16/23  0846 01/16/23  0010 01/14/23  1727 01/14/23  1555   ALBUMIN 1.9* 1.9* 2.0*  --    < > 2.8*  --  2.6*   < >  --   --  3.3*   BILITOTAL 9.3* 13.3* 12.5*  --    < > 11.2*  --  10.5*   < >  --   --  21.0*   ALT 73* 76* 88*  --    < > 186*  --  218*   < >  --   --  1,000*   * 115* 121*  --    < > 219*  --  249*   < >  --   --  1,069*   PROTEIN  --   --   --  50*  --   --  Negative  --   --  Negative   < >  --    LIPASE  --   --   --   --   --  67*  --  117*  --   --   --  42    < > = values in this interval not displayed.     Imaging:  EXAM: US ABDOMEN OR PELVIS DOPPLER COMPLETE  LOCATION: [facility]  DATE/TIME: 1/17/2023 10:16 AM     INDICATION: elevated LFTs, eval for vascular causes  COMPARISON: 01/14/2023.  TECHNIQUE: Complete abdominal ultrasound. Color flow with spectral Doppler and waveform analysis performed.     FINDINGS:  GALLBLADDER: Surgically absent.   BILE DUCTS: No biliary dilatation. The common duct measures for mm.  LIVER: Normal parenchyma with smooth contour. No focal mass.   The kidneys spleen and pancreas were not imaged. AORTA: Normal in caliber.  IVC: Normal where visualized.  No ascites.     ABDOMINAL DUPLEX: Hepatic artery, hepatic veins, inferior vena cava, portal veins and splenic vein are patent with flow in the normal direction. There is mild elevation of resistive indices in the hepatic artery with the main hepatic artery resistive   index 0.86, right hepatic artery 0.89 and left hepatic artery 0.9. There is diastolic flow in all of these vessels.                                                IMPRESSION:  1.  Normal sonographic appearance of the liver.  2.  Mildly elevated resistive indices in the hepatic arteries. The hepatic artery, hepatic veins, inferior vena cava, portal vein and splenic vein are patent with flow in the normal direction.     PROCEDURES:  EUS 1/15/23:  Findings:        ENDOSONOGRAPHIC FINDING: :        There was no sign of significant endosonographic abnormality in the        entire main bile duct. The maximum diameter of the duct was 3.2 mm. No        masses, no stones, no biliary sludge, ducts of normal caliber and ducts        with regular contour were identified.        The celiac axis including lymph nodes was unremarkable.        The left adrenal was examined and normal.        The examined portion of the left lobe of the liver and the gastrohepatic        ligament were evaluated and found to be without abnormalities.        The ampulla was normal endoscopically and endosonographically. The bile        duct ranged in  size from 1.8 mm to 3.2 mm without stones or sludge. The        gallbladder was surgically absent.                                                                                     Impression:     - There was no sign of significant pathology in the                          entire main bile duct.                          - No need for ERCP at this time.                          - No specimens collected.     Assessment:   1. Elevated LFTs  27 year-old with sickle cell disease, hemolysis, cholecystectomy with elevated LFTs felt due to hepatic congestion and liver ischemia related to sickle cell disease/hemolysis. Extensive work-up including Doppler US negative, EUS 1/15/23, evaluation for Hep A/B/C, hemachromatosis, Daniel's disease was negative. 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 Positive CRISPIN and elevated F actin EIA (with normal ASMA) noted.   -LFTs were elevated on admit: total bilirubin 11.2, , , alk phos 196. She had similar elevations during her last admission 1/15-1/19/23 for sickle cell pain crisis, and levels have actually improved since that admission.  - LFTs have continued to downtrend since admit.  - Appreciate pain team following.      Plan:   1. No further liver work-up planned at this time.   2. Keep outpatient liver clinic follow-up 4/6/23  3. Continue to trend LFTs.  4. GI will sign off at this time but please call with questions and concerns.      Discussed with Dr. Eloy Laird, CNP  Thank you for the opportunity to participate in the care of this patient.   Please feel free to call me with any questions or concerns.  Phone number [phone].

## 2023-02-19 NOTE — PROGRESS NOTES
Care Management Follow Up    Length of Stay (days): 6    Expected Discharge Date: 02/19/2023     Concerns to be Addressed:       Patient plan of care discussed at interdisciplinary rounds: Yes    Anticipated Discharge Disposition: Home  Disposition Comments: Anticipate return home via LYFT transport.  Anticipated Discharge Services: None  Anticipated Discharge DME: None    Additional Information:  Chart reviewed.  Pt not medically ready for discharge, continues on IV antibx.  Cultures pending, ID following.  CM will follow for discharge planning needs.    Referral sent to [facility] to check benefits for IV antibx.    Pt is independent at baseline, anticipate discharge to home, LYFT to transport via pt coordination.    Kristina Albright RN/CM

## 2023-02-19 NOTE — PROGRESS NOTES
[facility] ACUTE PAIN SERVICE    Daily PAIN Progress Note     Assessment/Plan:  Eveline Gage is a 27 year old female who was admitted on 2/13/2023.  Pain team was asked to see the patient for sickle cell pain. Patient with recent admission 1/15 to 1/19/23, Acute Pain Management Team followed patient at that time for abdominal pain (per GI note possibly pain due to hepatic congestion and liver ischemia related to sickle cell disease). Admitted this hospitalization for sickle cell crisis. History including sickle cell disease, Hx splenectomy, adjustment disorder, mixed anxiety and depression, asthma. The patient does  smoke and denies chemical dependency history.      Patient sitting up in chair doing well. Happy with care, dilaudid 2 mg and scheduled robaxin effective - see discharge reccs below, pain team will sign off. Thank you. Discussed plan with Julia Peñaloza, Pain Team APRN     PLAN:   1) Pain is consistent with sickle cell crisis.Treatment plan includes multimodal pain approach, Hospital Medicine Service for medical management, Heme/Onc. Patient educated regarding multimodal pain approach, medications as listed below. Patient is understanding of the plan. All questions and concerns addressed to patient's satisfaction.   2)Multimodal Medication Therapy  Topical: voltaren alternating with lidocaine   NSAID'S: none, hgb 7.4  Muscle Relaxants: Robaxin  q6h 500mg  Adjuvants: APAP prn and keep less than 2g given LFTs increased, hydroxyurea daily   Antidepressants/anxiolytics: hydroxyzine q6h prn  Opioids: dilaudid 2 mg q4h prn  IV Pain medication:  none  3)Non-medication interventions: heat, ice   Acupuncture consult - offered and agreeable   Integrative consult - offered and agreeable   4)Constipation Prophylaxis: Scheduled and prn     -[place]  pulled from system on 2/14/23. This indicates   2/11/23 Oxycodone 5 mg #12 by Emmanuelle Art  1/27/23 Oxycodone 5 mg #16 by Mario Willams  Discharge Recommendations -  We recommend prescribing the following at the time of discharge: recommend send patient home with 3 day supply of dilaudid 2 mg qid prn pain #12, and robaxin 500 mg po q6h prn #12, topical ointments, and hydroxyzine per [facility] reccs.      Grover Neville, PharmD, BCPS, CPE  Acute Care Pain Management Program  [facility] ([facility], [place], [place])  Page via online paging system or call [phone]

## 2023-02-19 NOTE — PROGRESS NOTES
[facility]    Infectious Disease Progress Note     02/19/2023     Chart reviewed. Patient updated. Discussed with nurse and hospitalist    Assessment & Plan   Eveline Gage is a 27 year old female who was admitted on 2/13/2023.     ASSESSMENT:  1. Sickle cell crisis in January 2023  2. Asplenia  3. History of cholecystectomy  4. Abnormal liver function tests with indirect hyperbilirubinemia consistent with hemolysis  5. Admitted with sickle cell crisis on 2 12/13/2023  6. New fever 2/16/2023, blood cultures in process.  Started on cefepime  7. Acute thrombocytopenia  8. Left upper extremity PICC      RECOMMENDATIONS:    1. Antibiotics: Continue cefepime at least until 2/20/2023  2. Follow culture results   3. Discontinue or Focus/de-escalate antibiotics based on final culture results  4. Monitor CBC, CMP  5. Patient updated  6. Anticipate discharge on 2/20/2023.       Alxeia Pfeiffer MD  [facility]  [phone]      Interval History   Chart reviewed.  Patient wants to be discharged- agreed to stay until cultures finalized  Fever curve better  Overall feels better, updated patient regarding antibiotic plan    Physical Exam   Temp: 98.1  F (36.7  C) Temp src: Oral BP: 125/70 Pulse: 69   Resp: 18 SpO2: 98 % O2 Device: Nasal cannula Oxygen Delivery: 1 LPM  Vitals:    02/15/23 2124 02/18/23 0300 02/19/23 0013   Weight: 90 kg (198 lb 6.6 oz) 93.1 kg (205 lb 4 oz) 87.4 kg (192 lb 11.2 oz)     Vital Signs with Ranges  Temp:  [98.1  F (36.7  C)-99.5  F (37.5  C)] 98.1  F (36.7  C)  Pulse:  [] 69  Resp:  [16-20] 18  BP: (125-142)/(61-83) 125/70  SpO2:  [95 %-98 %] 98 %        Gen: Pleasant in no acute distress.  HEENT: NCAT. EOMI. PERRL.  Neck: No bruit, JVD or thyromegaly.  Lungs: Clear to ascultation bilat with no crackles or wheezes.  Card: RRR. NSR. No RMG. Peripheral pulses present and symmetric. No edema.  Abd: Soft NT ND. No mass. Normal bowel  sounds.  Pelvic: Deferred.  Skin: No rash.  Extr: Right shoulder area with some tenderness although no evidence of abscess or infection.  Bilateral thighs and legs with no evidence of infection.  Neuro: Alert and oriented to place time and person.     Medications     sodium chloride 75 mL/hr at 02/19/23 0606       ceFEPIme  2 g Intravenous Q8H     diclofenac  2 g Topical 4x Daily     hydroxyurea  500 mg Oral Daily     lidocaine   Topical 4x Daily     melatonin  3 mg Oral At Bedtime     methocarbamol  500 mg Oral Q6H     multivitamin, therapeutic  1 tablet Oral Daily     pantoprazole  40 mg Oral BID     senna-docusate  1 tablet Oral BID    Or     senna-docusate  2 tablet Oral BID     sodium chloride (PF)  10 mL Intracatheter Q8H     sodium chloride (PF)  10-40 mL Intracatheter Q7 Days     sodium chloride (PF)  3 mL Intracatheter Q8H       Data   All microbiology laboratory data reviewed.  Recent Labs   Lab Test 02/19/23  1145 02/18/23  2108 02/18/23  1215 02/17/23  1003   WBC 8.6  --  10.7 7.6   HGB 7.3* 7.4* 6.2* 7.4*   HCT 20.9*  --  17.7* 20.5*   MCV 85  --  84 81   PLT 48*  --  44* 45*     Recent Labs   Lab Test 02/16/23  0626 02/15/23  2044 02/13/23  0808   CR 0.53* 0.50* 0.62     No lab results found.  No lab results found.    Invalid input(s):     MICROBIOLOGY:    Reviewed    7-Day Micro Results     Collected Updated Procedure Result Status      02/16/2023 2001 02/19/2023 0017 Blood Culture Peripheral Blood [[id number]]   Peripheral Blood    Preliminary result Component Value   Culture No growth after 2 days  [P]                02/16/2023 2001 02/19/2023 0017 Blood Culture Peripheral Blood [[id number]]    Peripheral Blood    Preliminary result Component Value   Culture No growth after 2 days  [P]                       RADIOLOGY:    Reviewed  Chest XR,  PA & LAT    Result Date: 2/13/2023  EXAM: XR CHEST 2 VIEWS LOCATION: [facility] DATE/TIME: 2/13/2023 11:42 AM INDICATION: Chest  pain. COMPARISON: Chest CTA 2023     IMPRESSION: Heart size and vascularity are normal. Mild bronchial wall thickening suggests airway inflammation. No focal consolidation, pneumothorax nor pleural effusion.    Echocardiogram Complete    Result Date: 2023  [id number]^LEO^[place]ELY^[place] [address]  Name: NANCI SANTANA MRN: [id number] : 1995 Study Date: 2023 10:36 AM Age: 27 yrs Gender: Female Patient Location: [facility] Reason For Study: CHF Ordering Physician: ARELY BAILEY Performed By: LICHA  BSA: 1.9 m2 Height: 63 in Weight: 198 lb ______________________________________________________________________________ Procedure Complete Portable Echo Adult. Definity (NDC #61547-319) given intravenously. Technically difficult study. Technically difficult study.Extremely difficult acoustic windows despite the use of contrast for endcardial border definition. ______________________________________________________________________________ Interpretation Summary  Technically difficult study. Difficult acoustic windows despite the use of contrast for endcardial border definition.  1. The left ventricle is normal in size. Left ventricular function is normal.The ejection fraction is 60-65%. No regional wall motion abnormalities noted. 2. The right ventricle is not well visualized. Normal right ventricle size and systolic function. 3. Right ventricle systolic pressure estimate normal 4. There is no pericardial effusion. 5. No hemodynamically significant valvular abnormalities on 2D or color flow imaging. ______________________________________________________________________________ Left Ventricle The left ventricle is normal in size. Left ventricular function is normal.The ejection fraction is 60-65%. There is normal left ventricular wall thickness. No regional wall motion abnormalities noted.  Right Ventricle Normal right ventricle size and  systolic function. The right ventricle is not well visualized.  Atria Normal left atrial size. Right atrial size is normal.  Mitral Valve Mitral valve leaflets appear normal. There is no evidence of mitral stenosis or clinically significant mitral regurgitation. The mitral valve is not well visualized.  Tricuspid Valve The tricuspid valve is not well visualized. The right ventricular systolic pressure is approximated at 24.7 mmHg plus the right atrial pressure. Right ventricle systolic pressure estimate normal. There is no tricuspid stenosis.  Aortic Valve Aortic valve leaflets appear normal. There is no evidence of aortic stenosis or clinically significant aortic regurgitation. The aortic valve is not well visualized.  Pulmonic Valve The pulmonic valve is not well seen, but is grossly normal. This degree of valvular regurgitation is within normal limits. There is trace pulmonic valvular regurgitation.  Vessels Inferior vena cava not well visualized for estimation of right atrial pressure.  Pericardium There is no pericardial effusion. ______________________________________________________________________________ MMode/2D Measurements & Calculations IVSd: 0.92 cm  LVIDd: 4.9 cm LVIDs: 3.0 cm LVPWd: 0.92 cm FS: 39.1 % LV mass(C)d: 158.8 grams LV mass(C)dI: 82.5 grams/m2 Ao root diam: 2.3 cm LA dimension: 3.4 cm LA/Ao: 1.5 RWT: 0.38  Time Measurements MM HR: 113.0 BPM  Doppler Measurements & Calculations MV E max wallace: 92.2 cm/sec MV A max wallace: 103.8 cm/sec MV E/A: 0.89 MV dec slope: 638.4 cm/sec2 MV dec time: 0.14 sec LV V1 max PG: 3.9 mmHg LV V1 max: 99.0 cm/sec LV V1 VTI: 19.3 cm PA acc time: 0.08 sec TR max wallace: 248.4 cm/sec TR max P.7 mmHg  E/E' av.4 Lateral E/e': 8.1 Medial E/e': 8.6  ______________________________________________________________________________ Report approved by: Natalie Dukes 2023 12:21 PM       CT Abdomen Pelvis w Contrast    Result Date: 2023  EXAM: CT ABDOMEN  PELVIS W CONTRAST LOCATION: [facility] DATE/TIME: 2/11/2023 4:49 AM INDICATION: right midabdominal and RLQ pain, jaundice COMPARISON: Ultrasound from 01/17/2023, CT from 01/14/2023. TECHNIQUE: CT scan of the abdomen and pelvis was performed following injection of IV contrast. Multiplanar reformats were obtained. Dose reduction techniques were used. CONTRAST: isovue 370 100 mls FINDINGS: LOWER CHEST: Please see separate dictation for detailed findings above the diaphragm. HEPATOBILIARY: Absent gallbladder. Liver within normal limits. Minimal central biliary ductal prominence likely due to postcholecystectomy reservoir effect. Normal caliber common bile duct. No calcified CBD stone. PANCREAS: Normal. SPLEEN: Absent. ADRENAL GLANDS: Normal. KIDNEYS/BLADDER: Symmetric enhancement. No hydronephrosis. Bladder is normal. BOWEL: No bowel obstruction or free air. Appendix is normal. LYMPH NODES: Normal. VASCULATURE: Unremarkable. PELVIC ORGANS: Enhancing focus at the uterine fundus consistent with a fibroid. MUSCULOSKELETAL: Normal.     IMPRESSION: 1.  No focal inflammatory change identified in the abdomen or pelvis. 2.  Slight intrahepatic biliary ductal prominence likely due to postcholecystectomy reservoir effect. This is unchanged compared to the prior CT.    CT Chest Pulmonary Embolism w Contrast    Result Date: 2/15/2023  EXAM: CT CHEST PULMONARY EMBOLISM W CONTRAST LOCATION: [facility] DATE/TIME: 2/15/2023 6:00 PM INDICATION: hypoxia and diffuse pain  hx of sickle cell crisis COMPARISON: 02/01/2023. TECHNIQUE: CT chest pulmonary angiogram during arterial phase injection of IV contrast. Multiplanar reformats and MIP reconstructions were performed. Dose reduction techniques were used. CONTRAST: Isovue 370 75mL FINDINGS: ANGIOGRAM CHEST: No pulmonary embolism. Pulmonary arteries normal in caliber. Thoracic aorta normal in caliber. No aortic dissection. HEART: Cardiac  chambers within normal limits. No pericardial effusion. No coronary artery calcification. MEDIASTINUM: Borderline hilar adenopathy. No abnormally enlarged axillary or mediastinal lymph nodes. LUNGS AND PLEURA: No pulmonary mass or consolidation. Trace pleural effusions and dependent subsegmental atelectasis in the lower lobes. No pneumothorax. LIMITED UPPER ABDOMEN: Prior cholecystectomy. Absent spleen likely related to sequelae of sickle cell disease. MUSCULOSKELETAL: Negative.     IMPRESSION: 1.  No evidence for pulmonary embolism. 2.  Trace bilateral pleural effusions.    CT Chest Pulmonary Embolism w Contrast    Result Date: 2/11/2023  EXAM: CT CHEST PULMONARY EMBOLISM W CONTRAST LOCATION: [facility] DATE/TIME: 2/11/2023 4:49 AM INDICATION: r o PE COMPARISON: None. TECHNIQUE: CT chest pulmonary angiogram during arterial phase injection of IV contrast. Multiplanar reformats and MIP reconstructions were performed. Dose reduction techniques were used. CONTRAST: isovue 370 100 mls FINDINGS: ANGIOGRAM CHEST: Pulmonary arteries are normal caliber and negative for pulmonary emboli. Thoracic aorta is negative for dissection. No CT evidence of right heart strain. LUNGS AND PLEURA: Subsegmental atelectasis lung bases. MEDIASTINUM/AXILLAE: Normal. CORONARY ARTERY CALCIFICATION: None. UPPER ABDOMEN: Normal. MUSCULOSKELETAL: Normal.     IMPRESSION: 1.  No pulmonary embolism.    CT Head w/o Contrast    Result Date: 2/15/2023  EXAM: CT HEAD W/O CONTRAST LOCATION: [facility] DATE/TIME: 2/15/2023 6:01 PM INDICATION: Altered mental status. Admitted for sickle cell crisis. COMPARISON: None. TECHNIQUE: Routine CT Head without IV contrast. Multiplanar reformats. Dose reduction techniques were used. FINDINGS: INTRACRANIAL CONTENTS: Gray-white matter differentiation is maintained. No hemorrhage or extraaxial collection. No mass effect. Subarachnoid cisterns are patent. Normal  parenchymal attenuation. Normal ventricles and sulci. VISUALIZED ORBITS/SINUSES/MASTOIDS: No visible intraorbital abnormality. Paranasal sinuses are clear. No middle ear or mastoid effusion. BONES/SOFT TISSUES: No acute abnormality.     IMPRESSION: 1.  No acute transcortical infarct, intracranial hemorrhage, or mass effect.      Total Time Spent 40 minutes with >50% of the time spent in chart review, evaluation, management, counseling, education and care coordination.

## 2023-02-19 NOTE — PLAN OF CARE
Problem: Plan of Care - These are the overarching goals to be used throughout the patient stay.    Goal: Absence of Hospital-Acquired Illness or Injury  Intervention: Identify and Manage Fall Risk    Problem: Pain Acute  Goal: Optimal Pain Control and Function  Outcome: Progressing  Intervention: Develop Pain Management Plan  Recent Flowsheet Documentation     Goal Outcome Evaluation:     Patient orientated x4, calls appropriately, independent in her room.     1x dose PO diludid given this shift, per pt pain seems to be a lot more controlled.     IVF NS running at 75.     VSS, tachy at times, afebrile. Voiding spontaneously, BM this shift.    No oxygen during the day, plan for overnight oximetry tonight.     **Pt was very frustrated this morning and was requesting to go home, possibly even AMA. After further discussion with [facility] and ID, patient agreed to stay one more night but would like to leave early Monday morning.

## 2023-02-19 NOTE — PROGRESS NOTES
[facility]    Medicine Progress Note - Hospitalist Service    Date of Admission:  2/13/2023    Assessment & Plan   Eveline Gage is 27-year-old female with history of sickle cell disease, s/p cholecystectomy, asplenia admitted for sickle cell crisis.  She spiked a temperature ID was consulted    #Sickle cell crisis.  Pain is in better control.  Appreciate pain management assistance for pain control.  She is currently on Voltarol alternating with lidocaine, Robaxin 500 mg every 6 hours, Dilaudid 2 mg p.o. every 4 hours as needed IV pain medication discontinued   Heme-onc recommended hydroxyurea 500 mg daily and outpatient follow-up.  Hemoglobin has been stable.  Continue to monitor transfuse if hemoglobin is less than 7.  Did require transfusions during this hospitalization.  Platelet 45 continue to monitor.    #Fever 2/16/2023  Blood cultures negative to for last 48 hours.  Discussed with ID Dr. Pfeiffer she would wait for final blood cultures report before making any disposition for antibiotics regimen at time of discharge.  Continue IV cefepime for now.      #Nocturnal hypoxia suspected underlying sleep apnea.  Discussed with patient to get a referral for sleep clinic from PCP.  Currently on room air during awake.  Will order nocturnal oximetry    #Elevated troponin likely in setting of sickle cell crisis.  Cardiology was consulted recommended echocardiogram showed normal left ventricular ejection fraction.    #Elevated LFTs likely in setting of sickle cell crisis due to hepatic congestion and hemolysis.  She had extensive work-up recently Extensive work-up including Doppler US negative, EUS 1/15/23, evaluation for Hep A/B/C, hemachromatosis, Daniel's disease was negative. 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 Positive CRISPIN and elevated F actin EIA (with normal ASMA) noted.   GI recommended continue to monitor without any  "intervention.  LFTs trending down.         Diet: Combination Diet Regular Diet Adult  Room Service    DVT Prophylaxis: Pneumatic Compression Devices  Echeverria Catheter: Not present  Lines: PRESENT             Cardiac Monitoring: ACTIVE order. Indication: Syncope- high cardiac risk (48 hours)  Code Status: Full Code      Clinically Significant Risk Factors              # Hypoalbuminemia: Lowest albumin = 1.9 g/dL at 2/18/2023 12:15 PM, will monitor as appropriate   # Thrombocytopenia: Lowest platelets = 44 in last 2 days, will monitor for bleeding         # Obesity: Estimated body mass index is 34.14 kg/m  as calculated from the following:    Height as of this encounter: 1.6 m (5' 3\").    Weight as of this encounter: 87.4 kg (192 lb 11.2 oz).          Disposition Plan Patient has made a mind she may leave AMA explained in detail risks of leaving AMA and why she is not medically stable to be discharged.     Expected Discharge Date: 02/20/2023      Destination: home with family  Discharge Comments: Pending final recommendations from ID          Saul Ta MD  Hospitalist Service  [facility]  Securely message with [facility] (more info)  Text page via [facility] Paging/Directory      Disclaimer: This note consists of symbols derived from keyboarding, dictation and/or voice recognition software. As a result, there may be errors in the script that have gone undetected. Please consider this when interpreting information found in this chart.    ______________________________________________________________________    Interval History   She is sitting up on the chair today.  She feels better today.  Has a good appetite.  Pain is in better control.  Denies any facial pain today.  Has been afebrile.  She did required oxygen overnight.  Discussed with patient regarding high suspicious for sleep apnea will do overnight oximetry.    She feels like she is ready to go home today.  Discussed with ID patient is not " stable to be medically discharged as we do not have any final reports on the blood cultures.    Explained in detail to patient risk for infection with history of asplenia and would be cautious for discharge.  If she is willing to go it would be AGAINST MEDICAL ADVICE explained with her current condition she her infection may get worsen and which could be life-threatening as well.  She understands the risk she will discuss with her significant other    Physical Exam   Vital Signs: Temp: 98.1  F (36.7  C) Temp src: Oral BP: 125/70 Pulse: 69   Resp: 18 SpO2: 98 % O2 Device: Nasal cannula Oxygen Delivery: 1 LPM  Weight: 192 lbs 11.2 oz    Constitutional: awake, alert, cooperative, NAD, and appears stated age  Respiratory: No increased work of breathing, good air exchange, clear to auscultation bilaterally, no crackles or wheezing  Cardiovascular: Normal apical impulse, regular rate and rhythm, normal S1 and S2, no S3 or S4, and no murmur noted  GI:, normal bowel sounds, soft, non-distended, non-tender, no masses palpated,  Musculoskeletal: no lower extremity pitting edema present  Neurologic: Awake, alert, oriented to name, place and time.  No focal neurodeficit noted    Medical Decision Making       38 MINUTES SPENT BY ME on the date of service doing chart review, history, exam, documentation & further activities per the note.      Data     I have personally reviewed the following data over the past 24 hrs:    10.7  \   7.4 (L)   / 44 (LL)     N/A N/A N/A /  N/A   N/A N/A N/A \       ALT: 76 (H) AST: 115 (H) AP: 506 (H) TBILI: 13.3 (H)   ALB: 1.9 (L) TOT PROTEIN: 5.5 (L) LIPASE: N/A       Imaging results reviewed over the past 24 hrs:   No results found for this or any previous visit (from the past 24 hour(s)).

## 2023-02-19 NOTE — PLAN OF CARE
Goal Outcome Evaluation:  Pt AO x4. Calm and cooperative. Pt on 1 L O2 on oxymask and humidifier. Pt was on NC, however she got a small bloody nose. Assist of one to the bathroom. Pt requested PRN for shoulder pain, Tylenol administered. VSS.       Problem: Plan of Care - These are the overarching goals to be used throughout the patient stay.    Goal: Absence of Hospital-Acquired Illness or Injury  Intervention: Identify and Manage Fall Risk  Recent Flowsheet Documentation  Taken 2/19/2023 0201 by Alyson Sanchez RN  Safety Promotion/Fall Prevention:   activity supervised   bed alarm on   increased rounding and observation   fall prevention program maintained   increase visualization of patient   clutter free environment maintained   room near nurse's station   room organization consistent   safety round/check completed   room door open  Taken 2/18/2023 2022 by Alyson Sanchez RN  Safety Promotion/Fall Prevention:   activity supervised   bed alarm on   increased rounding and observation   fall prevention program maintained   increase visualization of patient   clutter free environment maintained   room near nurse's station   room organization consistent   safety round/check completed   room door open     Problem: Pain Acute  Goal: Optimal Pain Control and Function  Intervention: Prevent or Manage Pain  Recent Flowsheet Documentation  Taken 2/19/2023 0201 by Alyson Sanchez, RN  Medication Review/Management: medications reviewed  Taken 2/18/2023 2022 by Alyson Sanchez RN  Medication Review/Management: medications reviewed

## 2023-02-20 ENCOUNTER — HOME INFUSION (PRE-WILLOW HOME INFUSION) (OUTPATIENT)
Dept: PHARMACY | Facility: CLINIC | Age: 28
End: 2023-02-20
Payer: COMMERCIAL

## 2023-02-20 VITALS
BODY MASS INDEX: 34.14 KG/M2 | RESPIRATION RATE: 18 BRPM | SYSTOLIC BLOOD PRESSURE: 119 MMHG | HEIGHT: 63 IN | HEART RATE: 82 BPM | DIASTOLIC BLOOD PRESSURE: 87 MMHG | OXYGEN SATURATION: 100 % | TEMPERATURE: 98.5 F | WEIGHT: 192.7 LBS

## 2023-02-20 LAB
ALBUMIN SERPL-MCNC: 2 G/DL (ref 3.5–5)
ALP SERPL-CCNC: 472 U/L (ref 45–120)
ALT SERPL W P-5'-P-CCNC: 72 U/L (ref 0–45)
AST SERPL W P-5'-P-CCNC: 106 U/L (ref 0–40)
BILIRUB DIRECT SERPL-MCNC: 4 MG/DL
BILIRUB SERPL-MCNC: 7.9 MG/DL (ref 0–1)
ERYTHROCYTE [DISTWIDTH] IN BLOOD BY AUTOMATED COUNT: 18.5 % (ref 10–15)
HCT VFR BLD AUTO: 22.6 % (ref 35–47)
HGB BLD-MCNC: 7.6 G/DL (ref 11.7–15.7)
Lab: NORMAL
MCH RBC QN AUTO: 29.7 PG (ref 26.5–33)
MCHC RBC AUTO-ENTMCNC: 33.6 G/DL (ref 31.5–36.5)
MCV RBC AUTO: 88 FL (ref 78–100)
PERFORMING LABORATORY: NORMAL
PLATELET # BLD AUTO: 51 10E3/UL (ref 150–450)
PROT SERPL-MCNC: 5.9 G/DL (ref 6–8)
RBC # BLD AUTO: 2.56 10E6/UL (ref 3.8–5.2)
TEST NAME: NORMAL
WBC # BLD AUTO: 10.9 10E3/UL (ref 4–11)

## 2023-02-20 PROCEDURE — 80076 HEPATIC FUNCTION PANEL: CPT | Performed by: FAMILY MEDICINE

## 2023-02-20 PROCEDURE — 999N000033 HC STATISTIC CHRONIC PULMONARY DISEASE SPECIALIST

## 2023-02-20 PROCEDURE — 94762 N-INVAS EAR/PLS OXIMTRY CONT: CPT

## 2023-02-20 PROCEDURE — 250N000013 HC RX MED GY IP 250 OP 250 PS 637: Performed by: INTERNAL MEDICINE

## 2023-02-20 PROCEDURE — 85027 COMPLETE CBC AUTOMATED: CPT | Performed by: FAMILY MEDICINE

## 2023-02-20 PROCEDURE — 250N000011 HC RX IP 250 OP 636: Performed by: HOSPITALIST

## 2023-02-20 PROCEDURE — 250N000013 HC RX MED GY IP 250 OP 250 PS 637: Performed by: CLINICAL NURSE SPECIALIST

## 2023-02-20 PROCEDURE — 250N000013 HC RX MED GY IP 250 OP 250 PS 637: Performed by: FAMILY MEDICINE

## 2023-02-20 PROCEDURE — 36415 COLL VENOUS BLD VENIPUNCTURE: CPT | Performed by: FAMILY MEDICINE

## 2023-02-20 PROCEDURE — 99239 HOSP IP/OBS DSCHRG MGMT >30: CPT | Performed by: STUDENT IN AN ORGANIZED HEALTH CARE EDUCATION/TRAINING PROGRAM

## 2023-02-20 PROCEDURE — 250N000013 HC RX MED GY IP 250 OP 250 PS 637: Performed by: NURSE PRACTITIONER

## 2023-02-20 RX ORDER — HYDROXYUREA 500 MG/1
500 CAPSULE ORAL DAILY
Qty: 30 CAPSULE | Refills: 0 | Status: SHIPPED | OUTPATIENT
Start: 2023-02-21 | End: 2023-02-28

## 2023-02-20 RX ADMIN — HYDROXYUREA 500 MG: 500 CAPSULE ORAL at 08:03

## 2023-02-20 RX ADMIN — METHOCARBAMOL 500 MG: 500 TABLET, FILM COATED ORAL at 08:03

## 2023-02-20 RX ADMIN — ACETAMINOPHEN 650 MG: 325 TABLET ORAL at 00:33

## 2023-02-20 RX ADMIN — THERA TABS 1 TABLET: TAB at 08:03

## 2023-02-20 RX ADMIN — CEFEPIME 2 G: 2 INJECTION, POWDER, FOR SOLUTION INTRAVENOUS at 06:36

## 2023-02-20 RX ADMIN — SENNOSIDES AND DOCUSATE SODIUM 1 TABLET: 50; 8.6 TABLET ORAL at 08:03

## 2023-02-20 RX ADMIN — METHOCARBAMOL 500 MG: 500 TABLET, FILM COATED ORAL at 01:27

## 2023-02-20 RX ADMIN — HYDROMORPHONE HYDROCHLORIDE 2 MG: 2 TABLET ORAL at 01:27

## 2023-02-20 RX ADMIN — DICLOFENAC SODIUM 2 G: 10 GEL TOPICAL at 08:03

## 2023-02-20 RX ADMIN — LIDOCAINE: 50 OINTMENT TOPICAL at 08:03

## 2023-02-20 RX ADMIN — PANTOPRAZOLE SODIUM 40 MG: 40 TABLET, DELAYED RELEASE ORAL at 08:03

## 2023-02-20 ASSESSMENT — ACTIVITIES OF DAILY LIVING (ADL)
ADLS_ACUITY_SCORE: 22

## 2023-02-20 NOTE — PROGRESS NOTES
Care Management Follow Up    Length of Stay (days): 7    Expected Discharge Date: 02/20/2023     Concerns to be Addressed:       Patient plan of care discussed at interdisciplinary rounds: Yes    Anticipated Discharge Disposition: Home  Disposition Comments: Anticipate return home via [facility] transport.  Anticipated Discharge Services: None  Anticipated Discharge DME: None    Patient/family educated on Medicare website which has current facility and service quality ratings:  ([facility] Providers)  Education Provided on the Discharge Plan:    Patient/Family in Agreement with the Plan:      Referrals Placed by CM/SW:    Private pay costs discussed: Not applicable    Additional Information:  ID following; anticipate discharge IV abx and continue PO - follow for recommendations. No needs anticipated from CM at discharge per pt; independent at baseline. Pt to follow up with PCP post discharge if needs arise. Family to transport home.  9:02 AM    BLANCHE Holliday  2/20/2023

## 2023-02-20 NOTE — PLAN OF CARE
Goal Outcome Evaluation:  Pt AO x4. Calm and cooperative. PRN dilaudid administered for back and shoulder pain 6/10. Continuous NS at 75 ml/hr. Sleeping on chair, since she can't get comfortable in bed. PRN atarax administered, pt having difficulties sleeping. Oxymetry test being done. If any changes to saturation, or O2 needed, notify RT. Pt on tele, NS. VSS.     Problem: Plan of Care - These are the overarching goals to be used throughout the patient stay.    Goal: Absence of Hospital-Acquired Illness or Injury  Intervention: Identify and Manage Fall Risk  Recent Flowsheet Documentation  Taken 2/19/2023 2102 by Alyson Sanchez, RN  Safety Promotion/Fall Prevention:   lighting adjusted   nonskid shoes/slippers when out of bed   patient and family education     Problem: Pain Acute  Goal: Optimal Pain Control and Function  Intervention: Prevent or Manage Pain  Recent Flowsheet Documentation  Taken 2/19/2023 2102 by Alyson Sanchez, RN  Medication Review/Management: medications reviewed

## 2023-02-20 NOTE — PLAN OF CARE
Problem: Plan of Care - These are the overarching goals to be used throughout the patient stay.    Goal: Optimal Comfort and Wellbeing  Outcome: Progressing     Problem: Pain Acute  Goal: Optimal Pain Control and Function  Outcome: Progressing      Left midline removed at 1405 on 2/20/23 by Mely Menendez RN.       Nurse teaching given on 2/20/23 and the patient expresses understanding and acceptance of instructions. Mely Menendez RN 2/20/2023 1:50 PM

## 2023-02-20 NOTE — PLAN OF CARE
Goal Outcome Evaluation:      Problem: Plan of Care - These are the overarching goals to be used throughout the patient stay.    Goal: Plan of Care Review  Description: The Plan of Care Review/Shift note should be completed every shift.  The Outcome Evaluation is a brief statement about your assessment that the patient is improving, declining, or no change.  This information will be displayed automatically on your shift note.  Outcome: Progressing     Problem: Pain Acute  Goal: Optimal Pain Control and Function  Outcome: Progressing    Patient reports intermittent left thigh pain overnight, controlled with prn Tylenol and Dilaudid.  Pt independently to use the BSC overnight.  O2 study overnight, O2 sats 94-97% on room air.   Tele:SR.  Pt slept in chair all night. Will continue to monitor.

## 2023-02-20 NOTE — PROGRESS NOTES
[facility]    Referral received  from Kristina Albright RN, CM, for possible IV antibiotics.    Benefits verified. Pt has 100% coverage for IV antibiotics under her [facility] PMAP plan.    Should pt require IV antibiotics at discharge, writer will speak with pt to review benefits, home infusion and to offer choice of agency/home infusion provider.    Thank you for the referral.    Alcira Lopez RN, BSN  [facility] Liaison  [phone] (Mon through Fri, 8:00 am-5:00 pm)  [phone] (Office)

## 2023-02-20 NOTE — CONSULTS
Tobacco Treatment Consult  2/20/2023, 10:06 AM    Patient admitted on: 2/13/2023   Patient admitted for: Bilateral leg pain [M79.604, M79.605]  Sickle cell pain crisis (H) [D57.00]  Pain of left upper arm [M79.622]   Patient seen on: 2/20/2023    Eveline declined tobacco treatment counseling and resource materials at this time.    Total 1 minutes spent in smoking cessation, and 19 minutes spent in chart review, care coordination, and documentation.    Jessica Vila, RT, Chronic Pulmonary Disease Specialist & Certified Tobacco Treatment Specialist  Phone [phone]

## 2023-02-20 NOTE — PROGRESS NOTES
Therapy: IV abx  Insurance: [facility] PMAP    100% coverage for IV abx     In reference to admission on 2/13/23 to check IV abx coverage    Please contact Intake with any questions, [phone] or In Basket pool, [facility] ([phone]).

## 2023-02-20 NOTE — DISCHARGE SUMMARY
[facility]  Hospitalist Discharge Summary      Date of Admission:  2/13/2023  Date of Discharge:  2/20/2023  Discharging Provider: MEY BLACK MD  Discharge Service: Hospitalist Service    Discharge Diagnoses   -Sickle cell crisis.  -1 episode of fever of unknown etiology.  -Nocturnal hypoxia with possible sleep apnea    Follow-ups Needed After Discharge    -CBC and CMP within 1 to 2 weeks after discharge  -Patient needs to reestablish care with hematology oncology.    Unresulted Labs Ordered in the Past 30 Days of this Admission     Date and Time Order Name Status Description    2/16/2023  7:37 PM Blood Culture Peripheral Blood Preliminary     2/16/2023  7:37 PM Blood Culture Peripheral Blood Preliminary     2/16/2023  1:34 AM Other Laboratory; [facility]; Antigen typed RBCs (Laboratory Miscellaneous Order) In process     2/13/2023  7:44 AM Other Laboratory; [facility]; Antigen Typing (Laboratory Miscellaneous Order) In process       These results will be followed up by PCP    Discharge Disposition   Discharged to home  Condition at discharge: Good      Hospital Course   Ms Eveline Gage is a 27 years old woman with past medical history significant for sickle cell disease, asplenia who presented to the hospital on 2/13/2023 with lower back and bilateral thighs pain.  She was diagnosed with sickle cell crisis and was treated with IV fluid, pain meds and started on hydroxyurea 500 mg daily by hematology oncology.    Of note, patient had elevated troponin.  She was evaluated by cardiology who recommended echocardiogram.  Echocardiogram without wall motion abnormality.  Also, patient had elevated LFTs and bilirubin suggestive of hemolysis.  Evaluated by GI and underwent extensive work-up including Doppler ultrasound which was negative, EUS on 1/15/2023.  Hepatitis panel, hemochromatosis and Daniel disease work-up both negative.    Patient had an episode of fever on  2/16/2023 and therefore given her history of asplenia she was started on empirically on IV cefepime.  Blood cultures on 2/16 remains negative to date.  Patient's pain has significantly improved.  Her mild leukocytosis resolved and there is no clear source of infection. Hemoglobin has been stable around 7.5 over the last 48 hours.  She is eager to go home.      #Sickle cell crisis.  -Per patient she had an acute attack of sickle cell disease once or twice a year.  -Presented with back pain along with upper thighs pain.  -Diagnosed with sickle cell crisis and treated with IV hydration along with pain meds.  -Evaluated by hematology oncology who started hydroxyurea 500 mg daily.  -Hemoglobin stable.  -Pain has almost resolved.    Plan:  [] Continue hydroxyurea 500 mg daily.  [] Follow-up with hematology as an outpatient.  [] Educated about the importance of adequate hydration.       #Fever 2/16/2023  -1 episode of fever of unclear etiology.  -Patient was started on cefepime on 2/16 given her history of asplenia.  -Blood cultures on 2/16 remain negative to date.  -Evaluated by ID.  Stop cefepime on 2/20 and discharged on cefdinir 300 mg twice daily for 7 days as empiric treatment.  -No signs of active infection.    Plan:  [] Discharged on cefdinir 300 mg twice daily for 7 days.  [] Educated about the signs and symptoms of infection including osteomyelitis.  [] Patient understand the risk of infection and we will continue to monitor her symptoms.        #Nocturnal hypoxia suspected underlying sleep apnea.    Plan:  [] Follow-up with primary care physician for sleep study and possible CPAP machine.       #Elevated LFTs   -likely in setting of sickle cell crisis due to hepatic congestion and hemolysis.  She had extensive work-up recently Extensive work-up including Doppler US negative, EUS 1/15/23, evaluation for Hep A/B/C, hemachromatosis, Daniel's disease was negative. EBV positive last admission but in convalescent  stage. 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 Positive CRISPIN and elevated F actin EIA (with normal ASMA) noted.   GI recommended continue to monitor without any intervention.  LFTs trending down.    Consultations This Hospital Stay   CARE MANAGEMENT / SOCIAL WORK IP CONSULT  PAIN MANAGEMENT ADULT IP CONSULT  HEMATOLOGY & ONCOLOGY IP CONSULT  ACUPUNCTURE IP CONSULT  INTEGRATIVE THERAPIES CONSULT  VASCULAR ACCESS ADULT IP CONSULT  VASCULAR ACCESS ADULT IP CONSULT  PSYCHIATRY IP CONSULT  CARDIOLOGY IP CONSULT  PSYCHIATRY IP CONSULT  GASTROENTEROLOGY IP CONSULT  INFECTIOUS DISEASES IP CONSULT  SMOKING CESSATION PROGRAM IP CONSULT    Code Status   Full Code    Time Spent on this Encounter   I, MEY BLACK MD, personally saw the patient today and spent greater than 30 minutes discharging this patient.       MEY BLACK MD  [address] [address]  Phone: [phone]  Fax: [phone]  ______________________________________________________________________    Physical Exam   Vital Signs: Temp: 98.5  F (36.9  C) Temp src: Oral BP: 119/87 Pulse: 82   Resp: 18 SpO2: 100 % O2 Device: None (Room air)    Weight: 192 lbs 11.2 oz  Physical Exam  Constitutional:       General: She is not in acute distress.     Appearance: She is not ill-appearing.   Cardiovascular:      Rate and Rhythm: Normal rate.   Abdominal:      General: Abdomen is flat.      Palpations: Abdomen is soft.   Musculoskeletal:      Comments: No paraspinal tenderness.   Skin:     General: Skin is warm and dry.   Neurological:      General: No focal deficit present.      Mental Status: She is alert.   Psychiatric:         Mood and Affect: Mood normal.            Primary Care Physician   [facility]    Discharge Orders   No discharge procedures on file.    Significant Results and Procedures   Most Recent 3 CBC's:Recent Labs   Lab Test 02/20/23  0642 02/19/23  1147  02/18/23  2108 02/18/23  1215   WBC 10.9 8.6  --  10.7   HGB 7.6* 7.3* 7.4* 6.2*   MCV 88 85  --  84   PLT 51* 48*  --  44*     Most Recent 3 BMP's:Recent Labs   Lab Test 02/16/23  0626 02/15/23  2044 02/13/23  0808    134* 139   POTASSIUM 3.7 3.9 4.6   CHLORIDE 103 102 109*   CO2 24 22 22   BUN 10 11 9   CR 0.53* 0.50* 0.62   ANIONGAP 9 10 8   ALIZA 8.9 8.5 8.8    107 96     Most Recent 2 LFT's:Recent Labs   Lab Test 02/20/23  0642 02/19/23  1145   * 107*   ALT 72* 73*   ALKPHOS 472* 476*   BILITOTAL 7.9* 9.3*   ,   Results for orders placed or performed during the hospital encounter of 02/13/23   Chest XR,  PA & LAT    Narrative    EXAM: XR CHEST 2 VIEWS  LOCATION: [facility]  DATE/TIME: 2/13/2023 11:42 AM    INDICATION: Chest pain.  COMPARISON: Chest CTA 02/11/2023      Impression    IMPRESSION: Heart size and vascularity are normal. Mild bronchial wall thickening suggests airway inflammation. No focal consolidation, pneumothorax nor pleural effusion.   CT Chest Pulmonary Embolism w Contrast    Narrative    EXAM: CT CHEST PULMONARY EMBOLISM W CONTRAST  LOCATION: [facility]  DATE/TIME: 2/15/2023 6:00 PM    INDICATION: hypoxia and diffuse pain  hx of sickle cell crisis  COMPARISON: 02/01/2023.  TECHNIQUE: CT chest pulmonary angiogram during arterial phase injection of IV contrast. Multiplanar reformats and MIP reconstructions were performed. Dose reduction techniques were used.   CONTRAST: Isovue 370 75mL    FINDINGS:  ANGIOGRAM CHEST: No pulmonary embolism. Pulmonary arteries normal in caliber. Thoracic aorta normal in caliber. No aortic dissection.    HEART: Cardiac chambers within normal limits. No pericardial effusion. No coronary artery calcification.    MEDIASTINUM: Borderline hilar adenopathy. No abnormally enlarged axillary or mediastinal lymph nodes.    LUNGS AND PLEURA: No pulmonary mass or consolidation. Trace pleural effusions and  dependent subsegmental atelectasis in the lower lobes. No pneumothorax.    LIMITED UPPER ABDOMEN: Prior cholecystectomy. Absent spleen likely related to sequelae of sickle cell disease.    MUSCULOSKELETAL: Negative.      Impression    IMPRESSION:  1.  No evidence for pulmonary embolism.   2.  Trace bilateral pleural effusions.   CT Head w/o Contrast    Narrative    EXAM: CT HEAD W/O CONTRAST  LOCATION: [facility]  DATE/TIME: 2/15/2023 6:01 PM    INDICATION: Altered mental status. Admitted for sickle cell crisis.  COMPARISON: None.  TECHNIQUE: Routine CT Head without IV contrast. Multiplanar reformats. Dose reduction techniques were used.    FINDINGS:  INTRACRANIAL CONTENTS: Gray-white matter differentiation is maintained. No hemorrhage or extraaxial collection. No mass effect. Subarachnoid cisterns are patent. Normal parenchymal attenuation. Normal ventricles and sulci.    VISUALIZED ORBITS/SINUSES/MASTOIDS: No visible intraorbital abnormality. Paranasal sinuses are clear. No middle ear or mastoid effusion.    BONES/SOFT TISSUES: No acute abnormality.      Impression    IMPRESSION:  1.  No acute transcortical infarct, intracranial hemorrhage, or mass effect.       Echocardiogram Complete     Value    LVEF  60-65%    Narrative    [id number]  [id number]  [id number]  [id number]^LEO^[place] [address]     Name: NANCI SANTANA  MRN: [id number]  : 1995  Study Date: 2023 10:36 AM  Age: 27 yrs  Gender: Female  Patient Location: [facility]  Reason For Study: CHF  Ordering Physician: ARELY BAILEY  Performed By: LICHA     BSA: 1.9 m2  Height: 63 in  Weight: 198 lb  ______________________________________________________________________________  Procedure  Complete Portable Echo Adult. Definity (NDC #24868-103) given intravenously.  Technically difficult study. Technically difficult study.Extremely difficult  acoustic windows despite the use of  contrast for endcardial border definition.  ______________________________________________________________________________  Interpretation Summary     Technically difficult study. Difficult acoustic windows despite the use of  contrast for endcardial border definition.     1. The left ventricle is normal in size. Left ventricular function is  normal.The ejection fraction is 60-65%. No regional wall motion abnormalities  noted.  2. The right ventricle is not well visualized. Normal right ventricle size and  systolic function.  3. Right ventricle systolic pressure estimate normal  4. There is no pericardial effusion.  5. No hemodynamically significant valvular abnormalities on 2D or color flow  imaging.  ______________________________________________________________________________  Left Ventricle  The left ventricle is normal in size. Left ventricular function is normal.The  ejection fraction is 60-65%. There is normal left ventricular wall thickness.  No regional wall motion abnormalities noted.     Right Ventricle  Normal right ventricle size and systolic function. The right ventricle is not  well visualized.     Atria  Normal left atrial size. Right atrial size is normal.     Mitral Valve  Mitral valve leaflets appear normal. There is no evidence of mitral stenosis  or clinically significant mitral regurgitation. The mitral valve is not well  visualized.     Tricuspid Valve  The tricuspid valve is not well visualized. The right ventricular systolic  pressure is approximated at 24.7 mmHg plus the right atrial pressure. Right  ventricle systolic pressure estimate normal. There is no tricuspid stenosis.     Aortic Valve  Aortic valve leaflets appear normal. There is no evidence of aortic stenosis  or clinically significant aortic regurgitation. The aortic valve is not well  visualized.     Pulmonic Valve  The pulmonic valve is not well seen, but is grossly normal. This degree of  valvular regurgitation is within  normal limits. There is trace pulmonic  valvular regurgitation.     Vessels  Inferior vena cava not well visualized for estimation of right atrial  pressure.     Pericardium  There is no pericardial effusion.  ______________________________________________________________________________  MMode/2D Measurements & Calculations  IVSd: 0.92 cm     LVIDd: 4.9 cm  LVIDs: 3.0 cm  LVPWd: 0.92 cm  FS: 39.1 %  LV mass(C)d: 158.8 grams  LV mass(C)dI: 82.5 grams/m2  Ao root diam: 2.3 cm  LA dimension: 3.4 cm  LA/Ao: 1.5  RWT: 0.38     Time Measurements  MM HR: 113.0 BPM     Doppler Measurements & Calculations  MV E max wallace: 92.2 cm/sec  MV A max wallace: 103.8 cm/sec  MV E/A: 0.89  MV dec slope: 638.4 cm/sec2  MV dec time: 0.14 sec  LV V1 max PG: 3.9 mmHg  LV V1 max: 99.0 cm/sec  LV V1 VTI: 19.3 cm  PA acc time: 0.08 sec  TR max wallace: 248.4 cm/sec  TR max P.7 mmHg     E/E' av.4  Lateral E/e': 8.1  Medial E/e': 8.6     ______________________________________________________________________________  Report approved by: Natalie Dukes 2023 12:21 PM               Discharge Medications   Current Discharge Medication List      CONTINUE these medications which have NOT CHANGED    Details   albuterol (PROAIR HFA/PROVENTIL HFA/VENTOLIN HFA) 108 (90 Base) MCG/ACT inhaler Inhale 2 puffs into the lungs every 6 hours as needed for shortness of breath / dyspnea or wheezing  Qty: 18 g, Refills: 1    Comments: Pharmacy may dispense brand covered by insurance (Proair, or proventil or ventolin or generic albuterol inhaler)      calcium carbonate (TUMS) 500 MG chewable tablet Take 1-2 chew tab by mouth every 4 hours as needed for heartburn      cyclobenzaprine (FLEXERIL) 10 MG tablet Take 1 tablet (10 mg) by mouth 3 times daily as needed for muscle spasms  Qty: 12 tablet, Refills: 0    Associated Diagnoses: Elevation of levels of liver transaminase levels      diclofenac (VOLTAREN) 1 % topical gel Apply 2 g topically 4 times  daily Apply as needed to chest, back, or other painful areas  Qty: 100 g, Refills: 0    Associated Diagnoses: Elevation of levels of liver transaminase levels      etonogestrel (NEXPLANON) 68 MG IMPL 1 each by Subdermal route      ipratropium - albuterol 0.5 mg/2.5 mg/3 mL (DUONEB) 0.5-2.5 (3) MG/3ML neb solution Take 1 vial (3 mLs) by nebulization every 6 hours as needed for shortness of breath / dyspnea or wheezing  Qty: 90 mL, Refills: 1      methocarbamol (ROBAXIN) 500 MG tablet Take 1 tablet (500 mg) by mouth 4 times daily as needed for muscle spasms  Qty: 20 tablet, Refills: 0      multivitamin, therapeutic (THERA-VIT) TABS tablet Take 1 tablet by mouth daily      naproxen sodium (ANAPROX) 220 MG tablet Take 220-440 mg by mouth every 12 hours as needed for moderate pain (4-6)      omeprazole (PRILOSEC) 20 MG DR capsule Take 1 capsule (20 mg) by mouth 2 times daily  Qty: 40 capsule, Refills: 0    Associated Diagnoses: Abdominal pain, epigastric      oxyCODONE (ROXICODONE) 5 MG tablet Take 1-2 tablets (5-10 mg) by mouth every 4 hours as needed for pain (can take 5 mg for moderate pain and 10 mg for severe pain)  Qty: 16 tablet, Refills: 0    Associated Diagnoses: Sickle cell disease with crisis and other complication (H)      sennosides (SENOKOT) 8.6 MG tablet Take 2 tablets by mouth daily as needed for constipation  Qty: 60 tablet, Refills: 0    Associated Diagnoses: Elevation of levels of liver transaminase levels      sucralfate (CARAFATE) 1 GM tablet Take 1 tablet (1 g) by mouth 4 times daily as needed for nausea (stomach pain)  Qty: 40 tablet, Refills: 0    Associated Diagnoses: Abdominal pain, epigastric           Allergies   Allergies   Allergen Reactions     Blood-Group Specific Substance Other (See Comments)     Patient has anti-C, anti-S, anti-Jkb, anti-M, and a nonspecific antibody. Blood product orders may be delayed. Draw THREE purple top tubes for all Type and Screen/Type and crossmatch orders.          Penicillins Rash

## 2023-02-21 ENCOUNTER — PATIENT OUTREACH (OUTPATIENT)
Dept: CARE COORDINATION | Facility: CLINIC | Age: 28
End: 2023-02-21
Payer: COMMERCIAL

## 2023-02-21 NOTE — PROGRESS NOTES
Clinic Care Coordination Contact  [facility]: Post-Discharge Note  SITUATION                                                      Admission:    Admission Date: 02/13/23   Reason for Admission: Sickle cell pain crisis  Discharge:   Discharge Date: 02/20/23  Discharge Diagnosis: -Sickle cell crisis.  -1 episode of fever of unknown etiology.  -Nocturnal hypoxia with possible sleep apnea    BACKGROUND                                                      Per hospital discharge summary and inpatient provider notes:    Hospital Course     Ms Eveline Gage is a 27 years old woman with past medical history significant for sickle cell disease, asplenia who presented to the hospital on 2/13/2023 with lower back and bilateral thighs pain.  She was diagnosed with sickle cell crisis and was treated with IV fluid, pain meds and started on hydroxyurea 500 mg daily by hematology oncology.     Of note, patient had elevated troponin.  She was evaluated by cardiology who recommended echocardiogram.  Echocardiogram without wall motion abnormality.  Also, patient had elevated LFTs and bilirubin suggestive of hemolysis.  Evaluated by GI and underwent extensive work-up including Doppler ultrasound which was negative, EUS on 1/15/2023.  Hepatitis panel, hemochromatosis and Daniel disease work-up both negative.     Patient had an episode of fever on 2/16/2023 and therefore given her history of asplenia she was started on empirically on IV cefepime.  Blood cultures on 2/16 remains negative to date.  Patient's pain has significantly improved.  Her mild leukocytosis resolved and there is no clear source of infection. Hemoglobin has been stable around 7.5 over the last 48 hours.  She is eager to go home.        #Sickle cell crisis.  -Per patient she had an acute attack of sickle cell disease once or twice a year.  -Presented with back pain along with upper thighs pain.  -Diagnosed with sickle cell crisis and treated with IV hydration  "along with pain meds.  -Evaluated by hematology oncology who started hydroxyurea 500 mg daily.  -Hemoglobin stable.  -Pain has almost resolved.     Plan:  [] Continue hydroxyurea 500 mg daily.  [] Follow-up with hematology as an outpatient.  [] Educated about the importance of adequate hydration.        #Fever 2/16/2023  -1 episode of fever of unclear etiology.  -Patient was started on cefepime on 2/16 given her history of asplenia.  -Blood cultures on 2/16 remain negative to date.  -Evaluated by ID.  Stop cefepime on 2/20 and discharged on cefdinir 300 mg twice daily for 7 days as empiric treatment.  -No signs of active infection.     Plan:  [] Discharged on cefdinir 300 mg twice daily for 7 days.  [] Educated about the signs and symptoms of infection including osteomyelitis.  [] Patient understand the risk of infection and we will continue to monitor her symptoms.        #Nocturnal hypoxia suspected underlying sleep apnea.     Plan:  [] Follow-up with primary care physician for sleep study and possible CPAP machine.        #Elevated LFTs   -likely in setting of sickle cell crisis due to hepatic congestion and hemolysis.  She had extensive work-up recently Extensive work-up including Doppler US negative, EUS 1/15/23, evaluation for Hep A/B/C, hemachromatosis, Daniel's disease was negative. 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 Positive CRISPIN and elevated F actin EIA (with normal ASMA) noted.   GI recommended continue to monitor without any intervention.  LFTs trending down.    ASSESSMENT           Discharge Assessment  How are you doing now that you are home?: \"I'm doing well, still a little soreness and pain but working through it.\"  Taking medications as prescribed, tolerating new medicine hydroxyurea without any problems.  How are your symptoms? (Red Flag symptoms escalate to triage hotline per guidelines): Improved  Do you feel your condition is " stable enough to be safe at home until your provider visit?: Yes  Does the patient have their discharge instructions? : Yes  Does the patient have questions regarding their discharge instructions? : No  Were you started on any new medications or were there changes to any of your previous medications? : Yes  Does the patient have all of their medications?: Yes  Do you have questions regarding any of your medications? : No  Discharge follow-up appointment scheduled within 14 calendar days? : Yes  Discharge Follow Up Appointment Date: 02/23/23  Discharge Follow Up Appointment Scheduled with?: Primary Care Provider (PCP 2/23/23, Hematology with labs 2/28/23)         Post-op (Clinicians Only)  Did the patient have surgery or a procedure: No        PLAN                                                      Outpatient Plan:      Follow up with primary care provider, [facility], within 7 days for hospital follow- up. The following labs/tests are recommended: CBC and CMP.  -CBC and CMP within 1 to 2 weeks after discharge  -Patient needs to reestablish care with hematology oncology.    Future Appointments   Date Time Provider Department Center   2/23/2023  8:30 AM Ines Padilla PA-C [facility]   2/28/2023  2:45 PM [facility]   2/28/2023  3:15 PM Ankita Mendoza MD [facility]         For any urgent concerns, please contact our 24 hour nurse triage line: [phone] ([phone])         Teresa Anderson RN

## 2023-02-22 LAB
BACTERIA BLD CULT: NO GROWTH
BACTERIA BLD CULT: NO GROWTH
PERFORMING LABORATORY: NORMAL
SCANNED LAB RESULT: NORMAL
TEST NAME: NORMAL

## 2023-02-23 ENCOUNTER — VIRTUAL VISIT (OUTPATIENT)
Dept: FAMILY MEDICINE | Facility: CLINIC | Age: 28
End: 2023-02-23
Payer: COMMERCIAL

## 2023-02-23 DIAGNOSIS — Z09 HOSPITAL DISCHARGE FOLLOW-UP: ICD-10-CM

## 2023-02-23 DIAGNOSIS — R74.8 ELEVATED LIVER ENZYMES: ICD-10-CM

## 2023-02-23 DIAGNOSIS — G47.34 NOCTURNAL HYPOXIA: ICD-10-CM

## 2023-02-23 DIAGNOSIS — D57.00 SICKLE CELL PAIN CRISIS (H): Chronic | ICD-10-CM

## 2023-02-23 DIAGNOSIS — R06.83 SNORING: Primary | ICD-10-CM

## 2023-02-23 PROCEDURE — 99495 TRANSJ CARE MGMT MOD F2F 14D: CPT | Mod: VID | Performed by: PHYSICIAN ASSISTANT

## 2023-02-23 NOTE — PROGRESS NOTES
"Eveline is a 27 year old who is being evaluated via a billable video visit.      How would you like to obtain your AVS? MyChart  If the video visit is dropped, the invitation should be resent by: Text to cell phone: [phone]  Will anyone else be joining your video visit? No          Assessment & Plan     1. Snoring  - Adult Sleep Eval & Management  Referral; Future    2. Nocturnal hypoxia  Noted on admission, needs formal sleep study  - Adult Sleep Eval & Management  Referral; Future    3. Hospital discharge follow-up    4. Sickle cell pain crisis (H)  Improving. Started hydroxyurea, has follow up appt with heme next week.   Note written for return to work in 2 weeks    5. Elevated liver enzymes  -likely in setting of sickle cell crisis due to hepatic congestion and hemolysis.  She had extensive work-up recently Extensive work-up including Doppler US negative, EUS 1/15/23, evaluation for Hep A/B/C, hemachromatosis, Daniel's disease was negative. 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 Positive CRISPIN and elevated F actin EIA (with normal ASMA) noted.   GI recommended continue to monitor without any intervention.  LFTs trending down.  Has follow up with GI next week.              MED REC REQUIRED  Post Medication Reconciliation Status: discharge medications reconciled, continue medications without change  Nicotine/Tobacco Cessation:  She reports that she has been smoking cigarettes. She has been smoking an average of .5 packs per day. She has never been exposed to tobacco smoke. She has never used smokeless tobacco.  Nicotine/Tobacco Cessation Plan:   Information offered: Patient not interested at this time      BMI:   Estimated body mass index is 34.14 kg/m  as calculated from the following:    Height as of 2/13/23: 1.6 m (5' 3\").    Weight as of 2/19/23: 87.4 kg (192 lb 11.2 oz).   Weight management plan: not addressed    Specialty " "appointments as scheduled.      Return if symptoms worsen or fail to improve.    Ines Padilla PA-C  [facility] Mosquera is a 27 year old, presenting for the following health issues:  Hospital F/U (2/13  hospital for sickle cell crisis, still a little sore but doing ok)      HPI     Post Discharge Outreach 2/21/2023   Admission Date 2/13/2023   Reason for Admission Sickle cell pain crisis   Discharge Date 2/20/2023   Discharge Diagnosis -Sickle cell crisis.  -1 episode of fever of unknown etiology.  -Nocturnal hypoxia with possible sleep apnea   How are you doing now that you are home? \"I'm doing well, still a little soreness and pain but working through it.\"  Taking medications as prescribed, tolerating new medicine hydroxyurea without any problems.   How are your symptoms? (Red Flag symptoms escalate to triage hotline per guidelines) Improved   Do you feel your condition is stable enough to be safe at home until your provider visit? Yes   Does the patient have their discharge instructions?  Yes   Does the patient have questions regarding their discharge instructions?  No   Were you started on any new medications or were there changes to any of your previous medications?  Yes   Does the patient have all of their medications? Yes   Do you have questions regarding any of your medications?  No   Discharge follow-up appointment scheduled within 14 calendar days?  Yes   Discharge Follow Up Appointment Date 2/23/2023   Discharge Follow Up Appointment Scheduled with? Primary Care Provider     Hospital Follow-up Visit:    Hospital/Nursing Home/IP Rehab Facility: [facility]  Date of Admission: 2/13/2023  Date of Discharge:2/20/2023  Reason(s) for Admission: sickle cell crisis    Was your hospitalization related to COVID-19? No   Problems taking medications regularly:  None  Medication changes since discharge: added hydroxyurea  Problems adhering to " non-medication therapy:  None    Summary of hospitalization:  [facility] discharge summary reviewed    Ms Eveline Gage is a 27 years old woman with past medical history significant for sickle cell disease, asplenia who presented to the hospital on 2/13/2023 with lower back and bilateral thighs pain.  She was diagnosed with sickle cell crisis and was treated with IV fluid, pain meds and started on hydroxyurea 500 mg daily by hematology oncology.     Of note, patient had elevated troponin.  She was evaluated by cardiology who recommended echocardiogram.  Echocardiogram without wall motion abnormality.  Also, patient had elevated LFTs and bilirubin suggestive of hemolysis.  Evaluated by GI and underwent extensive work-up including Doppler ultrasound which was negative, EUS on 1/15/2023.  Hepatitis panel, hemochromatosis and Daniel disease work-up both negative.     Patient had an episode of fever on 2/16/2023 and therefore given her history of asplenia she was started on empirically on IV cefepime.  Blood cultures on 2/16 remains negative to date.  Patient's pain has significantly improved.  Her mild leukocytosis resolved and there is no clear source of infection. Hemoglobin has been stable around 7.5 over the last 48 hours.  She is eager to go home.        #Sickle cell crisis.  -Per patient she had an acute attack of sickle cell disease once or twice a year.  -Presented with back pain along with upper thighs pain.  -Diagnosed with sickle cell crisis and treated with IV hydration along with pain meds.  -Evaluated by hematology oncology who started hydroxyurea 500 mg daily.  -Hemoglobin stable.  -Pain has almost resolved.     Plan:  [] Continue hydroxyurea 500 mg daily.  [] Follow-up with hematology as an outpatient.  [] Educated about the importance of adequate hydration.        #Fever 2/16/2023  -1 episode of fever of unclear etiology.  -Patient was started on cefepime on 2/16 given her history of  "asplenia.  -Blood cultures on 2/16 remain negative to date.  -Evaluated by ID.  Stop cefepime on 2/20 and discharged on cefdinir 300 mg twice daily for 7 days as empiric treatment.  -No signs of active infection.     Plan:  [] Discharged on cefdinir 300 mg twice daily for 7 days.- has completed  [] Educated about the signs and symptoms of infection including osteomyelitis.  [] Patient understand the risk of infection and we will continue to monitor her symptoms.        #Nocturnal hypoxia suspected underlying sleep apnea.     Plan:  [] Follow-up with primary care physician for sleep study and possible CPAP machine.        #Elevated LFTs   -likely in setting of sickle cell crisis due to hepatic congestion and hemolysis.  She had extensive work-up recently Extensive work-up including Doppler US negative, EUS 1/15/23, evaluation for Hep A/B/C, hemachromatosis, Daniel's disease was negative. 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 Positive CRISPIN and elevated F actin EIA (with normal ASMA) noted.   GI recommended continue to monitor without any intervention.  LFTs trending down.  Diagnostic Tests/Treatments reviewed.  Follow up needed: none  Other Healthcare Providers Involved in Patient s Care:     Update since discharge: improved.     sees GI in April and next week has appts with hematology.  Pt states pain is improving, continues to have mild pain and fatigue.  Needs a letter for work today to take 2 weeks off to recover and see specialists next week     Plan of care communicated with patient           Post Discharge Outreach 2/21/2023   Admission Date 2/13/2023   Reason for Admission Sickle cell pain crisis   Discharge Date 2/20/2023   Discharge Diagnosis -Sickle cell crisis.  -1 episode of fever of unknown etiology.  -Nocturnal hypoxia with possible sleep apnea   How are you doing now that you are home? \"I'm doing well, still a little soreness and pain but " "working through it.\"  Taking medications as prescribed, tolerating new medicine hydroxyurea without any problems.   How are your symptoms? (Red Flag symptoms escalate to triage hotline per guidelines) Improved   Do you feel your condition is stable enough to be safe at home until your provider visit? Yes   Does the patient have their discharge instructions?  Yes   Does the patient have questions regarding their discharge instructions?  No   Were you started on any new medications or were there changes to any of your previous medications?  Yes   Does the patient have all of their medications? Yes   Do you have questions regarding any of your medications?  No   Discharge follow-up appointment scheduled within 14 calendar days?  Yes   Discharge Follow Up Appointment Date 2/23/2023   Discharge Follow Up Appointment Scheduled with? Primary Care Provider     Hospital Follow-up Visit:        Review of Systems         Objective    Vitals - Patient Reported  Weight (Patient Reported): 89.4 kg (197 lb)        Physical Exam   GENERAL: Healthy, alert and no distress  EYES: Eyes grossly normal to inspection.  No discharge or erythema, or obvious scleral/conjunctival abnormalities.  RESP: No audible wheeze, cough, or visible cyanosis.  No visible retractions or increased work of breathing.    SKIN: Visible skin clear. No significant rash, abnormal pigmentation or lesions.  NEURO: Cranial nerves grossly intact.  Mentation and speech appropriate for age.  PSYCH: Mentation appears normal, affect normal/bright, judgement and insight intact, normal speech and appearance well-groomed.                Video-Visit Details    Type of service:  Video Visit   Video Start Time: 10:31 am  Video End Time:10:48 AM    Originating Location (pt. Location): Home    Distant Location (provider location):  On-site  Platform used for Video Visit: Radha    "

## 2023-02-23 NOTE — LETTER
February 23, 2023      Eveline Gage  [address]   [place] [address]        To Whom It May Concern:    Eveline Gage was seen in our clinic.  Please excuse her from work for 2 weeks due to medical problems.  She may return 3/9/2023.        Sincerely,        Ines Padilla PA-C

## 2023-02-24 ENCOUNTER — TELEPHONE (OUTPATIENT)
Dept: ONCOLOGY | Facility: HOSPITAL | Age: 28
End: 2023-02-24
Payer: COMMERCIAL

## 2023-02-27 ENCOUNTER — TELEPHONE (OUTPATIENT)
Dept: ONCOLOGY | Facility: HOSPITAL | Age: 28
End: 2023-02-27
Payer: COMMERCIAL

## 2023-02-27 NOTE — TELEPHONE ENCOUNTER
Pt was consulted by Dr Burroughs in hospital. Pt needs to be rescheduled with either Dr Burroughs or Marv Ham for hospital follow up.

## 2023-02-28 ENCOUNTER — ONCOLOGY VISIT (OUTPATIENT)
Dept: ONCOLOGY | Facility: CLINIC | Age: 28
End: 2023-02-28
Attending: NURSE PRACTITIONER
Payer: COMMERCIAL

## 2023-02-28 ENCOUNTER — LAB (OUTPATIENT)
Dept: INFUSION THERAPY | Facility: CLINIC | Age: 28
End: 2023-02-28
Attending: NURSE PRACTITIONER
Payer: COMMERCIAL

## 2023-02-28 VITALS
BODY MASS INDEX: 31.71 KG/M2 | HEART RATE: 99 BPM | SYSTOLIC BLOOD PRESSURE: 125 MMHG | WEIGHT: 179 LBS | OXYGEN SATURATION: 98 % | HEIGHT: 63 IN | DIASTOLIC BLOOD PRESSURE: 80 MMHG | RESPIRATION RATE: 16 BRPM

## 2023-02-28 DIAGNOSIS — Z90.81 HISTORY OF SPLENECTOMY: ICD-10-CM

## 2023-02-28 DIAGNOSIS — D57.1 SICKLE CELL DISEASE WITHOUT CRISIS (H): Primary | ICD-10-CM

## 2023-02-28 DIAGNOSIS — Z79.899 HIGH RISK MEDICATION USE: ICD-10-CM

## 2023-02-28 DIAGNOSIS — D57.00 SICKLE CELL PAIN CRISIS (H): ICD-10-CM

## 2023-02-28 LAB
ALBUMIN SERPL-MCNC: 2.6 G/DL (ref 3.5–5)
ALP SERPL-CCNC: 321 U/L (ref 45–120)
ALT SERPL W P-5'-P-CCNC: 61 U/L (ref 0–45)
ANION GAP SERPL CALCULATED.3IONS-SCNC: 7 MMOL/L (ref 5–18)
AST SERPL W P-5'-P-CCNC: 88 U/L (ref 0–40)
BASOPHILS # BLD MANUAL: 0 10E3/UL (ref 0–0.2)
BASOPHILS NFR BLD MANUAL: 0 %
BILIRUB SERPL-MCNC: 4.5 MG/DL (ref 0–1)
BUN SERPL-MCNC: 6 MG/DL (ref 8–22)
CALCIUM SERPL-MCNC: 8.8 MG/DL (ref 8.5–10.5)
CHLORIDE BLD-SCNC: 108 MMOL/L (ref 98–107)
CO2 SERPL-SCNC: 26 MMOL/L (ref 22–31)
CREAT SERPL-MCNC: 0.65 MG/DL (ref 0.6–1.1)
EOSINOPHIL # BLD MANUAL: 0 10E3/UL (ref 0–0.7)
EOSINOPHIL NFR BLD MANUAL: 0 %
ERYTHROCYTE [DISTWIDTH] IN BLOOD BY AUTOMATED COUNT: 25.2 % (ref 10–15)
FRAGMENTS BLD QL SMEAR: SLIGHT
GFR SERPL CREATININE-BSD FRML MDRD: >90 ML/MIN/1.73M2
GLUCOSE BLD-MCNC: 63 MG/DL (ref 70–125)
HCT VFR BLD AUTO: 29.7 % (ref 35–47)
HGB BLD-MCNC: 9.7 G/DL (ref 11.7–15.7)
LYMPHOCYTES # BLD MANUAL: 3.6 10E3/UL (ref 0.8–5.3)
LYMPHOCYTES NFR BLD MANUAL: 37 %
MCH RBC QN AUTO: 31.5 PG (ref 26.5–33)
MCHC RBC AUTO-ENTMCNC: 32.7 G/DL (ref 31.5–36.5)
MCV RBC AUTO: 96 FL (ref 78–100)
METAMYELOCYTES # BLD MANUAL: 0.1 10E3/UL
METAMYELOCYTES NFR BLD MANUAL: 1 %
MONOCYTES # BLD MANUAL: 0.7 10E3/UL (ref 0–1.3)
MONOCYTES NFR BLD MANUAL: 7 %
NEUTROPHILS # BLD MANUAL: 5.3 10E3/UL (ref 1.6–8.3)
NEUTROPHILS NFR BLD MANUAL: 55 %
NRBC # BLD AUTO: 5.2 10E3/UL
NRBC BLD MANUAL-RTO: 54 %
PLAT MORPH BLD: ABNORMAL
PLATELET # BLD AUTO: 337 10E3/UL (ref 150–450)
POLYCHROMASIA BLD QL SMEAR: SLIGHT
POTASSIUM BLD-SCNC: 3.5 MMOL/L (ref 3.5–5)
PROT SERPL-MCNC: 7.3 G/DL (ref 6–8)
RBC # BLD AUTO: 3.08 10E6/UL (ref 3.8–5.2)
RBC MORPH BLD: ABNORMAL
SODIUM SERPL-SCNC: 141 MMOL/L (ref 136–145)
TARGETS BLD QL SMEAR: ABNORMAL
WBC # BLD AUTO: 9.7 10E3/UL (ref 4–11)

## 2023-02-28 PROCEDURE — 82374 ASSAY BLOOD CARBON DIOXIDE: CPT | Performed by: NURSE PRACTITIONER

## 2023-02-28 PROCEDURE — 85027 COMPLETE CBC AUTOMATED: CPT | Performed by: NURSE PRACTITIONER

## 2023-02-28 PROCEDURE — 82310 ASSAY OF CALCIUM: CPT | Performed by: NURSE PRACTITIONER

## 2023-02-28 PROCEDURE — 36415 COLL VENOUS BLD VENIPUNCTURE: CPT | Performed by: NURSE PRACTITIONER

## 2023-02-28 PROCEDURE — 99203 OFFICE O/P NEW LOW 30 MIN: CPT | Performed by: NURSE PRACTITIONER

## 2023-02-28 PROCEDURE — G0463 HOSPITAL OUTPT CLINIC VISIT: HCPCS | Performed by: NURSE PRACTITIONER

## 2023-02-28 PROCEDURE — 85007 BL SMEAR W/DIFF WBC COUNT: CPT | Performed by: NURSE PRACTITIONER

## 2023-02-28 RX ORDER — HYDROXYUREA 500 MG/1
500 CAPSULE ORAL DAILY
Qty: 90 CAPSULE | Refills: 0 | Status: SHIPPED | OUTPATIENT
Start: 2023-02-28 | End: 2023-08-08

## 2023-02-28 ASSESSMENT — PAIN SCALES - GENERAL: PAINLEVEL: SEVERE PAIN (7)

## 2023-02-28 NOTE — LETTER
"    2/28/2023         RE: Eveline Gage  [address]  [place] [address]        Dear Colleague,    Thank you for referring your patient, Eveline Gage, to the [facility] [place]. Please see a copy of my visit note below.    Oncology Rooming Note    February 28, 2023 12:51 PM   Eveline Gage is a 27 year old female who presents for:    Chief Complaint   Patient presents with     Hematology     Hospital follow up     Initial Vitals: /80   Pulse 99   Resp 16   Ht 1.6 m (5' 3\")   Wt 81.2 kg (179 lb)   SpO2 98%   BMI 31.71 kg/m   Estimated body mass index is 31.71 kg/m  as calculated from the following:    Height as of this encounter: 1.6 m (5' 3\").    Weight as of this encounter: 81.2 kg (179 lb). Body surface area is 1.9 meters squared.  Severe Pain (7) Comment: Data Unavailable   No LMP recorded. Patient has had an implant.  Allergies reviewed: Yes  Medications reviewed: Yes    Medications: Medication refills not needed today.  Pharmacy name entered into [employer]: [id number] - [place], [place] - [address]    Clinical concerns: Hospital follow up and follow up    Sara Wren              [facility] Hematology and Oncology Progress Note    Patient: Eveline Gage  MRN: [id number]  Date of Service: Feb 28, 2023         Reason for Visit:    Scheduled hospital f/up    Assessment and Plan:    1.   Sickle cell disease:     27 year old.  Single with partner and 5 year old daughter.  Employed part-time @ [employer].  Diagnosed with SCD at birth ... reconfirmed diagnosis with hemoglobin electrophoresis in February, 2017 after moving to [place] from [place].  Frequent hospitalizations for Sickle cell crises when young, decreased in her late teens.  Post-splenectomy.    Eveline presents today generally feeling quite good.  We reviewed her SC history.  Since moving here from [place] in 2015, she has only been hospitalized twice for Sickle " "Cell crises, once in January and once in February of this year.  She has never been on Hydrea until her February hospitalization - tolerating well ... no nausea, sore mouth or bowel issues.  Crises generally manifest as \"achy prickly legs and arms\" ... these are manageable with OTCs.  The crises this past January and February progressed to chest, back and abdominal pain.  Her worst crises generally only occur during the winter, her usual crises she can manage at home with 2-ES Tylenol every 6-8 hours or 400 mg ibuprofen, generally once daily.  Also also has Flexeril, takes 0-2 times daily with crises.  With her February crisis she was discharged from the hospital with Robaxin which she took for a few days.  She also uses Voltaren gel prn.  She has 5 mg oxycodone if needed.  She has a history of acid reflux - has Prilosec and carafate if needed.  She uses an albuterol inhaler for wheezing prn.  She denies cough, fever, chills, unusual headaches, visual or mentation disturbance, bowel or bladder issues, rash.    CBC - WBC and Plts WNL.  HgB increased @ 9.7 g/dL.    CMP - decreasingly elevated LFTs including bili.  Improved hypoalbuminemia.     States has scheduled f/up with GI/hepatology.    Tolerating 500 mg dosing Hydrea exceptionally well.    Continue daily Hydrea therapy, 500 mg/day - Rx Eprescribed.    Instructed to contact us with recurrent crises she feels require hospitalizations as we may be able to manage as out-patient.    I also reviewed that the [facility] has a Sickle cell specialty division if she chooses to manage her Sickle cell care through them - declined.    3-month f/up with CBC and CMP.    Hematologic History:    1.   Sickle cell disease:     Diagnosed with SCD at birth ... reconfirmed diagnosis with hemoglobin electrophoresis in February, 2017 after moving to [place] from [place].      Frequent hospitalizations for Sickle Cell crises when young, decreased in her late teens.  Since moving here in " 2015, she has only been hospitalized twice for Sickle Cell crises, in January and February of this year, 2023.     02/13 - 02/20/2023 - hospitalized with Sickle cell crisis:    Patient had elevated troponin.  She was evaluated by cardiology who recommended echocardiogram.  Echocardiogram without wall motion abnormality.      Patient had elevated LFTs and bilirubin suggestive of hemolysis.  Evaluated by GI - extensive work-up included Doppler US negative, EUS 1/15/23, evaluation for Hep A/B/C, hemachromatosis, Daniel's disease - all negative. 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 HSV 1 positive.  Positive CRISPIN and elevated F actin EIA (with normal ASMA) noted.  GI recommended continue to monitor without any intervention.    CT A&P showed a normal-appearing liver.  Ultrasound showed normal bile ducts and normal liver parenchyma.      Total bilirubin trending down - elevation may be due to acute on chronic hepatic congestion and hemolysis in setting of sickle cell crisis.    Elevated white count, mostly neutrophilia which is frequently seen in sickle cell disease/vaso-occlusive crisis.  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  No evidence of infection/abscess on recent CT imaging.    CT CAP - no evidence of pneumonia, pulmonary embolism, biliary dilatation or obstruction or any other acute process.      With fever on 2/16/2023 and given her history of asplenia she was started on empirically on IV cefepime.  Blood cultures - negative.       Treated with supportive care ... hydration, supplemental oxygen, pain management (dilaudid).  Appropriate reticulocyte response.  Started hydroxyurea 500 mg daily.  No transfusions needed.    Had never been on Hydrea until recent hospitalization.    02/13/2023 - started on Hydrea therapy, 500 mg/day.      ECOG Performance:    0 - Independent    Pain:    Pain Score: Severe Pain (7)  Pain Loc: Other - see comment (when it's at it's worst) - see  above.    Encounter Diagnoses:    Problem List Items Addressed This Visit        Nervous and Auditory    Sickle cell pain crisis (H) (Chronic)    Relevant Medications    hydroxyurea (HYDREA) 500 MG capsule    Other Relevant Orders    CBC with platelets and differential (Completed)    Comprehensive metabolic panel (BMP + Alb, Alk Phos, ALT, AST, Total. Bili, TP) (Completed)       Hematologic    Sickle cell disease (H) - Primary       Other    History of splenectomy   Other Visit Diagnoses     High risk medication use                 35 minutes of time were spent on the date of this encounter with chart review, face to face time with the patient, examination, recommendations, documentation, and communication of the plan of care to the care team.    Marv Ham CNP     CC: [facility]   ____________________________________________________________________    Review of Systems:    No fever or night sweats.  No loss of weight.  No lumps or bumps anywhere.  No unusual headaches or eyesight issues.  No dizziness.  No bleeding from the nose.  No sores in the mouth. No problems with swallowing.  No chest pain. No shortness of breath. No cough.  No abdominal pain. No nausea or vomiting.  No diarrhea or constipation.  No blood in stool or black colored stools.  No problems passing urine.  No numbness or tingling in hands or feet.  No skin rashes.    A 14 point review of systems is otherwise negative.    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 Blood product orders may be delayed. Draw THREE purple top tubes for all Type and Screen/Type and crossmatch orders- per [facility]     Asplenia 06/17/2021     Asthma 03/09/2017     Cannabis use disorder       "Sickle cell disease (H) 06/15/2020     Somnolence 2/16/2023       Past Surgical History:   Procedure Laterality Date     CHOLECYSTECTOMY       ESOPHAGOSCOPY, GASTROSCOPY, DUODENOSCOPY (EGD), COMBINED N/A 01/15/2023    Procedure: ENDOSCOPIC ULTRASOUND;  Surgeon: Marv Tomas MD;  [place] [facility]     MIDLINE SINGLE LUMEN PLACEMENT  2/14/2023          SPLENECTOMY       TONSILLECTOMY       Physical Exam:    /80   Pulse 99   Resp 16   Ht 1.6 m (5' 3\")   Wt 81.2 kg (179 lb)   SpO2 98%   BMI 31.71 kg/m      GENERAL:   Very pleasant.  Alert and oriented.  Seated comfortably. In no distress.    HEENT:   Atraumatic and normocephalic.  ROBBIE.  EOMI.  No pallor.  No icterus.  No mucosal lesions.    LYMPH NODES:  No palpable cervical, axillary or inguinal lymphadenopathy.    CHEST:   Lungs clear to auscultation bilaterally.    CVS:    S1 and S2 heard. Regular rate and rhythm.  No murmur or gallop or rub heard.  No peripheral edema.    ABDOMEN:   Soft.  Overweight.  Not tender. Not distended.  No palpable hepatomegaly or splenomegaly.    EXTREMITIES:  Warm.    SKIN:    No rash, bruising or purpura noted.  Full head of hair.    Lab Results:    Reviewed with patient.    Recent Results (from the past 24 hour(s))   Comprehensive metabolic panel (BMP + Alb, Alk Phos, ALT, AST, Total. Bili, TP)    Collection Time: 02/28/23  1:06 PM   Result Value Ref Range    Sodium 141 136 - 145 mmol/L    Potassium 3.5 3.5 - 5.0 mmol/L    Chloride 108 (H) 98 - 107 mmol/L    Carbon Dioxide (CO2) 26 22 - 31 mmol/L    Anion Gap 7 5 - 18 mmol/L    Urea Nitrogen 6 (L) 8 - 22 mg/dL    Creatinine 0.65 0.60 - 1.10 mg/dL    Calcium 8.8 8.5 - 10.5 mg/dL    Glucose 63 (L) 70 - 125 mg/dL    Alkaline Phosphatase 321 (H) 45 - 120 U/L    AST 88 (H) 0 - 40 U/L    ALT 61 (H) 0 - 45 U/L    Protein Total 7.3 6.0 - 8.0 g/dL    Albumin 2.6 (L) 3.5 - 5.0 g/dL    Bilirubin Total 4.5 (H) 0.0 - 1.0 mg/dL    GFR Estimate >90 >60 mL/min/1.73m2   CBC " with platelets and differential    Collection Time: 02/28/23  1:06 PM   Result Value Ref Range    WBC Count 9.7 4.0 - 11.0 10e3/uL    RBC Count 3.08 (L) 3.80 - 5.20 10e6/uL    Hemoglobin 9.7 (L) 11.7 - 15.7 g/dL    Hematocrit 29.7 (L) 35.0 - 47.0 %    MCV 96 78 - 100 fL    MCH 31.5 26.5 - 33.0 pg    MCHC 32.7 31.5 - 36.5 g/dL    RDW 25.2 (H) 10.0 - 15.0 %    Platelet Count 337 150 - 450 10e3/uL   Manual Differential    Collection Time: 02/28/23  1:06 PM   Result Value Ref Range    % Neutrophils 55 %    % Lymphocytes 37 %    % Monocytes 7 %    % Eosinophils 0 %    % Basophils 0 %    % Metamyelocytes 1 %    NRBCs per 100 WBC 54 (H) <=0 %    Absolute Neutrophils 5.3 1.6 - 8.3 10e3/uL    Absolute Lymphocytes 3.6 0.8 - 5.3 10e3/uL    Absolute Monocytes 0.7 0.0 - 1.3 10e3/uL    Absolute Eosinophils 0.0 0.0 - 0.7 10e3/uL    Absolute Basophils 0.0 0.0 - 0.2 10e3/uL    Absolute Metamyelocytes 0.1 (H) <=0.0 10e3/uL    Absolute NRBCs 5.2 (H) <=0.0 10e3/uL    RBC Morphology Confirmed RBC Indices     Platelet Assessment  Automated Count Confirmed. Platelet morphology is normal.     Automated Count Confirmed. Platelet morphology is normal.    RBC Fragments Slight (A) None Seen    Polychromasia Slight (A) None Seen    Target Cells Moderate (A) None Seen     Imaging:    No new imaging.          Again, thank you for allowing me to participate in the care of your patient.        Sincerely,        Marv Ham, CNP

## 2023-02-28 NOTE — PATIENT INSTRUCTIONS
Recent Results (from the past 24 hour(s))   Comprehensive metabolic panel (BMP + Alb, Alk Phos, ALT, AST, Total. Bili, TP)    Collection Time: 02/28/23  1:06 PM   Result Value Ref Range    Sodium 141 136 - 145 mmol/L    Potassium 3.5 3.5 - 5.0 mmol/L    Chloride 108 (H) 98 - 107 mmol/L    Carbon Dioxide (CO2) 26 22 - 31 mmol/L    Anion Gap 7 5 - 18 mmol/L    Urea Nitrogen 6 (L) 8 - 22 mg/dL    Creatinine 0.65 0.60 - 1.10 mg/dL    Calcium 8.8 8.5 - 10.5 mg/dL    Glucose 63 (L) 70 - 125 mg/dL    Alkaline Phosphatase 321 (H) 45 - 120 U/L    AST 88 (H) 0 - 40 U/L    ALT 61 (H) 0 - 45 U/L    Protein Total 7.3 6.0 - 8.0 g/dL    Albumin 2.6 (L) 3.5 - 5.0 g/dL    Bilirubin Total 4.5 (H) 0.0 - 1.0 mg/dL    GFR Estimate >90 >60 mL/min/1.73m2

## 2023-02-28 NOTE — PROGRESS NOTES
"Oncology Rooming Note    February 28, 2023 12:51 PM   Eveline Gage is a 27 year old female who presents for:    Chief Complaint   Patient presents with     Hematology     Hospital follow up     Initial Vitals: /80   Pulse 99   Resp 16   Ht 1.6 m (5' 3\")   Wt 81.2 kg (179 lb)   SpO2 98%   BMI 31.71 kg/m   Estimated body mass index is 31.71 kg/m  as calculated from the following:    Height as of this encounter: 1.6 m (5' 3\").    Weight as of this encounter: 81.2 kg (179 lb). Body surface area is 1.9 meters squared.  Severe Pain (7) Comment: Data Unavailable   No LMP recorded. Patient has had an implant.  Allergies reviewed: Yes  Medications reviewed: Yes    Medications: Medication refills not needed today.  Pharmacy name entered into [facility]: [id number] - [place], [place] - [address]    Clinical concerns: Hospital follow up and follow up    Sara Wren            "

## 2023-02-28 NOTE — PROGRESS NOTES
"[facility] Hematology and Oncology Progress Note    Patient: Eveline Gage  MRN: [id number]  Date of Service: Feb 28, 2023         Reason for Visit:    Scheduled hospital f/up    Assessment and Plan:    1.   Sickle cell disease:     27 year old.  Single with partner and 5 year old daughter.  Employed part-time @ [employer].  Diagnosed with SCD at birth ... reconfirmed diagnosis with hemoglobin electrophoresis in February, 2017 after moving to [place] from [place].  Frequent hospitalizations for Sickle cell crises when young, decreased in her late teens.  Post-splenectomy.    Eveline presents today generally feeling quite good.  We reviewed her SC history.  Since moving here from [place] in 2015, she has only been hospitalized twice for Sickle Cell crises, once in January and once in February of this year.  She has never been on Hydrea until her February hospitalization - tolerating well ... no nausea, sore mouth or bowel issues.  Crises generally manifest as \"achy prickly legs and arms\" ... these are manageable with OTCs.  The crises this past January and February progressed to chest, back and abdominal pain.  Her worst crises generally only occur during the winter, her usual crises she can manage at home with 2-ES Tylenol every 6-8 hours or 400 mg ibuprofen, generally once daily.  Also also has Flexeril, takes 0-2 times daily with crises.  With her February crisis she was discharged from the hospital with Robaxin which she took for a few days.  She also uses Voltaren gel prn.  She has 5 mg oxycodone if needed.  She has a history of acid reflux - has Prilosec and carafate if needed.  She uses an albuterol inhaler for wheezing prn.  She denies cough, fever, chills, unusual headaches, visual or mentation disturbance, bowel or bladder issues, rash.    CBC - WBC and Plts WNL.  HgB increased @ 9.7 g/dL.    CMP - decreasingly elevated LFTs including bili.  Improved hypoalbuminemia.     [facility] has scheduled " f/up with GI/hepatology.    Tolerating 500 mg dosing Hydrea exceptionally well.    Continue daily Hydrea therapy, 500 mg/day - Rx Eprescribed.    Instructed to contact us with recurrent crises she feels require hospitalizations as we may be able to manage as out-patient.    I also reviewed that the [facility] has a Sickle cell specialty division if she chooses to manage her Sickle cell care through them - declined.    3-month f/up with CBC and CMP.    Hematologic History:    1.   Sickle cell disease:     Diagnosed with SCD at birth ... reconfirmed diagnosis with hemoglobin electrophoresis in February, 2017 after moving to [place] from [place].      Frequent hospitalizations for Sickle Cell crises when young, decreased in her late teens.  Since moving here in 2015, she has only been hospitalized twice for Sickle Cell crises, in January and February of this year, 2023.     02/13 - 02/20/2023 - hospitalized with Sickle cell crisis:    Patient had elevated troponin.  She was evaluated by cardiology who recommended echocardiogram.  Echocardiogram without wall motion abnormality.      Patient had elevated LFTs and bilirubin suggestive of hemolysis.  Evaluated by GI - extensive work-up included Doppler US negative, EUS 1/15/23, evaluation for Hep A/B/C, hemachromatosis, Daneil's disease - all negative. 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 HSV 1 positive.  Positive CRISPIN and elevated F actin EIA (with normal ASMA) noted.  GI recommended continue to monitor without any intervention.    CT A&P showed a normal-appearing liver.  Ultrasound showed normal bile ducts and normal liver parenchyma.      Total bilirubin trending down - elevation may be due to acute on chronic hepatic congestion and hemolysis in setting of sickle cell crisis.    Elevated white count, mostly neutrophilia which is frequently seen in sickle cell disease/vaso-occlusive crisis.  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  No evidence of  infection/abscess on recent CT imaging.    CT CAP - no evidence of pneumonia, pulmonary embolism, biliary dilatation or obstruction or any other acute process.      With fever on 2/16/2023 and given her history of asplenia she was started on empirically on IV cefepime.  Blood cultures - negative.       Treated with supportive care ... hydration, supplemental oxygen, pain management (dilaudid).  Appropriate reticulocyte response.  Started hydroxyurea 500 mg daily.  No transfusions needed.    Had never been on Hydrea until recent hospitalization.    02/13/2023 - started on Hydrea therapy, 500 mg/day.      ECOG Performance:    0 - Independent    Pain:    Pain Score: Severe Pain (7)  Pain Loc: Other - see comment (when it's at it's worst) - see above.    Encounter Diagnoses:    Problem List Items Addressed This Visit        Nervous and Auditory    Sickle cell pain crisis (H) (Chronic)    Relevant Medications    hydroxyurea (HYDREA) 500 MG capsule    Other Relevant Orders    CBC with platelets and differential (Completed)    Comprehensive metabolic panel (BMP + Alb, Alk Phos, ALT, AST, Total. Bili, TP) (Completed)       Hematologic    Sickle cell disease (H) - Primary       Other    History of splenectomy   Other Visit Diagnoses     High risk medication use                 35 minutes of time were spent on the date of this encounter with chart review, face to face time with the patient, examination, recommendations, documentation, and communication of the plan of care to the care team.    Marv Ham CNP     CC: [facility]   ____________________________________________________________________    Review of Systems:    No fever or night sweats.  No loss of weight.  No lumps or bumps anywhere.  No unusual headaches or eyesight issues.  No dizziness.  No bleeding from the nose.  No sores in the mouth. No problems with swallowing.  No chest pain. No shortness of breath. No cough.  No abdominal pain. No  "nausea or vomiting.  No diarrhea or constipation.  No blood in stool or black colored stools.  No problems passing urine.  No numbness or tingling in hands or feet.  No skin rashes.    A 14 point review of systems is otherwise negative.    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 Blood product orders may be delayed. Draw THREE purple top tubes for all Type and Screen/Type and crossmatch orders- per [facility]     Asplenia 06/17/2021     Asthma 03/09/2017     Cannabis use disorder      Sickle cell disease (H) 06/15/2020     Somnolence 2/16/2023       Past Surgical History:   Procedure Laterality Date     CHOLECYSTECTOMY       ESOPHAGOSCOPY, GASTROSCOPY, DUODENOSCOPY (EGD), COMBINED N/A 01/15/2023    Procedure: ENDOSCOPIC ULTRASOUND;  Surgeon: Marv Tomas MD;  Location: [facility] OR     MIDLINE SINGLE LUMEN PLACEMENT  2/14/2023          SPLENECTOMY       TONSILLECTOMY       Physical Exam:    /80   Pulse 99   Resp 16   Ht 1.6 m (5' 3\")   Wt 81.2 kg (179 lb)   SpO2 98%   BMI 31.71 kg/m      GENERAL:   Very pleasant.  Alert and oriented.  Seated comfortably. In no distress.    HEENT:   Atraumatic and normocephalic.  ROBBIE.  EOMI.  No pallor.  No icterus.  No mucosal lesions.    LYMPH NODES:  No palpable cervical, axillary or inguinal lymphadenopathy.    CHEST:   Lungs clear to auscultation bilaterally.    CVS:    S1 and S2 heard. Regular rate and rhythm.  No murmur or gallop or rub heard.  No peripheral edema.    ABDOMEN:   Soft.  Overweight.  Not tender. Not distended.  No palpable hepatomegaly or splenomegaly.    EXTREMITIES:  Warm.    SKIN:    No rash, bruising or purpura noted.  Full head of hair.    Lab Results:    Reviewed with " patient.    Recent Results (from the past 24 hour(s))   Comprehensive metabolic panel (BMP + Alb, Alk Phos, ALT, AST, Total. Bili, TP)    Collection Time: 02/28/23  1:06 PM   Result Value Ref Range    Sodium 141 136 - 145 mmol/L    Potassium 3.5 3.5 - 5.0 mmol/L    Chloride 108 (H) 98 - 107 mmol/L    Carbon Dioxide (CO2) 26 22 - 31 mmol/L    Anion Gap 7 5 - 18 mmol/L    Urea Nitrogen 6 (L) 8 - 22 mg/dL    Creatinine 0.65 0.60 - 1.10 mg/dL    Calcium 8.8 8.5 - 10.5 mg/dL    Glucose 63 (L) 70 - 125 mg/dL    Alkaline Phosphatase 321 (H) 45 - 120 U/L    AST 88 (H) 0 - 40 U/L    ALT 61 (H) 0 - 45 U/L    Protein Total 7.3 6.0 - 8.0 g/dL    Albumin 2.6 (L) 3.5 - 5.0 g/dL    Bilirubin Total 4.5 (H) 0.0 - 1.0 mg/dL    GFR Estimate >90 >60 mL/min/1.73m2   CBC with platelets and differential    Collection Time: 02/28/23  1:06 PM   Result Value Ref Range    WBC Count 9.7 4.0 - 11.0 10e3/uL    RBC Count 3.08 (L) 3.80 - 5.20 10e6/uL    Hemoglobin 9.7 (L) 11.7 - 15.7 g/dL    Hematocrit 29.7 (L) 35.0 - 47.0 %    MCV 96 78 - 100 fL    MCH 31.5 26.5 - 33.0 pg    MCHC 32.7 31.5 - 36.5 g/dL    RDW 25.2 (H) 10.0 - 15.0 %    Platelet Count 337 150 - 450 10e3/uL   Manual Differential    Collection Time: 02/28/23  1:06 PM   Result Value Ref Range    % Neutrophils 55 %    % Lymphocytes 37 %    % Monocytes 7 %    % Eosinophils 0 %    % Basophils 0 %    % Metamyelocytes 1 %    NRBCs per 100 WBC 54 (H) <=0 %    Absolute Neutrophils 5.3 1.6 - 8.3 10e3/uL    Absolute Lymphocytes 3.6 0.8 - 5.3 10e3/uL    Absolute Monocytes 0.7 0.0 - 1.3 10e3/uL    Absolute Eosinophils 0.0 0.0 - 0.7 10e3/uL    Absolute Basophils 0.0 0.0 - 0.2 10e3/uL    Absolute Metamyelocytes 0.1 (H) <=0.0 10e3/uL    Absolute NRBCs 5.2 (H) <=0.0 10e3/uL    RBC Morphology Confirmed RBC Indices     Platelet Assessment  Automated Count Confirmed. Platelet morphology is normal.     Automated Count Confirmed. Platelet morphology is normal.    RBC Fragments Slight (A) None Seen     Polychromasia Slight (A) None Seen    Target Cells Moderate (A) None Seen     Imaging:    No new imaging.

## 2023-03-02 ENCOUNTER — E-VISIT (OUTPATIENT)
Dept: FAMILY MEDICINE | Facility: CLINIC | Age: 28
End: 2023-03-02
Payer: COMMERCIAL

## 2023-03-02 DIAGNOSIS — L30.9 DERMATITIS: Primary | ICD-10-CM

## 2023-03-02 LAB
Lab: NORMAL
PERFORMING LABORATORY: NORMAL
TEST NAME: NORMAL

## 2023-03-02 PROCEDURE — 99207 PR NO CHARGE LOS: CPT | Performed by: PHYSICIAN ASSISTANT

## 2023-03-02 RX ORDER — TRIAMCINOLONE ACETONIDE 1 MG/G
OINTMENT TOPICAL 2 TIMES DAILY
Qty: 80 G | Refills: 1 | Status: SHIPPED | OUTPATIENT
Start: 2023-03-02 | End: 2024-03-05

## 2023-03-03 DIAGNOSIS — D57.1 SICKLE CELL DISEASE WITHOUT CRISIS (H): Primary | ICD-10-CM

## 2023-03-03 DIAGNOSIS — Z79.899 HIGH RISK MEDICATION USE: ICD-10-CM

## 2023-03-04 ENCOUNTER — HOSPITAL ENCOUNTER (EMERGENCY)
Facility: CLINIC | Age: 28
Discharge: HOME | End: 2023-03-04
Admitting: PHYSICIAN ASSISTANT
Payer: COMMERCIAL

## 2023-03-04 ENCOUNTER — APPOINTMENT (OUTPATIENT)
Dept: RADIOLOGY | Facility: CLINIC | Age: 28
End: 2023-03-04
Payer: COMMERCIAL

## 2023-03-04 VITALS
HEIGHT: 63 IN | BODY MASS INDEX: 31.71 KG/M2 | OXYGEN SATURATION: 98 % | SYSTOLIC BLOOD PRESSURE: 127 MMHG | DIASTOLIC BLOOD PRESSURE: 65 MMHG | HEART RATE: 91 BPM | TEMPERATURE: 97.4 F | RESPIRATION RATE: 18 BRPM | WEIGHT: 179 LBS

## 2023-03-04 DIAGNOSIS — B02.9 SHINGLES: ICD-10-CM

## 2023-03-04 PROCEDURE — 99284 EMERGENCY DEPT VISIT MOD MDM: CPT | Mod: 25

## 2023-03-04 PROCEDURE — 250N000013 HC RX MED GY IP 250 OP 250 PS 637: Performed by: PHYSICIAN ASSISTANT

## 2023-03-04 PROCEDURE — 71046 X-RAY EXAM CHEST 2 VIEWS: CPT

## 2023-03-04 RX ORDER — VALACYCLOVIR HYDROCHLORIDE 1 G/1
1000 TABLET, FILM COATED ORAL ONCE
Status: COMPLETED | OUTPATIENT
Start: 2023-03-04 | End: 2023-03-04

## 2023-03-04 RX ORDER — OXYCODONE HYDROCHLORIDE 5 MG/1
5 TABLET ORAL EVERY 6 HOURS PRN
Qty: 12 TABLET | Refills: 0 | Status: SHIPPED | OUTPATIENT
Start: 2023-03-04 | End: 2023-03-07

## 2023-03-04 RX ORDER — OXYCODONE HYDROCHLORIDE 5 MG/1
5 TABLET ORAL ONCE
Status: COMPLETED | OUTPATIENT
Start: 2023-03-04 | End: 2023-03-04

## 2023-03-04 RX ORDER — VALACYCLOVIR HYDROCHLORIDE 1 G/1
1000 TABLET, FILM COATED ORAL 3 TIMES DAILY
Qty: 21 TABLET | Refills: 0 | Status: SHIPPED | OUTPATIENT
Start: 2023-03-04 | End: 2023-03-11

## 2023-03-04 RX ADMIN — OXYCODONE HYDROCHLORIDE 5 MG: 5 TABLET ORAL at 17:19

## 2023-03-04 RX ADMIN — VALACYCLOVIR 1000 MG: 1 TABLET, FILM COATED ORAL at 17:19

## 2023-03-04 ASSESSMENT — ACTIVITIES OF DAILY LIVING (ADL): ADLS_ACUITY_SCORE: 33

## 2023-03-04 NOTE — ED TRIAGE NOTES
PT states that she has a rash to RT side upper stomach that traverse to her back that started Tuesday. It looks shingles in character. Pt is having pain with the rash. Did have a RX change for her sickle celll on Tuesday.      Triage Assessment     Row Name 03/04/23 2632       Triage Assessment (Adult)    Airway WDL WDL       Respiratory WDL    Respiratory WDL WDL       Skin Circulation/Temperature WDL    Skin Circulation/Temperature WDL WDL       Cardiac WDL    Cardiac WDL WDL       Peripheral/Neurovascular WDL    Peripheral Neurovascular WDL WDL       Cognitive/Neuro/Behavioral WDL    Cognitive/Neuro/Behavioral WDL WDL

## 2023-03-04 NOTE — ED PROVIDER NOTES
ED PROVIDER NOTE    EMERGENCY DEPARTMENT ENCOUNTER      NAME: Eveline Gage  AGE: 27 year old female  YOB: 1995  MRN: [id number]  EVALUATION DATE & TIME: 3/4/2023  4:06 PM    PCP: [facility]    ED PROVIDER: Yun Booker PA-C      Chief Complaint   Patient presents with     Rash         FINAL IMPRESSION:  1. Shingles          MEDICAL DECISION MAKING:    Pertinent Labs & Imaging studies reviewed. (See chart for details)    27-year-old female with a history of sickle cell disease and post splenectomy presenting to the emergency department with a painful rash to her right back and right abdomen.    Reviewed outside records including ED visit from 2 days ago where she was diagnosed with dermatitis and prescribed a steroid ointment.    Here vitals are stable and she is afebrile.  On exam she has a very typical shingles type rash in a dermatomal pattern with vesicles and papules.    She states that her sickle cell is overall doing well since being started on the hydroxyurea. She does report a more recent cough so we will check a CXR and make sure she does not have an associated PNA causing zoster flare.    CXR was clear.     While it has been > 72hr since symptom onset it does look like there are new vesicles so I feel anti-viral therapy is appropriate.     We discussed use of pain meds at home and close f/u with pcp to discuss ongoing neuralgia.     At the conclusion of the encounter I discussed the results of all of the tests and the disposition. The questions were answered. The patient or family acknowledged understanding and was agreeable with the care plan.       Medical Decision Making    History:    Supplemental history from: Documented in chart, if applicable    External Record(s) reviewed: Documented in chart, if applicable.    Work Up:    Chart documentation includes differential considered and any EKGs or imaging independently interpreted by provider, where  specified.    In additional to work up documented, I considered the following work up: Documented in chart, if applicable.    External consultation:    Discussion of management with another provider: Documented in chart, if applicable    Complicating factors:    Care impacted by chronic illness: Other: Sickle cell disease    Care affected by social determinants of health: N/A    Disposition considerations: Discharge. I prescribed additional prescription strength medication(s) as charted. See documentation for any additional details.      ED COURSE  4:12 PM Met and evaluated patient. Discussed ED plan.   4:49 PM Updated on results and plan. I discussed plans for discharge with the patient, which they were agreeable to. We discussed supportive cares at home and reasons for return to the ER including new or worsening symptoms. All questions and concerns were addressed. Patient to be discharged by RN.       MEDICATIONS GIVEN IN THE EMERGENCY:  Medications   valACYclovir (VALTREX) tablet 1,000 mg (1,000 mg Oral $Given 3/4/23 1719)   oxyCODONE (ROXICODONE) tablet 5 mg (5 mg Oral $Given 3/4/23 1719)       NEW PRESCRIPTIONS STARTED AT TODAY'S ER VISIT  Discharge Medication List as of 3/4/2023  5:20 PM      START taking these medications    Details   valACYclovir (VALTREX) 1000 mg tablet Take 1 tablet (1,000 mg) by mouth 3 times daily for 7 days, Disp-21 tablet, R-0, Local Print                =================================================================    HPI    Patient information was obtained from: Patient    Eveline Gage is a 27 year old female with a pertinent medical history of sickle cell disease, asthma, asplenia who presents to the ED for evaluation of a rash and back pain.    Patient reports that approximately 4 days ago she developed a rash on her back that wraps around to the right side of her abdomen. She states the rash itself is not painful, but notes that she has back pain at the location of the  rash (just deeper down). She endorses shortness of breath provoked with movement secondary to the back pain. She also endorses developing a cough recently as well as postnasal drip. She denies any recent nausea, vomiting, fever, chills, or any other complications at this time.    Patient endorses a PMH of sickle cell disease which she mentions she is prescribed hydroxyurea for. She endorses the hydroxyurea providing relief for her sickle cell disease associated symptoms. She also endorses taking Advil for sickle cell disease, but she notes Advil does not really provide any relief. She denies taking any chronic pain medications. She denies any change of pregnancy and mentions that she is currently on her menstrual period.     Reviewed outside records including oncology and f/u for her sickle cell disease.     Per Chart Review, patient was admitted to [facility] on 02/13/22 until 02/20/23 for sickle cell crisis. She was treated with IV fluid, pain meds and started on hydroxyurea 500 mg daily by hematology oncology. Of note, patient had elevated troponin.  She was evaluated by cardiology who recommended echocardiogram.  Echocardiogram without wall motion abnormality. Also, patient had elevated LFTs and bilirubin suggestive of hemolysis.  Evaluated by GI and underwent extensive work-up including Doppler ultrasound which was negative. Hepatitis panel, hemochromatosis and Daniel disease work-up both negative. Patient had an episode of fever on 2/16/2023 and given her history of asplenia she was started on empirically on IV cefepime. Blood cultures on 2/16 remained negative. Patient's pain significantly improved during admission.  Her mild leukocytosis resolved and there wasno clear source of infection. Hemoglobin has was stable around 7.5 over the last 48 hours. Patient was discharged in stable condition.    REVIEW OF SYSTEMS   See HPI, otherwise all other systems reviewed and are  negative    PAST MEDICAL HISTORY:  Past Medical History:   Diagnosis Date     Adjustment disorder with mixed anxiety and depressed mood 10/05/2017     Anemia, unspecified type 01/14/2023     Antibody to blood group S antigen positive     Patient has anti-C, anti-S, anti-Jkb, anti-M, and a nonspecific antibody. Blood product orders may be delayed. Draw THREE purple top tubes for all Type and Screen/Type and crossmatch orders- per [facility]     Asplenia 06/17/2021     Asthma 03/09/2017     Cannabis use disorder      Sickle cell disease (H) 06/15/2020     Somnolence 2/16/2023       PAST SURGICAL HISTORY:  Past Surgical History:   Procedure Laterality Date     CHOLECYSTECTOMY       ESOPHAGOSCOPY, GASTROSCOPY, DUODENOSCOPY (EGD), COMBINED N/A 01/15/2023    Procedure: ENDOSCOPIC ULTRASOUND;  Surgeon: Marv Tomas MD;  Location: [facility] OR     MIDLINE SINGLE LUMEN PLACEMENT  2/14/2023          SPLENECTOMY       TONSILLECTOMY             CURRENT MEDICATIONS:    No current facility-administered medications for this encounter.    Current Outpatient Medications:      oxyCODONE (ROXICODONE) 5 MG tablet, Take 1 tablet (5 mg) by mouth every 6 hours as needed for pain, Disp: 12 tablet, Rfl: 0     valACYclovir (VALTREX) 1000 mg tablet, Take 1 tablet (1,000 mg) by mouth 3 times daily for 7 days, Disp: 21 tablet, Rfl: 0     albuterol (PROAIR HFA/PROVENTIL HFA/VENTOLIN HFA) 108 (90 Base) MCG/ACT inhaler, Inhale 2 puffs into the lungs every 6 hours as needed for shortness of breath / dyspnea or wheezing, Disp: 18 g, Rfl: 1     cyclobenzaprine (FLEXERIL) 10 MG tablet, Take 1 tablet (10 mg) by mouth 3 times daily as needed for muscle spasms, Disp: 12 tablet, Rfl: 0     diclofenac (VOLTAREN) 1 % topical gel, Apply 2 g topically 4 times daily Apply as needed to chest, back, or other painful areas, Disp: 100 g, Rfl: 0     etonogestrel (NEXPLANON) 68 MG IMPL, 1 each by Subdermal route, Disp: , Rfl:      hydroxyurea (HYDREA)  500 MG capsule, Take 1 capsule (500 mg) by mouth daily, Disp: 90 capsule, Rfl: 0     ipratropium - albuterol 0.5 mg/2.5 mg/3 mL (DUONEB) 0.5-2.5 (3) MG/3ML neb solution, Take 1 vial (3 mLs) by nebulization every 6 hours as needed for shortness of breath / dyspnea or wheezing (Patient not taking: Reported on 2/23/2023), Disp: 90 mL, Rfl: 1     methocarbamol (ROBAXIN) 500 MG tablet, Take 1 tablet (500 mg) by mouth 4 times daily as needed for muscle spasms, Disp: 20 tablet, Rfl: 0     multivitamin, therapeutic (THERA-VIT) TABS tablet, Take 1 tablet by mouth daily, Disp: , Rfl:      naproxen sodium (ANAPROX) 220 MG tablet, Take 220-440 mg by mouth every 12 hours as needed for moderate pain (4-6) (Patient not taking: Reported on 2/23/2023), Disp: , Rfl:      omeprazole (PRILOSEC) 20 MG DR capsule, Take 1 capsule (20 mg) by mouth 2 times daily (Patient not taking: Reported on 2/23/2023), Disp: 40 capsule, Rfl: 0     sennosides (SENOKOT) 8.6 MG tablet, Take 2 tablets by mouth daily as needed for constipation (Patient not taking: Reported on 2/23/2023), Disp: 60 tablet, Rfl: 0     sucralfate (CARAFATE) 1 GM tablet, Take 1 tablet (1 g) by mouth 4 times daily as needed for nausea (stomach pain) (Patient not taking: Reported on 2/23/2023), Disp: 40 tablet, Rfl: 0     triamcinolone (KENALOG) 0.1 % external ointment, Apply topically 2 times daily, Disp: 80 g, Rfl: 1    ALLERGIES:  Allergies   Allergen Reactions     Blood-Group Specific Substance Other (See Comments)     Patient has anti-C, anti-S, anti-Jkb, anti-M, and a nonspecific antibody. Blood product orders may be delayed. Draw THREE purple top tubes for all Type and Screen/Type and crossmatch orders.         Penicillins Rash       FAMILY HISTORY:  Family History   Problem Relation Age of Onset     Sickle Cell Trait Mother      Hypertension Mother      Diabetes Father      Kidney Disease Father      Liver Disease Father      Sickle Cell Trait Maternal Grandfather       "Sickle Cell Trait Paternal Grandmother        VITALS:  Vitals:    03/04/23 1510 03/04/23 1724   BP: 139/88 127/65   Pulse: 91    Resp: 18    Temp: 97.4  F (36.3  C)    TempSrc: Temporal    SpO2: 100% 98%   Weight: 81.2 kg (179 lb)    Height: 1.6 m (5' 3\")        PHYSICAL EXAM    General Appearance:  Alert, cooperative, no distress, appears stated age  HENT: Normocephalic without obvious deformity, atraumatic. Mucous membranes moist   Eyes: Conjunctiva clear, Lids normal. No discharge.   Respiratory: No distress. Lungs clear to ausculation bilaterally. No wheezes, rhonchi or stridor  Cardiovascular: Regular rate and rhythm, no murmur. Normal cap refill. No peripheral edema  GI: Abdomen soft, nontender  Musculoskeletal: Moving all extremities. No gross deformities  Integument: Warm, dry.  Papular, vesicular rash in a dermatomal pattern over the right flank extending around the right side and under the right breast. No bullae or surrounding erythema or drainage  Neurologic: Alert and orientated x3. No focal deficits.  Psych: Normal mood and affect        LAB:  Labs Ordered and Resulted from Time of ED Arrival to Time of ED Departure - No data to display    RADIOLOGY:  Chest XR,  PA & LAT   Final Result   IMPRESSION: No change. Slight interstitial prominence but no focal pneumonia or pleural effusion. Heart size normal.          EKG:    None.    PROCEDURES:   None.      I, Feliberto Good, am serving as a scribe to document services personally performed by Yun Booker PA-C based on my observation and the provider's statements to me. I, Yun Booker PA-C attest that Feliberto Good is acting in a scribe capacity, has observed my performance of the services and has documented them in accordance with my direction.    Yun Booker PA-C   Emergency Medicine           Yun Booker PA-C  03/04/23 5782    "

## 2023-03-04 NOTE — DISCHARGE INSTRUCTIONS
Your symptoms are very consistent with shingles.  We are starting you on an antiviral to help with the rash and the pain.  However sometimes the pain from shingles can last several weeks.  It is very important to schedule a follow-up with your primary care doctor to check in next week and see how you are doing in regards to the pain.    Take the antiviral as prescribed.    For the pain I would recommend starting with the Tylenol and Aleve.  If needed I have also prescribed oxycodone for more severe pain.  This can make you drowsy and sleepy so do not take if you are driving or working.    If you have fevers, spreading of the rash specifically to other sites on the body, more redness or severe pain, return to the ER.

## 2023-03-14 ENCOUNTER — TELEPHONE (OUTPATIENT)
Dept: INTERNAL MEDICINE | Facility: CLINIC | Age: 28
End: 2023-03-14
Payer: COMMERCIAL

## 2023-03-14 NOTE — TELEPHONE ENCOUNTER
Patient Quality Outreach    Patient is due for the following:   Asthma  -  ACT needed    Next Steps:   Schedule a office visit for Asthma follow up & ACT     Type of outreach:    Phone, left message for patient/parent to call back.    Next Steps:  Reach out within 90 days via [employer].    Max number of attempts reached: No. Will try again in 90 days if patient still on fail list.    Questions for provider review:    None     Ree Gonzalez

## 2023-05-22 ENCOUNTER — ONCOLOGY VISIT (OUTPATIENT)
Dept: ONCOLOGY | Facility: CLINIC | Age: 28
End: 2023-05-22
Attending: INTERNAL MEDICINE
Payer: COMMERCIAL

## 2023-05-22 ENCOUNTER — LAB (OUTPATIENT)
Dept: INFUSION THERAPY | Facility: CLINIC | Age: 28
End: 2023-05-22
Attending: INTERNAL MEDICINE
Payer: COMMERCIAL

## 2023-05-22 VITALS
WEIGHT: 178.5 LBS | DIASTOLIC BLOOD PRESSURE: 80 MMHG | SYSTOLIC BLOOD PRESSURE: 128 MMHG | OXYGEN SATURATION: 100 % | HEART RATE: 95 BPM | BODY MASS INDEX: 31.63 KG/M2 | HEIGHT: 63 IN

## 2023-05-22 DIAGNOSIS — R74.01 ELEVATION OF LEVELS OF LIVER TRANSAMINASE LEVELS: ICD-10-CM

## 2023-05-22 DIAGNOSIS — D57.1 SICKLE CELL DISEASE WITHOUT CRISIS (H): Primary | ICD-10-CM

## 2023-05-22 DIAGNOSIS — J45.20 MILD INTERMITTENT ASTHMA WITHOUT COMPLICATION: ICD-10-CM

## 2023-05-22 DIAGNOSIS — Z79.899 HIGH RISK MEDICATION USE: ICD-10-CM

## 2023-05-22 DIAGNOSIS — R79.89 ELEVATED LIVER FUNCTION TESTS: ICD-10-CM

## 2023-05-22 DIAGNOSIS — D57.1 SICKLE CELL DISEASE WITHOUT CRISIS (H): ICD-10-CM

## 2023-05-22 LAB
ALBUMIN SERPL-MCNC: 3.9 G/DL (ref 3.5–5)
ALP SERPL-CCNC: 333 U/L (ref 45–120)
ALT SERPL W P-5'-P-CCNC: 29 U/L (ref 0–45)
ANION GAP SERPL CALCULATED.3IONS-SCNC: 6 MMOL/L (ref 5–18)
AST SERPL W P-5'-P-CCNC: 39 U/L (ref 0–40)
BASOPHILS # BLD MANUAL: 0.3 10E3/UL (ref 0–0.2)
BASOPHILS NFR BLD MANUAL: 2 %
BILIRUB SERPL-MCNC: 2 MG/DL (ref 0–1)
BUN SERPL-MCNC: 8 MG/DL (ref 8–22)
CALCIUM SERPL-MCNC: 9.5 MG/DL (ref 8.5–10.5)
CHLORIDE BLD-SCNC: 107 MMOL/L (ref 98–107)
CO2 SERPL-SCNC: 24 MMOL/L (ref 22–31)
CREAT SERPL-MCNC: 0.7 MG/DL (ref 0.6–1.1)
EOSINOPHIL # BLD MANUAL: 1 10E3/UL (ref 0–0.7)
EOSINOPHIL NFR BLD MANUAL: 7 %
ERYTHROCYTE [DISTWIDTH] IN BLOOD BY AUTOMATED COUNT: 14.9 % (ref 10–15)
GFR SERPL CREATININE-BSD FRML MDRD: >90 ML/MIN/1.73M2
GLUCOSE BLD-MCNC: 105 MG/DL (ref 70–125)
HCT VFR BLD AUTO: 30.2 % (ref 35–47)
HGB BLD-MCNC: 10.4 G/DL (ref 11.7–15.7)
LYMPHOCYTES # BLD MANUAL: 3.1 10E3/UL (ref 0.8–5.3)
LYMPHOCYTES NFR BLD MANUAL: 23 %
MCH RBC QN AUTO: 31.1 PG (ref 26.5–33)
MCHC RBC AUTO-ENTMCNC: 34.4 G/DL (ref 31.5–36.5)
MCV RBC AUTO: 90 FL (ref 78–100)
MONOCYTES # BLD MANUAL: 0.8 10E3/UL (ref 0–1.3)
MONOCYTES NFR BLD MANUAL: 6 %
NEUTROPHILS # BLD MANUAL: 8.4 10E3/UL (ref 1.6–8.3)
NEUTROPHILS NFR BLD MANUAL: 62 %
PLAT MORPH BLD: ABNORMAL
PLATELET # BLD AUTO: 399 10E3/UL (ref 150–450)
POLYCHROMASIA BLD QL SMEAR: SLIGHT
POTASSIUM BLD-SCNC: 3.7 MMOL/L (ref 3.5–5)
PROT SERPL-MCNC: 7.6 G/DL (ref 6–8)
RBC # BLD AUTO: 3.34 10E6/UL (ref 3.8–5.2)
RBC MORPH BLD: ABNORMAL
SICKLE CELLS BLD QL SMEAR: SLIGHT
SODIUM SERPL-SCNC: 137 MMOL/L (ref 136–145)
TARGETS BLD QL SMEAR: ABNORMAL
WBC # BLD AUTO: 13.6 10E3/UL (ref 4–11)

## 2023-05-22 PROCEDURE — 80053 COMPREHEN METABOLIC PANEL: CPT

## 2023-05-22 PROCEDURE — 36415 COLL VENOUS BLD VENIPUNCTURE: CPT

## 2023-05-22 PROCEDURE — 85014 HEMATOCRIT: CPT

## 2023-05-22 PROCEDURE — 85007 BL SMEAR W/DIFF WBC COUNT: CPT

## 2023-05-22 PROCEDURE — 99214 OFFICE O/P EST MOD 30 MIN: CPT | Performed by: INTERNAL MEDICINE

## 2023-05-22 PROCEDURE — G0463 HOSPITAL OUTPT CLINIC VISIT: HCPCS | Performed by: INTERNAL MEDICINE

## 2023-05-22 RX ORDER — ALBUTEROL SULFATE 90 UG/1
2 AEROSOL, METERED RESPIRATORY (INHALATION) EVERY 6 HOURS PRN
Qty: 18 G | Refills: 1 | Status: SHIPPED | OUTPATIENT
Start: 2023-05-22

## 2023-05-22 ASSESSMENT — PAIN SCALES - GENERAL: PAINLEVEL: NO PAIN (0)

## 2023-05-22 NOTE — PROGRESS NOTES
[facility]    Patient: Eveline Gage  MRN: [id number]  Date of Service: May 22, 2023        Assessment and Plan:    1.  Sickle cell disease: Had previously not been on Hydrea.  his was started during a hospital admission in February.  500 mg daily.  She is continuing to take it.  She is tolerating it quite well.  Were going to move up to 1000 mg daily.  She will take two 500 mg capsules until she is out and we will try to get her 1000 mg tablet.  We again reviewed the benefits of Hydrea in terms of symptom control and survival.  She has been doing well in terms of her pain lately.  No crises.  I reminded her to not get pregnant while she is on Hydrea.  I told her that if she thinks she ever might be pregnant she should immediately stop the Hydrea.  Additionally, I asked her to call the clinic if she ever feels like she is having a pain crisis come on as we would try to hydrate her and give her pain medications in our infusion center while trying to avoid an admission to the hospital.  Questions were answered.  We will see her back in clinic in 6 months.  I reviewed her CBC today which shows a white count of 13.6, hemoglobin 10.4, and platelets of 399,000.    2.  Elevated liver function test: These were followed in February without any obvious cause.  Probably vaso-occlusive crisis in the liver.  Liver function test from today reviewed and are improved with an alkaline phosphatase of 333, AST 39, ALT 29, bilirubin 2.0.    ECOG Performance  0    Diagnosis:    1.  Sickle cell disease: Confirmed by hemoglobin electrophoresis from February 2017.    Treatment:    Hydrea 500 mg daily initiated February 2023.    Interim History:    Eveline returns today for follow-up visit.  She was last seen in our clinic 3 months ago.  In the interim she has been doing okay.  She denies any problems with worsening pain.  Denies worsening fatigue, nausea, or skin rash or skin breakdown.   "Energy is okay.  No acute complaints today.    Review of Systems:    As above in the history.     Review of Systems otherwise Negative for:  General: chills, fever or night sweats  Psychological: anxiety or depression  Ophthalmic: blurry vision, double vision or loss of vision, vision change  ENT: epistaxis, oral lesions, hearing changes  Hematological and Lymphatic: bleeding, bruising, jaundice, swollen lymph nodes  Endocrine: hot flashes, unexpected weight changes  Respiratory: cough, hemoptysis, orthopnea or shortness of breath/LAWS  Cardiovascular: chest pain, edema, palpitations or PND  Gastrointestinal: abdominal pain, blood in stools, change in bowel habits, constipation  Genito-Urinary: change in urinary stream, incontinence, frequency/urgency  Musculoskeletal: joint swelling, muscle pain  Neurological: dizziness, headaches, numbness/tingling  Dermatological: lumps and rash    Past History:    Past Medical History:   Diagnosis Date     Adjustment disorder with mixed anxiety and depressed mood 10/05/2017     Anemia, unspecified type 01/14/2023     Antibody to blood group S antigen positive     Patient has anti-C, anti-S, anti-Jkb, anti-M, and a nonspecific antibody. Blood product orders may be delayed. Draw THREE purple top tubes for all Type and Screen/Type and crossmatch orders- [facility]     Asplenia 06/17/2021     Asthma 03/09/2017     Cannabis use disorder      Sickle cell disease (H) 06/15/2020     Somnolence 2/16/2023     Physical Exam:    /80 (BP Location: Right arm, Patient Position: Sitting, Cuff Size: Adult Regular)   Pulse 95   Ht 1.6 m (5' 2.99\")   Wt 81 kg (178 lb 8 oz)   SpO2 100%   BMI 31.63 kg/m      General: patient appears stated age of 27 year old. Nontoxic and in no distress.   HEENT: Head: atraumatic, normocephalic. Sclerae anicteric.  Chest:  Normal respiratory effort  Cardiac:  No edema.   Abdomen: abdomen is non-distended  Extremities: normal tone and muscle " bulk.  Skin: no lesions or rash on visible skin. Warm and dry.   CNS: alert and oriented. Grossly non-focal.   Psychiatric: normal mood and affect.     Lab Results:    Recent Results (from the past 168 hour(s))   Comprehensive metabolic panel   Result Value Ref Range    Sodium 137 136 - 145 mmol/L    Potassium 3.7 3.5 - 5.0 mmol/L    Chloride 107 98 - 107 mmol/L    Carbon Dioxide (CO2) 24 22 - 31 mmol/L    Anion Gap 6 5 - 18 mmol/L    Urea Nitrogen 8 8 - 22 mg/dL    Creatinine 0.70 0.60 - 1.10 mg/dL    Calcium 9.5 8.5 - 10.5 mg/dL    Glucose 105 70 - 125 mg/dL    Alkaline Phosphatase 333 (H) 45 - 120 U/L    AST 39 0 - 40 U/L    ALT 29 0 - 45 U/L    Protein Total 7.6 6.0 - 8.0 g/dL    Albumin 3.9 3.5 - 5.0 g/dL    Bilirubin Total 2.0 (H) 0.0 - 1.0 mg/dL    GFR Estimate >90 >60 mL/min/1.73m2     Imaging:    No results found.      Signed by: Joel Burroughs MD

## 2023-05-22 NOTE — PROGRESS NOTES
"Oncology Rooming Note    May 22, 2023 2:43 PM   Eveline Gage is a 27 year old female who presents for:    Chief Complaint   Patient presents with     Hematology     Sickle cell disease without crisis     Initial Vitals: Ht 1.6 m (5' 2.99\")   BMI 31.72 kg/m   Estimated body mass index is 31.72 kg/m  as calculated from the following:    Height as of this encounter: 1.6 m (5' 2.99\").    Weight as of 3/4/23: 81.2 kg (179 lb). Body surface area is 1.9 meters squared.  Data Unavailable Comment: Data Unavailable   No LMP recorded. Patient has had an implant.  Allergies reviewed: Yes  Medications reviewed: Yes    Medications: MEDICATION REFILLS NEEDED TODAY. Provider was notified.  Pharmacy name entered into [employer]: [id number] - [place], [place] - [address]    Clinical concerns: follow up with labs    Britney Waterman MA            "

## 2023-05-22 NOTE — LETTER
"    5/22/2023         RE: Eveline Gage  [address]  [place] [facility] [address]        Dear Colleague,    Thank you for referring your patient, Eveline Gage, to the [facility]. Please see a copy of my visit note below.    Oncology Rooming Note    May 22, 2023 2:43 PM   Eveline Gage is a 27 year old female who presents for:    Chief Complaint   Patient presents with     Hematology     Sickle cell disease without crisis     Initial Vitals: Ht 1.6 m (5' 2.99\")   BMI 31.72 kg/m   Estimated body mass index is 31.72 kg/m  as calculated from the following:    Height as of this encounter: 1.6 m (5' 2.99\").    Weight as of 3/4/23: 81.2 kg (179 lb). Body surface area is 1.9 meters squared.  Data Unavailable Comment: Data Unavailable   No LMP recorded. Patient has had an implant.  Allergies reviewed: Yes  Medications reviewed: Yes    Medications: MEDICATION REFILLS NEEDED TODAY. Provider was notified.  Pharmacy name entered into [email or web address]: [facility][id number] - [place], [place] - [address]    Clinical concerns: follow up with kendrick Waterman MA              [facility] Hematology and Oncology Progress Note    Patient: Eveline Gage  MRN: [id number]  Date of Service: May 22, 2023        Assessment and Plan:    1.  Sickle cell disease: Had previously not been on Hydrea.  his was started during a hospital admission in February.  500 mg daily.  She is continuing to take it.  She is tolerating it quite well.  Were going to move up to 1000 mg daily.  She will take two 500 mg capsules until she is out and we will try to get her 1000 mg tablet.  We again reviewed the benefits of Hydrea in terms of symptom control and survival.  She has been doing well in terms of her pain lately.  No crises.  I reminded her to not get pregnant while she is on Hydrea.  I told her that if she thinks she ever might be pregnant she should immediately stop the Hydrea.  " Additionally, I asked her to call the clinic if she ever feels like she is having a pain crisis come on as we would try to hydrate her and give her pain medications in our infusion center while trying to avoid an admission to the hospital.  Questions were answered.  We will see her back in clinic in 6 months.  I reviewed her CBC today which shows a white count of 13.6, hemoglobin 10.4, and platelets of 399,000.    2.  Elevated liver function test: These were followed in February without any obvious cause.  Probably vaso-occlusive crisis in the liver.  Liver function test from today reviewed and are improved with an alkaline phosphatase of 333, AST 39, ALT 29, bilirubin 2.0.    ECOG Performance  0    Diagnosis:    1.  Sickle cell disease: Confirmed by hemoglobin electrophoresis from February 2017.    Treatment:    Hydrea 500 mg daily initiated February 2023.    Interim History:    Eveline returns today for follow-up visit.  She was last seen in our clinic 3 months ago.  In the interim she has been doing okay.  She denies any problems with worsening pain.  Denies worsening fatigue, nausea, or skin rash or skin breakdown.  Energy is okay.  No acute complaints today.    Review of Systems:    As above in the history.     Review of Systems otherwise Negative for:  General: chills, fever or night sweats  Psychological: anxiety or depression  Ophthalmic: blurry vision, double vision or loss of vision, vision change  ENT: epistaxis, oral lesions, hearing changes  Hematological and Lymphatic: bleeding, bruising, jaundice, swollen lymph nodes  Endocrine: hot flashes, unexpected weight changes  Respiratory: cough, hemoptysis, orthopnea or shortness of breath/LAWS  Cardiovascular: chest pain, edema, palpitations or PND  Gastrointestinal: abdominal pain, blood in stools, change in bowel habits, constipation  Genito-Urinary: change in urinary stream, incontinence, frequency/urgency  Musculoskeletal: joint swelling, muscle  "pain  Neurological: dizziness, headaches, numbness/tingling  Dermatological: lumps and rash    Past History:    Past Medical History:   Diagnosis Date     Adjustment disorder with mixed anxiety and depressed mood 10/05/2017     Anemia, unspecified type 01/14/2023     Antibody to blood group S antigen positive     Patient has anti-C, anti-S, anti-Jkb, anti-M, and a nonspecific antibody. Blood product orders may be delayed. Draw THREE purple top tubes for all Type and Screen/Type and crossmatch orders- [facility]     Asplenia 06/17/2021     Asthma 03/09/2017     Cannabis use disorder      Sickle cell disease (H) 06/15/2020     Somnolence 2/16/2023     Physical Exam:    /80 (BP Location: Right arm, Patient Position: Sitting, Cuff Size: Adult Regular)   Pulse 95   Ht 1.6 m (5' 2.99\")   Wt 81 kg (178 lb 8 oz)   SpO2 100%   BMI 31.63 kg/m      General: patient appears stated age of 27 year old. Nontoxic and in no distress.   HEENT: Head: atraumatic, normocephalic. Sclerae anicteric.  Chest:  Normal respiratory effort  Cardiac:  No edema.   Abdomen: abdomen is non-distended  Extremities: normal tone and muscle bulk.  Skin: no lesions or rash on visible skin. Warm and dry.   CNS: alert and oriented. Grossly non-focal.   Psychiatric: normal mood and affect.     Lab Results:    Recent Results (from the past 168 hour(s))   Comprehensive metabolic panel   Result Value Ref Range    Sodium 137 136 - 145 mmol/L    Potassium 3.7 3.5 - 5.0 mmol/L    Chloride 107 98 - 107 mmol/L    Carbon Dioxide (CO2) 24 22 - 31 mmol/L    Anion Gap 6 5 - 18 mmol/L    Urea Nitrogen 8 8 - 22 mg/dL    Creatinine 0.70 0.60 - 1.10 mg/dL    Calcium 9.5 8.5 - 10.5 mg/dL    Glucose 105 70 - 125 mg/dL    Alkaline Phosphatase 333 (H) 45 - 120 U/L    AST 39 0 - 40 U/L    ALT 29 0 - 45 U/L    Protein Total 7.6 6.0 - 8.0 g/dL    Albumin 3.9 3.5 - 5.0 g/dL    Bilirubin Total 2.0 (H) 0.0 - 1.0 mg/dL    GFR Estimate >90 >60 mL/min/1.73m2 "     Imaging:    No results found.      Signed by: Joel Burroughs MD        Again, thank you for allowing me to participate in the care of your patient.        Sincerely,        Joel Burroughs MD

## 2023-08-08 ENCOUNTER — TELEPHONE (OUTPATIENT)
Dept: SLEEP MEDICINE | Facility: CLINIC | Age: 28
End: 2023-08-08
Payer: COMMERCIAL

## 2023-08-08 DIAGNOSIS — D57.00 SICKLE CELL PAIN CRISIS (H): ICD-10-CM

## 2023-08-08 NOTE — TELEPHONE ENCOUNTER
Per last office visit note patient is taking 1000 mg daily.    Last Written Prescription Date:  2/28/23  Last Fill Quantity: 90,  # refills: 0   Last office visit provider:  5/22/23 with Dr. Burroughs     Requested Prescriptions   Pending Prescriptions Disp Refills    hydroxyurea (HYDREA) 500 MG capsule 90 capsule 0     Sig: Take 1 capsule (500 mg) by mouth daily       There is no refill protocol information for this order          Lashaun Lima RN 08/08/23 10:49 AM

## 2023-08-08 NOTE — TELEPHONE ENCOUNTER
Medication Question or Refill        What medication are you calling about (include dose and sig)?: hydroxyurea    Preferred Pharmacy:   [facility] #[id number] - [place], [place] - [address]  [address]  [place] [address]  Phone: [phone] Fax: [phone]      Controlled Substance Agreement on file:   CSA -- Patient Level:    CSA: None found at the patient level.       Who prescribed the medication?: Shabbir Samuel MD    Do you need a refill? Yes    When did you use the medication last? 8/15    Patient offered an appointment? No    Do you have any questions or concerns?  No      Could we send this information to you in [facility] or would you prefer to receive a phone call?:   Patient would prefer a phone call   Okay to leave a detailed message?: Yes at Cell number on file:    Telephone Information:   Mobile [phone]

## 2023-08-09 RX ORDER — HYDROXYUREA 500 MG/1
500 CAPSULE ORAL DAILY
Qty: 90 CAPSULE | Refills: 0 | Status: SHIPPED | OUTPATIENT
Start: 2023-08-09 | End: 2024-03-05

## 2023-09-18 PROBLEM — R19.7 DIARRHEA: Status: RESOLVED | Noted: 2018-03-12 | Resolved: 2023-09-18

## 2023-10-21 ENCOUNTER — HEALTH MAINTENANCE LETTER (OUTPATIENT)
Age: 28
End: 2023-10-21

## 2024-03-05 ENCOUNTER — ONCOLOGY VISIT (OUTPATIENT)
Dept: ONCOLOGY | Facility: HOSPITAL | Age: 29
End: 2024-03-05
Attending: INTERNAL MEDICINE
Payer: COMMERCIAL

## 2024-03-05 ENCOUNTER — LAB (OUTPATIENT)
Dept: INFUSION THERAPY | Facility: HOSPITAL | Age: 29
End: 2024-03-05
Attending: INTERNAL MEDICINE
Payer: COMMERCIAL

## 2024-03-05 VITALS
BODY MASS INDEX: 32.78 KG/M2 | RESPIRATION RATE: 20 BRPM | OXYGEN SATURATION: 97 % | HEART RATE: 90 BPM | WEIGHT: 185 LBS | HEIGHT: 63 IN | DIASTOLIC BLOOD PRESSURE: 85 MMHG | SYSTOLIC BLOOD PRESSURE: 140 MMHG

## 2024-03-05 DIAGNOSIS — R10.13 ABDOMINAL PAIN, EPIGASTRIC: ICD-10-CM

## 2024-03-05 DIAGNOSIS — D57.1 SICKLE CELL DISEASE WITHOUT CRISIS (H): Primary | ICD-10-CM

## 2024-03-05 DIAGNOSIS — Z79.899 HIGH RISK MEDICATION USE: ICD-10-CM

## 2024-03-05 DIAGNOSIS — D57.00 SICKLE CELL PAIN CRISIS (H): ICD-10-CM

## 2024-03-05 DIAGNOSIS — R79.89 ELEVATED LIVER FUNCTION TESTS: ICD-10-CM

## 2024-03-05 LAB
ACANTHOCYTES BLD QL SMEAR: ABNORMAL
ALBUMIN SERPL BCG-MCNC: 4.3 G/DL (ref 3.5–5.2)
ALP SERPL-CCNC: 157 U/L (ref 40–150)
ALT SERPL W P-5'-P-CCNC: 22 U/L (ref 0–50)
ANION GAP SERPL CALCULATED.3IONS-SCNC: 12 MMOL/L (ref 7–15)
AST SERPL W P-5'-P-CCNC: 36 U/L (ref 0–45)
AUER BODIES BLD QL SMEAR: ABNORMAL
BASO STIPL BLD QL SMEAR: ABNORMAL
BASOPHILS # BLD AUTO: 0.1 10E3/UL (ref 0–0.2)
BASOPHILS NFR BLD AUTO: 0 %
BILIRUB SERPL-MCNC: 1.8 MG/DL
BITE CELLS BLD QL SMEAR: ABNORMAL
BLISTER CELLS BLD QL SMEAR: ABNORMAL
BUN SERPL-MCNC: 7.2 MG/DL (ref 6–20)
BURR CELLS BLD QL SMEAR: ABNORMAL
CALCIUM SERPL-MCNC: 9.1 MG/DL (ref 8.6–10)
CHLORIDE SERPL-SCNC: 107 MMOL/L (ref 98–107)
CREAT SERPL-MCNC: 0.56 MG/DL (ref 0.51–0.95)
DACRYOCYTES BLD QL SMEAR: ABNORMAL
DEPRECATED HCO3 PLAS-SCNC: 22 MMOL/L (ref 22–29)
EGFRCR SERPLBLD CKD-EPI 2021: >90 ML/MIN/1.73M2
ELLIPTOCYTES BLD QL SMEAR: ABNORMAL
EOSINOPHIL # BLD AUTO: 0.6 10E3/UL (ref 0–0.7)
EOSINOPHIL NFR BLD AUTO: 4 %
ERYTHROCYTE [DISTWIDTH] IN BLOOD BY AUTOMATED COUNT: 15.5 % (ref 10–15)
FRAGMENTS BLD QL SMEAR: ABNORMAL
GIANT PLATELETS BLD QL SMEAR: ABNORMAL
GLUCOSE SERPL-MCNC: 106 MG/DL (ref 70–99)
HCT VFR BLD AUTO: 29.4 % (ref 35–47)
HGB BLD-MCNC: 9.7 G/DL (ref 11.7–15.7)
HGB C CRYSTALS: ABNORMAL
HOWELL-JOLLY BOD BLD QL SMEAR: ABNORMAL
IMM GRANULOCYTES # BLD: 0.1 10E3/UL
IMM GRANULOCYTES NFR BLD: 1 %
LYMPHOCYTES # BLD AUTO: 4.4 10E3/UL (ref 0–5.3)
LYMPHOCYTES NFR BLD AUTO: 28 %
MCH RBC QN AUTO: 29.5 PG (ref 26.5–33)
MCHC RBC AUTO-ENTMCNC: 33 G/DL (ref 31.5–36.5)
MCV RBC AUTO: 89 FL (ref 78–100)
MONOCYTES # BLD AUTO: 0.9 10E3/UL (ref 0–1.3)
MONOCYTES NFR BLD AUTO: 6 %
NEUTROPHILS # BLD AUTO: 9.7 10E3/UL (ref 1.6–8.3)
NEUTROPHILS NFR BLD AUTO: 62 %
NEUTS HYPERSEG BLD QL SMEAR: ABNORMAL
NRBC # BLD AUTO: 0 10E3/UL
NRBC BLD AUTO-RTO: 0 /100
PATH REV: ABNORMAL
PLAT MORPH BLD: ABNORMAL
PLATELET # BLD AUTO: 352 10E3/UL (ref 150–450)
POLYCHROMASIA BLD QL SMEAR: SLIGHT
POTASSIUM SERPL-SCNC: 3.3 MMOL/L (ref 3.4–5.3)
PROT SERPL-MCNC: 7.4 G/DL (ref 6.4–8.3)
RBC # BLD AUTO: 3.29 10E6/UL (ref 3.8–5.2)
RBC AGGLUT BLD QL: ABNORMAL
RBC MORPH BLD: ABNORMAL
ROULEAUX BLD QL SMEAR: ABNORMAL
SICKLE CELLS BLD QL SMEAR: ABNORMAL
SMUDGE CELLS BLD QL SMEAR: ABNORMAL
SODIUM SERPL-SCNC: 141 MMOL/L (ref 135–145)
SPHEROCYTES BLD QL SMEAR: ABNORMAL
STOMATOCYTES BLD QL SMEAR: ABNORMAL
TARGETS BLD QL SMEAR: ABNORMAL
TOXIC GRANULES BLD QL SMEAR: ABNORMAL
VARIANT LYMPHS BLD QL SMEAR: ABNORMAL
WBC # BLD AUTO: 15.7 10E3/UL (ref 4–11)

## 2024-03-05 PROCEDURE — G0463 HOSPITAL OUTPT CLINIC VISIT: HCPCS | Performed by: NURSE PRACTITIONER

## 2024-03-05 PROCEDURE — G2211 COMPLEX E/M VISIT ADD ON: HCPCS | Performed by: NURSE PRACTITIONER

## 2024-03-05 PROCEDURE — 99214 OFFICE O/P EST MOD 30 MIN: CPT | Performed by: NURSE PRACTITIONER

## 2024-03-05 PROCEDURE — 85025 COMPLETE CBC W/AUTO DIFF WBC: CPT | Performed by: NURSE PRACTITIONER

## 2024-03-05 PROCEDURE — 36415 COLL VENOUS BLD VENIPUNCTURE: CPT | Performed by: NURSE PRACTITIONER

## 2024-03-05 PROCEDURE — 80053 COMPREHEN METABOLIC PANEL: CPT | Performed by: NURSE PRACTITIONER

## 2024-03-05 RX ORDER — SUCRALFATE 1 G/1
1 TABLET ORAL 4 TIMES DAILY PRN
Qty: 40 TABLET | Refills: 2 | Status: SHIPPED | OUTPATIENT
Start: 2024-03-05

## 2024-03-05 RX ORDER — HYDROXYUREA 500 MG/1
1000 CAPSULE ORAL DAILY
Qty: 180 CAPSULE | Refills: 3 | Status: SHIPPED | OUTPATIENT
Start: 2024-03-05

## 2024-03-05 ASSESSMENT — PAIN SCALES - GENERAL: PAINLEVEL: MILD PAIN (3)

## 2024-03-05 NOTE — LETTER
3/5/2024         RE: Eveline Gage  [address]  [place] [address]        Dear Colleague,    Thank you for referring your patient, Eveline Gage, to the [facility] [place]. Please see a copy of my visit note below.    [facility] Hematology and Oncology Progress Note    Patient: Eveline Gage  MRN: [id number]  Date of Service: 03/05/2024        Reason for Visit    Chief Complaint   Patient presents with     Hematology     Sickle cell disease without crisis       Assessment and Plan     Cancer Staging   No matching staging information was found for the patient.    1.  Sickle cell disease: Patient was started on Hydrea in February 2023.  She is been tolerating it pretty well for the most part.  She generally takes 1 tablet a day but in the winter she did take 2 tablets a day and it did seem to help her pain.  At this time she will continue to take 1 or 2 tablets a day based on how she is feeling.  Return in 6 months with repeat labs.    2.  Slight nausea: Patient has noticed that when she takes 2 tablets she does have nausea for 5 to 10 minutes afterwards.  Encouraged her to either split the dose or take it with food.    3.  Elevated liver function testing: This alk phos and bilirubin has been elevated in the past as well as AST and ALT intermittently.  This is still pending from today.  Call her if the results are abnormal or if she needs to do anything different.  Will continue to monitor    ECOG Performance    0 - Independent    Distress Screening (within last 30 days)    No data recorded     Pain  Pain Score: Mild Pain (3)  Pain Loc: Lower Leg    Problem List    Patient Active Problem List   Diagnosis     Adjustment disorder with mixed anxiety and depressed mood     Asthma     Elevated liver enzymes     Sickle cell disease (H)     Abnormal antibody titer     Anemia complicating pregnancy     Asplenia     Blood typing encounter     Drug use during  pregnancy     Immune to rubella     Iron deficiency anemia     Need for hepatitis B vaccination     Screening for malignant neoplasm of cervix     SGA (small for gestational age)     Abdominal pain, epigastric     Sickle cell pain crisis (H)     Antibody to blood group S antigen positive     Elevated LFTs     Anxiety     Acute hypoxemic respiratory failure (H)     Continuous cannabis use     Smokes tobacco daily     History of cholecystectomy     History of splenectomy     Somnolence        ______________________________________________________________________________    History of Present Illness    Hematologist: Dr. Burroughs    Diagnosis:     1.  Sickle cell disease: Confirmed by hemoglobin electrophoresis from February 2017.     Treatment:     Hydrea 500 mg daily initiated February 2023. Does take 1000mg daily in the winter when pain is worse.      Interim History:  Is here today for follow-up visit.  She was last seen May.  She has been doing well since then.  She says since she started the Hydrea her pain has been much better and she is happy with that.  She did say that Dr. Burroughs told her to take 2 tablets in the winter when her pain seems to be worse so she has been doing that and it does seem to be helping.  She says she does sometimes have some mild nausea when she takes 2 tablets but it goes away after 5 to 10 minutes.  She takes it on empty stomach.  She denies any new bone or back pain.  Just has her chronic pain      Review of Systems    Pertinent items are noted in HPI.    Past History    Past Medical History:   Diagnosis Date     Adjustment disorder with mixed anxiety and depressed mood 10/05/2017     Anemia, unspecified type 01/14/2023     Antibody to blood group S antigen positive     Patient has anti-C, anti-S, anti-Jkb, anti-M, and a nonspecific antibody. Blood product orders may be delayed. Draw THREE purple top tubes for all Type and Screen/Type and crossmatch orders- per [facility]      "Asplenia 06/17/2021     Asthma 03/09/2017     Cannabis use disorder      Diarrhea 03/12/2018     Postpartum mood disturbance 10/05/2017     Sickle cell disease (H) 06/15/2020     Somnolence 02/16/2023       PHYSICAL EXAM  BP (!) 140/85 (BP Location: Right arm, Patient Position: Sitting, Cuff Size: Adult Regular)   Pulse 90   Resp 20   Ht 1.6 m (5' 2.99\")   Wt 83.9 kg (185 lb)   SpO2 97%   BMI 32.78 kg/m      GENERAL: no acute distress. Cooperative in conversation. Alone in clinic  RESP: Regular respiratory rate. No expiratory wheezes   NEURO: non focal. Alert and oriented x3.   PSYCH: within normal limits. No depression or anxiety.  SKIN: exposed skin is dry intact.     Lab Results    No results found for this or any previous visit (from the past 168 hour(s)).  CBC, CMP pending    Imaging    No results found.    The longitudinal plan of care for the diagnosis(es)/condition(s) as documented were addressed during this visit. Due to the added complexity in care, I will continue to support Eveline in the subsequent management and with ongoing continuity of care.    Signed by: CHARLES Pablo CNP    Oncology Rooming Note    March 5, 2024 2:47 PM   Eveline Gage is a 28 year old female who presents for:    Chief Complaint   Patient presents with     Hematology     Sickle cell disease without crisis     Initial Vitals: BP (!) 140/85 (BP Location: Right arm, Patient Position: Sitting, Cuff Size: Adult Regular)   Pulse 90   Resp 20   Ht 1.6 m (5' 2.99\")   Wt 83.9 kg (185 lb)   SpO2 97%   BMI 32.78 kg/m   Estimated body mass index is 32.78 kg/m  as calculated from the following:    Height as of this encounter: 1.6 m (5' 2.99\").    Weight as of this encounter: 83.9 kg (185 lb). Body surface area is 1.93 meters squared.  Mild Pain (3) Comment: Data Unavailable   No LMP recorded. Patient has had an implant.  Allergies reviewed: Yes  Medications reviewed: Yes    Medications: MEDICATION REFILLS NEEDED " TODAY. Provider was notified.  Pharmacy name entered into [employer]: [facility] #[id number] - [place], [place] - [address]    Frailty Screening:   Is the patient here for a new oncology consult visit in cancer care? 2. No      Clinical concerns: Need refill on Hydrea and Carafate meds.       Sis Peralta MA                Again, thank you for allowing me to participate in the care of your patient.        Sincerely,        CHARLES Pablo CNP

## 2024-03-05 NOTE — PROGRESS NOTES
[facility]ology [facility] Progress Note    Patient: Eveline Gage  MRN: [id number]  Date of Service: 03/05/2024        Reason for Visit    Chief Complaint   Patient presents with    Hematology     Sickle cell disease without crisis       Assessment and Plan     Cancer Staging   No matching staging information was found for the patient.    1.  Sickle cell disease: Patient was started on Hydrea in February 2023.  She is been tolerating it pretty well for the most part.  She generally takes 1 tablet a day but in the winter she did take 2 tablets a day and it did seem to help her pain.  At this time she will continue to take 1 or 2 tablets a day based on how she is feeling.  Return in 6 months with repeat labs.    2.  Slight nausea: Patient has noticed that when she takes 2 tablets she does have nausea for 5 to 10 minutes afterwards.  Encouraged her to either split the dose or take it with food.    3.  Elevated liver function testing: This alk phos and bilirubin has been elevated in the past as well as AST and ALT intermittently.  This is still pending from today.  Call her if the results are abnormal or if she needs to do anything different.  Will continue to monitor    ECOG Performance    0 - Independent    Distress Screening (within last 30 days)    No data recorded     Pain  Pain Score: Mild Pain (3)  Pain Loc: Lower Leg    Problem List    Patient Active Problem List   Diagnosis    Adjustment disorder with mixed anxiety and depressed mood    Asthma    Elevated liver enzymes    Sickle cell disease (H)    Abnormal antibody titer    Anemia complicating pregnancy    Asplenia    Blood typing encounter    Drug use during pregnancy    Immune to rubella    Iron deficiency anemia    Need for hepatitis B vaccination    Screening for malignant neoplasm of cervix    SGA (small for gestational age)    Abdominal pain, epigastric    Sickle cell pain crisis (H)    Antibody to blood group S antigen positive     Elevated LFTs    Anxiety    Acute hypoxemic respiratory failure (H)    Continuous cannabis use    Smokes tobacco daily    History of cholecystectomy    History of splenectomy    Somnolence        ______________________________________________________________________________    History of Present Illness    Hematologist: Dr. Burroughs    Diagnosis:     1.  Sickle cell disease: Confirmed by hemoglobin electrophoresis from February 2017.     Treatment:     Hydrea 500 mg daily initiated February 2023. Does take 1000mg daily in the winter when pain is worse.      Interim History:  Is here today for follow-up visit.  She was last seen May.  She has been doing well since then.  She says since she started the Hydrea her pain has been much better and she is happy with that.  She did say that Dr. Burroughs told her to take 2 tablets in the winter when her pain seems to be worse so she has been doing that and it does seem to be helping.  She says she does sometimes have some mild nausea when she takes 2 tablets but it goes away after 5 to 10 minutes.  She takes it on empty stomach.  She denies any new bone or back pain.  Just has her chronic pain      Review of Systems    Pertinent items are noted in HPI.    Past History    Past Medical History:   Diagnosis Date    Adjustment disorder with mixed anxiety and depressed mood 10/05/2017    Anemia, unspecified type 01/14/2023    Antibody to blood group S antigen positive     Patient has anti-C, anti-S, anti-Jkb, anti-M, and a nonspecific antibody. Blood product orders may be delayed. Draw THREE purple top tubes for all Type and Screen/Type and crossmatch orders- [facility]    Asplenia 06/17/2021    Asthma 03/09/2017    Cannabis use disorder     Diarrhea 03/12/2018    Postpartum mood disturbance 10/05/2017    Sickle cell disease (H) 06/15/2020    Somnolence 02/16/2023       PHYSICAL EXAM  BP (!) 140/85 (BP Location: Right arm, Patient Position: Sitting, Cuff Size: Adult Regular)   " Pulse 90   Resp 20   Ht 1.6 m (5' 2.99\")   Wt 83.9 kg (185 lb)   SpO2 97%   BMI 32.78 kg/m      GENERAL: no acute distress. Cooperative in conversation. Alone in clinic  RESP: Regular respiratory rate. No expiratory wheezes   NEURO: non focal. Alert and oriented x3.   PSYCH: within normal limits. No depression or anxiety.  SKIN: exposed skin is dry intact.     Lab Results    No results found for this or any previous visit (from the past 168 hour(s)).  CBC, CMP pending    Imaging    No results found.    The longitudinal plan of care for the diagnosis(es)/condition(s) as documented were addressed during this visit. Due to the added complexity in care, I will continue to support Eveline in the subsequent management and with ongoing continuity of care.    Signed by: CHARLES Pablo CNP  "

## 2024-03-05 NOTE — PROGRESS NOTES
"Oncology Rooming Note    March 5, 2024 2:47 PM   Eveline Gage is a 28 year old female who presents for:    Chief Complaint   Patient presents with    Hematology     Sickle cell disease without crisis     Initial Vitals: BP (!) 140/85 (BP Location: Right arm, Patient Position: Sitting, Cuff Size: Adult Regular)   Pulse 90   Resp 20   Ht 1.6 m (5' 2.99\")   Wt 83.9 kg (185 lb)   SpO2 97%   BMI 32.78 kg/m   Estimated body mass index is 32.78 kg/m  as calculated from the following:    Height as of this encounter: 1.6 m (5' 2.99\").    Weight as of this encounter: 83.9 kg (185 lb). Body surface area is 1.93 meters squared.  Mild Pain (3) Comment: Data Unavailable   No LMP recorded. Patient has had an implant.  Allergies reviewed: Yes  Medications reviewed: Yes    Medications: MEDICATION REFILLS NEEDED TODAY. Provider was notified.  Pharmacy name entered into [employer]: [id number] - [place], [place] - [address]    Frailty Screening:   Is the patient here for a new oncology consult visit in cancer care? 2. No      Clinical concerns: Need refill on Hydrea and Carafate meds.       Sis Peralta MA              "

## 2024-04-09 ENCOUNTER — OFFICE VISIT (OUTPATIENT)
Dept: FAMILY MEDICINE | Facility: CLINIC | Age: 29
End: 2024-04-09
Payer: COMMERCIAL

## 2024-04-09 VITALS
HEIGHT: 62 IN | HEART RATE: 95 BPM | WEIGHT: 182.6 LBS | SYSTOLIC BLOOD PRESSURE: 122 MMHG | RESPIRATION RATE: 20 BRPM | DIASTOLIC BLOOD PRESSURE: 70 MMHG | OXYGEN SATURATION: 98 % | TEMPERATURE: 98.5 F | BODY MASS INDEX: 33.6 KG/M2

## 2024-04-09 DIAGNOSIS — Z23 IMMUNIZATION DUE: ICD-10-CM

## 2024-04-09 DIAGNOSIS — Q89.01 ASPLENIA: ICD-10-CM

## 2024-04-09 DIAGNOSIS — R74.8 ELEVATED LIVER ENZYMES: ICD-10-CM

## 2024-04-09 DIAGNOSIS — R07.9 ACUTE CHEST PAIN: ICD-10-CM

## 2024-04-09 DIAGNOSIS — Z11.4 SCREENING FOR HIV (HUMAN IMMUNODEFICIENCY VIRUS): ICD-10-CM

## 2024-04-09 DIAGNOSIS — F43.23 ADJUSTMENT DISORDER WITH MIXED ANXIETY AND DEPRESSED MOOD: Chronic | ICD-10-CM

## 2024-04-09 DIAGNOSIS — Z13.220 SCREENING FOR HYPERLIPIDEMIA: ICD-10-CM

## 2024-04-09 DIAGNOSIS — D57.1 SICKLE CELL DISEASE WITHOUT CRISIS (H): ICD-10-CM

## 2024-04-09 DIAGNOSIS — K21.9 GASTROESOPHAGEAL REFLUX DISEASE WITHOUT ESOPHAGITIS: ICD-10-CM

## 2024-04-09 DIAGNOSIS — Z00.00 ROUTINE HISTORY AND PHYSICAL EXAMINATION OF ADULT: Primary | ICD-10-CM

## 2024-04-09 DIAGNOSIS — Z13.1 SCREENING FOR DIABETES MELLITUS: ICD-10-CM

## 2024-04-09 DIAGNOSIS — Z12.4 CERVICAL CANCER SCREENING: ICD-10-CM

## 2024-04-09 DIAGNOSIS — R10.9 FLANK PAIN: ICD-10-CM

## 2024-04-09 LAB
CHOLEST SERPL-MCNC: 119 MG/DL
FASTING STATUS PATIENT QL REPORTED: ABNORMAL
HBA1C MFR BLD: 4.3 % (ref 0–5.6)
HDLC SERPL-MCNC: 43 MG/DL
HIV 1+2 AB+HIV1 P24 AG SERPL QL IA: NONREACTIVE
LDLC SERPL CALC-MCNC: 60 MG/DL
NONHDLC SERPL-MCNC: 76 MG/DL
TRIGL SERPL-MCNC: 80 MG/DL

## 2024-04-09 PROCEDURE — 36415 COLL VENOUS BLD VENIPUNCTURE: CPT

## 2024-04-09 PROCEDURE — 90471 IMMUNIZATION ADMIN: CPT

## 2024-04-09 PROCEDURE — 99395 PREV VISIT EST AGE 18-39: CPT | Mod: 25

## 2024-04-09 PROCEDURE — 87624 HPV HI-RISK TYP POOLED RSLT: CPT

## 2024-04-09 PROCEDURE — 90619 MENACWY-TT VACCINE IM: CPT

## 2024-04-09 PROCEDURE — 87389 HIV-1 AG W/HIV-1&-2 AB AG IA: CPT

## 2024-04-09 PROCEDURE — 90677 PCV20 VACCINE IM: CPT

## 2024-04-09 PROCEDURE — 90472 IMMUNIZATION ADMIN EACH ADD: CPT

## 2024-04-09 PROCEDURE — 99213 OFFICE O/P EST LOW 20 MIN: CPT | Mod: 25

## 2024-04-09 PROCEDURE — G0145 SCR C/V CYTO,THINLAYER,RESCR: HCPCS

## 2024-04-09 PROCEDURE — 80061 LIPID PANEL: CPT

## 2024-04-09 PROCEDURE — 83036 HEMOGLOBIN GLYCOSYLATED A1C: CPT

## 2024-04-09 PROCEDURE — 90686 IIV4 VACC NO PRSV 0.5 ML IM: CPT

## 2024-04-09 SDOH — HEALTH STABILITY: PHYSICAL HEALTH: ON AVERAGE, HOW MANY DAYS PER WEEK DO YOU ENGAGE IN MODERATE TO STRENUOUS EXERCISE (LIKE A BRISK WALK)?: 0 DAYS

## 2024-04-09 SDOH — HEALTH STABILITY: PHYSICAL HEALTH: ON AVERAGE, HOW MANY MINUTES DO YOU ENGAGE IN EXERCISE AT THIS LEVEL?: 20 MIN

## 2024-04-09 ASSESSMENT — ANXIETY QUESTIONNAIRES
1. FEELING NERVOUS, ANXIOUS, OR ON EDGE: NOT AT ALL
GAD7 TOTAL SCORE: 3
6. BECOMING EASILY ANNOYED OR IRRITABLE: NOT AT ALL
7. FEELING AFRAID AS IF SOMETHING AWFUL MIGHT HAPPEN: NOT AT ALL
5. BEING SO RESTLESS THAT IT IS HARD TO SIT STILL: NOT AT ALL
7. FEELING AFRAID AS IF SOMETHING AWFUL MIGHT HAPPEN: NOT AT ALL
GAD7 TOTAL SCORE: 3
IF YOU CHECKED OFF ANY PROBLEMS ON THIS QUESTIONNAIRE, HOW DIFFICULT HAVE THESE PROBLEMS MADE IT FOR YOU TO DO YOUR WORK, TAKE CARE OF THINGS AT HOME, OR GET ALONG WITH OTHER PEOPLE: SOMEWHAT DIFFICULT
2. NOT BEING ABLE TO STOP OR CONTROL WORRYING: SEVERAL DAYS
3. WORRYING TOO MUCH ABOUT DIFFERENT THINGS: SEVERAL DAYS
8. IF YOU CHECKED OFF ANY PROBLEMS, HOW DIFFICULT HAVE THESE MADE IT FOR YOU TO DO YOUR WORK, TAKE CARE OF THINGS AT HOME, OR GET ALONG WITH OTHER PEOPLE?: SOMEWHAT DIFFICULT
4. TROUBLE RELAXING: SEVERAL DAYS

## 2024-04-09 ASSESSMENT — PAIN SCALES - GENERAL: PAINLEVEL: NO PAIN (0)

## 2024-04-09 ASSESSMENT — ASTHMA QUESTIONNAIRES
QUESTION_3 LAST FOUR WEEKS HOW OFTEN DID YOUR ASTHMA SYMPTOMS (WHEEZING, COUGHING, SHORTNESS OF BREATH, CHEST TIGHTNESS OR PAIN) WAKE YOU UP AT NIGHT OR EARLIER THAN USUAL IN THE MORNING: ONCE A WEEK
QUESTION_2 LAST FOUR WEEKS HOW OFTEN HAVE YOU HAD SHORTNESS OF BREATH: ONCE OR TWICE A WEEK
QUESTION_4 LAST FOUR WEEKS HOW OFTEN HAVE YOU USED YOUR RESCUE INHALER OR NEBULIZER MEDICATION (SUCH AS ALBUTEROL): ONCE A WEEK OR LESS
ACT_TOTALSCORE: 18
QUESTION_5 LAST FOUR WEEKS HOW WOULD YOU RATE YOUR ASTHMA CONTROL: SOMEWHAT CONTROLLED
QUESTION_1 LAST FOUR WEEKS HOW MUCH OF THE TIME DID YOUR ASTHMA KEEP YOU FROM GETTING AS MUCH DONE AT WORK, SCHOOL OR AT HOME: A LITTLE OF THE TIME
ACT_TOTALSCORE: 18

## 2024-04-09 ASSESSMENT — SOCIAL DETERMINANTS OF HEALTH (SDOH): HOW OFTEN DO YOU GET TOGETHER WITH FRIENDS OR RELATIVES?: NEVER

## 2024-04-09 NOTE — COMMUNITY RESOURCES LIST (ENGLISH)
April 9, 2024           YOUR PERSONALIZED LIST OF SERVICES & PROGRAMS           & SHELTER    Case Management      Living - Housing Stabilization Services  [address] [place] [address] (Distance: 12.6 miles)  Phone: [phone]  Website: https://Atraverda.Znode  Language: Faroese, English, Iraqi  Fee: Insurance, Self pay      [employer] [facility] [employer] - Housing Stabilization Services  [address] [place] [address] (Distance: 1.9 miles)  Phone: [phone]  Website: https://www.[employer]/  Language: Faroese, English, Armenian, Hmong, Iraqi, Swedish  Fee: Insurance  Accessibility: Ada accessible, Blind accommodation, Deaf or hard of hearing, Translation services  Transportation Options: Free transportation      [employer] - Housing Stabilization Services  Phone: [phone]  Website: https://homebasemn.com/  Language: English  Hours: Mon 8:00 AM - 4:00 PM Tue 8:00 AM - 4:00 PM Wed 8:00 AM - 4:00 PM Thu 8:00 AM - 4:00 PM Fri 8:00 AM - 4:00 PM  Fee: Free  Accessibility: Blind accommodation, Deaf or hard of hearing  Transportation Options: Free transportation    Payment Assistance      [facility] - Transitional Services Program  [address] [place] [address] (Distance: 1.9 miles)  Phone: [phone]  Website: https://www.[employer]/services/transitional-services.html  Language: English      - HOUSING COUNSELING [facility]SEL[facility]  Phone: [phone]) [phone]  Email: derek@qualifyor  Website: http://www.nidhousing.com  Language: English      [employer] - Keep the Key  Phone: [phone]) [phone]  Website: https://www.dbm38-bkixfcsbdmnmjp.org/programs.html  Language: English  Hours: Mon 7:00 AM - 7:00 PM Tue 7:00 AM - 7:00 PM Wed 7:00 AM - 7:00 PM Thu 7:00 AM - 7:00 PM Fri 7:00 AM - 7:00 PM  Fee: Free    Mediation & Eviction Prevention      Living - Housing Stabilization  Services  [address] [place] [address] (Distance: 12.6 miles)  Phone: [phone]  Website: https://www.Fatsoma  Language: Estonian, English, Indian  Fee: Insurance, Self pay      Community Services [facility] - Housing Stabilization Services  [address] [place] [address] (Distance: 1.9 miles)  Phone: [phone]  Website: https://www.Urb[employer]Orange Line [employer].org/  Language: Estonian, English, Pashto, Hmong, Indian, Canadian  Fee: Insurance  Accessibility: Ada accessible, Blind accommodation, Deaf or hard of hearing, Translation services  Transportation Options: Free transportation      Line - Tenant Rights / Eviction Prevention  Website: https://Pathways Platformlinemn.org/s-wezh-hj-/  Language: English, Canadian        & RECREATION    Sports      of [place] - Sports clubs and recreational activities  [address] [place] [address] (Distance: 0.3 miles)  Language: English  Fee: Free, Self pay  Accessibility: Ada accessible      [facility] [facility] - Sports clubs and recreational activities - [facility] [employer] [facility] [place]  [address] [place] [address] (Distance: 0.5 miles)  Phone: [phone]  Language: English, Hmong, Malagasy  Fee: Self pay      [facility] - Adult Enrichment  Phone: [phone]  Website: https://WinningAdvantage/adults-seniors/adult-enrichment/  Language: English  Hours: Mon 7:30 AM - 4:00 PM Tue 7:30 AM - 4:00 PM Wed 7:30 AM - 4:00 PM Thu 7:30 AM - 4:00 PM Fri 7:30 AM - 4:00 PM    Classes/Groups      Fitness - [place] - Gym or workout facility - [employer] - [place] - [address]  [address] [place] [address] (Distance: 1.7 miles)  Phone: [phone]  Language: English  Fee: Free, Insurance, Self pay  Accessibility: Ada accessible      of [place] - Open Gym  [address] [place] [address] (Distance: 0.3 miles)  Language: English  Accessibility: Ada  [facility] Services - Care Coordination (Healthcare only)  Phone: [phone]  Website: https://[facility].org  Language: English, Bermudian  Hours: Wed 9:00 AM - 11:30 AM Thu 1:00 PM - 4:00 PM, 5:30 PM - 7:00 PM            Medical Transportation, (NEMT)      [facility] - [employer] - Transportation to medical appointments  [address] [address] (Distance: 9.5 miles)  Phone: [phone]  Website: https://www.SpeedDate/GiveSurance/public/portalcomponents/PublicContentServlet?contentId=I12WF_17026143  Language: English, Bermudian, Indonesian, Kyrgyz  Fee: Insurance  Accessibility: Translation services      Health - Transportation Assistance  [address] [place] [address] (Distance: 8.9 miles)  Phone: [phone]  Website: https://[facility].org/living-with-hiv/assistance/transportation/  Language: English      Social Service [facility] - Neighbor to Neighbor Program  Phone: [phone]  Email: isamar@[facility].org  Website: https://www.[facility].org/services/older-adults/-services/neighbor-to-neighbor  Language: English  Hours: Mon 8:00 AM - 5:00 PM Tue 8:00 AM - 5:00 PM Wed 8:00 AM - 5:00 PM Thu 8:00 AM - 5:00 PM Fri 8:00 AM - 5:00 PM  Fee: Insurance, Self pay  Accessibility: Deaf or hard of hearing, Blind accommodation, Translation services    Expense Assistance      Garage - Car Care Class  [address] [place] [address] (Distance: 10.7 miles)  Phone: [phone]  Website: https://www.theNidmi.org/[facility]  Language: English      RUNAWAY SAFELINE - RUNAWAY YOUTH CRISIS SERVICES - NATIONAL RUNAWAY SAFELINE  Phone: [phone]  Website: https://www.[employer].Switchable Solutions/  Language: English      - Dislocated Worker/Adult [employer] Employment Program  Phone: (523) 736-5676  Email: lauren@piSocietymn.org  Website:  https://Boca Researchvomn.org/services/employment-services/dislocated-worker-program/  Language: English, Gabonese  Hours: Mon 8:00 AM - 4:30 PM Tue 8:00 AM - 4:30 PM Wed 8:00 AM - 4:30 PM Thu 8:00 AM - 4:30 PM Fri 8:00 AM - 4:30 PM  Fee: Free  Accessibility: Ada accessible    Coordination      [place] - Transit Assistance Program (TAP)  [address] [place] [address] (Distance: 5.0 miles)  Phone: [phone]) [phone]  Language: English  Fee: Free  Accessibility: Ada accessible      Mobility - Paratransit or Dial-A-Ride service  [address] [place] [address] (Distance: 3.8 miles)  Phone: [phone]  Website: http://Executive Caddieobility.Zazzle  Language: English  Fee: Self pay  Accessibility: Translation services, Ada accessible      - [employer] SERVICES  Phone: (782) [phone]  Website: http://RentFeeder               IMPORTANT NUMBERS & WEBSITES        Emergency Services  911  .   [facility]  211 http://211unitedway.org  .   Poison Control  (825) 649-2236 http://[place]poison.org http://wisconsinpoison.org  .     Suicide and Crisis Lifeline  988 http://988lifeline.org  .   Childhelp [place] Child Abuse Hotline  219.269.8060 http://Childhelphotline.org   .   National Sexual Assault Hotline  (973) 954-2975 (HOPE) http://Rainn.org   .     National Runaway Safeline  (793) 831-5028 (RUNAWAY) http://1800runaSwyft.org  .   Pregnancy & Postpartum Support  Call/text 954-098-7065  [place]: http://ppsupportmn.org  [place]: http://Freebeepay.com/wi  .   Substance Abuse National Helpline ([facility])  520-391-HELP (6477) http://Findtreatment.gov   .                DISCLAIMER: These resources have been generated via the Magikflix Platform. [employer]ik[employer] does not endorse any service providers mentioned in this resource list. [employer] does not guarantee that the services mentioned in this resource list will be available to you or will improve your health or wellness.    [facility]

## 2024-04-09 NOTE — PROGRESS NOTES
Preventive Care Visit  [facility]  CHARLES Chen CNP, Family Medicine  Apr 9, 2024      Assessment & Plan     Routine history and physical examination of adult  Screening labs today per maintenance, age, risk assessment, and to promote patient wellbeing.  COVID vaccination declined.   - REVIEW OF HEALTH MAINTENANCE PROTOCOL ORDERS    Screening for HIV (human immunodeficiency virus)  - HIV Antigen Antibody Combo    Cervical cancer screening  - Pap Screen with HPV - recommended age 30 - 65 years    Immunization due  - MENINGOCOCCAL (MENQUADFI ) (2 YRS - 55 YRS)  - Pneumococcal 20 Valent Conjugate (Prevnar 20)  - INFLUENZA VACCINE IM > 6 MONTHS VALENT IIV4 (AFLURIA/FLUZONE)    Screening for diabetes mellitus  - Hemoglobin A1c    Screening for hyperlipidemia  - Lipid panel reflex to direct LDL Non-fasting    Adjustment disorder with mixed anxiety and depressed mood  PHQ 2 score 2.  Has no concerns.  No medication management.    Elevated liver enzymes  Elevated liver function test.  Probably vaso-occlusive crisis in the liver.  Trending liver tests have been decreasing in value which is reassuring.  Encourage patient to continue with 500 mg hydroxyurea daily.  Has future orders placed by hematology for CBC and CMP labs.    Sickle cell disease without crisis (H)  Sickle cell disease confirmed by hemoglobin electrophoresis 2/2017.  Started hydroxyurea 2/2023 for symptom management and survival.  Has reported improvement of shortness of breath and decreased sickle cell crisis pain events.  Recommended to quit smoking.    Asplenia  Splenectomy 2021.  Continue with recommended immunizations.  Continue with hand hygiene to prevent illness.    Gastroesophageal reflux disease without esophagitis  Reports morning midsternal chest pain that is relieved with upright position and movement.  Patient reports eating before bedtime every night.  Previously took omeprazole for GERD.  Will restart omeprazole for  "acid suppression.  Advised patient to eat earlier in the evening.  - omeprazole (PRILOSEC) 20 MG DR capsule; Take 1 capsule (20 mg) by mouth daily    Acute chest pain  One month ago reports acute left sided chest pain when sleeping that woke patient up lasting <10 seconds.  Advised patient to seek medical attention if patient develops chest pain.  No history of acute chest.  Reviewed symptoms of acute chest symptoms with patient in clinic.    Flank pain  Intermittent flank pain for approximately 4 months, R>L.  Denies urinary symptoms of hematuria, dysuria, frequency, urgency.  No CVA tenderness on exam.  No recent fevers.  Denies nausea, vomiting, abdominal pain.  If patient develops any above symptoms advised patient to return back for urine analysis.     Nicotine/Tobacco Cessation  She reports that she has been smoking cigarettes. She has been smoking an average of 0.5 packs per day. She has never been exposed to tobacco smoke. She has never used smokeless tobacco.  Nicotine/Tobacco Cessation Plan  Information offered: Patient not interested at this time    BMI  Estimated body mass index is 33.82 kg/m  as calculated from the following:    Height as of this encounter: 1.565 m (5' 1.61\").    Weight as of this encounter: 82.8 kg (182 lb 9.6 oz).   Weight management plan: Discussed healthy diet and exercise guidelines    Counseling  Appropriate preventive services were discussed with this patient, including applicable screening as appropriate for fall prevention, nutrition, physical activity, Tobacco-use cessation, weight loss and cognition.  Checklist reviewing preventive services available has been given to the patient.  Reviewed patient's diet, addressing concerns and/or questions.   Patient is at risk for social isolation and has been provided with information about the benefit of social connection.   The patient was instructed to see the dentist every 6 months.     Subjective   Eveline is a 28 year old, " presenting for the following:  Physical        4/9/2024     9:15 AM   Additional Questions   Roomed by Barb MARRERO CMA      Health Care Directive  Patient does not have a Health Care Directive or Living Will: Discussed advance care planning with patient; however, patient declined at this time.    HPI    Patient is a 28-year-old female who presents for annual physical exam.  Medical history includes sickle cell disease, asthma, asplenia, iron deficiency anemia, depression anxiety, cannabis use, tobacco use, elevated LFTs.    LMP: 4/3/2024  Hx of abnormal pap smear: no  Last pap smear: 2/2020  Perform self-breast exams: occasional   Vaginal discharge or irritation: no   Sexually active: yes   Contraception: nexplanon   Concerns for STDs:  no     Sickle cell disease confirmed by hemoglobin electrophoresis 2/2017.  Evaluated by hematology 3/2024 by Jill SOTO.  Started Hydrea 2/2023 for symptom management and survival.  Usually takes 1 tablet a day but in the winter would take 2 tablets a day which helped with pain.  Continue to take 1 or 2 tablets a day based on how she is feeling.  She will have follow up with hematology in 6 months with repeat lab.      Elevated liver function test: These were followed in February without any obvious cause.  Probably vaso-occlusive crisis in the liver.  Has future orders placed by hematology for CBC and CMP labs.    One month ago reports acute left sided chest pain when sleeping that woke patient up.  Left sided chest pain lasted for less than 10 seconds.  Went back to bed without any other symptoms.  Patient has not had another episode since then.  No history of acute chest.  Denies dyspnea, chest pain, palpitations, dizziness, weakness.  Vital signs in clinic within normal limits.     Intermittent flank pain for approximately 4 months, R>L.  Denies urinary symptoms of hematuria, dysuria, frequency, urgency.  No CVA tenderness on exam.  No recent fevers.  Denies nausea,  vomiting, abdominal pain.    For approximately one year, wakes up in the morning with mid sternal pain lasting 30-40 minutes.  Pain resolves with upright position and movement .  Does not have sternal pain throughout the day.   Was previously taking omeprazole for GERD but has not been on medication for couple years.  Able to manage GERD with lifestyle modifications.  Does eat and snack before bedtime.          4/9/2024   General Health   How would you rate your overall physical health? (!) FAIR   Feel stress (tense, anxious, or unable to sleep) To some extent   (!) STRESS CONCERN      4/9/2024   Nutrition   Three or more servings of calcium each day? Yes   Diet: Regular (no restrictions)   How many servings of fruit and vegetables per day? (!) 0-1   How many sweetened beverages each day? (!) 3         4/9/2024   Exercise   Days per week of moderate/strenous exercise 0 days   Average minutes spent exercising at this level 20 min   (!) EXERCISE CONCERN      4/9/2024   Social Factors   Frequency of gathering with friends or relatives Never   Worry food won't last until get money to buy more No   Food not last or not have enough money for food? No   Do you have housing?  Yes   Are you worried about losing your housing? Yes   Lack of transportation? Yes   Unable to get utilities (heat,electricity)? No   Want help with housing or utility concern? (!) YES    (!) TRANSPORTATION CONCERN PRESENT(!) HOUSING CONCERN PRESENT(!) SOCIAL CONNECTIONS CONCERN      4/9/2024   Dental   Dentist two times every year? (!) NO         4/9/2024   TB Screening   Were you born outside of the [place]? No     Today's PHQ-2 Score:       4/9/2024     9:19 AM   PHQ-2 ( 1999 [employer])   Q1: Little interest or pleasure in doing things 1   Q2: Feeling down, depressed or hopeless 1   PHQ-2 Score 2   Q1: Little interest or pleasure in doing things Several days   Q2: Feeling down, depressed or hopeless Several days   PHQ-2 Score 2         4/9/2024   Substance  Use   Alcohol more than 3/day or more than 7/wk No   Do you use any other substances recreationally? (!) CANNABIS PRODUCTS     Social History     Tobacco Use    Smoking status: Every Day     Packs/day: .5     Types: Cigarettes     Passive exposure: Never    Smokeless tobacco: Never    Tobacco comments:     Seen IP by TTS and declined services and materials on 2/20/23   Vaping Use    Vaping Use: Never used   Substance Use Topics    Alcohol use: Not Currently    Drug use: Yes     Frequency: 7.0 times per week     Types: Marijuana           9/19/2023   LAST FHS-7 RESULTS   1st degree relative breast or ovarian cancer Unknown   Any relative bilateral breast cancer Unknown   Any male have breast cancer No   Any ONE woman have BOTH breast AND ovarian cancer Yes   Any woman with breast cancer before 50yrs Yes   2 or more relatives with breast AND/OR ovarian cancer No   2 or more relatives with breast AND/OR bowel cancer No        Mammogram Screening - Mammogram every 1-2 years updated in Health Maintenance based on mutual decision making        4/9/2024   One time HIV Screening   Previous HIV test? Yes         4/9/2024   STI Screening   New sexual partner(s) since last STI/HIV test? No     History of abnormal Pap smear: NO - age 30- 65 PAP every 3 years recommended        4/9/2024   Contraception/Family Planning   Questions about contraception or family planning No        Reviewed and updated as needed this visit by Provider                  Past Medical History:   Diagnosis Date    Adjustment disorder with mixed anxiety and depressed mood 10/05/2017    Anemia, unspecified type 01/14/2023    Antibody to blood group S antigen positive     Patient has anti-C, anti-S, anti-Jkb, anti-M, and a nonspecific antibody. Blood product orders may be delayed. Draw THREE purple top tubes for all Type and Screen/Type and crossmatch orders- per [facility]    Asplenia 06/17/2021    Asthma 03/09/2017    Cannabis use disorder      Diarrhea 03/12/2018    Postpartum mood disturbance 10/05/2017    Sickle cell disease (H) 06/15/2020    Somnolence 02/16/2023     Past Surgical History:   Procedure Laterality Date    CHOLECYSTECTOMY      ESOPHAGOSCOPY, GASTROSCOPY, DUODENOSCOPY (EGD), COMBINED N/A 01/15/2023    Procedure: ENDOSCOPIC ULTRASOUND;  Surgeon: Marv Tomas MD;  Location: [facility]    MIDLINE SINGLE LUMEN PLACEMENT  02/14/2023         SPLENECTOMY  06/17/2021    TONSILLECTOMY       BP Readings from Last 3 Encounters:   04/09/24 122/70   03/05/24 (!) 140/85   05/22/23 128/80    Wt Readings from Last 3 Encounters:   04/09/24 82.8 kg (182 lb 9.6 oz)   03/05/24 83.9 kg (185 lb)   05/22/23 81 kg (178 lb 8 oz)         Current Outpatient Medications   Medication Sig Dispense Refill    albuterol (PROAIR HFA/PROVENTIL HFA/VENTOLIN HFA) 108 (90 Base) MCG/ACT inhaler Inhale 2 puffs into the lungs every 6 hours as needed for shortness of breath or wheezing 18 g 1    etonogestrel (NEXPLANON) 68 MG IMPL 1 each by Subdermal route      hydroxyurea (HYDREA) 500 MG capsule Take 2 capsules (1,000 mg) by mouth daily 180 capsule 3    ipratropium - albuterol 0.5 mg/2.5 mg/3 mL (DUONEB) 0.5-2.5 (3) MG/3ML neb solution Take 1 vial (3 mLs) by nebulization every 6 hours as needed for shortness of breath / dyspnea or wheezing 90 mL 1    multivitamin, therapeutic (THERA-VIT) TABS tablet Take 1 tablet by mouth daily      omeprazole (PRILOSEC) 20 MG DR capsule Take 1 capsule (20 mg) by mouth daily 90 capsule 0    sucralfate (CARAFATE) 1 GM tablet Take 1 tablet (1 g) by mouth 4 times daily as needed for nausea (stomach pain) 40 tablet 2    valACYclovir (VALTREX) 1000 mg tablet Take 1 tablet (1,000 mg) by mouth 3 times daily for 7 days 21 tablet 0     Review of Systems  Review of systems negative otherwise known HPI.     Objective    Exam  /70 (BP Location: Left arm, Patient Position: Sitting, Cuff Size: Adult Regular)   Pulse 95   Temp 98.5  F (36.9  " C) (Temporal)   Resp 20   Ht 1.565 m (5' 1.61\")   Wt 82.8 kg (182 lb 9.6 oz)   LMP 04/03/2024 (Approximate)   SpO2 98%   BMI 33.82 kg/m     Estimated body mass index is 33.82 kg/m  as calculated from the following:    Height as of this encounter: 1.565 m (5' 1.61\").    Weight as of this encounter: 82.8 kg (182 lb 9.6 oz).    Physical Exam  General: Alert, oriented, no acute distress.     Eyes: Normal external eye, conjunctiva, and lids. ROBBIE.      Ears: External ear nontender. Normal landmark and color.   Oropharynx: Dentition intact. Oral and posterior pharynx pink and moist.      Neck: Supple, no masses or nodes. No adenopathy.     Respiratory: Lungs clear, unlabored. No rales, rhonchi, or wheezes.     Cardiovascular: Regular rate and rhythm. No murmurs. No peripheral edema.      Breasts: Normal without suspicious masses, skin, nipple changes, or axillary nodes.  Gastrointestinal: Normoactive bowel sounds. Soft, nontender, no organomegaly.      Musculoskeletal: No joint tenderness, deformity, or swelling.      Skin: No suspicious lesions, rashes, or abnormalities.     Neurologic: No gross motor or sensory deficit.  Mentation intact and speech normal.      Psychiatric: Appropriate affect.   Genitourinary: Vulva and vagina without erythema. Normal vaginal mucosa. Cervix no lesions or cervical motion tenderness.     Signed Electronically by: CHARLES Chen CNP  Answers submitted by the patient for this visit:  TK-7 (Submitted on 4/9/2024)  TK 7 TOTAL SCORE: 3    "

## 2024-04-11 LAB
BKR LAB AP GYN ADEQUACY: NORMAL
BKR LAB AP GYN INTERPRETATION: NORMAL
BKR LAB AP HPV REFLEX: NORMAL
BKR LAB AP LMP: NORMAL
BKR LAB AP PREVIOUS ABNORMAL: NORMAL
PATH REPORT.COMMENTS IMP SPEC: NORMAL
PATH REPORT.COMMENTS IMP SPEC: NORMAL
PATH REPORT.RELEVANT HX SPEC: NORMAL

## 2024-04-14 LAB
HUMAN PAPILLOMA VIRUS 16 DNA: NEGATIVE
HUMAN PAPILLOMA VIRUS 18 DNA: NEGATIVE
HUMAN PAPILLOMA VIRUS FINAL DIAGNOSIS: NORMAL
HUMAN PAPILLOMA VIRUS OTHER HR: NEGATIVE

## 2024-06-28 ENCOUNTER — ANCILLARY PROCEDURE (OUTPATIENT)
Dept: GENERAL RADIOLOGY | Facility: CLINIC | Age: 29
End: 2024-06-28
Payer: COMMERCIAL

## 2024-06-28 ENCOUNTER — OFFICE VISIT (OUTPATIENT)
Dept: FAMILY MEDICINE | Facility: CLINIC | Age: 29
End: 2024-06-28
Payer: COMMERCIAL

## 2024-06-28 VITALS
RESPIRATION RATE: 18 BRPM | HEIGHT: 62 IN | DIASTOLIC BLOOD PRESSURE: 80 MMHG | HEART RATE: 76 BPM | TEMPERATURE: 98.5 F | BODY MASS INDEX: 34.19 KG/M2 | WEIGHT: 185.8 LBS | SYSTOLIC BLOOD PRESSURE: 122 MMHG | OXYGEN SATURATION: 98 %

## 2024-06-28 DIAGNOSIS — M79.671 BILATERAL FOOT PAIN: ICD-10-CM

## 2024-06-28 DIAGNOSIS — M79.672 BILATERAL FOOT PAIN: ICD-10-CM

## 2024-06-28 DIAGNOSIS — Z71.6 ENCOUNTER FOR SMOKING CESSATION COUNSELING: ICD-10-CM

## 2024-06-28 DIAGNOSIS — J45.20 MILD INTERMITTENT ASTHMA WITHOUT COMPLICATION: ICD-10-CM

## 2024-06-28 DIAGNOSIS — N92.6 IRREGULAR MENSTRUAL CYCLE: Primary | ICD-10-CM

## 2024-06-28 LAB
ERYTHROCYTE [DISTWIDTH] IN BLOOD BY AUTOMATED COUNT: 16.1 % (ref 10–15)
HCT VFR BLD AUTO: 28.2 % (ref 35–47)
HGB BLD-MCNC: 9.6 G/DL (ref 11.7–15.7)
MCH RBC QN AUTO: 30.3 PG (ref 26.5–33)
MCHC RBC AUTO-ENTMCNC: 34 G/DL (ref 31.5–36.5)
MCV RBC AUTO: 89 FL (ref 78–100)
PLATELET # BLD AUTO: 382 10E3/UL (ref 150–450)
RBC # BLD AUTO: 3.17 10E6/UL (ref 3.8–5.2)
WBC # BLD AUTO: 16.3 10E3/UL (ref 4–11)

## 2024-06-28 PROCEDURE — 99214 OFFICE O/P EST MOD 30 MIN: CPT

## 2024-06-28 PROCEDURE — 36415 COLL VENOUS BLD VENIPUNCTURE: CPT

## 2024-06-28 PROCEDURE — 85027 COMPLETE CBC AUTOMATED: CPT

## 2024-06-28 PROCEDURE — 73630 X-RAY EXAM OF FOOT: CPT | Mod: TC | Performed by: RADIOLOGY

## 2024-06-28 RX ORDER — IPRATROPIUM BROMIDE AND ALBUTEROL SULFATE 2.5; .5 MG/3ML; MG/3ML
1 SOLUTION RESPIRATORY (INHALATION) EVERY 6 HOURS PRN
Qty: 90 ML | Refills: 1 | Status: SHIPPED | OUTPATIENT
Start: 2024-06-28

## 2024-06-28 RX ORDER — NICOTINE 21 MG/24HR
1 PATCH, TRANSDERMAL 24 HOURS TRANSDERMAL EVERY 24 HOURS
Qty: 42 PATCH | Refills: 0 | Status: SHIPPED | OUTPATIENT
Start: 2024-06-28 | End: 2024-08-09

## 2024-06-28 RX ORDER — NICOTINE 21 MG/24HR
1 PATCH, TRANSDERMAL 24 HOURS TRANSDERMAL EVERY 24 HOURS
Qty: 14 PATCH | Refills: 0 | Status: SHIPPED | OUTPATIENT
Start: 2024-08-09 | End: 2024-08-23

## 2024-06-28 ASSESSMENT — ASTHMA QUESTIONNAIRES
QUESTION_1 LAST FOUR WEEKS HOW MUCH OF THE TIME DID YOUR ASTHMA KEEP YOU FROM GETTING AS MUCH DONE AT WORK, SCHOOL OR AT HOME: A LITTLE OF THE TIME
ACT_TOTALSCORE: 17
QUESTION_3 LAST FOUR WEEKS HOW OFTEN DID YOUR ASTHMA SYMPTOMS (WHEEZING, COUGHING, SHORTNESS OF BREATH, CHEST TIGHTNESS OR PAIN) WAKE YOU UP AT NIGHT OR EARLIER THAN USUAL IN THE MORNING: TWO OR THREE NIGHTS A WEEK
ACT_TOTALSCORE: 17
QUESTION_2 LAST FOUR WEEKS HOW OFTEN HAVE YOU HAD SHORTNESS OF BREATH: ONCE OR TWICE A WEEK
QUESTION_4 LAST FOUR WEEKS HOW OFTEN HAVE YOU USED YOUR RESCUE INHALER OR NEBULIZER MEDICATION (SUCH AS ALBUTEROL): ONCE A WEEK OR LESS
QUESTION_5 LAST FOUR WEEKS HOW WOULD YOU RATE YOUR ASTHMA CONTROL: SOMEWHAT CONTROLLED

## 2024-06-28 NOTE — PATIENT INSTRUCTIONS
Nicotine patch   Starting on the quit day  Smoke >10 cigarettes/day (1/2 pack) use the highest dose of the nicotine patch (21 mg/day) for six weeks  Followed by 14 mg/day for two weeks  Finish with 7 mg/day for two weeks.     Transvaginal US for irregular menstrual cycles.    Feet xray for foot pain.

## 2024-06-28 NOTE — PROGRESS NOTES
Assessment & Plan     Irregular menstrual cycle  Will evaluate for anemia r/t to heavy menstrual cycles.  Will evaluate for uterine fibroids or polyps.  - US Pelvic Complete with Transvaginal; Future  - CBC with platelets    Bilateral foot pain  Will obtain bilateral feet x-rays to evaluate for any abnormalities.  - XR Foot Right G/E 3 Views; Future  - XR Foot Left G/E 3 Views; Future    Mild intermittent asthma without complication  No recent asthma exacerbations.  Needs refill.  - ipratropium - albuterol 0.5 mg/2.5 mg/3 mL (DUONEB) 0.5-2.5 (3) MG/3ML neb solution; Take 1 vial (3 mLs) by nebulization every 6 hours as needed for shortness of breath or wheezing    Encounter for smoking cessation counseling  Cigarette smoking causes vasoconstriction and can lead to sickle cell pain complications.  Is interested in tobacco cessation.  Reviewed strength progression of nicotine patch.  - nicotine (NICODERM CQ) 21 MG/24HR 24 hr patch; Place 1 patch onto the skin every 24 hours for 42 days  - nicotine (NICODERM CQ) 7 MG/24HR 24 hr patch; Place 1 patch onto the skin every 24 hours for 14 days  - nicotine (NICODERM CQ) 14 MG/24HR 24 hr patch; Place 1 patch onto the skin every 24 hours for 14 days    Subjective   Eveline is a 28 year old, presenting for the following health issues:  Foot Pain (Patient states that she has bilateral feet pain x 1 year. Patient states that it has been worsening the last month. States that it is located on the outer part of foot and wraps to the bottom. Patient states that she notices it when working. ) and Abnormal Bleeding Problem (Patient states that her menstrual cycle has been coming more frequently. Patient states that she will get it a couple times a month. Patient states that the cycle last 3-7 days. )      6/28/2024    10:39 AM   Additional Questions   Roomed by Barb MARRERO CMA     History of Present Illness       Reason for visit:  Foot pain side pain and odd periods  Symptom onset:   More than a month    She eats 0-1 servings of fruits and vegetables daily.She consumes 3 sweetened beverage(s) daily.She exercises with enough effort to increase her heart rate 30 to 60 minutes per day.  She exercises with enough effort to increase her heart rate 5 days per week. She is missing 1 dose(s) of medications per week.     Patient is a 28-year-old female presenting for multiple concerns.  Medical history includes sickle cell disease, asthma, asplenia, iron deficiency anemia, depression anxiety, cannabis use, tobacco use, elevated LFTs.     Reports swelling to lateral side of feet for many years.  For the past year has noted increased pain.  Pain is worse at end of day.  Denies pain at heel or along plantar fascia.  Pain is improved with topical analgesic spray and massaging.  Denies any injury or trauma to feet.  Denies peripheral edema.  Pain is not consistent with previous sickle cell pain crisis.      Had Nexplanon replaced 1/2023.  After insertion, initially had amenorrhea.  Recently menstrual cycles have been lasting approximately 2 weeks.  Reports medium-heavy flow, changes tampon every 2-3 hours.  Variable timing between menstrual cycles.  Last LMP 6/24/2024.  Denies abdominal cramping, pelvic pain, dizziness, lightheadedness, or fatigue.    Has been experiencing intermittent right-sided flank pain since January.  Increased flank pain is usually associated with cigarette smoking.  Smokes 1/2 PPD.  Unsure if history of kidney stones.  Denies hematuria, dysuria, urinary frequency, urinary.  Is interested in smoking cessation.    Review of Systems  Review of systems negative otherwise known HPI.    Past Medical History:   Diagnosis Date    Adjustment disorder with mixed anxiety and depressed mood 10/05/2017    Anemia, unspecified type 01/14/2023    Antibody to blood group S antigen positive     Patient has anti-C, anti-S, anti-Jkb, anti-M, and a nonspecific antibody. Blood product orders may be  delayed. Draw THREE purple top tubes for all Type and Screen/Type and crossmatch orders- per [facility]    Asplenia 06/17/2021    Asthma 03/09/2017    Cannabis use disorder     Diarrhea 03/12/2018    Postpartum mood disturbance 10/05/2017    Sickle cell disease (H) 06/15/2020    Somnolence 02/16/2023     Past Surgical History:   Procedure Laterality Date    CHOLECYSTECTOMY      ESOPHAGOSCOPY, GASTROSCOPY, DUODENOSCOPY (EGD), COMBINED N/A 01/15/2023    Procedure: ENDOSCOPIC ULTRASOUND;  Surgeon: Marv Tomas MD;  Location: [facility] OR    MIDLINE SINGLE LUMEN PLACEMENT  02/14/2023         SPLENECTOMY  06/17/2021    TONSILLECTOMY       Family History   Problem Relation Age of Onset    Sickle Cell Trait Mother     Hypertension Mother     Diabetes Father     Kidney Disease Father     Liver Disease Father     Sickle Cell Trait Maternal Grandfather     Sickle Cell Trait Paternal Grandmother      Social History     Tobacco Use    Smoking status: Every Day     Current packs/day: 0.50     Types: Cigarettes     Passive exposure: Never    Smokeless tobacco: Never    Tobacco comments:     Seen IP by TTS and declined services and materials on 2/20/23   Substance Use Topics    Alcohol use: Not Currently     Current Outpatient Medications   Medication Sig Dispense Refill    albuterol (PROAIR HFA/PROVENTIL HFA/VENTOLIN HFA) 108 (90 Base) MCG/ACT inhaler Inhale 2 puffs into the lungs every 6 hours as needed for shortness of breath or wheezing 18 g 1    etonogestrel (NEXPLANON) 68 MG IMPL 1 each by Subdermal route      hydroxyurea (HYDREA) 500 MG capsule Take 2 capsules (1,000 mg) by mouth daily 180 capsule 3    ipratropium - albuterol 0.5 mg/2.5 mg/3 mL (DUONEB) 0.5-2.5 (3) MG/3ML neb solution Take 1 vial (3 mLs) by nebulization every 6 hours as needed for shortness of breath or wheezing 90 mL 1    multivitamin, therapeutic (THERA-VIT) TABS tablet Take 1 tablet by mouth daily      [START ON 8/9/2024] nicotine (NICODERM  "CQ) 14 MG/24HR 24 hr patch Place 1 patch onto the skin every 24 hours for 14 days 14 patch 0    nicotine (NICODERM CQ) 21 MG/24HR 24 hr patch Place 1 patch onto the skin every 24 hours for 42 days 42 patch 0    [START ON 8/24/2024] nicotine (NICODERM CQ) 7 MG/24HR 24 hr patch Place 1 patch onto the skin every 24 hours for 14 days 14 patch 0    omeprazole (PRILOSEC) 20 MG DR capsule Take 1 capsule (20 mg) by mouth daily 90 capsule 0    sucralfate (CARAFATE) 1 GM tablet Take 1 tablet (1 g) by mouth 4 times daily as needed for nausea (stomach pain) 40 tablet 2    valACYclovir (VALTREX) 1000 mg tablet Take 1 tablet (1,000 mg) by mouth 3 times daily for 7 days 21 tablet 0     No current facility-administered medications for this visit.       Objective    /80 (BP Location: Left arm, Patient Position: Sitting, Cuff Size: Adult Regular)   Pulse 76   Temp 98.5  F (36.9  C) (Temporal)   Resp 18   Ht 1.567 m (5' 1.69\")   Wt 84.3 kg (185 lb 12.8 oz)   LMP 06/24/2024 (Exact Date)   SpO2 98%   BMI 34.32 kg/m    Body mass index is 34.32 kg/m .  Physical Exam   General: Alert, oriented, no acute distress.    Respiratory: Easy work of breathing.   Cardiovascular: Appears warm and well-perfused.    Skin: No abnormalities noted on visualized skin.   Neurologic: Mentation intact and speech normal.     Psychiatric: Appropriate affect.   Bilateral feet: Soft tissue swelling to lateral side of feet.  Slight tenderness.  No ecchymosis.     Signed Electronically by: CHARLES Chen CNP    "

## 2024-09-16 ENCOUNTER — HOSPITAL ENCOUNTER (EMERGENCY)
Facility: CLINIC | Age: 29
Discharge: HOME | End: 2024-09-16
Attending: EMERGENCY MEDICINE | Admitting: EMERGENCY MEDICINE
Payer: COMMERCIAL

## 2024-09-16 ENCOUNTER — APPOINTMENT (OUTPATIENT)
Dept: RADIOLOGY | Facility: CLINIC | Age: 29
End: 2024-09-16
Attending: EMERGENCY MEDICINE
Payer: COMMERCIAL

## 2024-09-16 VITALS
HEART RATE: 103 BPM | SYSTOLIC BLOOD PRESSURE: 130 MMHG | TEMPERATURE: 98.3 F | OXYGEN SATURATION: 98 % | BODY MASS INDEX: 32.25 KG/M2 | RESPIRATION RATE: 12 BRPM | DIASTOLIC BLOOD PRESSURE: 73 MMHG | WEIGHT: 182 LBS | HEIGHT: 63 IN

## 2024-09-16 DIAGNOSIS — J45.901 MODERATE ASTHMA WITH EXACERBATION, UNSPECIFIED WHETHER PERSISTENT: ICD-10-CM

## 2024-09-16 LAB
ATRIAL RATE - MUSE: 82 BPM
BASOPHILS # BLD MANUAL: 0 10E3/UL (ref 0–0.2)
BASOPHILS NFR BLD MANUAL: 0 %
DIASTOLIC BLOOD PRESSURE - MUSE: NORMAL MMHG
EOSINOPHIL # BLD MANUAL: ABNORMAL 10*3/UL
EOSINOPHIL NFR BLD MANUAL: 7 %
ERYTHROCYTE [DISTWIDTH] IN BLOOD BY AUTOMATED COUNT: 16.4 % (ref 10–15)
FLUAV RNA SPEC QL NAA+PROBE: NEGATIVE
FLUBV RNA RESP QL NAA+PROBE: NEGATIVE
GIANT PLATELETS BLD QL SMEAR: SLIGHT
HCT VFR BLD AUTO: 27.6 % (ref 35–47)
HGB BLD-MCNC: 9.5 G/DL (ref 11.7–15.7)
INTERPRETATION ECG - MUSE: NORMAL
LYMPHOCYTES # BLD MANUAL: ABNORMAL 10*3/UL
LYMPHOCYTES NFR BLD MANUAL: 30 %
MCH RBC QN AUTO: 30.4 PG (ref 26.5–33)
MCHC RBC AUTO-ENTMCNC: 34.4 G/DL (ref 31.5–36.5)
MCV RBC AUTO: 89 FL (ref 78–100)
MONOCYTES # BLD MANUAL: ABNORMAL 10*3/UL
MONOCYTES NFR BLD MANUAL: 3 %
NEUTROPHILS # BLD MANUAL: ABNORMAL 10*3/UL
NEUTROPHILS NFR BLD MANUAL: 60 %
P AXIS - MUSE: 36 DEGREES
PLAT MORPH BLD: ABNORMAL
PLATELET # BLD AUTO: 353 10E3/UL (ref 150–450)
POLYCHROMASIA BLD QL SMEAR: SLIGHT
PR INTERVAL - MUSE: 128 MS
QRS DURATION - MUSE: 72 MS
QT - MUSE: 342 MS
QTC - MUSE: 399 MS
R AXIS - MUSE: 63 DEGREES
RBC # BLD AUTO: 3.12 10E6/UL (ref 3.8–5.2)
RBC MORPH BLD: ABNORMAL
RSV RNA SPEC NAA+PROBE: NEGATIVE
SARS-COV-2 RNA RESP QL NAA+PROBE: NEGATIVE
SYSTOLIC BLOOD PRESSURE - MUSE: NORMAL MMHG
T AXIS - MUSE: 43 DEGREES
TARGETS BLD QL SMEAR: SLIGHT
VARIANT LYMPHS BLD QL SMEAR: PRESENT
VENTRICULAR RATE- MUSE: 82 BPM
WBC # BLD AUTO: 17.4 10E3/UL (ref 4–11)

## 2024-09-16 PROCEDURE — 36415 COLL VENOUS BLD VENIPUNCTURE: CPT | Performed by: EMERGENCY MEDICINE

## 2024-09-16 PROCEDURE — 250N000011 HC RX IP 250 OP 636: Performed by: EMERGENCY MEDICINE

## 2024-09-16 PROCEDURE — 999N000157 HC STATISTIC RCP TIME EA 10 MIN

## 2024-09-16 PROCEDURE — 96374 THER/PROPH/DIAG INJ IV PUSH: CPT

## 2024-09-16 PROCEDURE — 87637 SARSCOV2&INF A&B&RSV AMP PRB: CPT | Performed by: EMERGENCY MEDICINE

## 2024-09-16 PROCEDURE — 85007 BL SMEAR W/DIFF WBC COUNT: CPT | Performed by: EMERGENCY MEDICINE

## 2024-09-16 PROCEDURE — 85027 COMPLETE CBC AUTOMATED: CPT | Performed by: EMERGENCY MEDICINE

## 2024-09-16 PROCEDURE — 93005 ELECTROCARDIOGRAM TRACING: CPT | Performed by: EMERGENCY MEDICINE

## 2024-09-16 PROCEDURE — 250N000009 HC RX 250: Performed by: EMERGENCY MEDICINE

## 2024-09-16 PROCEDURE — 99285 EMERGENCY DEPT VISIT HI MDM: CPT | Mod: 25

## 2024-09-16 PROCEDURE — 250N000013 HC RX MED GY IP 250 OP 250 PS 637: Performed by: EMERGENCY MEDICINE

## 2024-09-16 PROCEDURE — 94640 AIRWAY INHALATION TREATMENT: CPT

## 2024-09-16 PROCEDURE — 71046 X-RAY EXAM CHEST 2 VIEWS: CPT

## 2024-09-16 RX ORDER — IPRATROPIUM BROMIDE AND ALBUTEROL SULFATE 2.5; .5 MG/3ML; MG/3ML
3 SOLUTION RESPIRATORY (INHALATION) ONCE
Status: COMPLETED | OUTPATIENT
Start: 2024-09-16 | End: 2024-09-16

## 2024-09-16 RX ORDER — ALBUTEROL SULFATE 5 MG/ML
2.5 SOLUTION RESPIRATORY (INHALATION) EVERY 6 HOURS PRN
Status: DISCONTINUED | OUTPATIENT
Start: 2024-09-16 | End: 2024-09-16 | Stop reason: HOSPADM

## 2024-09-16 RX ORDER — HYDROCODONE BITARTRATE AND ACETAMINOPHEN 5; 325 MG/1; MG/1
1 TABLET ORAL ONCE
Status: COMPLETED | OUTPATIENT
Start: 2024-09-16 | End: 2024-09-16

## 2024-09-16 RX ORDER — METHYLPREDNISOLONE SODIUM SUCCINATE 125 MG/2ML
125 INJECTION, POWDER, LYOPHILIZED, FOR SOLUTION INTRAMUSCULAR; INTRAVENOUS ONCE
Status: COMPLETED | OUTPATIENT
Start: 2024-09-16 | End: 2024-09-16

## 2024-09-16 RX ORDER — PREDNISONE 10 MG/1
TABLET ORAL
Qty: 32 TABLET | Refills: 0 | Status: SHIPPED | OUTPATIENT
Start: 2024-09-16 | End: 2024-09-26

## 2024-09-16 RX ORDER — BENZONATATE 100 MG/1
100 CAPSULE ORAL 3 TIMES DAILY PRN
Qty: 20 CAPSULE | Refills: 0 | Status: SHIPPED | OUTPATIENT
Start: 2024-09-16

## 2024-09-16 RX ADMIN — HYDROCODONE BITARTRATE AND ACETAMINOPHEN 1 TABLET: 5; 325 TABLET ORAL at 20:21

## 2024-09-16 RX ADMIN — METHYLPREDNISOLONE SODIUM SUCCINATE 125 MG: 125 INJECTION, POWDER, FOR SOLUTION INTRAMUSCULAR; INTRAVENOUS at 17:31

## 2024-09-16 RX ADMIN — IPRATROPIUM BROMIDE AND ALBUTEROL SULFATE 3 ML: .5; 3 SOLUTION RESPIRATORY (INHALATION) at 17:33

## 2024-09-16 RX ADMIN — ALBUTEROL SULFATE 2.5 MG: 2.5 SOLUTION RESPIRATORY (INHALATION) at 19:51

## 2024-09-16 ASSESSMENT — COLUMBIA-SUICIDE SEVERITY RATING SCALE - C-SSRS
2. HAVE YOU ACTUALLY HAD ANY THOUGHTS OF KILLING YOURSELF IN THE PAST MONTH?: NO
6. HAVE YOU EVER DONE ANYTHING, STARTED TO DO ANYTHING, OR PREPARED TO DO ANYTHING TO END YOUR LIFE?: NO
1. IN THE PAST MONTH, HAVE YOU WISHED YOU WERE DEAD OR WISHED YOU COULD GO TO SLEEP AND NOT WAKE UP?: NO

## 2024-09-16 ASSESSMENT — ACTIVITIES OF DAILY LIVING (ADL)
ADLS_ACUITY_SCORE: 38

## 2024-09-16 NOTE — ED TRIAGE NOTES
Pt presents to the ED with an asthma exacerbation x 3 days. Minimal improvement with nebs and inhaler. Endorses some abdominal and side cramping with some chest pain.      Triage Assessment (Adult)       Row Name 09/16/24 1207          Triage Assessment    Airway WDL WDL        Respiratory WDL    Respiratory WDL X;rhythm/pattern     Rhythm/Pattern, Respiratory shortness of breath        Skin Circulation/Temperature WDL    Skin Circulation/Temperature WDL WDL        Cardiac WDL    Cardiac WDL WDL        Peripheral/Neurovascular WDL    Peripheral Neurovascular WDL WDL        Cognitive/Neuro/Behavioral WDL    Cognitive/Neuro/Behavioral WDL WDL

## 2024-09-16 NOTE — ED PROVIDER NOTES
EMERGENCY DEPARTMENT ENCOUNTER      NAME: Eveline Gage  AGE: 28 year old female  YOB: 1995  MRN: [id number]  EVALUATION DATE & TIME: 9/16/2024  5:03 PM    PCP: [facility]    ED PROVIDER: Gregory Hayden M.D.      Chief Complaint   Patient presents with    Asthma Exacerbation         FINAL IMPRESSION:  Asthma exacerbation      ED COURSE & MEDICAL DECISION MAKING:    Pertinent Labs & Imaging studies reviewed. (See chart for details)  28 year old female presents to the Emergency Department for evaluation of wheezing and shortness of breath.  Symptoms started late last week after her child was similarly ill.  Did develop fevers over the weekend that have since subsided.  However now with continued wheezing and exertional shortness of breath.  Patient with underlying sickle cell disease.  Denies any chest pain.  On exam she is actively wheezing with borderline tachycardia.  Oxygenation 100% on room air.  Lungs with diffuse inspiratory and expiratory wheezing with rhonchi in the right upper lobe.  Patient typically only uses her albuterol as needed.  Has been using at home without improvement.  Will provide DuoNeb.  Along with Solu-Medrol.  Chest x-ray being obtained assess for infiltrative disease given rhonchi.  Will also initiate CBC and COVID testing given presentation and history.. Patient appears non toxic with stable vitals signs. Overall exam is benign.        5:11 PM I met with the patient for the initial interview and physical examination. Discussed plan for treatment and workup in the ED.    6:24 PM I rechecked on the patient.  Patient with only minimal improvement with DuoNeb.  Patient with continued wheezing and rhonchi.  Albuterol nebulization ordered.  CBC with moderate leukocytosis at 17.4.  Hemoglobin 9.5 essentially unchanged from most recent value.  Differential revealing some polychromasia reactive lymphocytes and target cells consistent with her sickle cell  disease.  Awaiting chest x-ray and COVID swab  6:58 PM.  Swab negative for influenza/COVID/RSV.  Chest x-ray pending  7:43 PM.  Chest x-ray without acute infiltrates on review.  Confirmed by radiology.  Will reassess the patient.  Patient has not yet received second nebulization.  Call placed to respiratory therapy.  8:44 PM.  Patient feeling improved after second nebulization.  Minimal residual wheezing.  Will continue outpatient management given response to interventions however did consider hospitalization initially.  Will proceed with outpatient steroid taper along with Tessalon Perles for cough    At the conclusion of the encounter I discussed the results of all of the tests and the disposition. The questions were answered and return precautions provided. The patient or family acknowledged understanding and was agreeable with the care plan.       MEDICATIONS GIVEN IN THE EMERGENCY:  Medications   methylPREDNISolone Na Suc (solu-MEDROL) injection 125 mg (125 mg Intravenous $Given 9/16/24 1731)   ipratropium - albuterol 0.5 mg/2.5 mg/3 mL (DUONEB) neb solution 3 mL (3 mLs Nebulization $Given 9/16/24 6251)       NEW PRESCRIPTIONS STARTED AT TODAY'S ER VISIT  Current Discharge Medication List        START taking these medications    Details   benzonatate (TESSALON) 100 MG capsule Take 1 capsule (100 mg) by mouth 3 times daily as needed for cough.  Qty: 20 capsule, Refills: 0      predniSONE (DELTASONE) 10 MG tablet Take 4 tablets daily for 5 days,  take 2 tablets daily for 3 days, take 1 tablet daily for 3 days, take half a tablet for 3 days.  Qty: 32 tablet, Refills: 0                 =================================================================    HPI    Patient information was obtained from: patient     Use of Intrepreter: N/A         Eveline Gage is a 28 year old female with a pertient medical history of sickle cell disease, asthma, cannabis use disorder, and tobacco usage who presents to the ED for  evaluation of asthma exacerbation.     Patient reports her daughter was sick on 9/11/2024 or 9/12/2024 with a cough. Her daughter slept with her in the same bed and then she developed a cough as well. Since then, her cough has worsened with wheezing present. Endorses associated symptoms of fever and chills as well. She has used her inhaler and nebulizer with no relief. She further cannot walk for long periods of time without being short of breath.     Patient has not taken a COVID test. She has had COVID before and she is vaccinated.     Patient is on albuterol and says her asthma is under control. She only uses her inhaler and nebulizer when needed.       REVIEW OF SYSTEMS   Constitutional:  Positive for fever, chills  Respiratory:  Positive for productive cough or increased work of breathing  Cardiovascular:  Denies chest pain, palpitations  GI:  Denies abdominal pain, nausea, vomiting, or change in bowel or bladder habits   Musculoskeletal:  Denies any new muscle/joint swelling  Skin:  Denies rash   Neurologic:  Denies focal weakness  All systems negative except as marked.     PAST MEDICAL HISTORY:  Past Medical History:   Diagnosis Date    Adjustment disorder with mixed anxiety and depressed mood 10/05/2017    Anemia, unspecified type 01/14/2023    Antibody to blood group S antigen positive     Patient has anti-C, anti-S, anti-Jkb, anti-M, and a nonspecific antibody. Blood product orders may be delayed. Draw THREE purple top tubes for all Type and Screen/Type and crossmatch orders- per [facility]    Asplenia 06/17/2021    Asthma 03/09/2017    Cannabis use disorder     Diarrhea 03/12/2018    Postpartum mood disturbance 10/05/2017    Sickle cell disease (H) 06/15/2020    Somnolence 02/16/2023       PAST SURGICAL HISTORY:  Past Surgical History:   Procedure Laterality Date    CHOLECYSTECTOMY      ESOPHAGOSCOPY, GASTROSCOPY, DUODENOSCOPY (EGD), COMBINED N/A 01/15/2023    Procedure: ENDOSCOPIC ULTRASOUND;   Surgeon: Marv Tomas MD;  Location: [facility] OR    MIDLINE SINGLE LUMEN PLACEMENT  02/14/2023         SPLENECTOMY  06/17/2021    TONSILLECTOMY           CURRENT MEDICATIONS:    No current facility-administered medications for this encounter.    Current Outpatient Medications:     albuterol (PROAIR HFA/PROVENTIL HFA/VENTOLIN HFA) 108 (90 Base) MCG/ACT inhaler, Inhale 2 puffs into the lungs every 6 hours as needed for shortness of breath or wheezing, Disp: 18 g, Rfl: 1    etonogestrel (NEXPLANON) 68 MG IMPL, 1 each by Subdermal route, Disp: , Rfl:     hydroxyurea (HYDREA) 500 MG capsule, Take 2 capsules (1,000 mg) by mouth daily, Disp: 180 capsule, Rfl: 3    ipratropium - albuterol 0.5 mg/2.5 mg/3 mL (DUONEB) 0.5-2.5 (3) MG/3ML neb solution, Take 1 vial (3 mLs) by nebulization every 6 hours as needed for shortness of breath or wheezing, Disp: 90 mL, Rfl: 1    multivitamin, therapeutic (THERA-VIT) TABS tablet, Take 1 tablet by mouth daily, Disp: , Rfl:     omeprazole (PRILOSEC) 20 MG DR capsule, Take 1 capsule (20 mg) by mouth daily, Disp: 90 capsule, Rfl: 0    sucralfate (CARAFATE) 1 GM tablet, Take 1 tablet (1 g) by mouth 4 times daily as needed for nausea (stomach pain), Disp: 40 tablet, Rfl: 2    valACYclovir (VALTREX) 1000 mg tablet, Take 1 tablet (1,000 mg) by mouth 3 times daily for 7 days, Disp: 21 tablet, Rfl: 0    ALLERGIES:  Allergies   Allergen Reactions    Blood-Group Specific Substance Other (See Comments)     Patient has anti-C, anti-S, anti-Jkb, anti-M, and a nonspecific antibody. Blood product orders may be delayed. Draw THREE purple top tubes for all Type and Screen/Type and crossmatch orders.        Penicillins Rash       FAMILY HISTORY:  Family History   Problem Relation Age of Onset    Sickle Cell Trait Mother     Hypertension Mother     Diabetes Father     Kidney Disease Father     Liver Disease Father     Sickle Cell Trait Maternal Grandfather     Sickle Cell Trait Paternal  Grandmother        SOCIAL HISTORY:   Social History     Socioeconomic History    Marital status: Single   Tobacco Use    Smoking status: Every Day     Current packs/day: 0.50     Types: Cigarettes     Passive exposure: Never    Smokeless tobacco: Never    Tobacco comments:     Seen IP by TTS and declined services and materials on 2/20/23   Vaping Use    Vaping status: Never Used   Substance and Sexual Activity    Alcohol use: Not Currently    Drug use: Yes     Frequency: 7.0 times per week     Types: Marijuana    Sexual activity: Yes     Birth control/protection: Implant     Social Determinants of Health     Financial Resource Strain: Low Risk  (4/9/2024)    Financial Resource Strain     Within the past 12 months, have you or your family members you live with been unable to get utilities (heat, electricity) when it was really needed?: No   Food Insecurity: Low Risk  (4/9/2024)    Food Insecurity     Within the past 12 months, did you worry that your food would run out before you got money to buy more?: No     Within the past 12 months, did the food you bought just not last and you didn t have money to get more?: No   Transportation Needs: High Risk (4/9/2024)    Transportation Needs     Within the past 12 months, has lack of transportation kept you from medical appointments, getting your medicines, non-medical meetings or appointments, work, or from getting things that you need?: Yes   Physical Activity: Inactive (4/9/2024)    Exercise Vital Sign     Days of Exercise per Week: 0 days     Minutes of Exercise per Session: 20 min   Stress: Stress Concern Present (4/9/2024)    [employer] [place] [employer] - Occupational Stress Questionnaire     Feeling of Stress : To some extent   Social Connections: Unknown (4/9/2024)    Social Connection and Isolation Panel [NHANES]     Frequency of Social Gatherings with Friends and Family: Never   Interpersonal Safety: Low Risk  (6/28/2024)    Interpersonal Safety     Do  "you feel physically and emotionally safe where you currently live?: Yes     Within the past 12 months, have you been hit, slapped, kicked or otherwise physically hurt by someone?: No     Within the past 12 months, have you been humiliated or emotionally abused in other ways by your partner or ex-partner?: No   Housing Stability: High Risk (4/9/2024)    Housing Stability     Do you have housing? : Yes     Are you worried about losing your housing?: Yes       VITALS:  Patient Vitals for the past 24 hrs:   BP Temp Temp src Pulse Resp SpO2 Height Weight   09/16/24 1730 -- -- -- -- -- 100 % -- --   09/16/24 1655 131/60 98.3  F (36.8  C) Temporal 104 28 98 % 1.6 m (5' 3\") 82.6 kg (182 lb)        PHYSICAL EXAM    Constitutional:  Awake, alert, in mild distress  HENT:  Normocephalic, Atraumatic. Bilateral external ears normal. Oropharynx moist. Nose normal. Neck- Normal range of motion with no guarding, No midline cervical tenderness, Supple, No stridor.   Eyes:  PERRL, EOMI with no signs of entrapment, Conjunctiva normal, No discharge.   Respiratory:  Diffuse mild respiratory distress with wheezing present on the right upper level bronchi.   Cardiovascular:  Tachycardia, Normal rhythm, No appreciable rubs or gallops.   GI:  Soft, No tenderness, No distension, No palpable masses  Musculoskeletal:  Intact distal pulses, No edema. Good range of motion in all major joints. No tenderness to palpation or major deformities noted.  Integument:  Warm, Dry, No erythema, No rash.   Neurologic:  Alert & oriented, Normal motor function, Normal sensory function, No focal deficits noted.   Psychiatric:  Affect normal, Judgment normal, Mood normal.     LAB:  All pertinent labs reviewed and interpreted.     Results for orders placed or performed during the hospital encounter of 09/16/24   Chest XR,  PA & LAT     Status: None    Narrative    EXAM: XR CHEST 2 VIEWS  LOCATION: [facility]  DATE: " 9/16/2024    INDICATION: Dyspnea, history of asthma and sickle cell  COMPARISON: 3/4/2023.      Impression    IMPRESSION:   No acute abnormality or significant interval change as compared to the previous study.    No focal pulmonary infiltrate, pleural effusion, or pneumothorax. The cardiac size and mediastinal contours appear within normal limits.       CBC (+ platelets, no diff)     Status: Abnormal   Result Value Ref Range    WBC Count 17.4 (H) 4.0 - 11.0 10e3/uL    RBC Count 3.12 (L) 3.80 - 5.20 10e6/uL    Hemoglobin 9.5 (L) 11.7 - 15.7 g/dL    Hematocrit 27.6 (L) 35.0 - 47.0 %    MCV 89 78 - 100 fL    MCH 30.4 26.5 - 33.0 pg    MCHC 34.4 31.5 - 36.5 g/dL    RDW 16.4 (H) 10.0 - 15.0 %    Platelet Count 353 150 - 450 10e3/uL   Symptomatic Influenza A/B, RSV, & SARS-CoV2 PCR (COVID-19) Nasopharyngeal     Status: Normal    Specimen: Nasopharyngeal; Swab   Result Value Ref Range    Influenza A PCR Negative Negative    Influenza B PCR Negative Negative    RSV PCR Negative Negative    SARS CoV2 PCR Negative Negative    Narrative    Testing was performed using the Xpert Xpress CoV2/Flu/RSV Assay on the [employer] GeneXpert Instrument. This test should be ordered for the detection of SARS-CoV2, influenza, and RSV viruses in individuals with signs and symptoms of respiratory tract infection. This test is for in vitro diagnostic use under the [place] FDA for laboratories certified under CLIA to perform high or moderate complexity testing. This test has been [place] FDA cleared. A negative result does not rule out the presence of PCR inhibitors in the specimen or target RNA in concentration below the limit of detection for the assay. If only one viral target is positive but coinfection with multiple targets is suspected, the sample should be re-tested with another FDA cleared, approved, or authorized test, if coninfection would change clinical management. This test was validated by the [facility]. These laboratories  are certified under the Clinical Laboratory Improvement Amendments of 1988 (CLIA-88) as qualified to perfom high complexity laboratory testing.   Manual Differential     Status: Abnormal   Result Value Ref Range    % Neutrophils 60 %    % Lymphocytes 30 %    % Monocytes 3 %    % Eosinophils 7 %    % Basophils 0 %    Absolute Neutrophils      Absolute Lymphocytes      Absolute Monocytes      Absolute Eosinophils      Absolute Basophils 0.0 0.0 - 0.2 10e3/uL    RBC Morphology Confirmed RBC Indices     Platelet Assessment (A) Automated Count Confirmed. Platelet morphology is normal.     Automated Count Confirmed. Giant platelets are present.    Polychromasia Slight (A) None Seen    Reactive Lymphocytes Present (A) None Seen    Target Cells Slight (A) None Seen    Giant Platelets Slight (A) None Seen   ECG 12-LEAD WITH MUSE (LHE)     Status: None   Result Value Ref Range    Systolic Blood Pressure  mmHg    Diastolic Blood Pressure  mmHg    Ventricular Rate 82 BPM    Atrial Rate 82 BPM    NH Interval 128 ms    QRS Duration 72 ms     ms    QTc 399 ms    P Axis 36 degrees    R AXIS 63 degrees    T Axis 43 degrees    Interpretation ECG       Sinus rhythm with marked sinus arrhythmia  Otherwise normal ECG  When compared with ECG of 15-Feb-2023 22:19,  Vent. rate has decreased by  47 bpm  Confirmed by SEE ED PROVIDER NOTE FOR, ECG INTERPRETATION (4000),  DENNIS BECKETT ([id number]) on 9/16/2024 8:02:32 PM                EKG:    Normal sinus rhythm with marked sinus arrhythmia.  Normal QRS.  Normal ST segments.  Unchanged compared to February 15, 2023 other than decreased rate today  I have independently reviewed and interpreted the EKG(s) documented above.        I, Yoselin Peralta, am serving as a scribe to document services personally performed by Gregory Hayden MD, based on my observation and the provider's statements to me. I, Gregory Hayden MD attest that Yoselin Peralta is acting in a scribe capacity, has observed my  performance of the services and has documented them in accordance with my direction.    Gregory Hayden M.D.  Emergency Medicine  [facility] EMERGENCY ROOM      Gregory Hayden MD  09/16/24 2043

## 2024-09-16 NOTE — Clinical Note
Eveline Gage was seen and treated in our emergency department on 9/16/2024.  She may return to work on 09/18/2024.       If you have any questions or concerns, please don't hesitate to call.      Gregory Hayden MD

## 2025-05-18 ENCOUNTER — HEALTH MAINTENANCE LETTER (OUTPATIENT)
Age: 30
End: 2025-05-18

## 2025-08-04 ENCOUNTER — APPOINTMENT (OUTPATIENT)
Dept: CT IMAGING | Facility: CLINIC | Age: 30
End: 2025-08-04
Attending: EMERGENCY MEDICINE
Payer: COMMERCIAL

## 2025-08-04 ENCOUNTER — HOSPITAL ENCOUNTER (EMERGENCY)
Facility: CLINIC | Age: 30
Discharge: HOME | End: 2025-08-04
Attending: EMERGENCY MEDICINE
Payer: COMMERCIAL

## 2025-08-04 ENCOUNTER — NURSE TRIAGE (OUTPATIENT)
Dept: FAMILY MEDICINE | Facility: CLINIC | Age: 30
End: 2025-08-04
Payer: COMMERCIAL

## 2025-08-04 VITALS
TEMPERATURE: 98.1 F | OXYGEN SATURATION: 100 % | HEART RATE: 68 BPM | WEIGHT: 185 LBS | DIASTOLIC BLOOD PRESSURE: 72 MMHG | RESPIRATION RATE: 19 BRPM | SYSTOLIC BLOOD PRESSURE: 129 MMHG | BODY MASS INDEX: 32.78 KG/M2 | HEIGHT: 63 IN

## 2025-08-04 DIAGNOSIS — R07.81 PLEURITIC CHEST PAIN: Primary | ICD-10-CM

## 2025-08-04 LAB
ANION GAP SERPL CALCULATED.3IONS-SCNC: 9 MMOL/L (ref 7–15)
ATRIAL RATE - MUSE: 77 BPM
BASOPHILS # BLD AUTO: 0.1 10E3/UL (ref 0–0.2)
BASOPHILS NFR BLD AUTO: 1 %
BUN SERPL-MCNC: 11.5 MG/DL (ref 6–20)
CALCIUM SERPL-MCNC: 9.4 MG/DL (ref 8.8–10.4)
CHLORIDE SERPL-SCNC: 108 MMOL/L (ref 98–107)
CREAT SERPL-MCNC: 0.64 MG/DL (ref 0.51–0.95)
CRP SERPL-MCNC: 5.73 MG/L
D DIMER PPP FEU-MCNC: 2.39 UG/ML FEU (ref 0–0.5)
DACRYOCYTES BLD QL SMEAR: SLIGHT
DIASTOLIC BLOOD PRESSURE - MUSE: 72 MMHG
EGFRCR SERPLBLD CKD-EPI 2021: >90 ML/MIN/1.73M2
ELLIPTOCYTES BLD QL SMEAR: SLIGHT
EOSINOPHIL # BLD AUTO: 0.9 10E3/UL (ref 0–0.7)
EOSINOPHIL NFR BLD AUTO: 6 %
ERYTHROCYTE [DISTWIDTH] IN BLOOD BY AUTOMATED COUNT: 15.9 % (ref 10–15)
ERYTHROCYTE [SEDIMENTATION RATE] IN BLOOD BY WESTERGREN METHOD: 15 MM/HR (ref 0–20)
FLUAV RNA SPEC QL NAA+PROBE: NEGATIVE
FLUBV RNA RESP QL NAA+PROBE: NEGATIVE
FRAGMENTS BLD QL SMEAR: ABNORMAL
GIANT PLATELETS BLD QL SMEAR: SLIGHT
GLUCOSE SERPL-MCNC: 96 MG/DL (ref 70–99)
HCG SERPL QL: NEGATIVE
HCO3 SERPL-SCNC: 22 MMOL/L (ref 22–29)
HCT VFR BLD AUTO: 29.9 % (ref 35–47)
HGB BLD-MCNC: 10.2 G/DL (ref 11.7–15.7)
HOWELL-JOLLY BOD BLD QL SMEAR: PRESENT
IMM GRANULOCYTES # BLD: 0.1 10E3/UL
IMM GRANULOCYTES NFR BLD: 1 %
INTERPRETATION ECG - MUSE: NORMAL
LYMPHOCYTES # BLD AUTO: 4.1 10E3/UL (ref 0.8–5.3)
LYMPHOCYTES NFR BLD AUTO: 26 %
MCH RBC QN AUTO: 29.7 PG (ref 26.5–33)
MCHC RBC AUTO-ENTMCNC: 34.1 G/DL (ref 31.5–36.5)
MCV RBC AUTO: 87 FL (ref 78–100)
MONOCYTES # BLD AUTO: 0.9 10E3/UL (ref 0–1.3)
MONOCYTES NFR BLD AUTO: 6 %
NEUTROPHILS # BLD AUTO: 9.8 10E3/UL (ref 1.6–8.3)
NEUTROPHILS NFR BLD AUTO: 62 %
NRBC # BLD AUTO: 0 10E3/UL
NRBC BLD AUTO-RTO: 0 /100
P AXIS - MUSE: 23 DEGREES
PLAT MORPH BLD: ABNORMAL
PLATELET # BLD AUTO: 387 10E3/UL (ref 150–450)
POLYCHROMASIA BLD QL SMEAR: SLIGHT
POTASSIUM SERPL-SCNC: 5.2 MMOL/L (ref 3.4–5.3)
PR INTERVAL - MUSE: 140 MS
QRS DURATION - MUSE: 70 MS
QT - MUSE: 366 MS
QTC - MUSE: 414 MS
R AXIS - MUSE: 41 DEGREES
RBC # BLD AUTO: 3.44 10E6/UL (ref 3.8–5.2)
RBC MORPH BLD: ABNORMAL
RETICS # AUTO: 0.25 10E6/UL (ref 0.03–0.1)
RETICS/RBC NFR AUTO: 7.3 % (ref 0.5–2)
RSV RNA SPEC NAA+PROBE: NEGATIVE
SARS-COV-2 RNA RESP QL NAA+PROBE: NEGATIVE
SICKLE CELLS BLD QL SMEAR: SLIGHT
SODIUM SERPL-SCNC: 139 MMOL/L (ref 135–145)
SYSTOLIC BLOOD PRESSURE - MUSE: 129 MMHG
T AXIS - MUSE: 34 DEGREES
TARGETS BLD QL SMEAR: SLIGHT
TROPONIN T SERPL HS-MCNC: 8 NG/L
VENTRICULAR RATE- MUSE: 77 BPM
WBC # BLD AUTO: 15.9 10E3/UL (ref 4–11)

## 2025-08-04 PROCEDURE — 96374 THER/PROPH/DIAG INJ IV PUSH: CPT

## 2025-08-04 PROCEDURE — 86140 C-REACTIVE PROTEIN: CPT | Performed by: EMERGENCY MEDICINE

## 2025-08-04 PROCEDURE — 99285 EMERGENCY DEPT VISIT HI MDM: CPT | Mod: 25 | Performed by: EMERGENCY MEDICINE

## 2025-08-04 PROCEDURE — 87637 SARSCOV2&INF A&B&RSV AMP PRB: CPT | Performed by: EMERGENCY MEDICINE

## 2025-08-04 PROCEDURE — 250N000011 HC RX IP 250 OP 636: Performed by: EMERGENCY MEDICINE

## 2025-08-04 PROCEDURE — 85004 AUTOMATED DIFF WBC COUNT: CPT | Performed by: EMERGENCY MEDICINE

## 2025-08-04 PROCEDURE — 85379 FIBRIN DEGRADATION QUANT: CPT | Performed by: EMERGENCY MEDICINE

## 2025-08-04 PROCEDURE — 84703 CHORIONIC GONADOTROPIN ASSAY: CPT | Performed by: EMERGENCY MEDICINE

## 2025-08-04 PROCEDURE — 84484 ASSAY OF TROPONIN QUANT: CPT | Performed by: EMERGENCY MEDICINE

## 2025-08-04 PROCEDURE — 71275 CT ANGIOGRAPHY CHEST: CPT

## 2025-08-04 PROCEDURE — 93005 ELECTROCARDIOGRAM TRACING: CPT | Performed by: EMERGENCY MEDICINE

## 2025-08-04 PROCEDURE — 36415 COLL VENOUS BLD VENIPUNCTURE: CPT | Performed by: EMERGENCY MEDICINE

## 2025-08-04 PROCEDURE — 85045 AUTOMATED RETICULOCYTE COUNT: CPT | Performed by: EMERGENCY MEDICINE

## 2025-08-04 PROCEDURE — 85652 RBC SED RATE AUTOMATED: CPT | Performed by: EMERGENCY MEDICINE

## 2025-08-04 PROCEDURE — 82310 ASSAY OF CALCIUM: CPT | Performed by: EMERGENCY MEDICINE

## 2025-08-04 RX ORDER — KETOROLAC TROMETHAMINE 15 MG/ML
15 INJECTION, SOLUTION INTRAMUSCULAR; INTRAVENOUS ONCE
Status: COMPLETED | OUTPATIENT
Start: 2025-08-04 | End: 2025-08-04

## 2025-08-04 RX ORDER — PREDNISONE 20 MG/1
TABLET ORAL
Qty: 10 TABLET | Refills: 0 | Status: SHIPPED | OUTPATIENT
Start: 2025-08-04

## 2025-08-04 RX ORDER — IOPAMIDOL 755 MG/ML
90 INJECTION, SOLUTION INTRAVASCULAR ONCE
Status: COMPLETED | OUTPATIENT
Start: 2025-08-04 | End: 2025-08-04

## 2025-08-04 RX ADMIN — KETOROLAC TROMETHAMINE 15 MG: 15 INJECTION, SOLUTION INTRAMUSCULAR; INTRAVENOUS at 12:42

## 2025-08-04 RX ADMIN — IOPAMIDOL 90 ML: 755 INJECTION, SOLUTION INTRAVENOUS at 13:26

## 2025-08-04 ASSESSMENT — ACTIVITIES OF DAILY LIVING (ADL)
ADLS_ACUITY_SCORE: 53

## 2025-08-04 ASSESSMENT — COLUMBIA-SUICIDE SEVERITY RATING SCALE - C-SSRS
6. HAVE YOU EVER DONE ANYTHING, STARTED TO DO ANYTHING, OR PREPARED TO DO ANYTHING TO END YOUR LIFE?: NO
1. IN THE PAST MONTH, HAVE YOU WISHED YOU WERE DEAD OR WISHED YOU COULD GO TO SLEEP AND NOT WAKE UP?: NO
2. HAVE YOU ACTUALLY HAD ANY THOUGHTS OF KILLING YOURSELF IN THE PAST MONTH?: NO

## 2025-08-22 DIAGNOSIS — D57.00 SICKLE CELL PAIN CRISIS (H): ICD-10-CM

## 2025-08-23 RX ORDER — HYDROXYUREA 500 MG/1
1000 CAPSULE ORAL DAILY
Qty: 60 CAPSULE | Refills: 3 | Status: SHIPPED | OUTPATIENT
Start: 2025-08-23 | End: 2025-08-25

## 2025-08-25 DIAGNOSIS — D57.00 SICKLE CELL PAIN CRISIS (H): ICD-10-CM

## 2025-08-25 RX ORDER — HYDROXYUREA 500 MG/1
1000 CAPSULE ORAL DAILY
Qty: 60 CAPSULE | Refills: 3 | Status: SHIPPED | OUTPATIENT
Start: 2025-08-25

## 2025-08-27 ENCOUNTER — TELEPHONE (OUTPATIENT)
Dept: ONCOLOGY | Facility: HOSPITAL | Age: 30
End: 2025-08-27
Payer: COMMERCIAL

## (undated) DEVICE — TUBING SUCTION MEDI-VAC 1/4"X20' N620A - HE

## (undated) DEVICE — SUCTION MANIFOLD NEPTUNE 2 SYS 1 PORT 702-025-000

## (undated) DEVICE — PLATE GROUNDING ADULT W/CORD 9165L

## (undated) DEVICE — SOL WATER IRRIG 1000ML BOTTLE 2F7114

## (undated) RX ORDER — ALBUMIN (HUMAN) 12.5 G/50ML
SOLUTION INTRAVENOUS
Status: DISPENSED
Start: 2024-03-08